# Patient Record
Sex: MALE | Race: WHITE | Employment: UNEMPLOYED | URBAN - METROPOLITAN AREA
[De-identification: names, ages, dates, MRNs, and addresses within clinical notes are randomized per-mention and may not be internally consistent; named-entity substitution may affect disease eponyms.]

---

## 2018-07-18 ENCOUNTER — HOSPITAL ENCOUNTER (EMERGENCY)
Age: 73
Discharge: HOME | End: 2018-07-18
Attending: EMERGENCY MEDICINE
Payer: COMMERCIAL

## 2018-07-18 VITALS
HEART RATE: 77 BPM | DIASTOLIC BLOOD PRESSURE: 57 MMHG | WEIGHT: 199 LBS | TEMPERATURE: 98.4 F | RESPIRATION RATE: 14 BRPM | SYSTOLIC BLOOD PRESSURE: 111 MMHG | OXYGEN SATURATION: 98 %

## 2018-07-18 DIAGNOSIS — Z76.89 ENCOUNTER FOR PSYCHIATRIC ASSESSMENT: Primary | ICD-10-CM

## 2018-07-18 PROCEDURE — 99283 EMERGENCY DEPT VISIT LOW MDM: CPT

## 2018-07-18 RX ORDER — LAMOTRIGINE 200 MG/1
200 TABLET ORAL DAILY
Status: ON HOLD | COMMUNITY
End: 2018-07-31

## 2018-07-18 RX ORDER — QUETIAPINE FUMARATE 300 MG/1
300 TABLET, FILM COATED ORAL EVERY EVENING
Status: ON HOLD | COMMUNITY
End: 2018-07-19 | Stop reason: ALTCHOICE

## 2018-07-18 RX ORDER — QUETIAPINE 200 MG/1
200 TABLET, FILM COATED, EXTENDED RELEASE ORAL 2 TIMES DAILY
Status: ON HOLD | COMMUNITY
End: 2018-07-19 | Stop reason: ALTCHOICE

## 2018-07-18 RX ORDER — TRAZODONE HYDROCHLORIDE 100 MG/1
100 TABLET ORAL NIGHTLY
Status: ON HOLD | COMMUNITY
End: 2018-07-19 | Stop reason: ALTCHOICE

## 2018-07-18 NOTE — ED NOTES
Provisional Diagnosis:  Bi-Polar Disorder     Psychosocial and Contextual Factors:    Pt has issues with relationships. Pt has issues with social environment. Pt states that he is suffering from \"Hypomania\". C-SSRS Summary: X    Patient: X  Family:  Agency: [facility]; [facility]     Present Suicidal Behavior:  Pt denies     Verbal:    Attempt:    Past Suicidal Behavior: Pt denies     Verbal:    Attempt:    Self-Injurious/Self-Mutilation:    Protective Factors:  Pt has housing. Pt has income. Pt has support. Pt has a Guardian: [place]. Risk Factors:  Pt has poor judgment, insight and coping skills. Clinical Summary:  Pt is a 72year old  male who presents to the ED via [facility] case workers. Pt reports that he is suffering from \"hypomania\". Pt is linked with [facility] and reports medication compliance. Pt reports great sleep and a good appetite. Pt states that he was just released from [facility] this morning. Pt states that he went to [facility] and decided that he was hungry and tired and wanted to come to the hospital. Pt then states that Alma Rosa Stewart made him come to the hospital to get checked out. Pt denies suicidal ideations. Pt denies homicidal ideations. Pt denies all halucinations. Pt reports that he lives in a group home in [place] [address] and his Jose Christiano is his daughter, Chari Mott. Level of Care Disposition:  Pt is not meeting criteria for an in-patient psychiatric admission. Pt to follow up at [facility].  Pt discharged

## 2018-07-19 ENCOUNTER — APPOINTMENT (OUTPATIENT)
Dept: CT IMAGING | Age: 73
DRG: 885 | End: 2018-07-19
Payer: COMMERCIAL

## 2018-07-19 ENCOUNTER — HOSPITAL ENCOUNTER (INPATIENT)
Age: 73
LOS: 12 days | Discharge: HOME | DRG: 885 | End: 2018-07-31
Attending: EMERGENCY MEDICINE | Admitting: PSYCHIATRY & NEUROLOGY
Payer: COMMERCIAL

## 2018-07-19 DIAGNOSIS — F31.9 BIPOLAR 1 DISORDER (HCC): Primary | ICD-10-CM

## 2018-07-19 LAB
ABSOLUTE EOS #: 0.1 K/UL (ref 0–0.4)
ABSOLUTE IMMATURE GRANULOCYTE: ABNORMAL K/UL (ref 0–0.3)
ABSOLUTE LYMPH #: 2.1 K/UL (ref 1–4.8)
ABSOLUTE MONO #: 0.6 K/UL (ref 0.1–1.3)
ALBUMIN SERPL-MCNC: 4 G/DL (ref 3.5–5.2)
ALBUMIN/GLOBULIN RATIO: ABNORMAL (ref 1–2.5)
ALP BLD-CCNC: 56 U/L (ref 40–129)
ALT SERPL-CCNC: 24 U/L (ref 5–41)
AMPHETAMINE SCREEN URINE: NEGATIVE
ANION GAP SERPL CALCULATED.3IONS-SCNC: 12 MMOL/L (ref 9–17)
AST SERPL-CCNC: 23 U/L
BARBITURATE SCREEN URINE: NEGATIVE
BASOPHILS # BLD: 1 % (ref 0–2)
BASOPHILS ABSOLUTE: 0 K/UL (ref 0–0.2)
BENZODIAZEPINE SCREEN, URINE: NEGATIVE
BILIRUB SERPL-MCNC: 0.23 MG/DL (ref 0.3–1.2)
BUN BLDV-MCNC: 16 MG/DL (ref 8–23)
BUN/CREAT BLD: ABNORMAL (ref 9–20)
BUPRENORPHINE URINE: NORMAL
CALCIUM SERPL-MCNC: 9.4 MG/DL (ref 8.6–10.4)
CANNABINOID SCREEN URINE: NEGATIVE
CHLORIDE BLD-SCNC: 101 MMOL/L (ref 98–107)
CO2: 25 MMOL/L (ref 20–31)
COCAINE METABOLITE, URINE: NEGATIVE
CREAT SERPL-MCNC: 0.92 MG/DL (ref 0.7–1.2)
DIFFERENTIAL TYPE: ABNORMAL
EOSINOPHILS RELATIVE PERCENT: 1 % (ref 0–4)
ETHANOL PERCENT: <0.01 %
ETHANOL: <10 MG/DL
GFR AFRICAN AMERICAN: >60 ML/MIN
GFR NON-AFRICAN AMERICAN: >60 ML/MIN
GFR SERPL CREATININE-BSD FRML MDRD: ABNORMAL ML/MIN/{1.73_M2}
GFR SERPL CREATININE-BSD FRML MDRD: ABNORMAL ML/MIN/{1.73_M2}
GLUCOSE BLD-MCNC: 131 MG/DL (ref 70–99)
HCT VFR BLD CALC: 44.4 % (ref 41–53)
HEMOGLOBIN: 14.7 G/DL (ref 13.5–17.5)
IMMATURE GRANULOCYTES: ABNORMAL %
LYMPHOCYTES # BLD: 28 % (ref 24–44)
MAGNESIUM: 2.1 MG/DL (ref 1.6–2.6)
MCH RBC QN AUTO: 32.4 PG (ref 26–34)
MCHC RBC AUTO-ENTMCNC: 33.2 G/DL (ref 31–37)
MCV RBC AUTO: 97.5 FL (ref 80–100)
MDMA URINE: NORMAL
METHADONE SCREEN, URINE: NEGATIVE
METHAMPHETAMINE, URINE: NORMAL
MONOCYTES # BLD: 9 % (ref 1–7)
NRBC AUTOMATED: ABNORMAL PER 100 WBC
OPIATES, URINE: NEGATIVE
OXYCODONE SCREEN URINE: NEGATIVE
PDW BLD-RTO: 13.9 % (ref 11.5–14.9)
PHENCYCLIDINE, URINE: NEGATIVE
PLATELET # BLD: 288 K/UL (ref 150–450)
PLATELET ESTIMATE: ABNORMAL
PMV BLD AUTO: 6.8 FL (ref 6–12)
POTASSIUM SERPL-SCNC: 4.7 MMOL/L (ref 3.7–5.3)
PROPOXYPHENE, URINE: NORMAL
RBC # BLD: 4.55 M/UL (ref 4.5–5.9)
RBC # BLD: ABNORMAL 10*6/UL
SEG NEUTROPHILS: 61 % (ref 36–66)
SEGMENTED NEUTROPHILS ABSOLUTE COUNT: 4.7 K/UL (ref 1.3–9.1)
SODIUM BLD-SCNC: 138 MMOL/L (ref 135–144)
TEST INFORMATION: NORMAL
TOTAL PROTEIN: 7.2 G/DL (ref 6.4–8.3)
TRICYCLIC ANTIDEPRESSANTS, UR: NORMAL
TSH SERPL DL<=0.05 MIU/L-ACNC: 3.05 MIU/L (ref 0.3–5)
WBC # BLD: 7.6 K/UL (ref 3.5–11)
WBC # BLD: ABNORMAL 10*3/UL

## 2018-07-19 PROCEDURE — 84439 ASSAY OF FREE THYROXINE: CPT

## 2018-07-19 PROCEDURE — 80307 DRUG TEST PRSMV CHEM ANLYZR: CPT

## 2018-07-19 PROCEDURE — 80053 COMPREHEN METABOLIC PANEL: CPT

## 2018-07-19 PROCEDURE — 84481 FREE ASSAY (FT-3): CPT

## 2018-07-19 PROCEDURE — 84443 ASSAY THYROID STIM HORMONE: CPT

## 2018-07-19 PROCEDURE — 99285 EMERGENCY DEPT VISIT HI MDM: CPT

## 2018-07-19 PROCEDURE — 6370000000 HC RX 637 (ALT 250 FOR IP): Performed by: PSYCHIATRY & NEUROLOGY

## 2018-07-19 PROCEDURE — 83735 ASSAY OF MAGNESIUM: CPT

## 2018-07-19 PROCEDURE — 85025 COMPLETE CBC W/AUTO DIFF WBC: CPT

## 2018-07-19 PROCEDURE — G0480 DRUG TEST DEF 1-7 CLASSES: HCPCS

## 2018-07-19 PROCEDURE — 70450 CT HEAD/BRAIN W/O DYE: CPT

## 2018-07-19 PROCEDURE — 80061 LIPID PANEL: CPT

## 2018-07-19 PROCEDURE — 1240000000 HC EMOTIONAL WELLNESS R&B

## 2018-07-19 PROCEDURE — 83036 HEMOGLOBIN GLYCOSYLATED A1C: CPT

## 2018-07-19 PROCEDURE — 36415 COLL VENOUS BLD VENIPUNCTURE: CPT

## 2018-07-19 RX ORDER — HYDROXYZINE HYDROCHLORIDE 25 MG/1
25 TABLET, FILM COATED ORAL 3 TIMES DAILY PRN
Status: DISCONTINUED | OUTPATIENT
Start: 2018-07-19 | End: 2018-07-24

## 2018-07-19 RX ORDER — PANTOPRAZOLE SODIUM 40 MG/1
40 TABLET, DELAYED RELEASE ORAL DAILY
Status: DISCONTINUED | OUTPATIENT
Start: 2018-07-20 | End: 2018-07-31 | Stop reason: HOSPADM

## 2018-07-19 RX ORDER — DIVALPROEX SODIUM 500 MG/1
500 TABLET, EXTENDED RELEASE ORAL DAILY
Status: ON HOLD | COMMUNITY
End: 2018-07-31 | Stop reason: HOSPADM

## 2018-07-19 RX ORDER — LAMOTRIGINE 100 MG/1
200 TABLET ORAL DAILY
Status: DISCONTINUED | OUTPATIENT
Start: 2018-07-20 | End: 2018-07-31 | Stop reason: HOSPADM

## 2018-07-19 RX ORDER — TRAZODONE HYDROCHLORIDE 100 MG/1
100 TABLET ORAL NIGHTLY PRN
Status: DISCONTINUED | OUTPATIENT
Start: 2018-07-19 | End: 2018-07-31 | Stop reason: HOSPADM

## 2018-07-19 RX ORDER — LOPERAMIDE HYDROCHLORIDE 2 MG/1
2 CAPSULE ORAL 2 TIMES DAILY PRN
Status: DISCONTINUED | OUTPATIENT
Start: 2018-07-19 | End: 2018-07-31 | Stop reason: HOSPADM

## 2018-07-19 RX ORDER — MAGNESIUM HYDROXIDE/ALUMINUM HYDROXICE/SIMETHICONE 120; 1200; 1200 MG/30ML; MG/30ML; MG/30ML
30 SUSPENSION ORAL 3 TIMES DAILY PRN
Status: DISCONTINUED | OUTPATIENT
Start: 2018-07-19 | End: 2018-07-31 | Stop reason: HOSPADM

## 2018-07-19 RX ORDER — DIVALPROEX SODIUM 500 MG/1
500 TABLET, EXTENDED RELEASE ORAL DAILY
Status: DISCONTINUED | OUTPATIENT
Start: 2018-07-19 | End: 2018-07-27

## 2018-07-19 RX ORDER — TRAZODONE HYDROCHLORIDE 100 MG/1
100 TABLET ORAL NIGHTLY PRN
Status: ON HOLD | COMMUNITY
End: 2018-07-31

## 2018-07-19 RX ORDER — ACETAMINOPHEN 500 MG
500 TABLET ORAL EVERY 4 HOURS PRN
Status: DISCONTINUED | OUTPATIENT
Start: 2018-07-19 | End: 2018-07-31 | Stop reason: HOSPADM

## 2018-07-19 RX ORDER — QUETIAPINE FUMARATE 300 MG/1
300 TABLET, FILM COATED ORAL NIGHTLY
Status: DISCONTINUED | OUTPATIENT
Start: 2018-07-19 | End: 2018-07-31 | Stop reason: HOSPADM

## 2018-07-19 RX ORDER — QUETIAPINE FUMARATE 300 MG/1
300 TABLET, FILM COATED ORAL NIGHTLY
Status: ON HOLD | COMMUNITY
End: 2018-07-31

## 2018-07-19 RX ORDER — OMEPRAZOLE 20 MG/1
20 CAPSULE, DELAYED RELEASE ORAL DAILY
Status: ON HOLD | COMMUNITY
End: 2018-07-31 | Stop reason: HOSPADM

## 2018-07-19 RX ADMIN — NICOTINE POLACRILEX 2 MG: 2 GUM, CHEWING BUCCAL at 22:31

## 2018-07-19 RX ADMIN — MAGNESIUM HYDROXIDE 10 ML: 400 SUSPENSION ORAL at 21:58

## 2018-07-19 RX ADMIN — TRAZODONE HYDROCHLORIDE 100 MG: 100 TABLET ORAL at 21:58

## 2018-07-19 RX ADMIN — QUETIAPINE FUMARATE 300 MG: 300 TABLET ORAL at 21:58

## 2018-07-19 RX ADMIN — HYDROXYZINE HYDROCHLORIDE 25 MG: 25 TABLET, FILM COATED ORAL at 21:58

## 2018-07-19 RX ADMIN — DIVALPROEX SODIUM 500 MG: 500 TABLET, EXTENDED RELEASE ORAL at 21:58

## 2018-07-19 ASSESSMENT — SLEEP AND FATIGUE QUESTIONNAIRES
AVERAGE NUMBER OF SLEEP HOURS: 5
DO YOU USE A SLEEP AID: YES
DIFFICULTY ARISING: NO
DIFFICULTY FALLING ASLEEP: NO
SLEEP PATTERN: EARLY AWAKENING
RESTFUL SLEEP: YES
DIFFICULTY STAYING ASLEEP: NO
DO YOU HAVE DIFFICULTY SLEEPING: NO

## 2018-07-19 ASSESSMENT — PAIN DESCRIPTION - ONSET: ONSET: ON-GOING

## 2018-07-19 ASSESSMENT — PAIN DESCRIPTION - PAIN TYPE: TYPE: CHRONIC PAIN

## 2018-07-19 ASSESSMENT — PAIN SCALES - GENERAL
PAINLEVEL_OUTOF10: 0
PAINLEVEL_OUTOF10: 2

## 2018-07-19 ASSESSMENT — PAIN DESCRIPTION - LOCATION: LOCATION: BACK

## 2018-07-19 ASSESSMENT — LIFESTYLE VARIABLES: HISTORY_ALCOHOL_USE: NO

## 2018-07-19 ASSESSMENT — PAIN DESCRIPTION - DESCRIPTORS: DESCRIPTORS: ACHING

## 2018-07-19 ASSESSMENT — PAIN DESCRIPTION - ORIENTATION: ORIENTATION: LOWER

## 2018-07-19 NOTE — ED NOTES

## 2018-07-19 NOTE — ED PROVIDER NOTES
(SEROQUEL XR) 200 MG EXTENDED RELEASE TABLET    Take 200 mg by mouth 2 times daily    QUETIAPINE (SEROQUEL) 300 MG TABLET    Take 300 mg by mouth every evening    TRAZODONE (DESYREL) 100 MG TABLET    Take 100 mg by mouth nightly     ALLERGIES     has No Known Allergies. FAMILY HISTORY     has no family status information on file. SOCIAL HISTORY      reports that he has been smoking Cigarettes. He has never used smokeless tobacco. He reports that he does not drink alcohol or use drugs. PHYSICAL EXAM     INITIAL VITALS: /65   Pulse 78   Temp 98.1 °F (36.7 °C)   Resp 16   Ht 5' 11\" (1.803 m)   Wt 198 lb (89.8 kg)   SpO2 98%   BMI 27.62 kg/m²    Physical Exam   Constitutional: He is oriented to person, place, and time. He appears well-developed and well-nourished. No distress. HENT:   Head: Normocephalic and atraumatic. Right Ear: External ear normal.   Left Ear: External ear normal.   Nose: Nose normal.   Mouth/Throat: Oropharynx is clear and moist.   Eyes: Conjunctivae and EOM are normal. Pupils are equal, round, and reactive to light. Right eye exhibits no discharge. Left eye exhibits no discharge. Neck: Normal range of motion. Neck supple. No tracheal deviation present. Cardiovascular: Normal rate, regular rhythm, normal heart sounds and intact distal pulses. Pulmonary/Chest: Effort normal and breath sounds normal. No stridor. No respiratory distress. He has no wheezes. He has no rales. He exhibits no tenderness. Abdominal: Soft. Bowel sounds are normal. There is no tenderness. There is no rebound and no guarding. Genitourinary:   Genitourinary Comments: Chronic mild rectal prolapse   Musculoskeletal: Normal range of motion. He exhibits no edema or tenderness. Neurological: He is alert and oriented to person, place, and time. No cranial nerve deficit. Coordination normal.   Skin: Skin is warm and dry. No rash noted. He is not diaphoretic. No erythema.    Psychiatric: His behavior is normal. Judgment and thought content normal.   Slightly anxious and hyperverbal, no si or hi, not psychotic, organized thinking       MEDICAL DECISION MAKING:   [facility]  Admitting psychiatrist requesting brain CT prior to admission to [facility] I have ordered this for him Dr. Jacinto Iniguez. He is medically clear for psychiatry admission. I reviewed the labs. Procedures    DIAGNOSTIC RESULTS     RADIOLOGY:All plain film, CT, MRI, and formal ultrasound images (except ED bedside ultrasound) are read by the radiologist, see reports below, unless otherwise noted in MDM or here. CT Head WO Contrast   Final Result   *No acute intracranial abnormality. *Mild chronic microvascular ischemic changes. *Questionable asymmetrical encephalomalacia involving the left cerebellar   hemisphere possibly representing prior infarct. LABS: All lab results were reviewed by myself, and all abnormals are listed below. Labs Reviewed   CBC WITH AUTO DIFFERENTIAL - Abnormal; Notable for the following:        Result Value    Monocytes 9 (*)     All other components within normal limits   COMPREHENSIVE METABOLIC PANEL - Abnormal; Notable for the following:     Glucose 131 (*)     Total Bilirubin 0.23 (*)     All other components within normal limits   ETHANOL   URINE DRUG SCREEN   MAGNESIUM   TSH WITH REFLEX     EMERGENCY DEPARTMENT COURSE:   Vitals:    Vitals:    07/19/18 1138   BP: 121/65   Pulse: 78   Resp: 16   Temp: 98.1 °F (36.7 °C)   SpO2: 98%   Weight: 198 lb (89.8 kg)   Height: 5' 11\" (1.803 m)         FINAL IMPRESSION      1. [facility]          DISPOSITION/PLAN   DISPOSITION  Admit to bhi    Griselda Albright, MD  Attending Emergency Physician  [employer] voice recognition software used in portions of this document. Griselda Albright, MD  07/19/18 7986    Reviewed CT brain no ICH, old CVA, patient still medically clear for psychiatry admission.        Griselda Albright, MD  07/19/18 4882

## 2018-07-19 NOTE — ED PROVIDER NOTES
[address] ED  eMERGENCY dEPARTMENT eNCOUnter    Pt Name: Priscilla Soto  MRN: [id number]  Maribelgfgrace 1945  Date of evaluation: 7/19/18  CHIEF COMPLAINT       Chief Complaint   Patient presents with    Psychiatric Evaluation     pt wants assessed for a hypomanic state     HISTORY OF PRESENT ILLNESS   HPI  71 yo M with pmh of bipolar presents to the ER from [address] [place] [address] after being released from shelter for psychiatric evaluation. Pt sates that he is here because he is hypomanic. Pt denies SI, HI, hallucinations, delusions and offers no somatic complaints. Pt's mood is elated and he is asking for food. Pt is elated in mood and very talkative. REVIEW OF SYSTEMS     Review of Systems   All other systems reviewed and are negative. PAST MEDICAL HISTORY     Past Medical History:   Diagnosis Date    Bipolar 1 disorder ([facility]      SURGICAL HISTORY     History reviewed. No pertinent surgical history. CURRENT MEDICATIONS       Discharge Medication List as of 7/18/2018  3:02 PM      CONTINUE these medications which have NOT CHANGED    Details   QUEtiapine (SEROQUEL XR) 200 MG extended release tablet Take 200 mg by mouth 2 times dailyHistorical Med      QUEtiapine (SEROQUEL) 300 MG tablet Take 300 mg by mouth every eveningHistorical Med      lamoTRIgine (LAMICTAL) 200 MG tablet Take 200 mg by mouth dailyHistorical Med      traZODone (DESYREL) 100 MG tablet Take 100 mg by mouth nightlyHistorical Med           ALLERGIES     has No Known Allergies. FAMILY HISTORY     has no family status information on file. SOCIAL HISTORY      reports that he has been smoking Cigarettes. He has never used smokeless tobacco. He reports that he does not drink alcohol or use drugs. PHYSICAL EXAM     INITIAL VITALS: BP (!) 111/57   Pulse 77   Temp 98.4 °F (36.9 °C) (Oral)   Resp 14   Wt 199 lb (90.3 kg)   SpO2 98%    Physical Exam   Constitutional: He is oriented to person, place, and time.  He appears well-developed and well-nourished. No distress. HENT:   Head: Normocephalic and atraumatic. Nose: Nose normal.   Mouth/Throat: Oropharynx is clear and moist.   Eyes: Conjunctivae and EOM are normal. Pupils are equal, round, and reactive to light. Neck: Normal range of motion. Neck supple. Cardiovascular: Normal rate, regular rhythm, normal heart sounds and intact distal pulses. Exam reveals no gallop and no friction rub. No murmur heard. Pulmonary/Chest: Effort normal and breath sounds normal. No stridor. No respiratory distress. He has no wheezes. He has no rales. He exhibits no tenderness. Abdominal: Soft. Bowel sounds are normal. He exhibits no distension. There is no tenderness. There is no rebound and no guarding. Musculoskeletal: Normal range of motion. He exhibits no edema, tenderness or deformity. Neurological: He is alert and oriented to person, place, and time. No cranial nerve deficit. Skin: Skin is warm and dry. No rash noted. He is not diaphoretic. No erythema. Psychiatric: His behavior is normal. Judgment and thought content normal.   Elated mood, colorful affect   Nursing note and vitals reviewed. MEDICAL DECISION MAKING:   [facility]    Pt displays insight and judgement. No indications for involuntary hold. Pt is appropriate for dc home at this time      Procedures    DIAGNOSTIC RESULTS   EKG: All EKG's are interpreted by the Emergency Department Physician who either signs or Co-signs this chart in the absence of a cardiologist.      RADIOLOGY:All plain film, CT, MRI, and formal ultrasound images (except ED bedside ultrasound) are read by the radiologist, see reports below, unless otherwise noted in MDM or here. No orders to display     LABS: All lab results were reviewed by myself, and all abnormals are listed below.   Labs Reviewed - No data to display  EMERGENCY DEPARTMENT COURSE:   Vitals:    Vitals:    07/18/18 1313   BP: (!) 111/57   Pulse: 77   Resp: 14   Temp: 98.4 °F (36.9 °C)   TempSrc: Oral   SpO2: 98%   Weight: 199 lb (90.3 kg)       The patient was given the following medications while in the emergency department:  No orders of the defined types were placed in this encounter. CONSULTS:  None    FINAL IMPRESSION      1. Encounter for psychiatric assessment          DISPOSITION/PLAN   DISPOSITION Decision To Discharge 07/18/2018 02:23:24 PM      PATIENT REFERRED TO:  Isaiah Gomez  [facility] [address]  [phone]  Schedule an appointment as soon as possible for a visit       DISCHARGE MEDICATIONS:  Discharge Medication List as of 7/18/2018  3:02 PM        Lolita Olson MD  Attending Emergency Physician  [employer] voice recognition software used in portions of this document.                   Lolita Olson MD  07/19/18 3901

## 2018-07-19 NOTE — ED NOTES
Provisional Diagnosis:   Bipolar    Psychosocial and Contextual Factors:   Patient has social issues. Patient lives in a group home. C-SSRS Summary:      Patient: X  Family:   Agency: X (EPIC)    Substance Abuse: Patient denies. Present Suicidal Behavior:  Patient denies. Verbal:      Attempt:     Past Suicidal Behavior: Patient denies. Verbal:     Attempt:       Self-Injurious/Self-Mutilation: Patient denies. Trauma Identified:  Patient denies. Protective Factors:    Patient has housing. Patient has insurance. Patient is linked with mental health agency. Risk Factors:    Patient has history of non compliance. Patient has relationship issues. Clinical Summary:    Patient is 72year old  male that comes into the ED today as a referral from Dr. Eloy Hu. Patient is currently having issues in his group home. Patient is manic, aggressive and impulsive per Dr. Eloy Hu. Patient is currently denying suicidal and homicidal ideations. Patient reports he \"packed up all my stuff and ready to go. \" Patient states \"I think I'm moving. \" Patient reports he currently lives in a group home. It is noted that patient is having conflicts with other residents in his group home as well as the group home staff. Patient is linked with [facility] but has a history of non compliance. Patient states Neeraj Enrique I'm here to play the piano today. \"  Patient has a guardian. It is noted it is difficult to redirect patient throughout assessment. Level of Care Disposition:    Dr. Eloy Hu consulted and patient to be admitted to the [facility] for safety and stabilization.

## 2018-07-20 ENCOUNTER — APPOINTMENT (OUTPATIENT)
Dept: MRI IMAGING | Age: 73
DRG: 885 | End: 2018-07-20
Payer: COMMERCIAL

## 2018-07-20 LAB
CHOLESTEROL/HDL RATIO: 3.9
CHOLESTEROL: 197 MG/DL
ESTIMATED AVERAGE GLUCOSE: 111 MG/DL
HBA1C MFR BLD: 5.5 % (ref 4–6)
HDLC SERPL-MCNC: 50 MG/DL
LDL CHOLESTEROL: 119 MG/DL (ref 0–130)
T3 FREE: 3.54 PG/ML (ref 2.02–4.43)
THYROXINE, FREE: 1.25 NG/DL (ref 0.93–1.7)
TRIGL SERPL-MCNC: 142 MG/DL
VLDLC SERPL CALC-MCNC: NORMAL MG/DL (ref 1–30)

## 2018-07-20 PROCEDURE — 6370000000 HC RX 637 (ALT 250 FOR IP): Performed by: PSYCHIATRY & NEUROLOGY

## 2018-07-20 PROCEDURE — 84481 FREE ASSAY (FT-3): CPT

## 2018-07-20 PROCEDURE — 80061 LIPID PANEL: CPT

## 2018-07-20 PROCEDURE — 1240000000 HC EMOTIONAL WELLNESS R&B

## 2018-07-20 PROCEDURE — 36415 COLL VENOUS BLD VENIPUNCTURE: CPT

## 2018-07-20 PROCEDURE — 84439 ASSAY OF FREE THYROXINE: CPT

## 2018-07-20 RX ORDER — LORAZEPAM 2 MG/ML
2 INJECTION INTRAMUSCULAR ONCE
Status: DISCONTINUED | OUTPATIENT
Start: 2018-07-20 | End: 2018-07-24

## 2018-07-20 RX ADMIN — TRAZODONE HYDROCHLORIDE 100 MG: 100 TABLET ORAL at 22:27

## 2018-07-20 RX ADMIN — ALUMINUM HYDROXIDE, MAGNESIUM HYDROXIDE, AND SIMETHICONE 30 ML: 200; 200; 20 SUSPENSION ORAL at 11:10

## 2018-07-20 RX ADMIN — HYDROXYZINE HYDROCHLORIDE 25 MG: 25 TABLET, FILM COATED ORAL at 03:51

## 2018-07-20 RX ADMIN — CARIPRAZINE 1.5 MG: 1.5 CAPSULE, GELATIN COATED ORAL at 08:59

## 2018-07-20 RX ADMIN — DIVALPROEX SODIUM 500 MG: 500 TABLET, EXTENDED RELEASE ORAL at 09:00

## 2018-07-20 RX ADMIN — ALUMINUM HYDROXIDE, MAGNESIUM HYDROXIDE, AND SIMETHICONE 30 ML: 200; 200; 20 SUSPENSION ORAL at 15:19

## 2018-07-20 RX ADMIN — NICOTINE POLACRILEX 2 MG: 2 GUM, CHEWING BUCCAL at 12:39

## 2018-07-20 RX ADMIN — QUETIAPINE FUMARATE 150 MG: 100 TABLET ORAL at 09:00

## 2018-07-20 RX ADMIN — NICOTINE POLACRILEX 2 MG: 2 GUM, CHEWING BUCCAL at 19:44

## 2018-07-20 RX ADMIN — QUETIAPINE FUMARATE 300 MG: 300 TABLET ORAL at 22:27

## 2018-07-20 RX ADMIN — LAMOTRIGINE 200 MG: 100 TABLET ORAL at 09:00

## 2018-07-20 RX ADMIN — PANTOPRAZOLE SODIUM 40 MG: 40 TABLET, DELAYED RELEASE ORAL at 08:59

## 2018-07-20 ASSESSMENT — LIFESTYLE VARIABLES: HISTORY_ALCOHOL_USE: NO

## 2018-07-20 ASSESSMENT — PAIN SCALES - GENERAL: PAINLEVEL_OUTOF10: 0

## 2018-07-20 NOTE — PLAN OF CARE
Problem: Altered Mood, Manic Behavior:  Goal: Able to verbalize decrease in frequency and intensity of racing thoughts  Able to verbalize decrease in frequency and intensity of racing thoughts   Outcome: Ongoing  Psychoeducation Group Note    Date: 7/20/2018  Start Time: 1430  End Time: 1515    Number Participants in Group:  10    Goal:  Patient will demonstrate increased interpersonal interaction. Topic:  Creative Expression / Stress and Relaxation / Positive Coping Skills    Discipline Responsible:   OT  AT  [facility]. X RT  Other       Participation Level:     None  Minimal    Active Listener x Interactive   x Monopolizing         Participation Quality:   Appropriate x Inappropriate          Attentive x       Intrusive   x       Sharing        Resistant          Supportive        Lethargic       Affective:    Congruent x Incongruent  Blunted  Flat    Constricted  Anxious  Elated  Angry    Labile  Depressed  Other  Bright       Cognitive:  x Alert x Oriented PPTP     Concentration  G  F x P   Attention Span  G  F x P   Short-Term Memory  G x F  P   Long-Term Memory  G  F  P   ProblemSolving/  Decision Making  G  F x P   Ability to Process  Information  G x F  P      Contributing Factors             Delusional             Hallucinating   x          Flight of Ideas             Other:       Modes of Intervention:   Education  Support x Exploration   x Clarifying x Problem Solving  Confrontation   x Socialization x Limit Setting x Reality Testing   x Activity  Movement  Media    Other:            Response to Learning:  x Able to verbalize current knowledge/experience    Able to verbalize/acknowledge new learning    Able to retain information    Capable of insight    Able to change behavior   x Progressing to goal    Other:        Comments:  Patient had poor concentration during group and needed constant redirection. Patient is currently manic with flight of ideas.  Patient walked away from group after he upset peers

## 2018-07-20 NOTE — PLAN OF CARE
Problem: Altered Mood, Manic Behavior:  Goal: Able to verbalize decrease in frequency and intensity of racing thoughts  Able to verbalize decrease in frequency and intensity of racing thoughts   Outcome: Ongoing  Patient is manic, has pressured speech, has flight of ideas, easily agitated, delusions and having thoughts of hurting others, no one specific. Writer encouraged patient to keep staff informed of any side effects he may have, talk to staff when he is feeling like he is loosing control and writer spoke to patient about the importance of staying on his medications when he feels fine and going to his community mental health appointments.

## 2018-07-20 NOTE — PLAN OF CARE
Problem: Altered Mood, Manic Behavior:  Goal: Able to verbalize decrease in frequency and intensity of racing thoughts  Able to verbalize decrease in frequency and intensity of racing thoughts   Outcome: Ongoing  Pt did not attend Therapeutic Recreation at 1330 d/t resting in room despite staff invitation to attend.

## 2018-07-20 NOTE — PLAN OF CARE
Problem: Altered Mood, Manic Behavior:  Goal: Able to verbalize decrease in frequency and intensity of racing thoughts  Able to verbalize decrease in frequency and intensity of racing thoughts   Outcome: Ongoing  Pt relates he knows he needs to take his medication to slow his thoughts. Does relate that he has been taking his meds but that \"they are not metabolizing correctly and that is why they do not work. \"  Goal: Ability to demonstrate self-control will improve  Ability to demonstrate self-control will improve   Outcome: Ongoing  Pt continues to display manic behavior AEB poor self control, inability to focus on one task for long periods of time, ideas of influence. Problem: Risk of Harm:  Goal: Ability to remain free from injury will improve  Ability to remain free from injury will improve    Outcome: Ongoing  No injury this shift.

## 2018-07-20 NOTE — PLAN OF CARE
Problem: Altered Mood, Manic Behavior:  Goal: Able to verbalize decrease in frequency and intensity of racing thoughts  Able to verbalize decrease in frequency and intensity of racing thoughts   Outcome: Ongoing  [facility]  Initial Interdisciplinary Treatment Plan NOTE    Original treatment plan Date & Time: 7/20/2018 0857    Admission Type:  Admission Type: Voluntary    Reason for admission:   Reason for Admission: from [facility] off medications, lives in group home. denies SI -pressured thoughts,states sleeping 5 hours a night.      Estimated Length of Stay:  5-7days  Estimated Discharge Date:  7/26/2018 to be determined by physician    PATIENT STRENGTHS:  Patient Strengths:Strengths: Positive Support  Patient Strengths and Limitations:Limitations: Perceives need for assistance with self-care, External locus of control  Addictive Behavior: Addictive Behavior  In the past 3 months, have you felt or has someone told you that you have a problem with:  : None  Do you have a history of Chemical Use?: No  Do you have a history of Alcohol Use?: No  Do you have a history of Street Drug Abuse?: No  Histroy of Prescripton Drug Abuse?: No  Medical Problems:  Past Medical History:   Diagnosis Date    Back pain     [facility]     [facility]     bowel    [facility]     GERD (gastroesophageal reflux disease)     Heart attack      Status EXAM:Status and Exam  Normal: No  Facial Expression: Brightened, Exaggerated  Affect: Congruent  Level of Consciousness: Alert  Mood:Normal: No  Mood: Anxious, Elated  Motor Activity:Normal: Yes  Interview Behavior: Cooperative  Preception: [place] to Person, Marsha Megan to Time, [place] to Place  Attention:Normal: No  Attention: Distractible  Thought Processes: Flt.of Ideas  Thought Content:Normal: No  Thought Content: Ideas of Influence  Hallucinations: None  Delusions: No  Memory:Normal: No  Memory: Poor Recent  Insight and Judgment: No  Insight and Judgment: Poor Judgment  Present Suicidal Ideation: No  Present Homicidal Ideation: No    EDUCATION:   Learner Progress Toward Treatment Goals:  Reviewed group plans and strategies for care    Method:  Group therapy, medication compliance, individualized assessments and care planning    Outcome:  Needs reinforcement    PATIENT GOALS:  Pt did not identify, to be discussed with patient within 72 hours.     PLAN/TREATMENT RECOMMENDATIONS:   Group therapy, medications compliance, goal setting, individualized assessments and care, continue to monitor pt on unit      SHORT-TERM GOALS:   Time frame for Short-Term Goals:  5-7 days    LONG-TERM GOALS:  Time frame for Long-Term Goals:  6 months    Members Present in Team Meeting:   See signature sheet    SEBLE Borja  Goal: Ability to demonstrate self-control will improve  Ability to demonstrate self-control will improve   Outcome: Ongoing

## 2018-07-20 NOTE — PLAN OF CARE
Problem: Altered Mood, Manic Behavior:  Goal: Able to verbalize decrease in frequency and intensity of racing thoughts  Able to verbalize decrease in frequency and intensity of racing thoughts   Outcome: Ongoing  PSYCHOTHERAP GROUP NOTE    Date: 7/20/18  Start Time: 10 AM  End Time: 1045AM    Number Participants in Group:  13    Goal:  Patient will demonstrate increased interpersonal interaction   Topic: Support     Discipline Responsible:   OT  AT x SW  Nsg.  RT MHP Other       Participation Level:     None  Minimal    Active Listener x Interactive   x Monopolizing         Participation Quality:   Appropriate x Inappropriate          Attentive        Intrusive   x       Sharing        Resistant          Supportive        Lethargic       Affective:   x Congruent  Incongruent  Blunted  Flat    Constricted  Anxious  Elated  Angry    Labile  Depressed  Other         Cognitive:  x Alert x Oriented PPTP     Concentration  G  F x P   Attention Span  G  F x P   Short-Term Memory  G  F x P   Long-Term Memory  G  F x P   ProblemSolving/  Decision Making  G  F x P   Ability to Process  Information  G  F x P      Contributing Factors             Delusional             Hallucinating   x          Flight of Ideas             Other:       Modes of Intervention:   Education x Support  Exploration    Clarifying  Problem Solving  Confrontation    Socialization  Limit Setting  Reality Testing    Activity  Movement  Media    Other:            Response to Learning:  x Able to verbalize current knowledge/experience    Able to verbalize/acknowledge new learning    Able to retain information   x Capable of insight    Able to change behavior    Progressing to goal    Other:        Comments:   Stated, \"I want to rape and kill people\"- angered multiple females in group.  Pt did eventually apologize and stated he likes to evoke reactions from other s

## 2018-07-21 ENCOUNTER — APPOINTMENT (OUTPATIENT)
Dept: MRI IMAGING | Age: 73
DRG: 885 | End: 2018-07-21
Payer: COMMERCIAL

## 2018-07-21 PROCEDURE — 6360000004 HC RX CONTRAST MEDICATION: Performed by: PSYCHIATRY & NEUROLOGY

## 2018-07-21 PROCEDURE — A9579 GAD-BASE MR CONTRAST NOS,1ML: HCPCS | Performed by: PSYCHIATRY & NEUROLOGY

## 2018-07-21 PROCEDURE — 70553 MRI BRAIN STEM W/O & W/DYE: CPT

## 2018-07-21 PROCEDURE — 6360000002 HC RX W HCPCS: Performed by: PSYCHIATRY & NEUROLOGY

## 2018-07-21 PROCEDURE — 1240000000 HC EMOTIONAL WELLNESS R&B

## 2018-07-21 PROCEDURE — 6370000000 HC RX 637 (ALT 250 FOR IP): Performed by: PSYCHIATRY & NEUROLOGY

## 2018-07-21 PROCEDURE — 2580000003 HC RX 258: Performed by: PSYCHIATRY & NEUROLOGY

## 2018-07-21 RX ORDER — SODIUM CHLORIDE 0.9 % (FLUSH) 0.9 %
10 SYRINGE (ML) INJECTION PRN
Status: DISCONTINUED | OUTPATIENT
Start: 2018-07-21 | End: 2018-07-24

## 2018-07-21 RX ORDER — LORAZEPAM 2 MG/ML
2 INJECTION INTRAMUSCULAR ONCE
Status: COMPLETED | OUTPATIENT
Start: 2018-07-21 | End: 2018-07-21

## 2018-07-21 RX ADMIN — QUETIAPINE FUMARATE 150 MG: 100 TABLET ORAL at 08:12

## 2018-07-21 RX ADMIN — HYDROXYZINE HYDROCHLORIDE 25 MG: 25 TABLET, FILM COATED ORAL at 21:43

## 2018-07-21 RX ADMIN — LORAZEPAM 2 MG: 2 INJECTION INTRAMUSCULAR; INTRAVENOUS at 12:33

## 2018-07-21 RX ADMIN — NICOTINE POLACRILEX 2 MG: 2 GUM, CHEWING BUCCAL at 18:12

## 2018-07-21 RX ADMIN — Medication 10 ML: at 13:38

## 2018-07-21 RX ADMIN — NICOTINE POLACRILEX 2 MG: 2 GUM, CHEWING BUCCAL at 14:55

## 2018-07-21 RX ADMIN — LAMOTRIGINE 200 MG: 100 TABLET ORAL at 08:12

## 2018-07-21 RX ADMIN — TRAZODONE HYDROCHLORIDE 100 MG: 100 TABLET ORAL at 21:43

## 2018-07-21 RX ADMIN — PANTOPRAZOLE SODIUM 40 MG: 40 TABLET, DELAYED RELEASE ORAL at 08:12

## 2018-07-21 RX ADMIN — GADOTERIDOL 18 ML: 279.3 INJECTION, SOLUTION INTRAVENOUS at 13:37

## 2018-07-21 RX ADMIN — DIVALPROEX SODIUM 500 MG: 500 TABLET, EXTENDED RELEASE ORAL at 08:12

## 2018-07-21 RX ADMIN — QUETIAPINE FUMARATE 300 MG: 300 TABLET ORAL at 21:43

## 2018-07-21 RX ADMIN — CARIPRAZINE 1.5 MG: 1.5 CAPSULE, GELATIN COATED ORAL at 08:12

## 2018-07-21 NOTE — PLAN OF CARE
Problem: Falls - Risk of:  Goal: Will remain free from falls  Will remain free from falls   Outcome: Ongoing  Pt remains free from falls, will continue to monitor pt.

## 2018-07-21 NOTE — H&P
Social History     Social History    Marital status:      Spouse name: N/A    Number of children: N/A    Years of education: N/A     Social History Main Topics    Smoking status: Current Some Day Smoker     Types: Cigarettes, Pipe    Smokeless tobacco: Never Used      Comment: unable to identify an amount    Alcohol use No    Drug use: No    Sexual activity: No     Other Topics Concern    None     Social History Narrative    None           REVIEW OF SYSTEMS      No Known Allergies    No current facility-administered medications on file prior to encounter. Current Outpatient Prescriptions on File Prior to Encounter   Medication Sig Dispense Refill    lamoTRIgine (LAMICTAL) 200 MG tablet Take 200 mg by mouth daily                        General health:  Fairly good. No fever or chills. Skin:  No itching, redness or rash. Head, eyes, ears, nose, throat:  No headache, epistaxis, rhinorrhea hearing loss or sore throat. Neck:  No pain, stiffness or masses. Cardiovascular/Respiratory system:  No chest pain, palpitation, shortness of breath, coughing or expectoration. Gastrointestinal tract: No abdominal pain, nausea, vomiting, diarrhea or constipation. Genitourinary:  No burning on micturition. No hesitancy, urgency, frequency or discoloration of urine. Locomotor:  No bone or joint pains. No swelling or deformities. Neuropsychiatric:  See HPI. GENERAL PHYSICAL EXAM:     Vitals: /81   Pulse 76   Temp 97.3 °F (36.3 °C) (Oral)   Resp 14   Ht 5' 11\" (1.803 m)   Wt 198 lb (89.8 kg)   SpO2 98%   BMI 27.62 kg/m²  Body mass index is 27.62 kg/m². Pt was examined with a nurse present in the room. GENERAL APPEARANCE:  Carolyn Mercado is 72 y.o.,  male, mildly obese, nourished, conscious, alert. Does not appear to be distress or pain at this time. SKIN:  Warm, dry, no cyanosis or jaundice.     HEAD: Normocephalic, atraumatic, no swelling or tenderness. EYES:  Pupils equal, reactive to light, Conjunctiva is clear, EOMs intact adolfo. eyelids WNL. EARS:  No discharge, no marked hearing loss. NOSE:  No rhinorrhea, epistaxis or septal deformity. THROAT:  Not congested. No ulceration bleeding or discharge. NECK:  No stiffness, trachea central.  No palpable masses or L.N.      CHEST:  Symmetrical and equal on expansion. HEART:  Regular rate and rhythm. S1 > S2, No audible murmurs or gallops. LUNGS:  Equal on expansion, normal breath sounds. No adventitious sounds. ABDOMEN:  Obese. Soft on palpation. No localized tenderness. No guarding or rigidity. No palpable organomegaly. LYMPHATICS:  No palpable Lymphadenopathy. LOCOMOTOR, BACK AND SPINE:  No tenderness or deformities. EXTREMITIES:  Symmetrical, no pedal edema. Adalbertos sign negative. No discoloration or ulcerations. NEUROLOGIC:  The patient is conscious, alert, oriented, Gait and balance WNL. No apparent focal sensory deficits. No motor deficits, muscle strength equal Adolfo. No facial droop, tongue protrudes centrally, no slurring of the speech. PROVISIONAL DIAGNOSES:      Principal Problem:    Bipolar 1 disorder ([facility] [address]  Resolved Problems:    * No resolved hospital problems.  Beverly SALDAÑA PA-C on 7/21/2018 at 2:08 PM

## 2018-07-21 NOTE — PLAN OF CARE
improve   Outcome: Ongoing      Problem: Tobacco Use:  Goal: Inpatient tobacco use cessation counseling participation  Inpatient tobacco use cessation counseling participation   Outcome: Ongoing

## 2018-07-21 NOTE — PLAN OF CARE
Pt declined to attend psychoeducational group at 1000 am despite encouragement. Pt offered 1:1 and refused.

## 2018-07-21 NOTE — PLAN OF CARE
Problem: Altered Mood, Manic Behavior:  Goal: Ability to demonstrate self-control will improve  Ability to demonstrate self-control will improve   Outcome: Ongoing  Pt did not attend Therapeutic Recreation at 1330 d/t resting in room despite staff invitation to attend.

## 2018-07-22 PROCEDURE — 6370000000 HC RX 637 (ALT 250 FOR IP): Performed by: PSYCHIATRY & NEUROLOGY

## 2018-07-22 PROCEDURE — 1240000000 HC EMOTIONAL WELLNESS R&B

## 2018-07-22 RX ADMIN — NICOTINE POLACRILEX 2 MG: 2 GUM, CHEWING BUCCAL at 15:46

## 2018-07-22 RX ADMIN — ACETAMINOPHEN 500 MG: 500 TABLET, FILM COATED ORAL at 08:37

## 2018-07-22 RX ADMIN — TRAZODONE HYDROCHLORIDE 100 MG: 100 TABLET ORAL at 21:34

## 2018-07-22 RX ADMIN — CARIPRAZINE 1.5 MG: 1.5 CAPSULE, GELATIN COATED ORAL at 08:38

## 2018-07-22 RX ADMIN — NICOTINE POLACRILEX 2 MG: 2 GUM, CHEWING BUCCAL at 10:01

## 2018-07-22 RX ADMIN — PANTOPRAZOLE SODIUM 40 MG: 40 TABLET, DELAYED RELEASE ORAL at 08:38

## 2018-07-22 RX ADMIN — QUETIAPINE FUMARATE 300 MG: 300 TABLET ORAL at 21:34

## 2018-07-22 RX ADMIN — HYDROXYZINE HYDROCHLORIDE 25 MG: 25 TABLET, FILM COATED ORAL at 21:34

## 2018-07-22 RX ADMIN — QUETIAPINE FUMARATE 150 MG: 100 TABLET ORAL at 08:38

## 2018-07-22 RX ADMIN — ALUMINUM HYDROXIDE, MAGNESIUM HYDROXIDE, AND SIMETHICONE 30 ML: 200; 200; 20 SUSPENSION ORAL at 19:45

## 2018-07-22 RX ADMIN — LAMOTRIGINE 200 MG: 100 TABLET ORAL at 08:38

## 2018-07-22 RX ADMIN — DIVALPROEX SODIUM 500 MG: 500 TABLET, EXTENDED RELEASE ORAL at 08:38

## 2018-07-22 ASSESSMENT — PAIN SCALES - GENERAL
PAINLEVEL_OUTOF10: 2
PAINLEVEL_OUTOF10: 2

## 2018-07-22 NOTE — PLAN OF CARE
Problem: Altered Mood, Manic Behavior:  Intervention: Supervise medication intake  Patient took all medications as prescribed. Goal: Able to verbalize decrease in frequency and intensity of racing thoughts  Able to verbalize decrease in frequency and intensity of racing thoughts   Outcome: Ongoing  Patient has pressured speech that is tangential and loosely associated with current conversation. Goal: Ability to demonstrate self-control will improve  Ability to demonstrate self-control will improve   Outcome: Ongoing  Patient has been in control of self this shift so far.

## 2018-07-22 NOTE — FLOWSHEET NOTE
Patient asked to see a ; patient wanted  to escort him to a piano in the main hospital;  explained she was unable to do this because it went against hospital policy; listening presence; listened to Chacha vega per patient's request;     07/22/18 Sarahi 54   Encounter Summary   Services provided to: Patient   Referral/Consult From: Rounding   Continue Visiting (7/22/18)   Complexity of Encounter Moderate   Length of Encounter 15 minutes   Spiritual Assessment Completed Yes   Routine   Type Initial   Spiritual/Catholic   Type Spiritual support   Assessment Approachable   Intervention Prayer;Sustaining presence/ Ministry of presence   Outcome Expressed gratitude;Engaged in conversation;Receptive

## 2018-07-22 NOTE — PLAN OF CARE
Problem: Risk of Harm:  Goal: Ability to remain free from injury will improve  Ability to remain free from injury will improve    Outcome: Ongoing  Patient has not had any falls this shift so far.

## 2018-07-22 NOTE — PLAN OF CARE
Problem: Altered Mood, Manic Behavior:  Goal: Ability to demonstrate self-control will improve  Ability to demonstrate self-control will improve   Outcome: Ongoing  PSYCHOEDUCATION GROUP NOTE    Date: 7/22/18  Start Time: 0845  End Time: 0900    Number Participants in Group:  10/21    Goal:  Patient will demonstrate increased interpersonal interaction   Topic: Community Meeting     Discipline Responsible:   OT  AT    Ns. x RT MHP Other       Participation Level:     None  Minimal   x Active Listener x Interactive    Monopolizing         Participation Quality:  x Appropriate  Inappropriate   x       Attentive        Intrusive   x       Sharing        Resistant          Supportive        Lethargic       Affective:   x Congruent  Incongruent  Blunted  Flat    Constricted  Anxious  Elated  Angry    Labile  Depressed  Other         Cognitive:  x Alert x Oriented PPTP     Concentration  G x F  P   Attention Span  G x F  P   Short-Term Memory  G x F  P   Long-Term Memory  G x F  P   ProblemSolving/  Decision Making  G x F  P   Ability to Process  Information  G x F  P      Contributing Factors             Delusional             Hallucinating   x          Flight of Ideas             Other:       Modes of Intervention:  x Education x Support x Exploration   x Clarifying x Problem Solving  Confrontation   x Socialization  Limit Setting x Reality Testing    Activity  Movement  Media    Other:            Response to Learning:   Able to verbalize current knowledge/experience    Able to verbalize/acknowledge new learning   x Able to retain information    Capable of insight    Able to change behavior   x Progressing to goal    Other:        Comments:

## 2018-07-22 NOTE — PLAN OF CARE
Problem: Falls - Risk of:  Goal: Will remain free from falls  Will remain free from falls   Outcome: Ongoing  No falls this shift. Problem: Altered Mood, Manic Behavior:  Goal: Able to verbalize decrease in frequency and intensity of racing thoughts  Able to verbalize decrease in frequency and intensity of racing thoughts   Outcome: Ongoing  Pt denies having racing thoughts states \" these meds are slowing me down like they are suppose to. \"  Goal: Ability to demonstrate self-control will improve  Ability to demonstrate self-control will improve   Outcome: Ongoing  Pt aloof of peers, less manic behaviors noted. Problem: Risk of Harm:  Goal: Ability to remain free from injury will improve  Ability to remain free from injury will improve    Outcome: Ongoing  No injury this shift.

## 2018-07-23 LAB
EKG ATRIAL RATE: 80 BPM
EKG P AXIS: 63 DEGREES
EKG P-R INTERVAL: 132 MS
EKG Q-T INTERVAL: 434 MS
EKG QRS DURATION: 130 MS
EKG QTC CALCULATION (BAZETT): 500 MS
EKG R AXIS: -51 DEGREES
EKG T AXIS: 60 DEGREES
EKG VENTRICULAR RATE: 80 BPM

## 2018-07-23 PROCEDURE — 1240000000 HC EMOTIONAL WELLNESS R&B

## 2018-07-23 PROCEDURE — 6370000000 HC RX 637 (ALT 250 FOR IP): Performed by: PSYCHIATRY & NEUROLOGY

## 2018-07-23 PROCEDURE — 93005 ELECTROCARDIOGRAM TRACING: CPT

## 2018-07-23 RX ADMIN — NICOTINE POLACRILEX 2 MG: 2 GUM, CHEWING BUCCAL at 09:42

## 2018-07-23 RX ADMIN — QUETIAPINE FUMARATE 150 MG: 100 TABLET ORAL at 08:03

## 2018-07-23 RX ADMIN — NICOTINE POLACRILEX 2 MG: 2 GUM, CHEWING BUCCAL at 23:03

## 2018-07-23 RX ADMIN — QUETIAPINE FUMARATE 300 MG: 300 TABLET ORAL at 22:19

## 2018-07-23 RX ADMIN — HYDROXYZINE HYDROCHLORIDE 25 MG: 25 TABLET, FILM COATED ORAL at 00:21

## 2018-07-23 RX ADMIN — PANTOPRAZOLE SODIUM 40 MG: 40 TABLET, DELAYED RELEASE ORAL at 08:03

## 2018-07-23 RX ADMIN — DIVALPROEX SODIUM 500 MG: 500 TABLET, EXTENDED RELEASE ORAL at 08:03

## 2018-07-23 RX ADMIN — NICOTINE POLACRILEX 2 MG: 2 GUM, CHEWING BUCCAL at 17:49

## 2018-07-23 RX ADMIN — NICOTINE POLACRILEX 2 MG: 2 GUM, CHEWING BUCCAL at 13:36

## 2018-07-23 RX ADMIN — LAMOTRIGINE 200 MG: 100 TABLET ORAL at 08:02

## 2018-07-23 RX ADMIN — CARIPRAZINE 1.5 MG: 1.5 CAPSULE, GELATIN COATED ORAL at 08:03

## 2018-07-23 RX ADMIN — TRAZODONE HYDROCHLORIDE 100 MG: 100 TABLET ORAL at 22:22

## 2018-07-23 NOTE — CARE COORDINATION
1:1    Pt approached writer in early morning and requested to meet. During 1:1 pt wanted to share poetry he has wrote for a friend, writer listened. Pt speech was slow and slured, pt stated it was d/t medications. Pt dropped coffee.  Pt psychomotor slower than admission; however thought process tangential and hyper verbal
balls\". Pt had a pocket full of sliced pickles he placed on writers desk and requested writer to smell. Pt friendly, evasive. Pt has legal guardian and lives in [address] [facility].  Pt denied concerns with MH

## 2018-07-23 NOTE — PLAN OF CARE
Problem: Altered Mood, Manic Behavior:  Goal: Able to verbalize decrease in frequency and intensity of racing thoughts  Able to verbalize decrease in frequency and intensity of racing thoughts   Outcome: Ongoing  Pt is up and about on unit social with staff and peers, pt is compliant with medications, attending to his ADL'S, encouraged patient to continue taking prescribed medications and go to all follow up appointments. Pt denies having any suicidal or homicidal thoughts, will continue to monitor pt.

## 2018-07-24 PROCEDURE — 99221 1ST HOSP IP/OBS SF/LOW 40: CPT | Performed by: INTERNAL MEDICINE

## 2018-07-24 PROCEDURE — 1240000000 HC EMOTIONAL WELLNESS R&B

## 2018-07-24 PROCEDURE — 6370000000 HC RX 637 (ALT 250 FOR IP): Performed by: PSYCHIATRY & NEUROLOGY

## 2018-07-24 RX ADMIN — NICOTINE POLACRILEX 2 MG: 2 GUM, CHEWING BUCCAL at 15:09

## 2018-07-24 RX ADMIN — ACETAMINOPHEN 500 MG: 500 TABLET, FILM COATED ORAL at 08:00

## 2018-07-24 RX ADMIN — NICOTINE POLACRILEX 2 MG: 2 GUM, CHEWING BUCCAL at 21:02

## 2018-07-24 RX ADMIN — TRAZODONE HYDROCHLORIDE 100 MG: 100 TABLET ORAL at 22:36

## 2018-07-24 RX ADMIN — QUETIAPINE FUMARATE 300 MG: 300 TABLET ORAL at 22:37

## 2018-07-24 RX ADMIN — QUETIAPINE FUMARATE 150 MG: 100 TABLET ORAL at 08:50

## 2018-07-24 RX ADMIN — DIVALPROEX SODIUM 500 MG: 500 TABLET, EXTENDED RELEASE ORAL at 08:51

## 2018-07-24 RX ADMIN — LAMOTRIGINE 200 MG: 100 TABLET ORAL at 08:51

## 2018-07-24 RX ADMIN — PANTOPRAZOLE SODIUM 40 MG: 40 TABLET, DELAYED RELEASE ORAL at 08:50

## 2018-07-24 RX ADMIN — CARIPRAZINE 1.5 MG: 1.5 CAPSULE, GELATIN COATED ORAL at 08:51

## 2018-07-24 RX ADMIN — NICOTINE POLACRILEX 2 MG: 2 GUM, CHEWING BUCCAL at 09:51

## 2018-07-24 ASSESSMENT — PAIN SCALES - GENERAL
PAINLEVEL_OUTOF10: 0
PAINLEVEL_OUTOF10: 3

## 2018-07-24 NOTE — PLAN OF CARE
Problem: Altered Mood, Manic Behavior:  Intervention: Assess ability to perform activities of daily living  Patient showered this morning. Goal: Able to verbalize decrease in frequency and intensity of racing thoughts  Able to verbalize decrease in frequency and intensity of racing thoughts   Outcome: Ongoing  Patient still exhibits signs of racing thoughts. Patient has pressured speech that is tangential and includes flight of ideas. Goal: Ability to demonstrate self-control will improve  Ability to demonstrate self-control will improve   Outcome: Ongoing  Patient has been behavior and medication compliant while on the unit this morning. Patient reports he feels ready to go home today.

## 2018-07-24 NOTE — PLAN OF CARE
Problem: Altered Mood, Manic Behavior:  Goal: Able to verbalize decrease in frequency and intensity of racing thoughts  Able to verbalize decrease in frequency and intensity of racing thoughts   Outcome: Ongoing  Pt did not participate in Therapeutic Cognitive Skills Group at 1330 despite staff encouragement.

## 2018-07-24 NOTE — CONSULTS
Department of General Surgery - Adult  Surgical Service  Consult Note      Reason for Consult:  Anal lesion  Requesting Physician:  Dr. Shireen Curiel:  Anal lesion    History Obtained From:  patient    HISTORY OF PRESENT ILLNESS:                The patient is a 72 y.o. male who presents with psychosis, admitted to [facility]. He is a poor historian. Pt states that he has a history of cancer, unable to elaborate on whether this was colon/rectal cancer vs anal cancer. He states that it was approximately 5-10 years ago and does not recall who any of the physicians treating him at that time were and that he did not have a PCP because he \" doesn't believe in them. \"  He states that he has had a lesion protruding from his rectum that is non-painful for the past few years. He has been referred to Colorectal surgery at [facility] however he failed to keep his appointment. He denies rectal bleeding. He complains of anal leakage for the past 5 years or so and thinks that it was because his biopsy was \"unnecessarily rough. \"  He states that he did have chemotherapy and radiation to his rectum, however I do not see obvious erythema or skin thickening in this area. He does not recall when his last colonoscopy was, however there are records of a colonoscopy in 6/2015 noting diverticulosis, hemorrhoids, reduced sphincter tone, and no evidence of an anastomosis indicating that he did not have a previous colon resection.     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    COPD (chronic obstructive pulmonary disease) (HCC)     GERD (gastroesophageal reflux disease)     Heart attack      Past Surgical History:        Procedure Laterality Date    ANUS SURGERY      bowel surgery r/t cancer approx 4 years ago     Current Medications:   Current Facility-Administered Medications: cariprazine hcl (VRAYLAR) capsule 1.5 mg, 1.5 mg, Oral, Daily  divalproex (DEPAKOTE ER) extended release tablet 500 mg, 500 mg, Oral, Daily  lamoTRIgine (LAMICTAL) tablet 200 mg, 200 mg, Oral, Daily  pantoprazole (PROTONIX) tablet 40 mg, 40 mg, Oral, Daily  QUEtiapine (SEROQUEL) tablet 300 mg, 300 mg, Oral, Nightly  QUEtiapine (SEROQUEL) tablet 150 mg, 150 mg, Oral, Daily  traZODone (DESYREL) tablet 100 mg, 100 mg, Oral, Nightly PRN  aluminum & magnesium hydroxide-simethicone (MAALOX) 200-200-20 MG/5ML suspension 30 mL, 30 mL, Oral, TID PRN  magnesium hydroxide (MILK OF MAGNESIA) 400 MG/5ML suspension 10 mL, 10 mL, Oral, BID PRN  acetaminophen (TYLENOL) tablet 500 mg, 500 mg, Oral, Q4H PRN  loperamide (IMODIUM) capsule 2 mg, 2 mg, Oral, BID PRN  nicotine polacrilex (NICORETTE) gum 2 mg, 2 mg, Oral, PRN  Allergies:  Patient has no known allergies. Social History:   TOBACCO:   reports that he has been smoking Cigarettes and Pipe. He has never used smokeless tobacco.  ETOH:   reports that he does not drink alcohol. DRUGS:   reports that he does not use drugs. Family History:   Family History   Problem Relation Age of Onset    Cancer Father         Lung    Stroke Father     Cancer Brother         Lymphoma    Mental Illness Other         Aunt       REVIEW OF SYSTEMS:    Review of systems not obtained due to patient factors - mental status    PHYSICAL EXAM:    VITALS:  /73   Pulse 74   Temp 97.7 °F (36.5 °C) (Oral)   Resp 14   Ht 5' 11\" (1.803 m)   Wt 198 lb (89.8 kg)   SpO2 98%   BMI 27.62 kg/m²   CONSTITUTIONAL:  awake, alert, cooperative, poor historian  ENT:  Normocephalic, without obvious abnormality, atraumatic, sinuses nontender on palpation, external ears without lesions, oral pharynx with moist mucus membranes, tonsils without erythema or exudates, gums normal and good dentition.   NECK:  supple, symmetrical, trachea midline  BACK:  symmetric and no curvature  LUNGS:  No increased work of breathing, good air exchange, clear to auscultation bilaterally, no crackles or wheezing  CARDIOVASCULAR:  normal apical pulses and regular rate and rhythm  ABDOMEN:  No scars, normal bowel sounds, soft, non-distended, non-tender, no masses palpated, no hepatosplenomegally  MUSCULOSKELETAL:  there is no redness, warmth, or swelling of the joints  NEUROLOGIC:  Awake, alert, oriented to name, place and time. Cranial nerves II-XII are grossly intact. SKIN:  no bruising or bleeding  Rectal: posterior anal fissure, no palpable mass visualized, poor sphincter tone    DATA:    CBC with Differential:    Lab Results   Component Value Date    WBC 7.6 07/19/2018    RBC 4.55 07/19/2018    HGB 14.7 07/19/2018    HCT 44.4 07/19/2018     07/19/2018    MCV 97.5 07/19/2018    MCH 32.4 07/19/2018    MCHC 33.2 07/19/2018    RDW 13.9 07/19/2018    LYMPHOPCT 28 07/19/2018    MONOPCT 9 07/19/2018    BASOPCT 1 07/19/2018    MONOSABS 0.60 07/19/2018    LYMPHSABS 2.10 07/19/2018    EOSABS 0.10 07/19/2018    BASOSABS 0.00 07/19/2018    DIFFTYPE NOT REPORTED 07/19/2018     BMP:    Lab Results   Component Value Date     07/19/2018    K 4.7 07/19/2018     07/19/2018    CO2 25 07/19/2018    BUN 16 07/19/2018    LABALBU 4.0 07/19/2018    CREATININE 0.92 07/19/2018    CALCIUM 9.4 07/19/2018    GFRAA >60 07/19/2018    LABGLOM >60 07/19/2018    GLUCOSE 131 07/19/2018     Radiology Review:  Ct Head Wo Contrast    Result Date: 7/19/2018  EXAMINATION: CT OF THE HEAD WITHOUT CONTRAST  7/19/2018 1:40 pm TECHNIQUE: CT of the head was performed without the administration of intravenous contrast. Dose modulation, iterative reconstruction, and/or weight based adjustment of the mA/kV was utilized to reduce the radiation dose to as low as reasonably achievable. COMPARISON: None. HISTORY: ORDERING SYSTEM PROVIDED HISTORY: ams per psychiatrist TECHNOLOGIST PROVIDED HISTORY: Has a \"code stroke\" or \"stroke alert\" been called? ->No Ordering Physician Provided Reason for Exam: FINDINGS: BRAIN/VENTRICLES: There is no acute intracranial hemorrhage, mass effect or midline shift. No abnormal extra-axial fluid collection. The gray-white differentiation is maintained without evidence of an acute infarct. There is no evidence of hydrocephalus. Bilateral basal ganglia lacunar infarcts. Mild chronic microvascular ischemic changes. Questionable asymmetrical encephalomalacia involving the left cerebellar hemisphere. ORBITS: The visualized portion of the orbits demonstrate no acute abnormality. SINUSES: The visualized paranasal sinuses and mastoid air cells demonstrate no acute abnormality. SOFT TISSUES/SKULL:  No acute abnormality of the visualized skull or soft tissues. *No acute intracranial abnormality. *Mild chronic microvascular ischemic changes. *Questionable asymmetrical encephalomalacia involving the left cerebellar hemisphere possibly representing prior infarct. Mri Brain W Wo Contrast    Result Date: 7/21/2018  EXAMINATION: MRI OF THE BRAIN WITHOUT AND WITH CONTRAST  7/21/2018 1:56 pm TECHNIQUE: Multiplanar multisequence MRI of the head/brain was performed without and with the administration of intravenous contrast. COMPARISON: CT brain 07/19/2018 HISTORY: ORDERING SYSTEM PROVIDED HISTORY: DEMENTIA TECHNOLOGIST PROVIDED HISTORY: Ordering Physician Provided Reason for Exam: DEMENTIA; MENTAL STATUS CHANGES Acuity: Unknown Additional signs and symptoms: MANIC EPISODES FINDINGS: INTRACRANIAL STRUCTURES/VENTRICLES:  There are no areas of restricted diffusion identified to suggest an acute infarct. There is no acute intracranial hemorrhage. No mass effect or midline shift is present. There is a remote left cerebellar infarct. There are multiple foci of abnormal increased T2/FLAIR signal intensity within the periventricular and deep white matter of both hemispheres. There is no abnormal enhancement. There is no sellar or suprasellar mass present.   There is no ventriculomegaly or abnormal extra-axial fluid collection present. The proximal portions of the [place] demonstrate normal flow voids. ORBITS: Limited evaluation of the orbits is unremarkable. SINUSES: The paranasal sinuses are clear. Trace fluid is present within the right mastoid air cells. BONES/SOFT TISSUES: Bone marrow signal intensity is normal.     1. No acute infarct or acute intracranial process identified. 2. Remote left cerebellar infarct. 3. Mild chronic small vessel ischemic changes.      IMPRESSION/RECOMMENDATIONS:      Patient Active Problem List   Diagnosis    Bipolar 1 disorder ([facility] [address]   history of colorectas vs anal cancer  Posterior anal fissure    More extensive chart review is required to obtain information on potential cancer history and treatment, no pathology, oncology, or surgical records are present in either the [facility] or [facility] system to indicate that he has had a definitive cancer diagnosis in the past  Recommend colonoscopy with biopsy, may be done outpatient if discharge is pending, pt may require referral to colorectal surgery pending actual cancer history    Electronically signed by Stephanie Padilla DO on 7/24/2018 at 6:13 PM

## 2018-07-24 NOTE — PLAN OF CARE
Problem: Altered Mood, Manic Behavior:  Goal: Ability to demonstrate self-control will improve  Ability to demonstrate self-control will improve   Outcome: Ongoing  PSYCHOEDUCATION GROUP NOTE    Date: 7/24/2018  Start Time: 1100  End Time: 2579    Number Participants in Group:  7/19    Goal:  Patient will demonstrate increased interpersonal interaction   Topic: Self-Expression/Therapeutic Arts    Discipline Responsible:   OT  AT  [facility]. x RT MHP Other       Participation Level:     None  Minimal   x Active Listener x Interactive    Monopolizing         Participation Quality:  x Appropriate  Inappropriate   x       Attentive        Intrusive   x       Sharing        Resistant          Supportive        Lethargic       Affective:   x Congruent  Incongruent  Blunted  Flat    Constricted  Anxious  Elated  Angry    Labile  Depressed  Other         Cognitive:  x Alert x Oriented PPTP     Concentration  G x F  P   Attention Span  G x F  P   Short-Term Memory  G x F  P   Long-Term Memory  G x F  P   ProblemSolving/  Decision Making  G x F  P   Ability to Process  Information  G x F  P      Contributing Factors             Delusional             Hallucinating             Flight of Ideas             Other:       Modes of Intervention:  x Education x Support x Exploration   x Clarifying x Problem Solving x Confrontation   x Socialization x Limit Setting x Reality Testing   x Activity  Movement x Media    Other:            Response to Learning:  x Able to verbalize current knowledge/experience   x Able to verbalize/acknowledge new learning    Able to retain information    Capable of insight    Able to change behavior   x Progressing to goal    Other:        Comments:

## 2018-07-24 NOTE — PLAN OF CARE
Problem: Falls - Risk of:  Goal: Will remain free from falls  Will remain free from falls   Outcome: Ongoing  Patient has not had a fall this shift so far. Goal: Absence of physical injury  Absence of physical injury   Outcome: Ongoing  Patient has not had any new physical injuries this shift so far.

## 2018-07-25 PROCEDURE — 6370000000 HC RX 637 (ALT 250 FOR IP): Performed by: PSYCHIATRY & NEUROLOGY

## 2018-07-25 PROCEDURE — 1240000000 HC EMOTIONAL WELLNESS R&B

## 2018-07-25 RX ADMIN — DIVALPROEX SODIUM 500 MG: 500 TABLET, EXTENDED RELEASE ORAL at 08:11

## 2018-07-25 RX ADMIN — PANTOPRAZOLE SODIUM 40 MG: 40 TABLET, DELAYED RELEASE ORAL at 08:11

## 2018-07-25 RX ADMIN — QUETIAPINE FUMARATE 300 MG: 300 TABLET ORAL at 22:19

## 2018-07-25 RX ADMIN — CARIPRAZINE 1.5 MG: 1.5 CAPSULE, GELATIN COATED ORAL at 08:11

## 2018-07-25 RX ADMIN — NICOTINE POLACRILEX 2 MG: 2 GUM, CHEWING BUCCAL at 12:23

## 2018-07-25 RX ADMIN — NICOTINE POLACRILEX 2 MG: 2 GUM, CHEWING BUCCAL at 22:24

## 2018-07-25 RX ADMIN — TRAZODONE HYDROCHLORIDE 100 MG: 100 TABLET ORAL at 22:19

## 2018-07-25 RX ADMIN — QUETIAPINE FUMARATE 150 MG: 100 TABLET ORAL at 08:11

## 2018-07-25 RX ADMIN — ALUMINUM HYDROXIDE, MAGNESIUM HYDROXIDE, AND SIMETHICONE 30 ML: 200; 200; 20 SUSPENSION ORAL at 22:45

## 2018-07-25 RX ADMIN — LAMOTRIGINE 200 MG: 100 TABLET ORAL at 08:11

## 2018-07-25 RX ADMIN — NICOTINE POLACRILEX 2 MG: 2 GUM, CHEWING BUCCAL at 06:49

## 2018-07-25 NOTE — CONSULTS
Provider, MD        Allergies:     Patient has no known allergies. Social History:     Tobacco:    reports that he has been smoking Cigarettes and Pipe. He has never used smokeless tobacco.  Alcohol:      reports that he does not drink alcohol. Drug Use:  reports that he does not use drugs. Family History:     Family History   Problem Relation Age of Onset    Cancer Father         Lung    Stroke Father     Cancer Brother         Lymphoma    Mental Illness Other         Aunt       Review of Systems:     Positive and Negative as described in HPI. CONSTITUTIONAL:  negative for fevers, chills, sweats, fatigue, weight loss  HEENT:  negative for vision, hearing changes, runny nose, throat pain  RESPIRATORY:  negative for shortness of breath, cough, congestion, wheezing. CARDIOVASCULAR:  negative for chest pain, palpitations. GASTROINTESTINAL:  negative for nausea, vomiting, diarrhea, constipation, change in bowel habits, abdominal pain   GENITOURINARY:  negative for difficulty of urination, burning with urination, frequency   INTEGUMENT:  negative for rash, skin lesions, easy bruising   HEMATOLOGIC/LYMPHATIC:  negative for swelling/edema   ALLERGIC/IMMUNOLOGIC:  negative for urticaria , itching  ENDOCRINE:  negative increase in drinking, increase in urination, hot or cold intolerance  MUSCULOSKELETAL:  negative joint pains, muscle aches, swelling of joints  NEUROLOGICAL:  negative for headaches, dizziness, lightheadedness, numbness, pain, tingling extremities      Physical Exam:     /75   Pulse 75   Temp 97.7 °F (36.5 °C) (Oral)   Resp 14   Ht 5' 11\" (1.803 m)   Wt 198 lb (89.8 kg)   SpO2 98%   BMI 27.62 kg/m²   Temp (24hrs), Av.7 °F (36.5 °C), Min:97.7 °F (36.5 °C), Max:97.7 °F (36.5 °C)    No results for input(s): POCGLU in the last 72 hours.   No intake or output data in the 24 hours ending 18 2550    General Appearance:  alert, well appearing, and in no acute distress  Mental status: oriented to person, place, and time with normal affect  Head:  normocephalic, atraumatic. Eye: no icterus, redness, pupils equal and reactive, extraocular eye movements intact, conjunctiva clear  Ear: normal external ear, no discharge, hearing intact  Nose:  no drainage noted  Mouth: mucous membranes moist  Neck: supple, no carotid bruits, thyroid not palpable  Lungs: Bilateral equal air entry, clear to ausculation, no wheezing, rales or rhonchi, normal effort  Cardiovascular: normal rate, regular rhythm, no murmur, gallop, rub. Abdomen: Soft, nontender, nondistended, normal bowel sounds, no hepatomegaly or splenomegaly  Neurologic: There are no new focal motor or sensory deficits, normal muscle tone and bulk, no abnormal sensation, normal speech, cranial nerves II through XII grossly intact  Skin: No gross lesions, rashes, bruising or bleeding on exposed skin area  Extremities:  peripheral pulses palpable, no pedal edema or calf pain with palpation      Investigations:      Laboratory Testing:  No results found for this or any previous visit (from the past 24 hour(s)). Imaging/Diagonstics:      Assessment :      Primary Problem  Bipolar 1 disorder [facility])    Active Hospital Problems    Diagnosis Date Noted    [facility] 07/19/2018       Plan:     1. ekg bifa block  2. No cp  3. asymtomaitc  4. Out pt pcp f/u   5. Pt complaints of rectal prolapse to nurse  6. Advised for surg consult  7. Will sing off pl nadia as needed    Consultations:   IP CONSULT TO HISTORY AND PHYSICAL  IP CONSULT TO INTERNAL MEDICINE  IP CONSULT TO [facility] Hawthorne MD  7/24/2018  11:33 PM    Copy sent to Dr. Hannah Gale primary care provider on file. Please note that this chart was generated using voice recognition Dragon dictation software. Although every effort was made to ensure the accuracy of this automated transcription, some errors in transcription may have occurred.

## 2018-07-25 NOTE — PLAN OF CARE
Problem: Altered Mood, Manic Behavior:  Goal: Ability to demonstrate self-control will improve  Ability to demonstrate self-control will improve   Outcome: Ongoing  Psychoeducation Group Note    Date: 7/25/18  Start Time: 1100  End Time: 9034    Number Participants in Group:  7    Goal:  Patient will demonstrate increased interpersonal interaction. Topic:  Leisure Skills Therapeutic Group     Discipline Responsible:   OT  AT  [facility]. X RT  Other       Participation Level:     None X Minimal    Active Listener  Interactive    Monopolizing         Participation Quality:  X Appropriate  Inappropriate          Attentive        Intrusive          Sharing        Resistant          Supportive        Lethargic       Affective:   X Congruent  Incongruent  Blunted  Flat    Constricted  Anxious  Elated  Angry    Labile  Depressed  Other  Bright       Cognitive:  X Alert X Oriented PPTP     Concentration  G X F  P   Attention Span  G X F  P   Short-Term Memory  G  F  P   Long-Term Memory  G  F  P   ProblemSolving/  Decision Making  G  F  P   Ability to Process  Information  G  F  P      Contributing Factors             Delusional             Hallucinating             Flight of Ideas             Other:       Modes of Intervention:  X Education X Support X Exploration   X Clarifying X Problem Solving  Confrontation   X Socialization X Limit Setting  Reality Testing   X Activity  Movement  Media    Other:            Response to Learning:  X Able to verbalize current knowledge/experience    Able to verbalize/acknowledge new learning    Able to retain information    Capable of insight    Able to change behavior   X Progressing to goal    Other:        Comments: Pt was present in group for 20 minutes but did not participate in activity.

## 2018-07-26 PROCEDURE — 1240000000 HC EMOTIONAL WELLNESS R&B

## 2018-07-26 PROCEDURE — 6370000000 HC RX 637 (ALT 250 FOR IP): Performed by: PSYCHIATRY & NEUROLOGY

## 2018-07-26 RX ADMIN — NICOTINE POLACRILEX 2 MG: 2 GUM, CHEWING BUCCAL at 12:59

## 2018-07-26 RX ADMIN — DIVALPROEX SODIUM 500 MG: 500 TABLET, EXTENDED RELEASE ORAL at 08:04

## 2018-07-26 RX ADMIN — TRAZODONE HYDROCHLORIDE 100 MG: 100 TABLET ORAL at 22:04

## 2018-07-26 RX ADMIN — QUETIAPINE FUMARATE 150 MG: 100 TABLET ORAL at 08:04

## 2018-07-26 RX ADMIN — LAMOTRIGINE 200 MG: 100 TABLET ORAL at 08:04

## 2018-07-26 RX ADMIN — NICOTINE POLACRILEX 2 MG: 2 GUM, CHEWING BUCCAL at 21:26

## 2018-07-26 RX ADMIN — QUETIAPINE FUMARATE 300 MG: 300 TABLET ORAL at 22:04

## 2018-07-26 RX ADMIN — PANTOPRAZOLE SODIUM 40 MG: 40 TABLET, DELAYED RELEASE ORAL at 08:03

## 2018-07-26 RX ADMIN — CARIPRAZINE 1.5 MG: 1.5 CAPSULE, GELATIN COATED ORAL at 08:04

## 2018-07-26 NOTE — PLAN OF CARE
Problem: Falls - Risk of:  Goal: Will remain free from falls  Will remain free from falls   Outcome: Ongoing  Pt remains free of falls and verbalizes understanding of individual fall risks. Pt wearing non skid footwear and encouraged to seek out staff for any assistance needed. Problem: Altered Mood, Manic Behavior:  Goal: Able to verbalize decrease in frequency and intensity of racing thoughts  Able to verbalize decrease in frequency and intensity of racing thoughts   Outcome: Ongoing  Pt less intrusive on unit and more social with peers. PT attends unit programming and voices readiness for discharge. PT states he is sleeping fine but only slept for 4 hour this evening. PT is taking ordered medications. PT maintained on 15 min safety checks and rounds at irregular intervals.

## 2018-07-26 NOTE — FLOWSHEET NOTE
*Patient participated in the [facility]       07/26/18 1532   Encounter Summary   Services provided to: Patient   Referral/Consult From: Rounding   Continue Visiting (7/26/18)   Complexity of Encounter Low   Length of Encounter 30 minutes   Spiritual Assessment Completed Yes   Spiritual/Latter day   Type Spiritual support   Assessment Calm   Intervention Active listening   Outcome Receptive

## 2018-07-26 NOTE — PLAN OF CARE
Problem: Altered Mood, Manic Behavior:  Goal: Ability to demonstrate self-control will improve  Ability to demonstrate self-control will improve   Outcome: Ongoing  PSYCHOEDUCATION GROUP NOTE    Date: 7/26/2018  Start Time: 0845  End Time: 0910    Number Participants in Group:  7/20    Goal:  Patient will demonstrate increased interpersonal interaction   Topic: Community Meeting/Goal Setting    Discipline Responsible:   OT  AT    Ns. x RT MHP Other       Participation Level:     None  Minimal   x Active Listener x Interactive    Monopolizing         Participation Quality:  x Appropriate  Inappropriate   x       Attentive        Intrusive   x       Sharing        Resistant          Supportive        Lethargic       Affective:    Congruent  Incongruent x Blunted  Flat    Constricted  Anxious  Elated  Angry    Labile  Depressed  Other         Cognitive:  x Alert x Oriented PPTP     Concentration  G  F x P   Attention Span  G  F x P   Short-Term Memory  G  F x P   Long-Term Memory  G  F x P   ProblemSolving/  Decision Making  G  F x P   Ability to Process  Information  G  F x P      Contributing Factors             Delusional             Hallucinating             Flight of Ideas             Other:       Modes of Intervention:  x Education x Support x Exploration   x Clarifying x Problem Solving x Confrontation   x Socialization x Limit Setting x Reality Testing    Activity  Movement  Media    Other:            Response to Learning:  x Able to verbalize current knowledge/experience    Able to verbalize/acknowledge new learning    Able to retain information    Capable of insight    Able to change behavior   x Progressing to goal    Other:        Comments:

## 2018-07-27 PROCEDURE — 1240000000 HC EMOTIONAL WELLNESS R&B

## 2018-07-27 PROCEDURE — 6370000000 HC RX 637 (ALT 250 FOR IP): Performed by: PSYCHIATRY & NEUROLOGY

## 2018-07-27 RX ORDER — DIVALPROEX SODIUM 500 MG/1
1000 TABLET, EXTENDED RELEASE ORAL DAILY
Status: DISCONTINUED | OUTPATIENT
Start: 2018-07-28 | End: 2018-07-31 | Stop reason: HOSPADM

## 2018-07-27 RX ADMIN — DIVALPROEX SODIUM 500 MG: 500 TABLET, EXTENDED RELEASE ORAL at 08:24

## 2018-07-27 RX ADMIN — QUETIAPINE FUMARATE 150 MG: 100 TABLET ORAL at 08:24

## 2018-07-27 RX ADMIN — TRAZODONE HYDROCHLORIDE 100 MG: 100 TABLET ORAL at 21:52

## 2018-07-27 RX ADMIN — NICOTINE POLACRILEX 2 MG: 2 GUM, CHEWING BUCCAL at 09:37

## 2018-07-27 RX ADMIN — LAMOTRIGINE 200 MG: 100 TABLET ORAL at 08:24

## 2018-07-27 RX ADMIN — CARIPRAZINE 1.5 MG: 1.5 CAPSULE, GELATIN COATED ORAL at 08:24

## 2018-07-27 RX ADMIN — PANTOPRAZOLE SODIUM 40 MG: 40 TABLET, DELAYED RELEASE ORAL at 08:24

## 2018-07-27 RX ADMIN — NICOTINE POLACRILEX 2 MG: 2 GUM, CHEWING BUCCAL at 15:39

## 2018-07-27 RX ADMIN — QUETIAPINE FUMARATE 300 MG: 300 TABLET ORAL at 21:52

## 2018-07-27 RX ADMIN — NICOTINE POLACRILEX 2 MG: 2 GUM, CHEWING BUCCAL at 21:52

## 2018-07-27 NOTE — PLAN OF CARE
Problem: Altered Mood, Manic Behavior:  Goal: Ability to demonstrate self-control will improve  Ability to demonstrate self-control will improve   Outcome: Ongoing  Pt did not participate in Stress Management Recovery Group at 1430 despite staff encouragement.

## 2018-07-27 NOTE — PLAN OF CARE
Problem: Altered Mood, Manic Behavior:  Goal: Able to verbalize decrease in frequency and intensity of racing thoughts  Able to verbalize decrease in frequency and intensity of racing thoughts   Outcome: Ongoing  Patient denies all but seem to have delusions of grandeur. Patient has exaggerated expressions and bizarre conversations while at the desk. Patient is controlled and calm with staff.

## 2018-07-27 NOTE — PLAN OF CARE
Problem: Altered Mood, Manic Behavior:  Goal: Ability to demonstrate self-control will improve  Ability to demonstrate self-control will improve   Outcome: Ongoing  Pt did not participate in Leisure Skills Therapeutic Group at 1100 despite staff encouragement.

## 2018-07-27 NOTE — PLAN OF CARE
Problem: Altered Mood, Manic Behavior:  Goal: Ability to demonstrate self-control will improve  Ability to demonstrate self-control will improve   Outcome: Ongoing  PSYCHOEDUCATION GROUP NOTE    Date: 7/27/2018  Start Time: 1330  End Time: 1415    Number Participants in Group:  6/16    Goal:  Patient will demonstrate increased interpersonal interaction   Topic: Self-Awareness/Emotions    Discipline Responsible:   OT  AT    Ns. x RT MHP Other       Participation Level:     None  Minimal   x Active Listener x Interactive    Monopolizing         Participation Quality:  x Appropriate  Inappropriate   x       Attentive        Intrusive   x       Sharing        Resistant          Supportive        Lethargic       Affective:   x Congruent  Incongruent  Blunted  Flat    Constricted  Anxious  Elated  Angry    Labile  Depressed  Other         Cognitive:  x Alert x Oriented PPTP     Concentration  G x F  P   Attention Span  G x F  P   Short-Term Memory  G x F  P   Long-Term Memory  G x F  P   ProblemSolving/  Decision Making  G x F  P   Ability to Process  Information  G x F  P      Contributing Factors             Delusional             Hallucinating             Flight of Ideas             Other:       Modes of Intervention:  x Education x Support x Exploration   x Clarifying x Problem Solving x Confrontation   x Socialization x Limit Setting x Reality Testing   x Activity x Movement x Media    Other:            Response to Learning:  x Able to verbalize current knowledge/experience   x Able to verbalize/acknowledge new learning    Able to retain information    Capable of insight    Able to change behavior   x Progressing to goal    Other:        Comments:

## 2018-07-28 PROCEDURE — 6370000000 HC RX 637 (ALT 250 FOR IP): Performed by: PSYCHIATRY & NEUROLOGY

## 2018-07-28 PROCEDURE — 1240000000 HC EMOTIONAL WELLNESS R&B

## 2018-07-28 RX ADMIN — ALUMINUM HYDROXIDE, MAGNESIUM HYDROXIDE, AND SIMETHICONE 30 ML: 200; 200; 20 SUSPENSION ORAL at 01:30

## 2018-07-28 RX ADMIN — NICOTINE POLACRILEX 2 MG: 2 GUM, CHEWING BUCCAL at 09:54

## 2018-07-28 RX ADMIN — PANTOPRAZOLE SODIUM 40 MG: 40 TABLET, DELAYED RELEASE ORAL at 08:32

## 2018-07-28 RX ADMIN — TRAZODONE HYDROCHLORIDE 100 MG: 100 TABLET ORAL at 21:36

## 2018-07-28 RX ADMIN — ACETAMINOPHEN 500 MG: 500 TABLET, FILM COATED ORAL at 12:56

## 2018-07-28 RX ADMIN — LAMOTRIGINE 200 MG: 100 TABLET ORAL at 08:31

## 2018-07-28 RX ADMIN — NICOTINE POLACRILEX 2 MG: 2 GUM, CHEWING BUCCAL at 18:57

## 2018-07-28 RX ADMIN — QUETIAPINE FUMARATE 300 MG: 300 TABLET ORAL at 21:36

## 2018-07-28 RX ADMIN — CARIPRAZINE 1.5 MG: 1.5 CAPSULE, GELATIN COATED ORAL at 08:32

## 2018-07-28 RX ADMIN — QUETIAPINE FUMARATE 150 MG: 100 TABLET ORAL at 08:32

## 2018-07-28 RX ADMIN — DIVALPROEX SODIUM 1000 MG: 500 TABLET, EXTENDED RELEASE ORAL at 10:50

## 2018-07-28 ASSESSMENT — PAIN SCALES - GENERAL
PAINLEVEL_OUTOF10: 0
PAINLEVEL_OUTOF10: 3

## 2018-07-28 NOTE — PLAN OF CARE
Problem: Altered Mood, Manic Behavior:  Goal: Ability to demonstrate self-control will improve  Ability to demonstrate self-control will improve   Outcome: Ongoing  Psychoeducation Group Note    Date: 7/28/2018  Start Time: 0900  End Time: 0920    Number Participants in Group:  9    Goal:  Patient will demonstrate increased interpersonal interaction. Topic:  Goal Setting / Community Meeting    Discipline Responsible:   OT  AT  [facility]. X RT  Other       Participation Level:     None  Minimal   x Active Listener x Interactive    Monopolizing         Participation Quality:  x Appropriate  Inappropriate   x       Attentive        Intrusive   x       Sharing        Resistant          Supportive        Lethargic       Affective:   x Congruent  Incongruent  Blunted  Flat    Constricted  Anxious  Elated  Angry    Labile  Depressed  Other  Bright       Cognitive:  x Alert x Oriented PPTP     Concentration  G x F  P   Attention Span  G x F  P   Short-Term Memory  G x F  P   Long-Term Memory  G  F  P   ProblemSolving/  Decision Making  G x F  P   Ability to Process  Information  G x F  P      Contributing Factors             Delusional             Hallucinating             Flight of Ideas             Other:       Modes of Intervention:  x Education x Support x Exploration   x Clarifying x Problem Solving x Confrontation   x Socialization x Limit Setting x Reality Testing   x Activity  Movement  Media    Other:            Response to Learning:  x Able to verbalize current knowledge/experience   x Able to verbalize/acknowledge new learning   x Able to retain information   x Capable of insight    Able to change behavior   x Progressing to goal    Other:        Goal for today:  Talk with doctor about medications and discharge.

## 2018-07-28 NOTE — PLAN OF CARE
Problem: Altered Mood, Manic Behavior:  Goal: Able to verbalize decrease in frequency and intensity of racing thoughts  Able to verbalize decrease in frequency and intensity of racing thoughts   Outcome: Ongoing  PSYCHOEDUCATION GROUP NOTE    Date: 7/28/18  Start Time: 1000  End Time: 1045    Number Participants in Group:  11    Goal:  Patient will demonstrate increased interpersonal interaction   Topic: Decision Making    Discipline Responsible:   OT  AT x SW  Nsg.  RT MHP Other       Participation Level:     None  Minimal   x Active Listener x Interactive    Monopolizing         Participation Quality:   Appropriate  Inappropriate          Attentive        Intrusive   x       Sharing        Resistant          Supportive        Lethargic       Affective:   x Congruent  Incongruent  Blunted  Flat    Constricted  Anxious  Elated  Angry    Labile  Depressed  Other         Cognitive:  x Alert x Oriented PPTP     Concentration x G  F  P   Attention Span x G  F  P   Short-Term Memory  G  F  P   Long-Term Memory  G  F  P   ProblemSolving/  Decision Making x G  F  P   Ability to Process  Information  G x F  P      Contributing Factors             Delusional             Hallucinating   x          Flight of Ideas             Other:       Modes of Intervention:  x Education x Support x Exploration    Clarifying x Problem Solving  Confrontation   xx Socialization  Limit Setting  Reality Testing    Activity  Movement  Media    Other:            Response to Learning:  x Able to verbalize current knowledge/experience    Able to verbalize/acknowledge new learning    Able to retain information    Capable of insight    Able to change behavior   x Progressing to goal    Other:        Comments:

## 2018-07-28 NOTE — PLAN OF CARE
Problem: Falls - Risk of:  Goal: Will remain free from falls  Will remain free from falls   Outcome: Ongoing  Pt remains free of falls and verbalizes understanding of individual fall risks. Pt wearing non skid footwear and encouraged to seek out staff for any assistance needed.

## 2018-07-28 NOTE — PLAN OF CARE
Problem: Risk of Harm:  Goal: Ability to remain free from injury will improve  Ability to remain free from injury will improve    Outcome: Ongoing  Pt denies thoughts of self harm and is agreeable to seeking out should thoughts of self harm arise. Safe environment maintained. Q15 minute checks for safety cont per unit policy. Will cont to monitor for safety and provides support and reassurance as needed.

## 2018-07-28 NOTE — PLAN OF CARE
Problem: Altered Mood, Manic Behavior:  Goal: Able to verbalize decrease in frequency and intensity of racing thoughts  Able to verbalize decrease in frequency and intensity of racing thoughts   Outcome: Ongoing  Pt denies auditory/visual hallucinations at this time. Pt out in milieu at irregular intervals. Pt is medication compliant and in behavioral control. Pt is disheveled and encouraged to tend to ADL's. Pt agrees to feeling safe on the unit and seeking out staff for any needs. Pt. Remains on q15 min checks and frequent spontaneous checks throughout shift. Pt. Safety maintained.

## 2018-07-28 NOTE — PLAN OF CARE
Problem: Altered Mood, Manic Behavior:  Goal: Able to verbalize decrease in frequency and intensity of racing thoughts  Able to verbalize decrease in frequency and intensity of racing thoughts   Outcome: Ongoing  [facility]  Week Interdisciplinary Treatment Plan Note     Review Date & Time: 7/28/2018 0949    Admission Type:   Admission Type: Voluntary    Reason for admission:  Reason for Admission: from [facility] off medications, lives in group home. denies SI -pressured thoughts,states sleeping 5 hours a night. Estimated Length of Stay:  8-14 days  Estimated Discharge Date Update:  8/2/2018 to be determined by physician    PATIENT STRENGTHS:  Patient Strengths:Strengths: Positive Support, Connection to output provider  Patient Strengths and Limitations:Limitations: External locus of control  Addictive Behavior:Addictive Behavior  In the past 3 months, have you felt or has someone told you that you have a problem with:  : None  Do you have a history of Chemical Use?: No  Do you have a history of Alcohol Use?: No  Do you have a history of Street Drug Abuse?: No  Histroy of Prescripton Drug Abuse?: No  Medical Problems:   Past Medical History:   Diagnosis Date    Back pain     [facility]     [facility]     bowel    [facility]     GERD (gastroesophageal reflux disease)     Heart attack        Risk:  Fall RiskTotal: 67  Shankar Scale Shankar Scale Score: 22  BVC Total: 0    Change in scores:  No. Changes to plan of Care:  No    Status EXAM:   Status and Exam  Normal: No  Facial Expression: Exaggerated  Affect: Appropriate  Level of Consciousness: Alert  Mood:Normal: No  Mood: Anxious  Motor Activity:Normal: Yes  Motor Activity: Increased  Interview Behavior: Cooperative  Preception: [place] to Person, [place] to Time, [place] to Place, [place] to Situation  Attention:Normal: No  Attention: Distractible  Thought Processes: Loose Assoc.   Thought Content:Normal: No  Thought Content: Preoccupations  Hallucinations: None  Delusions: No  Delusions: Grandeur  Memory:Normal: No  Memory: Confabulation  Insight and Judgment: No  Insight and Judgment: Poor Insight, Poor Judgment  Present Suicidal Ideation: No  Present Homicidal Ideation: No    Daily Assessment Last Entry:   Daily Sleep (WDL): Exceptions to WDL         Patient Currently in Pain: Denies  Daily Nutrition (WDL): Within Defined Limits    Patient Monitoring:  Frequency of Checks: 4 times per hour, close    Psychiatric Symptoms:   Depression Symptoms  Depression Symptoms: Impaired concentration, Sleep disturbance  Anxiety Symptoms  Anxiety Symptoms: Generalized  Courtney Symptoms  Courtney Symptoms: Increased energy, Less need to sleep     Psychosis Symptoms  Delusion Type: No problems reported or observed. Suicide Risk CSSR-S:  1) Within the past month, have you wished you were dead or wished you could go to sleep and not wake up? : NO  2) Within the past month, have you actually had any thoughts of killing yourself? : NO  6)  Have you ever done anything, started to do anything, or prepared to do anything to end your life?: NO  Change in result:  No  Change in plan of care:  No    EDUCATION:   Learner Progress Toward Treatment Goals:   Reviewed results and recommendations of this team, reviewed group plan and strategies, reviewed signs, symptoms and risk of self harm and violent behavior, reviewed goals and plan of care. Method:  individual education, small group, verbal education. Outcome:    Pt verbalized understanding, but needs reinforcement to obtain goals. PATIENT GOALS:  Short term:  Discharge planning. Adjust to medications. Long term: Follow up with [facility]. Finish book. PLAN/TREATMENT RECOMMENDATIONS UPDATE:   Continue with group therapies, education of coping skills   Continue to monitor patient on unit. Medications provided/medication compliance by patient.   Continue for plans to

## 2018-07-28 NOTE — PLAN OF CARE
Problem: Altered Mood, Manic Behavior:  Goal: Able to verbalize decrease in frequency and intensity of racing thoughts  Able to verbalize decrease in frequency and intensity of racing thoughts   Outcome: Ongoing  Psychoeducation Group Note    Date:  7/28/2018  Start Time: 1330  End Time: 1415    Number Participants in Group:  10    Goal:  Patient will demonstrate increased interpersonal interaction. Topic:   Social Skills / Leisure Skills and Awareness / Problem Solving    Discipline Responsible:   OT  AT  [facility]. X RT  Other       Participation Level:     None  Minimal   x Active Listener x Interactive    Monopolizing         Participation Quality:  x Appropriate  Inappropriate   x       Attentive        Intrusive   x       Sharing        Resistant          Supportive        Lethargic       Affective:    Congruent x Incongruent  Blunted  Flat    Constricted  Anxious  Elated  Angry    Labile  Depressed  Other  Bright       Cognitive:  x Alert x Oriented PPTP     Concentration  G x F  P   Attention Span  G x F  P   Short-Term Memory  G x F  P   Long-Term Memory  G  F  P   ProblemSolving/  Decision Making  G x F  P   Ability to Process  Information  G x F  P      Contributing Factors             Delusional             Hallucinating             Flight of Ideas             Other:       Modes of Intervention:   Education  Support x Exploration   x Clarifying x Problem Solving x Confrontation   x Socialization x Limit Setting x Reality Testing   x Activity  Movement  Media    Other:            Response to Learning:  x Able to verbalize current knowledge/experience   x Able to verbalize/acknowledge new learning   x Able to retain information   x Capable of insight    Able to change behavior   x Progressing to goal    Other:        Comments:   Patient left group 10 minutes early stating it was time for him to leave- did not return to the room.  Patient made bizarre statement of if someone didn't pick his card from task at hand that he was going to hold his breath until he passed out.

## 2018-07-29 ENCOUNTER — ANESTHESIA EVENT (OUTPATIENT)
Dept: OPERATING ROOM | Age: 73
DRG: 885 | End: 2018-07-29
Payer: COMMERCIAL

## 2018-07-29 PROCEDURE — 6370000000 HC RX 637 (ALT 250 FOR IP): Performed by: PSYCHIATRY & NEUROLOGY

## 2018-07-29 PROCEDURE — 6370000000 HC RX 637 (ALT 250 FOR IP): Performed by: SURGERY

## 2018-07-29 PROCEDURE — 1240000000 HC EMOTIONAL WELLNESS R&B

## 2018-07-29 RX ADMIN — DIVALPROEX SODIUM 1000 MG: 500 TABLET, EXTENDED RELEASE ORAL at 07:54

## 2018-07-29 RX ADMIN — CARIPRAZINE 1.5 MG: 1.5 CAPSULE, GELATIN COATED ORAL at 07:58

## 2018-07-29 RX ADMIN — NICOTINE POLACRILEX 2 MG: 2 GUM, CHEWING BUCCAL at 16:06

## 2018-07-29 RX ADMIN — PANTOPRAZOLE SODIUM 40 MG: 40 TABLET, DELAYED RELEASE ORAL at 07:53

## 2018-07-29 RX ADMIN — QUETIAPINE FUMARATE 150 MG: 100 TABLET ORAL at 07:54

## 2018-07-29 RX ADMIN — TRAZODONE HYDROCHLORIDE 100 MG: 100 TABLET ORAL at 21:46

## 2018-07-29 RX ADMIN — NICOTINE POLACRILEX 2 MG: 2 GUM, CHEWING BUCCAL at 18:22

## 2018-07-29 RX ADMIN — LAMOTRIGINE 200 MG: 100 TABLET ORAL at 07:53

## 2018-07-29 RX ADMIN — POLYETHYLENE GLYCOL-3350 AND ELECTROLYTES 4000 ML: 236; 6.74; 5.86; 2.97; 22.74 POWDER, FOR SOLUTION ORAL at 10:45

## 2018-07-29 RX ADMIN — QUETIAPINE FUMARATE 300 MG: 300 TABLET ORAL at 21:46

## 2018-07-29 ASSESSMENT — ENCOUNTER SYMPTOMS
STRIDOR: 0
SHORTNESS OF BREATH: 0

## 2018-07-29 ASSESSMENT — LIFESTYLE VARIABLES: SMOKING_STATUS: 0

## 2018-07-29 ASSESSMENT — COPD QUESTIONNAIRES: CAT_SEVERITY: NO INTERVAL CHANGE

## 2018-07-29 NOTE — ANESTHESIA PRE PROCEDURE
Department of Anesthesiology  Preprocedure Note       Name:  Ajit Blackburn   Age:  72 y.o.  :  1945                                          MRN:  [id number]         Date:  2018      Surgeon: Irina Perdomo):  Anushka Sow DO    Procedure: Procedure(s):  COLONOSCOPY WITH ANAL BIOPSY    Medications prior to admission:   Prior to Admission medications    Medication Sig Start Date End Date Taking?  Authorizing Provider   traZODone (DESYREL) 100 MG tablet Take 100 mg by mouth nightly as needed for Sleep   Yes Historical Provider, MD   omeprazole (PRILOSEC) 20 MG delayed release capsule Take 20 mg by mouth daily   Yes Historical Provider, MD   QUETIAPINE FUMARATE PO Take 150 mg by mouth daily   Yes Historical Provider, MD   QUEtiapine (SEROQUEL) 300 MG tablet Take 300 mg by mouth nightly   Yes Historical Provider, MD   cariprazine hcl (VRAYLAR) 1.5 MG capsule Take 1.5 mg by mouth daily   Yes Historical Provider, MD   lamoTRIgine (LAMICTAL) 200 MG tablet Take 200 mg by mouth daily   Yes Historical Provider, MD   divalproex (DEPAKOTE ER) 500 MG extended release tablet Take 500 mg by mouth daily    Historical Provider, MD       Current medications:    Current Facility-Administered Medications   Medication Dose Route Frequency Provider Last Rate Last Dose    divalproex (DEPAKOTE ER) extended release tablet 1,000 mg  1,000 mg Oral Daily Jayson SUN MD   1,000 mg at 18 0754    cariprazine hcl (VRAYLAR) capsule 1.5 mg  1.5 mg Oral Daily Jayson SUN MD   1.5 mg at 18 0758    lamoTRIgine (LAMICTAL) tablet 200 mg  200 mg Oral Daily Jayson SUN MD   200 mg at 18 0753    pantoprazole (PROTONIX) tablet 40 mg  40 mg Oral Daily Hugh SUN MD   40 mg at 18 0753    QUEtiapine (SEROQUEL) tablet 300 mg  300 mg Oral Nightly Jayson SUN MD   300 mg at 18 2136    QUEtiapine (SEROQUEL) tablet 150 mg  150 mg Oral Daily Jayson SUN MD   150 mg at 07/18/18 (!) 111/57       NPO Status:                                                                                 BMI:   Wt Readings from Last 3 Encounters:   07/19/18 198 lb (89.8 kg)   07/18/18 199 lb (90.3 kg)     Body mass index is 27.62 kg/m². CBC:   Lab Results   Component Value Date    WBC 7.6 07/19/2018    RBC 4.55 07/19/2018    HGB 14.7 07/19/2018    HCT 44.4 07/19/2018    MCV 97.5 07/19/2018    RDW 13.9 07/19/2018     07/19/2018       CMP:   Lab Results   Component Value Date     07/19/2018    K 4.7 07/19/2018     07/19/2018    CO2 25 07/19/2018    BUN 16 07/19/2018    CREATININE 0.92 07/19/2018    GFRAA >60 07/19/2018    LABGLOM >60 07/19/2018    GLUCOSE 131 07/19/2018    PROT 7.2 07/19/2018    CALCIUM 9.4 07/19/2018    BILITOT 0.23 07/19/2018    ALKPHOS 56 07/19/2018    AST 23 07/19/2018    ALT 24 07/19/2018       POC Tests: No results for input(s): POCGLU, POCNA, POCK, POCCL, POCBUN, POCHEMO, POCHCT in the last 72 hours. Coags: No results found for: PROTIME, INR, APTT    HCG (If Applicable): No results found for: PREGTESTUR, PREGSERUM, HCG, HCGQUANT     ABGs: No results found for: PHART, PO2ART, NMF3SJV, DKM2ILM, BEART, D9SUHHUH     Type & Screen (If Applicable):  No results found for: [facility]    Anesthesia Evaluation  Patient summary reviewed no history of anesthetic complications:   Airway: Mallampati: II  TM distance: >3 FB   Neck ROM: full  Mouth opening: > = 3 FB Dental:          Pulmonary:normal exam  breath sounds clear to auscultation  (+) COPD: no interval change,      (-) pneumonia, asthma, shortness of breath, recent URI, sleep apnea, rhonchi, wheezes, rales, stridor and not a current smoker          Patient smoked on day of surgery.                  Cardiovascular:  Exercise tolerance: good (>4 METS),   (+) past MI: no interval change,     (-) pacemaker, hypertension, valvular problems/murmurs, CAD, CABG/stent, dysrhythmias,  angina,  CHF, orthopnea, PND, CASTILLO, murmur, weak pulses,  friction rub, systolic click, carotid bruit,  JVD and peripheral edema    ECG reviewed  Rhythm: regular             Beta Blocker:  Not on Beta Blocker         Neuro/Psych:   (+) psychiatric history: stable with treatmentdepression/anxiety    (-) seizures, neuromuscular disease, TIA, CVA and headaches           GI/Hepatic/Renal:   (+) GERD: no interval change,      (-) hiatal hernia, PUD, hepatitis, liver disease, no renal disease and no morbid obesity       Endo/Other: Negative Endo/Other ROS   (+) no malignancy/cancer. (-) diabetes mellitus, hypothyroidism, hyperthyroidism, blood dyscrasia, arthritis, no electrolyte abnormalities, no malignancy/cancer               Abdominal:           Vascular: negative vascular ROS. - PVD, DVT and PE. Anesthesia Plan      MAC and general     ASA 3       Induction: intravenous. MIPS: Postoperative opioids intended and Prophylactic antiemetics administered. Anesthetic plan and risks discussed with patient.                       Tyrone Del Valle MD   7/29/2018

## 2018-07-30 ENCOUNTER — ANESTHESIA (OUTPATIENT)
Dept: OPERATING ROOM | Age: 73
DRG: 885 | End: 2018-07-30
Payer: COMMERCIAL

## 2018-07-30 VITALS — SYSTOLIC BLOOD PRESSURE: 123 MMHG | DIASTOLIC BLOOD PRESSURE: 63 MMHG | OXYGEN SATURATION: 100 %

## 2018-07-30 PROCEDURE — 6360000002 HC RX W HCPCS: Performed by: ANESTHESIOLOGY

## 2018-07-30 PROCEDURE — 0DBH8ZZ EXCISION OF CECUM, VIA NATURAL OR ARTIFICIAL OPENING ENDOSCOPIC: ICD-10-PCS | Performed by: SURGERY

## 2018-07-30 PROCEDURE — 6370000000 HC RX 637 (ALT 250 FOR IP): Performed by: PSYCHIATRY & NEUROLOGY

## 2018-07-30 PROCEDURE — 2580000003 HC RX 258: Performed by: ANESTHESIOLOGY

## 2018-07-30 PROCEDURE — 3609010400 HC COLONOSCOPY POLYPECTOMY HOT BIOPSY: Performed by: SURGERY

## 2018-07-30 PROCEDURE — C1773 RET DEV, INSERTABLE: HCPCS | Performed by: SURGERY

## 2018-07-30 PROCEDURE — 88305 TISSUE EXAM BY PATHOLOGIST: CPT

## 2018-07-30 PROCEDURE — 7100000000 HC PACU RECOVERY - FIRST 15 MIN: Performed by: SURGERY

## 2018-07-30 PROCEDURE — 2709999900 HC NON-CHARGEABLE SUPPLY: Performed by: SURGERY

## 2018-07-30 PROCEDURE — 7100000001 HC PACU RECOVERY - ADDTL 15 MIN: Performed by: SURGERY

## 2018-07-30 PROCEDURE — 3700000001 HC ADD 15 MINUTES (ANESTHESIA): Performed by: SURGERY

## 2018-07-30 PROCEDURE — 2580000003 HC RX 258: Performed by: SURGERY

## 2018-07-30 PROCEDURE — 2720000010 HC SURG SUPPLY STERILE: Performed by: SURGERY

## 2018-07-30 PROCEDURE — 3700000000 HC ANESTHESIA ATTENDED CARE: Performed by: SURGERY

## 2018-07-30 PROCEDURE — 1240000000 HC EMOTIONAL WELLNESS R&B

## 2018-07-30 RX ORDER — ONDANSETRON 2 MG/ML
4 INJECTION INTRAMUSCULAR; INTRAVENOUS
Status: DISCONTINUED | OUTPATIENT
Start: 2018-07-30 | End: 2018-07-30 | Stop reason: HOSPADM

## 2018-07-30 RX ORDER — DIPHENHYDRAMINE HYDROCHLORIDE 50 MG/ML
12.5 INJECTION INTRAMUSCULAR; INTRAVENOUS
Status: DISCONTINUED | OUTPATIENT
Start: 2018-07-30 | End: 2018-07-30 | Stop reason: HOSPADM

## 2018-07-30 RX ORDER — SODIUM CHLORIDE, SODIUM LACTATE, POTASSIUM CHLORIDE, CALCIUM CHLORIDE 600; 310; 30; 20 MG/100ML; MG/100ML; MG/100ML; MG/100ML
INJECTION, SOLUTION INTRAVENOUS CONTINUOUS
Status: DISCONTINUED | OUTPATIENT
Start: 2018-07-30 | End: 2018-07-30

## 2018-07-30 RX ORDER — HYDRALAZINE HYDROCHLORIDE 20 MG/ML
5 INJECTION INTRAMUSCULAR; INTRAVENOUS EVERY 10 MIN PRN
Status: DISCONTINUED | OUTPATIENT
Start: 2018-07-30 | End: 2018-07-30 | Stop reason: HOSPADM

## 2018-07-30 RX ORDER — FENTANYL CITRATE 50 UG/ML
25 INJECTION, SOLUTION INTRAMUSCULAR; INTRAVENOUS EVERY 5 MIN PRN
Status: DISCONTINUED | OUTPATIENT
Start: 2018-07-30 | End: 2018-07-30 | Stop reason: HOSPADM

## 2018-07-30 RX ORDER — FENTANYL CITRATE 50 UG/ML
50 INJECTION, SOLUTION INTRAMUSCULAR; INTRAVENOUS EVERY 5 MIN PRN
Status: DISCONTINUED | OUTPATIENT
Start: 2018-07-30 | End: 2018-07-30 | Stop reason: HOSPADM

## 2018-07-30 RX ORDER — LABETALOL HYDROCHLORIDE 5 MG/ML
5 INJECTION, SOLUTION INTRAVENOUS EVERY 10 MIN PRN
Status: DISCONTINUED | OUTPATIENT
Start: 2018-07-30 | End: 2018-07-30 | Stop reason: HOSPADM

## 2018-07-30 RX ORDER — MEPERIDINE HYDROCHLORIDE 50 MG/ML
12.5 INJECTION INTRAMUSCULAR; INTRAVENOUS; SUBCUTANEOUS EVERY 5 MIN PRN
Status: DISCONTINUED | OUTPATIENT
Start: 2018-07-30 | End: 2018-07-30 | Stop reason: HOSPADM

## 2018-07-30 RX ORDER — HYDROCODONE BITARTRATE AND ACETAMINOPHEN 5; 325 MG/1; MG/1
1 TABLET ORAL PRN
Status: DISCONTINUED | OUTPATIENT
Start: 2018-07-30 | End: 2018-07-30 | Stop reason: HOSPADM

## 2018-07-30 RX ORDER — MORPHINE SULFATE 2 MG/ML
2 INJECTION, SOLUTION INTRAMUSCULAR; INTRAVENOUS EVERY 5 MIN PRN
Status: DISCONTINUED | OUTPATIENT
Start: 2018-07-30 | End: 2018-07-30 | Stop reason: HOSPADM

## 2018-07-30 RX ORDER — METOCLOPRAMIDE HYDROCHLORIDE 5 MG/ML
10 INJECTION INTRAMUSCULAR; INTRAVENOUS
Status: DISCONTINUED | OUTPATIENT
Start: 2018-07-30 | End: 2018-07-30 | Stop reason: HOSPADM

## 2018-07-30 RX ORDER — SODIUM CHLORIDE, SODIUM LACTATE, POTASSIUM CHLORIDE, CALCIUM CHLORIDE 600; 310; 30; 20 MG/100ML; MG/100ML; MG/100ML; MG/100ML
INJECTION, SOLUTION INTRAVENOUS CONTINUOUS PRN
Status: DISCONTINUED | OUTPATIENT
Start: 2018-07-30 | End: 2018-07-30 | Stop reason: SDUPTHER

## 2018-07-30 RX ORDER — HYDROCODONE BITARTRATE AND ACETAMINOPHEN 5; 325 MG/1; MG/1
2 TABLET ORAL PRN
Status: DISCONTINUED | OUTPATIENT
Start: 2018-07-30 | End: 2018-07-30 | Stop reason: HOSPADM

## 2018-07-30 RX ORDER — PROPOFOL 10 MG/ML
INJECTION, EMULSION INTRAVENOUS PRN
Status: DISCONTINUED | OUTPATIENT
Start: 2018-07-30 | End: 2018-07-30 | Stop reason: SDUPTHER

## 2018-07-30 RX ADMIN — PROPOFOL 50 MG: 10 INJECTION, EMULSION INTRAVENOUS at 07:35

## 2018-07-30 RX ADMIN — TRAZODONE HYDROCHLORIDE 100 MG: 100 TABLET ORAL at 21:44

## 2018-07-30 RX ADMIN — NICOTINE POLACRILEX 2 MG: 2 GUM, CHEWING BUCCAL at 19:07

## 2018-07-30 RX ADMIN — SODIUM CHLORIDE, POTASSIUM CHLORIDE, SODIUM LACTATE AND CALCIUM CHLORIDE: 600; 310; 30; 20 INJECTION, SOLUTION INTRAVENOUS at 07:21

## 2018-07-30 RX ADMIN — SODIUM CHLORIDE, POTASSIUM CHLORIDE, SODIUM LACTATE AND CALCIUM CHLORIDE: 600; 310; 30; 20 INJECTION, SOLUTION INTRAVENOUS at 06:50

## 2018-07-30 RX ADMIN — QUETIAPINE FUMARATE 150 MG: 100 TABLET ORAL at 09:42

## 2018-07-30 RX ADMIN — PROPOFOL 50 MG: 10 INJECTION, EMULSION INTRAVENOUS at 07:45

## 2018-07-30 RX ADMIN — PROPOFOL 25 MG: 10 INJECTION, EMULSION INTRAVENOUS at 07:40

## 2018-07-30 RX ADMIN — PROPOFOL 25 MG: 10 INJECTION, EMULSION INTRAVENOUS at 07:55

## 2018-07-30 RX ADMIN — PROPOFOL 100 MG: 10 INJECTION, EMULSION INTRAVENOUS at 07:20

## 2018-07-30 RX ADMIN — PANTOPRAZOLE SODIUM 40 MG: 40 TABLET, DELAYED RELEASE ORAL at 09:42

## 2018-07-30 RX ADMIN — PROPOFOL 50 MG: 10 INJECTION, EMULSION INTRAVENOUS at 07:25

## 2018-07-30 RX ADMIN — PROPOFOL 25 MG: 10 INJECTION, EMULSION INTRAVENOUS at 08:00

## 2018-07-30 RX ADMIN — PROPOFOL 50 MG: 10 INJECTION, EMULSION INTRAVENOUS at 07:50

## 2018-07-30 RX ADMIN — CARIPRAZINE 1.5 MG: 1.5 CAPSULE, GELATIN COATED ORAL at 09:42

## 2018-07-30 RX ADMIN — QUETIAPINE FUMARATE 300 MG: 300 TABLET ORAL at 21:45

## 2018-07-30 RX ADMIN — DIVALPROEX SODIUM 1000 MG: 500 TABLET, EXTENDED RELEASE ORAL at 09:42

## 2018-07-30 RX ADMIN — ACETAMINOPHEN 500 MG: 500 TABLET, FILM COATED ORAL at 13:26

## 2018-07-30 RX ADMIN — NICOTINE POLACRILEX 2 MG: 2 GUM, CHEWING BUCCAL at 13:26

## 2018-07-30 RX ADMIN — ALUMINUM HYDROXIDE, MAGNESIUM HYDROXIDE, AND SIMETHICONE 30 ML: 200; 200; 20 SUSPENSION ORAL at 18:56

## 2018-07-30 RX ADMIN — PROPOFOL 50 MG: 10 INJECTION, EMULSION INTRAVENOUS at 07:30

## 2018-07-30 RX ADMIN — NICOTINE POLACRILEX 2 MG: 2 GUM, CHEWING BUCCAL at 21:45

## 2018-07-30 RX ADMIN — LAMOTRIGINE 200 MG: 100 TABLET ORAL at 09:42

## 2018-07-30 ASSESSMENT — PULMONARY FUNCTION TESTS
PIF_VALUE: 1
PIF_VALUE: 0
PIF_VALUE: 1

## 2018-07-30 ASSESSMENT — PAIN SCALES - GENERAL
PAINLEVEL_OUTOF10: 0
PAINLEVEL_OUTOF10: 3

## 2018-07-30 ASSESSMENT — PAIN - FUNCTIONAL ASSESSMENT: PAIN_FUNCTIONAL_ASSESSMENT: 0-10

## 2018-07-30 ASSESSMENT — PAIN DESCRIPTION - FREQUENCY: FREQUENCY: INTERMITTENT

## 2018-07-30 ASSESSMENT — PAIN DESCRIPTION - LOCATION
LOCATION: BACK
LOCATION: BACK

## 2018-07-30 ASSESSMENT — PAIN DESCRIPTION - PAIN TYPE
TYPE: CHRONIC PAIN
TYPE: CHRONIC PAIN

## 2018-07-30 ASSESSMENT — PAIN DESCRIPTION - DESCRIPTORS
DESCRIPTORS: ACHING
DESCRIPTORS: ACHING

## 2018-07-30 ASSESSMENT — PAIN DESCRIPTION - ORIENTATION
ORIENTATION: LOWER
ORIENTATION: LOWER

## 2018-07-30 ASSESSMENT — PAIN DESCRIPTION - ONSET: ONSET: ON-GOING

## 2018-07-30 NOTE — ANESTHESIA POSTPROCEDURE EVALUATION
POST- ANESTHESIA EVALUATION       Pt Name: Monique Lynn  MRN: [id number]  YOB: 1945  Date of evaluation: 7/30/2018  Time:  8:58 AM      BP (!) 167/81   Pulse 77   Temp 97.2 °F (36.2 °C) (Infrared)   Resp 20   Ht 5' 11\" (1.803 m)   Wt 198 lb (89.8 kg)   SpO2 98%   BMI 27.62 kg/m²      Consciousness Level  Awake  Cardiopulmonary Status  Stable  Pain Adequately Treated YES  Nausea / Vomiting  NO  Adequate Hydration  YES  Anesthesia Related Complications NONE      Electronically signed by Gaye Lloyd MD on 7/30/2018 at 8:58 AM       Department of Anesthesiology  Postprocedure Note    Patient: Monique Lynn  MRN: [id number]  YOB: 1945  Date of evaluation: 7/30/2018  Time:  8:58 AM     Procedure Summary     Date:  07/30/18 Room / Location:  [address] [place] [address] [address] / [address] [place] [address] [address]    Anesthesia Start:  0721 Anesthesia Stop:  0813    Procedure:  COLONOSCOPY POLYPECTOMY HOT BIOPSY (N/A ) Diagnosis:  (abdominal pain)    Surgeon:  Lauryn Jorgensen DO Responsible Provider:  Gaye Lloyd MD    Anesthesia Type:  MAC, general ASA Status:  3          Anesthesia Type: MAC, general    Rasheeda Phase I: Rasheeda Score: 10    Rasheeda Phase II:      Last vitals: Reviewed and per EMR flowsheets.        Anesthesia Post Evaluation

## 2018-07-30 NOTE — PLAN OF CARE
Problem: Altered Mood, Manic Behavior:  Goal: Able to verbalize decrease in frequency and intensity of racing thoughts  Able to verbalize decrease in frequency and intensity of racing thoughts   Outcome: Ongoing  Psychoeducation Group Note    Date: 7/30/18  Start Time: 0845  End Time: 5151    Number Participants in Group:  16    Goal:  Patient will demonstrate increased interpersonal interaction. Topic:  Goal Setting and [employer]    Discipline Responsible:   OT  AT  [facility]. X RT  Other       Participation Level:     None  Minimal   X Active Listener X Interactive    Monopolizing         Participation Quality:  X Appropriate  Inappropriate   X       Attentive        Intrusive   X       Sharing        Resistant          Supportive        Lethargic       Affective:   X Congruent  Incongruent  Blunted  Flat    Constricted  Anxious  Elated  Angry    Labile  Depressed  Other  Bright       Cognitive:  X Alert X Oriented PPTP     Concentration X G  F  P   Attention Span X G  F  P   Short-Term Memory X G  F  P   Long-Term Memory X G  F  P   ProblemSolving/  Decision Making X G  F  P   Ability to Process  Information X G  F  P      Contributing Factors             Delusional             Hallucinating             Flight of Ideas             Other:       Modes of Intervention:  X Education  Support X Exploration   X Clarifying X Problem Solving  Confrontation    Socialization X Limit Setting  Reality Testing    Activity  Movement  Media    Other:            Response to Learning:  X Able to verbalize current knowledge/experience   X Able to verbalize/acknowledge new learning   X Able to retain information   X Capable of insight    Able to change behavior   X Progressing to goal    Other:        Comments: Pt developed daily goal of discharge planning.

## 2018-07-30 NOTE — PLAN OF CARE
Problem: Altered Mood, Manic Behavior:  Goal: Able to verbalize decrease in frequency and intensity of racing thoughts  Able to verbalize decrease in frequency and intensity of racing thoughts   Outcome: Ongoing  PSYCHOTHERAPYGROUP NOTE    Date: 7/30/18  Start Time: 10 am  End Time: 1045am    Number Participants in Group:  8    Goal:  Patient will demonstrate increased interpersonal interaction   Topic: Support     Discipline Responsible:   OT  AT x SW  Nsg.  RT MHP Other       Participation Level:     None  Minimal    Active Listener x Interactive    Monopolizing         Participation Quality:  x Appropriate  Inappropriate   x       Attentive        Intrusive   x       Sharing        Resistant          Supportive        Lethargic       Affective:   x Congruent  Incongruent  Blunted  Flat    Constricted  Anxious  Elated  Angry    Labile  Depressed  Other         Cognitive:  x Alert x Oriented PPTP     Concentration  G x F  P   Attention Span  G x F  P   Short-Term Memory  G x F  P   Long-Term Memory  G x F  P   ProblemSolving/  Decision Making  G x F  P   Ability to Process  Information  G x F  P      Contributing Factors             Delusional             Hallucinating             Flight of Ideas             Other:       Modes of Intervention:   Education x Support  Exploration    Clarifying  Problem Solving  Confrontation    Socialization  Limit Setting  Reality Testing    Activity  Movement  Media    Other:            Response to Learning:  x Able to verbalize current knowledge/experience    Able to verbalize/acknowledge new learning    Able to retain information    Capable of insight    Able to change behavior    Progressing to goal    Other:        Comments:

## 2018-07-30 NOTE — BH NOTE
He has grandiose delusions. He had delusions of persecution and delusions of reference. He has thought withdrawal and thought insertion. He has homicidal thoughts    He has homicidal thoughts and plans to beat up everybody including the police and the staff. He has no insight. His judgment is impaired. He is oriented to time place and person. His memory to recent and remote and immediate events are within normal limits    Plan: To continue current management.     Autumn Grannis 72 y.o. male      Vitals:    07/24/18 0819   BP: 123/73   Pulse: 74   Resp: 14   Temp: 97.7 °F (36.5 °C)   SpO2:        Review of systems  Constitutional:  negative for chills, fevers, sweats  Respiratory:  negative for cough, dyspnea on exertion, hemoptysis, shortness of breath, wheezing  Cardiovascular:  negative for chest pain, chest pressure/discomfort, lower extremity edema, palpitations  Gastrointestinal:  negative for abdominal pain, constipation, diarrhea, nausea, vomiting  Neurological:  negative for dizziness, headache
I have reviewed AMARI Diggs's charting.
LPN documentation reviewed by RN
PRN note  Pt is anxious AEB poor concentration and restlessness. Staff attempted to find and relieve the distress by talking to patient, and offering PRN medication. Pt is currently accepted PRN medications, and 1:1 talk time.
Patient did attend 2:30 PM open recreational group by playing cards.
Patient has delusions of persecution and delusions of reference. He is experiencing auditory hallucinations. He is actively responding to internal stimuli. He feels that he has nothing in his life. He feels that there is no one in his life. He is unhappy that his life has no purpose. He has no insight and his judgement is impaired. He is anxious and agitated for no reason. He is refusing to take his medication. Plan: to continue current management. Jovanni Coley 72 y.o. male    I have reviewed all the lab results. There are some abnormalities that are not critical to the patient's health, b schedule a follow up visit with me at his convenience. Her judgement is impaired. She has adequate attention and concentration.   Plan: continue current management  Vitals:    07/30/18 0830   BP: (!) 167/81   Pulse: 77   Resp: 20   Temp:    SpO2: 98%       Review of systems  Constitutional:  negative for chills, fevers, sweats  Respiratory:  negative for cough, dyspnea on exertion, hemoptysis, shortness of breath, wheezing  Cardiovascular:  negative for chest pain, chest pressure/discomfort, lower extremity edema, palpitations  Gastrointestinal:  negative for abdominal pain, constipation, diarrhea, nausea, vomiting  Neurological:  negative for dizziness, headache
Patient has labile mood and affect. Patient has mildly pressured speech. Patient has delusions of persecution. He has been hearing voices telling him different things. He feels that people are against him and that they are trying to harm him and hurt him. He feels that his life is a waste and he should kill himself and die. He said that he tried to cut himself to commit suicide. He has no insight and him judgement is impaired. Plan: to continue current management.     Jamie Benjamin 72 y.o. male      Vitals:    07/27/18 0728   BP: (!) 133/57   Pulse: 66   Resp: 14   Temp: 97.5 °F (36.4 °C)   SpO2:        Review of systems  Constitutional:  negative for chills, fevers, sweats  Respiratory:  negative for cough, dyspnea on exertion, hemoptysis, shortness of breath, wheezing  Cardiovascular:  negative for chest pain, chest pressure/discomfort, lower extremity edema, palpitations  Gastrointestinal:  negative for abdominal pain, constipation, diarrhea, nausea, vomiting  Neurological:  negative for dizziness, headache
Patient is having increased psychomotor activity. Patient has labile mood and affect. Patient has adequate eye contact. Patient has adequate rapport. Patient has been experiencing auditory hallucinations giving him running commentary. Some of the voices are also telling them to hurt himself and other people. He has delusions of persecution and delusions of reference. He feels that people are trying to harm him and are talking about him. He has no insight. His judgement is impaired. He has poor impulse control. Patient has suicidal and homicidal thoughts and plans. He wants to hurt himself by walking into traffic. Patient is oriented to time, place and person. Patient is of average intellegence. Patient's memory to recent, remote and immediate events are within normal limits. Plan: to continue current management.   Ajit Blackburn 72 y.o. male      Vitals:    07/25/18 0731   BP: 127/76   Pulse: 79   Resp: 14   Temp: 98 °F (36.7 °C)   SpO2:        Review of systems  Constitutional:  negative for chills, fevers, sweats  Respiratory:  negative for cough, dyspnea on exertion, hemoptysis, shortness of breath, wheezing  Cardiovascular:  negative for chest pain, chest pressure/discomfort, lower extremity edema, palpitations  Gastrointestinal:  negative for abdominal pain, constipation, diarrhea, nausea, vomiting  Neurological:  negative for dizziness, headache
Patient is pacing up and down. He has labile mood and affect. He complains of racing thoughts and agitation. He has lose associations and thought blocking. He feels that people are watching him. He believes that people are trying to harm him and kill him by shooting at him. He complains of mood swings. He has dysphoric mood and affect. He has been hearing voices telling him to kill himself and die and beat up other people. He feels that he should overdose on his medication an die. He is unable to focus and concentrate. He is oriented to time place and person. His memory to immediate, recent and remote events are within noramal limits. He has no insight and his judgement is impaired. Plan: to continue current management.   Renate Russo 72 y.o. male      Vitals:    07/26/18 0741   BP: 131/60   Pulse: 69   Resp: 14   Temp: 97.3 °F (36.3 °C)   SpO2:        Review of systems  Constitutional:  negative for chills, fevers, sweats  Respiratory:  negative for cough, dyspnea on exertion, hemoptysis, shortness of breath, wheezing  Cardiovascular:  negative for chest pain, chest pressure/discomfort, lower extremity edema, palpitations  Gastrointestinal:  negative for abdominal pain, constipation, diarrhea, nausea, vomiting  Neurological:  negative for dizziness, headache
Patient participated in 4 PM safety group.
Patient was admitted through the ER with complaints of increasing agitation and aggressive behavior Detailed note dictated    Mental Status Examination:    Appearance: grooming:alert, severe distress, uncooperative  Motor Activity:psychomotor activity: accelerated  Speech:rate/volume:pressured and profane  Attitude: cooperative with interview  Affect: labile  Mood: euphoric  Thought Processes: pressured speech and flight of ideas  Thought Content: He has delusions of persecution, delusions of grandeur and reference. Suicidal Ideation: patient denies  Homicidal Ideation: patient has homicidal thoughts and plans to beat up people and hurt them  Perceptions: Hearing voices and responds to them  Insight/Judgment: impaired  Cognition: Alert, oriented to person, place, time, and situation; immediate recall 3/3 and delayed recall 3/3; concentration mildly impaired; it is evident during interview that patient's fund of knowledge is average; no language deficit noted  Axis I Bipolar Disorder type I Courtney  Axis II None  Axis III H? O hypertension.   Axis IV Severe  Axis V 30  Tx plan: Safe therapeutic milieu
[facility] through the [facility] with complaints of increasing depression and suicidal ideation. Detailed note dictated    Mental Status Examination:    Appearance: grooming:alert, cooperative, severe distress  Motor Activity:psychomotor activity: accelerated  Speech:rate/volume:normal pitch and normal volume  Attitude: cooperative with interview  Affect: labile  Mood: dysphoric  Thought Processes: linear goal directed  Thought Content: He has delusions of persecution and delusions of reference. Suicidal Ideation: Denies  Homicidal Ideation: patient has homicidal thoughts and he is threatening his peers at the group home/  Perceptions: Hearing voices  Insight/Judgment: impaired  Cognition: Alert, oriented to person, place, time, and situation; immediate recall 3/3 and delayed recall 3/3; concentration mildly impaired; it is evident during interview that patient's fund of knowledge is average; no language deficit noted  Axis I Bipolar disorder Type 1 Courtney  Axis II None  Axis III HTN  Axis IV Severe  Axis V 30  Tx plan: Safe therapeutic milieu  Medications as listed   No current facility-administered medications for this encounter.       [unfilled]    H&P  Labs  Encourage to attend groups  Supportive therapy  ELOS: 5-7 days      Electronically signed by Anusha Le MD on 7/19/2018 at 6:31 PM
Psychoeducation Group Note    Date: 07/26/18  Start Time: 1600  End Time: 1645    Number Participants in Group:  6/18    Goal:  Patient will demonstrate increased interpersonal interaction   Topic: Wellness Group- Attitude    Discipline Responsible:   OT  AT   X Nsg.  RT  Other       Participation Level:     None  Minimal   X Active Listener  Interactive    Monopolizing         Participation Quality:  X Appropriate  Inappropriate          Attentive        Intrusive          Sharing        Resistant          Supportive        Lethargic       Affective:   X Congruent  Incongruent  Blunted  Flat    Constricted  Anxious  Elated  Angry    Labile  Depressed  Other         Cognitive:  X Alert  Oriented PPTP     Concentration  G X F  P   Attention Span  G X F  P   Short-Term Memory  G X F  P   Long-Term Memory  G X F  P   ProblemSolving/  Decision Making  G X F  P   Ability to Process  Information  G X F  P      Contributing Factors             Delusional             Hallucinating             Flight of Ideas             Other:       Modes of Intervention:  X Education  Support  Exploration    Clarifying  Problem Solving  Confrontation   X Socialization  Limit Setting  Reality Testing    Activity  Movement  Media    Other:            Response to Learning:  X Able to verbalize current knowledge/experience    Able to verbalize/acknowledge new learning    Able to retain information    Capable of insight    Able to change behavior   X Progressing to goal    Other:        Comments: Discussed having a positive attitude compared to a negative one.
Pt did not attend 4pm group on unit. Despite encouragement.
Pt. Did not attend wellness group at 1100 despite invitation and encouragement to attend.
Pt. Returned from MRI with staff member, no issues to report
Pt. Taken off unit for MRI with staff member.
RN reviewed LPN work.
This RN has reviewed and agrees with all the documentation entered by Brice Zavala LPN.
Writer called patients guardian Reid Barajas (daughter) [phone]  regarding admission paperwork, Roque Holloway states she has no access to a fax machine and lives in [place]. Writer informed her that  will be contacting her, to get papers signed. Roque Holloway also stated pt was to be in court today at 9 AM the court requires that Social work will need to fax the court at  case # [phone] stating he was admitted  to hospital. Information was given to .
Writer spoke to Amanda Whitten, patient's guardian, concerning patient's results of CT scan and MRI. Amanda Whitten was asking if she could receive a copy of the tests and have them explained to her. Writer informed Amanda Whitten that Shadia Cadet would have to talk to charge nurse concerning the protocol for such a circumstance. Writer informed Amanda Whitten that Shadia Cadet believes she would have to obtain a copy from medical records and then call Dr. Guido Hands office to make an appointment time to speak with doctor concerning results. Writer informed Amanda Whitten that writer would clarify information with charge nurse and call Amanda Whitten back with answer.  Merly's number is [phone]
Writer spoke to Dr. Rachel Flores who stated she would come see patient either this afternoon or sometime tomorrow. Dr. Rachel Flores informed [address] that there is not a lot the surgeons can do while he is inpatient on [facility]- he would most likely be told to follow up when he is discharged. Writer informed Dr. Rachel Flores that patient seems like he simply wants to be reassured by a doctor that his rectum has not prolapsed. Dr. Rachel Flores stated she would call writer to let writer know what time she was going to see patient.
Writer viewed patient's rectum, along with a male staff member, Luisa Walden, [place]. Patient appeared to have a hemorrhoid near rectum. A consult was put into colon and rectal surgery per Dr. Raciel Ellis.
`[facility]  Admission Note     Admission Type:   Admission Type: Voluntary    Reason for admission:  Reason for Admission: from [facility] off medications, lives in group home. denies SI -pressured thoughts,states sleeping 5 hours a night. PATIENT STRENGTHS:  Strengths: Positive Support    Patient Strengths and Limitations:  Limitations: Perceives need for assistance with self-care, External locus of control    Addictive Behavior:   Addictive Behavior  In the past 3 months, have you felt or has someone told you that you have a problem with:  : None  Do you have a history of Chemical Use?: No  Do you have a history of Alcohol Use?: No  Do you have a history of Street Drug Abuse?: No  Histroy of Prescripton Drug Abuse?: No    Medical Problems:   Past Medical History:   Diagnosis Date    Back pain     Bipolar 1 disorder (HCC)     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    [facility] obstructive [facility]     GERD (gastroesophageal reflux disease)     Heart attack        Status EXAM:  Status and Exam  Normal: No  Facial Expression: Brightened, Avoids Gaze  Affect: Blunt  Level of Consciousness: Alert  Mood:Normal: Yes  Mood: Anxious, Sad  Motor Activity:Normal: Yes  Interview Behavior: Cooperative  Preception: [place] to Person, [place] to Time, [place] to Place  Attention:Normal: No  Attention: Distractible  Thought Processes: Flt.of Ideas  Thought Content:Normal: No  Thought Content: Preoccupations  Hallucinations: None  Delusions: Yes  Memory:Normal: No  Memory: Poor Recent  Insight and Judgment: No  Insight and Judgment: Poor Judgment, Unrealistic  Present Suicidal Ideation: No  Present Homicidal Ideation: No    Tobacco Screening:  Practical Counseling, on admission, cal X, if applicable and completed (first 3 are required if patient doesn't refuse):             (x )  Recognizing danger situations (included triggers and roadblocks)                    ( x)  Coping skills (new ways to manage
He is of average intelligence. He is oriented to  time, place, and person. He has poor attention and concentration. His  insight and judgement are impaired. His abstraction is concrete. DIAGNOSES:  1. Bipolar disorder, current episode type 1, janine. 2.  Hypertension. TREATMENT AND PLAN:  1. Admit him. 2.  Stabilize him. 3.  Adjust his medications. ESTIMATED LENGTH OF STAY:  10 days.         Yogn Sotelo    D: 07/19/2018 18:11:56       T: 07/19/2018 21:35:27     [email or web address]  Job#: [id number]     Doc#: [id number]    CC:
indicated    Anticipated Discharge Date: None    Patient's Response to Treatment: positive    Electronically signed by Oliva Oscar MD on 7/28/2018 at 9:19 AM

## 2018-07-31 VITALS
OXYGEN SATURATION: 98 % | DIASTOLIC BLOOD PRESSURE: 66 MMHG | HEART RATE: 73 BPM | BODY MASS INDEX: 27.72 KG/M2 | HEIGHT: 71 IN | WEIGHT: 198 LBS | TEMPERATURE: 97.5 F | RESPIRATION RATE: 14 BRPM | SYSTOLIC BLOOD PRESSURE: 135 MMHG

## 2018-07-31 PROBLEM — F31.9 BIPOLAR 1 DISORDER (HCC): Status: RESOLVED | Noted: 2018-07-19 | Resolved: 2018-07-31

## 2018-07-31 LAB — SURGICAL PATHOLOGY REPORT: NORMAL

## 2018-07-31 PROCEDURE — 6370000000 HC RX 637 (ALT 250 FOR IP): Performed by: PSYCHIATRY & NEUROLOGY

## 2018-07-31 PROCEDURE — 5130000000 HC BRIDGE APPOINTMENT

## 2018-07-31 RX ORDER — TRAZODONE HYDROCHLORIDE 100 MG/1
100 TABLET ORAL NIGHTLY PRN
Qty: 30 TABLET | Refills: 0 | Status: SHIPPED | OUTPATIENT
Start: 2018-07-31

## 2018-07-31 RX ORDER — LAMOTRIGINE 200 MG/1
200 TABLET ORAL DAILY
Qty: 30 TABLET | Refills: 0 | Status: SHIPPED | OUTPATIENT
Start: 2018-07-31

## 2018-07-31 RX ORDER — TRAZODONE HYDROCHLORIDE 100 MG/1
100 TABLET ORAL NIGHTLY PRN
Qty: 30 TABLET | Refills: 0 | Status: SHIPPED | OUTPATIENT
Start: 2018-07-31 | End: 2018-07-31

## 2018-07-31 RX ORDER — QUETIAPINE 150 MG/1
150 TABLET, FILM COATED, EXTENDED RELEASE ORAL DAILY
Qty: 30 TABLET | Refills: 0 | Status: SHIPPED | OUTPATIENT
Start: 2018-07-31 | End: 2018-07-31

## 2018-07-31 RX ORDER — DIVALPROEX SODIUM 500 MG/1
1000 TABLET, EXTENDED RELEASE ORAL DAILY
Qty: 60 TABLET | Refills: 0 | Status: SHIPPED | OUTPATIENT
Start: 2018-07-31

## 2018-07-31 RX ORDER — QUETIAPINE 150 MG/1
150 TABLET, FILM COATED, EXTENDED RELEASE ORAL DAILY
Qty: 30 TABLET | Refills: 0 | Status: SHIPPED | OUTPATIENT
Start: 2018-07-31

## 2018-07-31 RX ORDER — DIVALPROEX SODIUM 500 MG/1
1000 TABLET, EXTENDED RELEASE ORAL DAILY
Qty: 60 TABLET | Refills: 0 | Status: SHIPPED | OUTPATIENT
Start: 2018-07-31 | End: 2018-07-31

## 2018-07-31 RX ORDER — PANTOPRAZOLE SODIUM 40 MG/1
40 TABLET, DELAYED RELEASE ORAL DAILY
Qty: 30 TABLET | Refills: 0 | Status: SHIPPED | OUTPATIENT
Start: 2018-07-31

## 2018-07-31 RX ORDER — QUETIAPINE FUMARATE 300 MG/1
300 TABLET, FILM COATED ORAL NIGHTLY
Qty: 30 TABLET | Refills: 0 | Status: SHIPPED | OUTPATIENT
Start: 2018-07-31

## 2018-07-31 RX ORDER — LAMOTRIGINE 200 MG/1
200 TABLET ORAL DAILY
Qty: 30 TABLET | Refills: 0 | Status: SHIPPED | OUTPATIENT
Start: 2018-07-31 | End: 2018-07-31

## 2018-07-31 RX ORDER — PANTOPRAZOLE SODIUM 40 MG/1
40 TABLET, DELAYED RELEASE ORAL DAILY
Qty: 30 TABLET | Refills: 0 | Status: SHIPPED | OUTPATIENT
Start: 2018-07-31 | End: 2018-07-31

## 2018-07-31 RX ORDER — QUETIAPINE FUMARATE 300 MG/1
300 TABLET, FILM COATED ORAL NIGHTLY
Qty: 30 TABLET | Refills: 0 | Status: SHIPPED | OUTPATIENT
Start: 2018-07-31 | End: 2018-07-31

## 2018-07-31 RX ADMIN — LAMOTRIGINE 200 MG: 100 TABLET ORAL at 08:51

## 2018-07-31 RX ADMIN — DIVALPROEX SODIUM 1000 MG: 500 TABLET, EXTENDED RELEASE ORAL at 08:51

## 2018-07-31 RX ADMIN — CARIPRAZINE 1.5 MG: 1.5 CAPSULE, GELATIN COATED ORAL at 08:51

## 2018-07-31 RX ADMIN — NICOTINE POLACRILEX 2 MG: 2 GUM, CHEWING BUCCAL at 07:19

## 2018-07-31 RX ADMIN — NICOTINE POLACRILEX 2 MG: 2 GUM, CHEWING BUCCAL at 10:38

## 2018-07-31 RX ADMIN — QUETIAPINE FUMARATE 150 MG: 100 TABLET ORAL at 08:51

## 2018-07-31 RX ADMIN — PANTOPRAZOLE SODIUM 40 MG: 40 TABLET, DELAYED RELEASE ORAL at 08:51

## 2018-07-31 ASSESSMENT — PAIN SCALES - GENERAL: PAINLEVEL_OUTOF10: 0
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
## 2018-07-31 NOTE — PLAN OF CARE
Problem: Altered Mood, Manic Behavior:  Goal: Able to verbalize decrease in frequency and intensity of racing thoughts  Able to verbalize decrease in frequency and intensity of racing thoughts   Outcome: Ongoing  Psychoeducation Group Note    Date: 7/31/2018  Start Time: 0845  End Time: 0905    Number Participants in Group:  12    Goal:  Patient will demonstrate increased interpersonal interaction   Topic: Community meeting/ goals group    Discipline Responsible:   OT  AT  [facility]. x RT  Other       Participation Level:     None  Minimal   x Active Listener x Interactive    Monopolizing         Participation Quality:  x Appropriate  Inappropriate   x       Attentive        Intrusive   x       Sharing        Resistant          Supportive        Lethargic       Affective:   x Congruent  Incongruent  Blunted  Flat    Constricted  Anxious  Elated  Angry    Labile  Depressed  Other         Cognitive:  x Alert x Oriented PPTP     Concentration  G x F  P   Attention Span  G x F  P   Short-Term Memory  G x F  P   Long-Term Memory  G x F  P   ProblemSolving/  Decision Making  G x F  P   Ability to Process  Information  G x F  P      Contributing Factors             Delusional             Hallucinating             Flight of Ideas             Other:       Modes of Intervention:  x Education x Support x Exploration   x Clarifying x Problem Solving  Confrontation   x Socialization  Limit Setting x Reality Testing   x Activity  Movement  Media    Other:            Response to Learning:  x Able to verbalize current knowledge/experience   x Able to verbalize/acknowledge new learning   x Able to retain information    Capable of insight    Able to change behavior   x Progressing to goal    Other:        Comments:

## 2018-07-31 NOTE — PLAN OF CARE
Problem: Falls - Risk of:  Goal: Will remain free from falls  Will remain free from falls   Outcome: Ongoing  Pt remains free of falls and verbalizes understanding of individual fall risks. Pt wearing non skid footwear and encouraged to seek out staff for any assistance needed. Problem: Altered Mood, Manic Behavior:  Goal: Able to verbalize decrease in frequency and intensity of racing thoughts  Able to verbalize decrease in frequency and intensity of racing thoughts   Outcome: Ongoing  Patient has been calm, controlled, med complaint. Accepting of 1:1 talk time with staff. 1:1 with pt x ten minutes. Pt encouraged to attend unit programming and interact with peers and staff. Pt also encouraged to tend to hygiene and ADLs. Pt encouraged to discuss feelings with staff and feedback and reassurance provided. Patient has been out in the dayroom and social with peers. Patient currently denies anxiety and depression. Goal: Ability to demonstrate self-control will improve  Ability to demonstrate self-control will improve   Outcome: Ongoing      Problem: Risk of Harm:  Goal: Ability to remain free from injury will improve  Ability to remain free from injury will improve    Outcome: Ongoing  Patient denies suicidal and homicidal thoughts. Patient denies auditory and visual hallucinations. Patient is taking meds and eating well. No self harm noted this shift. Patient agrees to seek staff out if negative thoughts arise. Will continue to monitor Q15 minute and intermittently.

## 2018-07-31 NOTE — PLAN OF CARE
Problem: Altered Mood, Manic Behavior:  Goal: Able to verbalize decrease in frequency and intensity of racing thoughts  Able to verbalize decrease in frequency and intensity of racing thoughts   Outcome: Ongoing  Pt did not participate in Leisure Skills Therapeutic Group at 1000. Pt was planning discharge.

## 2023-01-13 ENCOUNTER — APPOINTMENT (OUTPATIENT)
Dept: GENERAL RADIOLOGY | Age: 78
End: 2023-01-13
Payer: MEDICARE

## 2023-01-13 ENCOUNTER — HOSPITAL ENCOUNTER (INPATIENT)
Age: 78
LOS: 14 days | Discharge: NURSING, REHAB OR LONG-TERM CARE | End: 2023-01-27
Attending: EMERGENCY MEDICINE | Admitting: INTERNAL MEDICINE
Payer: MEDICARE

## 2023-01-13 DIAGNOSIS — U07.1 COVID-19: Primary | ICD-10-CM

## 2023-01-13 DIAGNOSIS — R77.8 ELEVATED TROPONIN: ICD-10-CM

## 2023-01-13 PROBLEM — R79.89 ELEVATED TROPONIN: Status: ACTIVE | Noted: 2023-01-13

## 2023-01-13 PROBLEM — J96.01 ACUTE RESPIRATORY FAILURE WITH HYPOXIA (HCC): Status: ACTIVE | Noted: 2023-01-13

## 2023-01-13 PROBLEM — J44.1 COPD EXACERBATION (HCC): Status: ACTIVE | Noted: 2023-01-13

## 2023-01-13 PROBLEM — Z72.0 TOBACCO ABUSE: Status: ACTIVE | Noted: 2023-01-13

## 2023-01-13 LAB
ABSOLUTE EOS #: 0.06 K/UL (ref 0–0.44)
ABSOLUTE IMMATURE GRANULOCYTE: 0.03 K/UL (ref 0–0.3)
ABSOLUTE LYMPH #: 1.04 K/UL (ref 1.1–3.7)
ABSOLUTE MONO #: 1 K/UL (ref 0.1–1.2)
ALBUMIN SERPL-MCNC: 3.5 G/DL (ref 3.5–5.2)
ALP BLD-CCNC: 56 U/L (ref 40–129)
ALT SERPL-CCNC: 18 U/L (ref 5–41)
ANION GAP SERPL CALCULATED.3IONS-SCNC: 13 MMOL/L (ref 9–17)
AST SERPL-CCNC: 29 U/L
BASOPHILS # BLD: 1 % (ref 0–2)
BASOPHILS ABSOLUTE: 0.04 K/UL (ref 0–0.2)
BILIRUB SERPL-MCNC: 0.2 MG/DL (ref 0.3–1.2)
BILIRUBIN DIRECT: <0.1 MG/DL
BILIRUBIN URINE: NEGATIVE
BILIRUBIN, INDIRECT: ABNORMAL MG/DL (ref 0–1)
BUN BLDV-MCNC: 21 MG/DL (ref 8–23)
BUN/CREAT BLD: 20 (ref 9–20)
C-REACTIVE PROTEIN: 5.4 MG/L (ref 0–5)
CALCIUM SERPL-MCNC: 9.2 MG/DL (ref 8.6–10.4)
CHLORIDE BLD-SCNC: 99 MMOL/L (ref 98–107)
CO2: 26 MMOL/L (ref 20–31)
COLOR: YELLOW
CREAT SERPL-MCNC: 1.07 MG/DL (ref 0.7–1.2)
D-DIMER QUANTITATIVE: 1.63 MG/L FEU (ref 0–0.59)
EKG ATRIAL RATE: 95 BPM
EKG P AXIS: 55 DEGREES
EKG P-R INTERVAL: 128 MS
EKG Q-T INTERVAL: 386 MS
EKG QRS DURATION: 124 MS
EKG QTC CALCULATION (BAZETT): 485 MS
EKG R AXIS: -20 DEGREES
EKG T AXIS: 39 DEGREES
EKG VENTRICULAR RATE: 95 BPM
EOSINOPHILS RELATIVE PERCENT: 1 % (ref 1–4)
EPITHELIAL CELLS UA: NORMAL /HPF (ref 0–5)
FERRITIN: 114 NG/ML (ref 30–400)
FLU A ANTIGEN: NEGATIVE
FLU B ANTIGEN: NEGATIVE
GFR SERPL CREATININE-BSD FRML MDRD: >60 ML/MIN/1.73M2
GLUCOSE BLD-MCNC: 109 MG/DL (ref 70–99)
GLUCOSE URINE: NEGATIVE
HCT VFR BLD CALC: 48.1 % (ref 40.7–50.3)
HEMOGLOBIN: 15.2 G/DL (ref 13–17)
IMMATURE GRANULOCYTES: 1 %
KETONES, URINE: NEGATIVE
LACTATE DEHYDROGENASE: 174 U/L (ref 135–225)
LEUKOCYTE ESTERASE, URINE: NEGATIVE
LYMPHOCYTES # BLD: 18 % (ref 24–43)
MCH RBC QN AUTO: 31 PG (ref 25.2–33.5)
MCHC RBC AUTO-ENTMCNC: 31.6 G/DL (ref 28.4–34.8)
MCV RBC AUTO: 98.2 FL (ref 82.6–102.9)
MONOCYTES # BLD: 17 % (ref 3–12)
MYOGLOBIN: 177 NG/ML (ref 28–72)
MYOGLOBIN: 462 NG/ML (ref 28–72)
MYOGLOBIN: 520 NG/ML (ref 28–72)
NITRITE, URINE: NEGATIVE
NRBC AUTOMATED: 0 PER 100 WBC
PDW BLD-RTO: 14.2 % (ref 11.8–14.4)
PH UA: 7.5 (ref 5–8)
PLATELET # BLD: 182 K/UL (ref 138–453)
PMV BLD AUTO: 8.9 FL (ref 8.1–13.5)
POTASSIUM SERPL-SCNC: 4.4 MMOL/L (ref 3.7–5.3)
PROCALCITONIN: 0.07 NG/ML
PROTEIN UA: ABNORMAL
RBC # BLD: 4.9 M/UL (ref 4.21–5.77)
RBC UA: NORMAL /HPF (ref 0–2)
SARS-COV-2, RAPID: DETECTED
SEG NEUTROPHILS: 62 % (ref 36–65)
SEGMENTED NEUTROPHILS ABSOLUTE COUNT: 3.62 K/UL (ref 1.5–8.1)
SODIUM BLD-SCNC: 138 MMOL/L (ref 135–144)
SPECIFIC GRAVITY UA: 1.01 (ref 1–1.03)
SPECIMEN DESCRIPTION: ABNORMAL
TOTAL PROTEIN: 6.9 G/DL (ref 6.4–8.3)
TROPONIN, HIGH SENSITIVITY: 31 NG/L (ref 0–22)
TROPONIN, HIGH SENSITIVITY: 34 NG/L (ref 0–22)
TROPONIN, HIGH SENSITIVITY: 40 NG/L (ref 0–22)
TROPONIN, HIGH SENSITIVITY: 43 NG/L (ref 0–22)
TROPONIN, HIGH SENSITIVITY: 47 NG/L (ref 0–22)
TROPONIN, HIGH SENSITIVITY: 51 NG/L (ref 0–22)
TURBIDITY: CLEAR
URINE HGB: NEGATIVE
UROBILINOGEN, URINE: NORMAL
VITAMIN D 25-HYDROXY: 22.2 NG/ML
WBC # BLD: 5.8 K/UL (ref 3.5–11.3)
WBC UA: NORMAL /HPF (ref 0–5)

## 2023-01-13 PROCEDURE — 6370000000 HC RX 637 (ALT 250 FOR IP): Performed by: NURSE PRACTITIONER

## 2023-01-13 PROCEDURE — 6370000000 HC RX 637 (ALT 250 FOR IP): Performed by: STUDENT IN AN ORGANIZED HEALTH CARE EDUCATION/TRAINING PROGRAM

## 2023-01-13 PROCEDURE — 86140 C-REACTIVE PROTEIN: CPT

## 2023-01-13 PROCEDURE — 36415 COLL VENOUS BLD VENIPUNCTURE: CPT

## 2023-01-13 PROCEDURE — 6360000002 HC RX W HCPCS: Performed by: NURSE PRACTITIONER

## 2023-01-13 PROCEDURE — 85379 FIBRIN DEGRADATION QUANT: CPT

## 2023-01-13 PROCEDURE — 2060000000 HC ICU INTERMEDIATE R&B

## 2023-01-13 PROCEDURE — 83874 ASSAY OF MYOGLOBIN: CPT

## 2023-01-13 PROCEDURE — 94761 N-INVAS EAR/PLS OXIMETRY MLT: CPT

## 2023-01-13 PROCEDURE — 94640 AIRWAY INHALATION TREATMENT: CPT

## 2023-01-13 PROCEDURE — 99285 EMERGENCY DEPT VISIT HI MDM: CPT

## 2023-01-13 PROCEDURE — 87635 SARS-COV-2 COVID-19 AMP PRB: CPT

## 2023-01-13 PROCEDURE — 84145 PROCALCITONIN (PCT): CPT

## 2023-01-13 PROCEDURE — 93010 ELECTROCARDIOGRAM REPORT: CPT | Performed by: INTERNAL MEDICINE

## 2023-01-13 PROCEDURE — 84484 ASSAY OF TROPONIN QUANT: CPT

## 2023-01-13 PROCEDURE — 80048 BASIC METABOLIC PNL TOTAL CA: CPT

## 2023-01-13 PROCEDURE — 99222 1ST HOSP IP/OBS MODERATE 55: CPT | Performed by: STUDENT IN AN ORGANIZED HEALTH CARE EDUCATION/TRAINING PROGRAM

## 2023-01-13 PROCEDURE — 93306 TTE W/DOPPLER COMPLETE: CPT

## 2023-01-13 PROCEDURE — 93005 ELECTROCARDIOGRAM TRACING: CPT | Performed by: STUDENT IN AN ORGANIZED HEALTH CARE EDUCATION/TRAINING PROGRAM

## 2023-01-13 PROCEDURE — 80076 HEPATIC FUNCTION PANEL: CPT

## 2023-01-13 PROCEDURE — 81001 URINALYSIS AUTO W/SCOPE: CPT

## 2023-01-13 PROCEDURE — XW0DXF5 INTRODUCTION OF OTHER NEW TECHNOLOGY THERAPEUTIC SUBSTANCE INTO MOUTH AND PHARYNX, EXTERNAL APPROACH, NEW TECHNOLOGY GROUP 5: ICD-10-PCS | Performed by: STUDENT IN AN ORGANIZED HEALTH CARE EDUCATION/TRAINING PROGRAM

## 2023-01-13 PROCEDURE — 83615 LACTATE (LD) (LDH) ENZYME: CPT

## 2023-01-13 PROCEDURE — 2700000000 HC OXYGEN THERAPY PER DAY

## 2023-01-13 PROCEDURE — 85025 COMPLETE CBC W/AUTO DIFF WBC: CPT

## 2023-01-13 PROCEDURE — 82728 ASSAY OF FERRITIN: CPT

## 2023-01-13 PROCEDURE — 2580000003 HC RX 258: Performed by: NURSE PRACTITIONER

## 2023-01-13 PROCEDURE — 82306 VITAMIN D 25 HYDROXY: CPT

## 2023-01-13 PROCEDURE — 87804 INFLUENZA ASSAY W/OPTIC: CPT

## 2023-01-13 PROCEDURE — 71045 X-RAY EXAM CHEST 1 VIEW: CPT

## 2023-01-13 RX ORDER — PANTOPRAZOLE SODIUM 40 MG/1
40 TABLET, DELAYED RELEASE ORAL
Status: DISCONTINUED | OUTPATIENT
Start: 2023-01-14 | End: 2023-01-16

## 2023-01-13 RX ORDER — ASPIRIN 81 MG/1
81 TABLET, CHEWABLE ORAL DAILY
Status: DISCONTINUED | OUTPATIENT
Start: 2023-01-13 | End: 2023-01-27 | Stop reason: HOSPADM

## 2023-01-13 RX ORDER — SODIUM CHLORIDE 0.9 % (FLUSH) 0.9 %
5-40 SYRINGE (ML) INJECTION PRN
Status: DISCONTINUED | OUTPATIENT
Start: 2023-01-13 | End: 2023-01-27 | Stop reason: HOSPADM

## 2023-01-13 RX ORDER — POLYETHYLENE GLYCOL 3350 17 G/17G
17 POWDER, FOR SOLUTION ORAL DAILY PRN
Status: DISCONTINUED | OUTPATIENT
Start: 2023-01-13 | End: 2023-01-27 | Stop reason: HOSPADM

## 2023-01-13 RX ORDER — LAMOTRIGINE 100 MG/1
100 TABLET ORAL DAILY
COMMUNITY

## 2023-01-13 RX ORDER — NICOTINE 21 MG/24HR
1 PATCH, TRANSDERMAL 24 HOURS TRANSDERMAL DAILY
Status: DISCONTINUED | OUTPATIENT
Start: 2023-01-13 | End: 2023-01-27 | Stop reason: HOSPADM

## 2023-01-13 RX ORDER — DIVALPROEX SODIUM 500 MG/1
1000 TABLET, EXTENDED RELEASE ORAL DAILY
Status: DISCONTINUED | OUTPATIENT
Start: 2023-01-13 | End: 2023-01-27 | Stop reason: HOSPADM

## 2023-01-13 RX ORDER — ONDANSETRON 4 MG/1
4 TABLET, ORALLY DISINTEGRATING ORAL EVERY 8 HOURS PRN
Status: DISCONTINUED | OUTPATIENT
Start: 2023-01-13 | End: 2023-01-27 | Stop reason: HOSPADM

## 2023-01-13 RX ORDER — ACETAMINOPHEN 650 MG/1
650 SUPPOSITORY RECTAL EVERY 6 HOURS PRN
Status: DISCONTINUED | OUTPATIENT
Start: 2023-01-13 | End: 2023-01-27 | Stop reason: HOSPADM

## 2023-01-13 RX ORDER — AZITHROMYCIN 250 MG/1
500 TABLET, FILM COATED ORAL ONCE
Status: COMPLETED | OUTPATIENT
Start: 2023-01-13 | End: 2023-01-13

## 2023-01-13 RX ORDER — BUDESONIDE AND FORMOTEROL FUMARATE DIHYDRATE 80; 4.5 UG/1; UG/1
2 AEROSOL RESPIRATORY (INHALATION) 2 TIMES DAILY
Status: DISCONTINUED | OUTPATIENT
Start: 2023-01-13 | End: 2023-01-18

## 2023-01-13 RX ORDER — QUETIAPINE 150 MG/1
150 TABLET, FILM COATED, EXTENDED RELEASE ORAL NIGHTLY
Status: DISCONTINUED | OUTPATIENT
Start: 2023-01-13 | End: 2023-01-27 | Stop reason: HOSPADM

## 2023-01-13 RX ORDER — QUETIAPINE FUMARATE 400 MG/1
400 TABLET, FILM COATED ORAL NIGHTLY
COMMUNITY

## 2023-01-13 RX ORDER — SODIUM CHLORIDE 9 MG/ML
25 INJECTION, SOLUTION INTRAVENOUS PRN
Status: DISCONTINUED | OUTPATIENT
Start: 2023-01-13 | End: 2023-01-27 | Stop reason: HOSPADM

## 2023-01-13 RX ORDER — ENOXAPARIN SODIUM 100 MG/ML
30 INJECTION SUBCUTANEOUS 2 TIMES DAILY
Status: DISCONTINUED | OUTPATIENT
Start: 2023-01-13 | End: 2023-01-14

## 2023-01-13 RX ORDER — QUETIAPINE FUMARATE 200 MG/1
200 TABLET, FILM COATED ORAL 2 TIMES DAILY
Status: DISCONTINUED | OUTPATIENT
Start: 2023-01-13 | End: 2023-01-27 | Stop reason: HOSPADM

## 2023-01-13 RX ORDER — ACETAMINOPHEN 325 MG/1
650 TABLET ORAL EVERY 6 HOURS PRN
Status: DISCONTINUED | OUTPATIENT
Start: 2023-01-13 | End: 2023-01-27 | Stop reason: HOSPADM

## 2023-01-13 RX ORDER — ONDANSETRON 2 MG/ML
4 INJECTION INTRAMUSCULAR; INTRAVENOUS EVERY 6 HOURS PRN
Status: DISCONTINUED | OUTPATIENT
Start: 2023-01-13 | End: 2023-01-27 | Stop reason: HOSPADM

## 2023-01-13 RX ORDER — LAMOTRIGINE 100 MG/1
100 TABLET ORAL DAILY
Status: DISCONTINUED | OUTPATIENT
Start: 2023-01-13 | End: 2023-01-27 | Stop reason: HOSPADM

## 2023-01-13 RX ORDER — AZITHROMYCIN 250 MG/1
250 TABLET, FILM COATED ORAL DAILY
Status: DISCONTINUED | OUTPATIENT
Start: 2023-01-14 | End: 2023-01-15

## 2023-01-13 RX ORDER — MAGNESIUM HYDROXIDE/ALUMINUM HYDROXICE/SIMETHICONE 120; 1200; 1200 MG/30ML; MG/30ML; MG/30ML
30 SUSPENSION ORAL EVERY 6 HOURS PRN
Status: DISCONTINUED | OUTPATIENT
Start: 2023-01-13 | End: 2023-01-27 | Stop reason: HOSPADM

## 2023-01-13 RX ORDER — GUAIFENESIN/DEXTROMETHORPHAN 100-10MG/5
5 SYRUP ORAL EVERY 4 HOURS PRN
Status: DISCONTINUED | OUTPATIENT
Start: 2023-01-13 | End: 2023-01-21

## 2023-01-13 RX ORDER — PREDNISONE 20 MG/1
40 TABLET ORAL DAILY
Status: DISCONTINUED | OUTPATIENT
Start: 2023-01-13 | End: 2023-01-15

## 2023-01-13 RX ORDER — IPRATROPIUM BROMIDE AND ALBUTEROL SULFATE 2.5; .5 MG/3ML; MG/3ML
1 SOLUTION RESPIRATORY (INHALATION)
Status: DISCONTINUED | OUTPATIENT
Start: 2023-01-13 | End: 2023-01-25

## 2023-01-13 RX ORDER — SODIUM CHLORIDE 0.9 % (FLUSH) 0.9 %
5-40 SYRINGE (ML) INJECTION EVERY 12 HOURS SCHEDULED
Status: DISCONTINUED | OUTPATIENT
Start: 2023-01-13 | End: 2023-01-27 | Stop reason: HOSPADM

## 2023-01-13 RX ADMIN — DIVALPROEX SODIUM 1000 MG: 500 TABLET, EXTENDED RELEASE ORAL at 09:16

## 2023-01-13 RX ADMIN — QUETIAPINE FUMARATE 150 MG: 150 TABLET, EXTENDED RELEASE ORAL at 21:57

## 2023-01-13 RX ADMIN — ENOXAPARIN SODIUM 30 MG: 100 INJECTION SUBCUTANEOUS at 21:58

## 2023-01-13 RX ADMIN — PREDNISONE 40 MG: 20 TABLET ORAL at 10:37

## 2023-01-13 RX ADMIN — ENOXAPARIN SODIUM 30 MG: 100 INJECTION SUBCUTANEOUS at 09:16

## 2023-01-13 RX ADMIN — DEXAMETHASONE 6 MG: 2 TABLET ORAL at 09:16

## 2023-01-13 RX ADMIN — QUETIAPINE FUMARATE 200 MG: 200 TABLET ORAL at 21:57

## 2023-01-13 RX ADMIN — SODIUM CHLORIDE, PRESERVATIVE FREE 10 ML: 5 INJECTION INTRAVENOUS at 09:16

## 2023-01-13 RX ADMIN — ASPIRIN 81 MG: 81 TABLET, CHEWABLE ORAL at 09:16

## 2023-01-13 RX ADMIN — SODIUM CHLORIDE, PRESERVATIVE FREE 10 ML: 5 INJECTION INTRAVENOUS at 23:02

## 2023-01-13 RX ADMIN — IPRATROPIUM BROMIDE AND ALBUTEROL SULFATE 1 AMPULE: 2.5; .5 SOLUTION RESPIRATORY (INHALATION) at 18:16

## 2023-01-13 RX ADMIN — IPRATROPIUM BROMIDE AND ALBUTEROL SULFATE 1 AMPULE: 2.5; .5 SOLUTION RESPIRATORY (INHALATION) at 09:45

## 2023-01-13 RX ADMIN — MAGNESIUM HYDROXIDE/ALUMINUM HYDROXICE/SIMETHICONE 30 ML: 120; 1200; 1200 SUSPENSION ORAL at 23:00

## 2023-01-13 RX ADMIN — LAMOTRIGINE 100 MG: 100 TABLET ORAL at 16:15

## 2023-01-13 RX ADMIN — AZITHROMYCIN MONOHYDRATE 500 MG: 250 TABLET ORAL at 10:37

## 2023-01-13 RX ADMIN — ACETAMINOPHEN 650 MG: 325 TABLET ORAL at 11:53

## 2023-01-13 RX ADMIN — IPRATROPIUM BROMIDE AND ALBUTEROL SULFATE 1 AMPULE: 2.5; .5 SOLUTION RESPIRATORY (INHALATION) at 14:21

## 2023-01-13 RX ADMIN — BUDESONIDE AND FORMOTEROL FUMARATE DIHYDRATE 2 PUFF: 80; 4.5 AEROSOL RESPIRATORY (INHALATION) at 14:21

## 2023-01-13 ASSESSMENT — ENCOUNTER SYMPTOMS
CONSTIPATION: 0
VOMITING: 0
DIARRHEA: 0
EYE REDNESS: 0
SHORTNESS OF BREATH: 1
BACK PAIN: 0
COUGH: 0
FACIAL SWELLING: 0
EYE DISCHARGE: 0
ABDOMINAL PAIN: 0
SHORTNESS OF BREATH: 0
COUGH: 1
ABDOMINAL DISTENTION: 0
EYE ITCHING: 0
COLOR CHANGE: 0
NAUSEA: 0

## 2023-01-13 ASSESSMENT — PAIN SCALES - GENERAL: PAINLEVEL_OUTOF10: 8

## 2023-01-13 ASSESSMENT — PAIN DESCRIPTION - LOCATION
LOCATION: HEAD
LOCATION: HEAD

## 2023-01-13 ASSESSMENT — PAIN DESCRIPTION - DESCRIPTORS: DESCRIPTORS: ACHING

## 2023-01-13 NOTE — H&P
[facility]  Office: [address], DO, Trisha Lanza, DO, Sha Corbin, DO, Alena Ember Richards, DO, Mahesh Bruce MD, Myesha Houser MD, Oumar Harrison MD, Whit Zepeda MD,  Julia Fabian MD, Earl Ace MD, Derick Kerr, DO, Kourtney Peck MD,  Bret Simons MD, Lieutenant Carrington MD, Estela Anderson, DO, David Sharpe MD, Destiney Jeong MD, Franklyn Rodriguez DO, Eugene Yañez MD, Jorge Bradford MD, Thierry Shine MD, Giovanna Dennis MD, Donell Lawson, DO, Chet Hernandez MD, Chel Block MD, Lance Barrow, CNP,  Ken Negrete, CNP, Radha Eckert, CNP, Juan De Leon, CNP,  Michelle Barone, [facility], Zeb Barros, CNP, Florence Rojas, CNP, Tom Blue, CNP, eGorgette King, CNP, Caroline Riley, CNP, Rose Bhatt PA-C, Bryant Castro, CNS, Jamie Cui, CNP, Aj Plunkett, [facility]    HISTORY AND PHYSICAL EXAMINATION            Date:   1/13/2023  Patient name:  Patrick Celeste  Date of admission:  1/13/2023  2:14 AM  MRN:   [id number]  Account:  [de-identified]  YOB: 1945  PCP:    No primary care provider on file. Room:   [address] [place] [address]  Code Status:    Full Code    Chief Complaint:     Chief Complaint   Patient presents with    Fatigue     Pt was found on his knees outside of assisted living. Pt was outside smoking and slid out of a chair. History Obtained From:     patient, electronic medical record    History of Present Illness:     Patrick Celeste is a 78 y.o. Non- / non  male who presents with Fatigue (Pt was found on his knees outside of assisted living. Pt was outside smoking and slid out of a chair. )   and is admitted to the hospital for the management of Hardeep.    78 yrs old with past medical history of COPD lives in assisted living facility presented to hospital with weakness, shortness of breath.  Patient states that he had a usual morning routine, drank his coffee and was trying to get out of wheel chair noted that he was very weak and fatigued. he also had cough started yesterday with white phlegm, no chest pain, no fever, no chills. Patient also started feeling short of breath. No diarrhea. No sick contacts that patient knows of. Patient smokes 2 packs every day and he has copd diagnosis in chart but not on any treatment, no pft seen in the chart. In the ER patient was noted to be hypoxic, 88%, requiring 2 lt oxygen. He was afebrile. Electrolytes normal. Patient was seen to have elevated troponin of 34 which trended up to 51 and 47. Patient does not report any chest pain. D-dimer 1.63, COVID positive, x-ray does not show any acute abnormality. Past Medical History:     Past Medical History:   Diagnosis Date    Back pain     Bipolar 1 disorder ([facility]     Cancer ([address]     bowel    COPD (chronic obstructive pulmonary disease) (HCC)     GERD (gastroesophageal reflux disease)     Heart attack [facility])         Past Surgical History:     Past Surgical History:   Procedure Laterality Date    ANUS SURGERY      bowel surgery r/t cancer approx 4 years ago    COLONOSCOPY N/A 7/30/2018    COLONOSCOPY POLYPECTOMY HOT BIOPSY performed by Manda Butler DO at [address]      [facility]        Medications Prior to Admission:     Prior to Admission medications    Medication Sig Start Date End Date Taking? Authorizing Provider   nirmatrelvir/ritonavir (PAXLOVID, 300/100,) 20 x 150 MG & 10 x 100MG TBPK Take 3 tablets (two 150 mg nirmatrelvir and one 100 mg ritonavir tablets) by mouth every 12 hours for 5 days.  1/13/23 1/18/23 Yes Linda Flores MD   divalproex (DEPAKOTE ER) 500 MG extended release tablet Take 2 tablets by mouth daily 7/31/18   Yusuf Clarke MD   QUEtiapine (SEROQUEL) 300 MG tablet Take 1 tablet by mouth nightly  Patient taking differently: Take 400 mg by mouth nightly 7/31/18   Yusuf Clarke MD   QUEtiapine (SEROQUEL XR) 150 MG TB24 extended release tablet Take 1 tablet by mouth daily  Patient taking differently: Take 150 mg by mouth at bedtime 18   Anthony Treviño MD        Allergies:     Patient has no known allergies. Social History:     Tobacco:    reports that he has been smoking cigarettes and pipe. He has never used smokeless tobacco.  Alcohol:      reports no history of alcohol use. Drug Use:  reports no history of drug use. Family History:     Family History   Problem Relation Age of Onset    Cancer Father         Lung    Stroke Father     Cancer Brother         Lymphoma    Mental Illness Other         Aunt       Review of Systems:     Positive and Negative as described in HPI. Review of Systems   Constitutional:  Positive for activity change, chills, diaphoresis and fatigue. Negative for fever. HENT:  Negative for congestion. Eyes:  Negative for discharge and itching. Respiratory:  Positive for cough and shortness of breath. Cardiovascular:  Negative for chest pain, palpitations and leg swelling. Gastrointestinal:  Negative for abdominal distention, abdominal pain, diarrhea and nausea. Endocrine: Negative for cold intolerance and heat intolerance. Genitourinary:  Negative for difficulty urinating and dysuria. Musculoskeletal:  Positive for arthralgias. Negative for back pain and gait problem. Skin:  Negative for color change and pallor. Allergic/Immunologic: Negative for environmental allergies and food allergies. Neurological:  Negative for dizziness and facial asymmetry. Psychiatric/Behavioral:  Negative for agitation and behavioral problems. Physical Exam:   BP (!) 155/76   Pulse 80   Temp 98.4 °F (36.9 °C) (Oral)   Resp 24   Ht 5' 10\" (1.778 m)   Wt 207 lb (93.9 kg)   SpO2 92%   BMI 29.70 kg/m²   Temp (24hrs), Av.4 °F (36.9 °C), Min:98.4 °F (36.9 °C), Max:98.4 °F (36.9 °C)    No results for input(s): POCGLU in the last 72 hours.   No intake or output data in the 24 hours ending 01/13/23 1114    Physical Exam  Constitutional:       General: He is not in acute distress. Appearance: Normal appearance. He is not ill-appearing or diaphoretic. HENT:      Head: Normocephalic. Right Ear: External ear normal.      Left Ear: External ear normal.      Nose: Nose normal.      Mouth/Throat:      Mouth: Mucous membranes are dry. Eyes:      Pupils: Pupils are equal, round, and reactive to light. Cardiovascular:      Rate and Rhythm: Normal rate and regular rhythm. Pulmonary:      Effort: Pulmonary effort is normal. No respiratory distress. Comments: Decr breath sounds bilaterally  No crackles  Abdominal:      General: Abdomen is flat. Bowel sounds are normal. There is no distension. Tenderness: There is no abdominal tenderness. Musculoskeletal:         General: No deformity. Normal range of motion. Right lower leg: No edema. Left lower leg: No edema. Skin:     General: Skin is warm. Coloration: Skin is not jaundiced. Findings: No bruising. Neurological:      General: No focal deficit present. Mental Status: He is alert and oriented to person, place, and time.    Psychiatric:         Mood and Affect: Mood normal.       Investigations:      Laboratory Testing:  Recent Results (from the past 24 hour(s))   CBC with Auto Differential    Collection Time: 01/13/23  2:25 AM   Result Value Ref Range    WBC 5.8 3.5 - 11.3 k/uL    RBC 4.90 4.21 - 5.77 m/uL    Hemoglobin 15.2 13.0 - 17.0 g/dL    Hematocrit 48.1 40.7 - 50.3 %    MCV 98.2 82.6 - 102.9 fL    MCH 31.0 25.2 - 33.5 pg    MCHC 31.6 28.4 - 34.8 g/dL    RDW 14.2 11.8 - 14.4 %    Platelets 884 591 - 641 k/uL    MPV 8.9 8.1 - 13.5 fL    NRBC Automated 0.0 0.0 per 100 WBC    Seg Neutrophils 62 36 - 65 %    Lymphocytes 18 (L) 24 - 43 %    Monocytes 17 (H) 3 - 12 %    Eosinophils % 1 1 - 4 %    Basophils 1 0 - 2 %    Immature Granulocytes 1 (H) 0 %    Segs Absolute 3.62 1.50 - 8.10 k/uL Absolute Lymph # 1.04 (L) 1.10 - 3.70 k/uL    Absolute Mono # 1.00 0.10 - 1.20 k/uL    Absolute Eos # 0.06 0.00 - 0.44 k/uL    Basophils Absolute 0.04 0.00 - 0.20 k/uL    Absolute Immature Granulocyte 0.03 0.00 - 0.30 k/uL   Basic Metabolic Panel    Collection Time: 01/13/23  2:25 AM   Result Value Ref Range    Glucose 109 (H) 70 - 99 mg/dL    BUN 21 8 - 23 mg/dL    Creatinine 1.07 0.70 - 1.20 mg/dL    Est, Glom Filt Rate >60 >60 mL/min/1.73m2    Bun/Cre Ratio 20 9 - 20    Calcium 9.2 8.6 - 10.4 mg/dL    Sodium 138 135 - 144 mmol/L    Potassium 4.4 3.7 - 5.3 mmol/L    Chloride 99 98 - 107 mmol/L    CO2 26 20 - 31 mmol/L    Anion Gap 13 9 - 17 mmol/L   TROP/MYOGLOBIN    Collection Time: 01/13/23  2:25 AM   Result Value Ref Range    Troponin, High Sensitivity 34 (H) 0 - 22 ng/L    Myoglobin 177 (H) 28 - 72 ng/mL   COVID-19, Rapid    Collection Time: 01/13/23  2:26 AM    Specimen: Nasopharyngeal Swab   Result Value Ref Range    Specimen Description . NASOPHARYNGEAL SWAB     SARS-CoV-2, Rapid DETECTED (A) Not Detected   Flu A/B Ag Detection    Collection Time: 01/13/23  2:26 AM    Specimen: Nasopharyngeal   Result Value Ref Range    Flu A Antigen NEGATIVE NEGATIVE    Flu B Antigen NEGATIVE NEGATIVE   EKG 12 Lead    Collection Time: 01/13/23  2:26 AM   Result Value Ref Range    Ventricular Rate 95 BPM    Atrial Rate 95 BPM    P-R Interval 128 ms    QRS Duration 124 ms    Q-T Interval 386 ms    QTc Calculation (Bazett) 485 ms    P Axis 55 degrees    R Axis -20 degrees    T Axis 39 degrees   Urinalysis    Collection Time: 01/13/23  3:03 AM   Result Value Ref Range    Color, UA Yellow Yellow    Turbidity UA Clear Clear    Glucose, Ur NEGATIVE NEGATIVE    Bilirubin Urine NEGATIVE NEGATIVE    Ketones, Urine NEGATIVE NEGATIVE    Specific Gravity, UA 1.015 1.005 - 1.030    Urine Hgb NEGATIVE NEGATIVE    pH, UA 7.5 5.0 - 8.0    Protein, UA 1+ (A) NEGATIVE    Urobilinogen, Urine Normal Normal    Nitrite, Urine NEGATIVE NEGATIVE Leukocyte Esterase, Urine NEGATIVE NEGATIVE   Microscopic Urinalysis    Collection Time: 01/13/23  3:03 AM   Result Value Ref Range    WBC, UA 0 TO 2 0 - 5 /HPF    RBC, UA 0 TO 2 0 - 2 /HPF    Epithelial Cells UA None 0 - 5 /HPF   TROP/MYOGLOBIN    Collection Time: 01/13/23  3:43 AM   Result Value Ref Range    Troponin, High Sensitivity 43 (H) 0 - 22 ng/L    Myoglobin 462 (H) 28 - 72 ng/mL   TROP/MYOGLOBIN    Collection Time: 01/13/23  5:30 AM   Result Value Ref Range    Troponin, High Sensitivity 51 (H) 0 - 22 ng/L    Myoglobin 520 (H) 28 - 72 ng/mL   C-Reactive Protein    Collection Time: 01/13/23  8:24 AM   Result Value Ref Range    CRP 5.4 (H) 0.0 - 5.0 mg/L   Lactate Dehydrogenase    Collection Time: 01/13/23  8:24 AM   Result Value Ref Range     135 - 225 U/L   Ferritin    Collection Time: 01/13/23  8:24 AM   Result Value Ref Range    Ferritin 114 30 - 400 ng/mL   D-Dimer, Quantitative    Collection Time: 01/13/23  8:24 AM   Result Value Ref Range    D-Dimer, Quant 1.63 (H) 0.00 - 0.59 mg/L FEU   Troponin    Collection Time: 01/13/23  8:24 AM   Result Value Ref Range    Troponin, High Sensitivity 47 (H) 0 - 22 ng/L   Hepatic Function Panel    Collection Time: 01/13/23  8:24 AM   Result Value Ref Range    Albumin 3.5 3.5 - 5.2 g/dL    Alkaline Phosphatase 56 40 - 129 U/L    ALT 18 5 - 41 U/L    AST 29 <40 U/L    Total Bilirubin 0.2 (L) 0.3 - 1.2 mg/dL    Bilirubin, Direct <0.1 <0.3 mg/dL    Bilirubin, Indirect Can not be calculated 0.0 - 1.0 mg/dL    Total Protein 6.9 6.4 - 8.3 g/dL       Imaging/Diagnostics:  XR CHEST PORTABLE    Result Date: 1/13/2023  Mild to moderate COPD but no acute cardiopulmonary abnormality evident. Possible soft tissue mass at the lateral left lower chest wall. Correlate clinically.        Assessment :      Hospital Problems             Last Modified POA    * (Principal) COVID 1/13/2023 Yes    Acute respiratory failure with hypoxia ([facility] 1/13/2023 Yes    COPD exacerbation ([facility] 1/13/2023 Yes    Elevated troponin 1/13/2023 Yes    Tobacco abuse 1/13/2023 Yes       Plan:     Patient status inpatient in the  Progressive Unit/Step down    COPD exacerbation with COVID-CRP of 5.4, D-dimer 1.63, will start paxlovid, given risk factors of age, smoking, COPD, start Z-Segun, breathing treatments with DuoNeb and Symbicort. We will continue to follow inflammatory markers to watch trend. Prednisone 40 mg for 5 doses. Patient will need PFTs outpatient to establish diagnosis. Acute respiratory failure likely secondary to COPD exacerbation-patient was desaturating to 88% on arrival, now requiring 2 L oxygen, wean as tolerated. Elevated troponin-no ST elevation, no chest pain, will have cardiology evaluation as troponins trended up, will obtain echocardiogram to rule out wall motion abnormality. Add aspirin. Tobacco abuse-nicotine patch ordered, patient motivated to quit smoking    Pt/ot eval  Lovenox for dvt prop  Resume home seroquel    Consultations:   IP CONSULT TO HOSPITALIST  IP CONSULT TO CARDIOLOGY  IP CONSULT TO PHARMACY     Patient is admitted as inpatient status because of co-morbidities listed above, severity of signs and symptoms as outlined, requirement for current medical therapies and most importantly because of direct risk to patient if care not provided in a hospital setting. Expected length of stay > 48 hours. Bonnie Kirby MD  1/13/2023  11:14 AM    Copy sent to Dr. Dunn primary care provider on file.

## 2023-01-13 NOTE — ED NOTES
[address] [place] called, [facility] provided update on admission status.       Darius Perez RN  01/13/23 8552

## 2023-01-13 NOTE — ED NOTES
ED to inpatient nurses report     Chief Complaint   Patient presents with    Fatigue     Pt was found on his knees outside of assisted living. Pt was outside smoking and slid out of a chair. Present to ED from [facility] where he resides. Was outside smoking a cigarette in the smoke hut at [facility] when he got increasingly weak and slid out of chair and could not get back up. Patient was found on his knees, did not hit head. SPO2 was in the upper 80s when EMS arrived. LOC: alert and orientated to name, place, date  Vital signs   Vitals:    01/13/23 0227 01/13/23 0230 01/13/23 0258 01/13/23 0400   BP:  117/65 134/62 (!) 147/63   Pulse:       Resp:       Temp:       TempSrc:       SpO2: 94% 94% 93% 94%   Weight:       Height:          Oxygen Baseline room air. Current needs required 2L O2      LDAs:   Peripheral IV 01/13/23 Left Forearm (Active)     Mobility: Requires assistance * 1-- standby assistance  Fall Risk:    Pending ED orders: n/a  Present condition: stable  Code Status: FULL CODE  Consults: IP CONSULT TO HOSPITALIST  [x]  Hospitalist  Completed  [x] yes [] no Who: Intermed     []  Medicine  Completed  [] yes [] No Who:   []  Cardiology  Completed  [] yes [] No Who:   []  GI   Completed  [] yes [] No Who:   []  Neurology  Completed  [] yes [] No Who:   []  Nephrology Completed  [] yes [] No Who:    []  Vascular  Completed  [] yes [] No Who:   []  Ortho  Completed  [] yes [] No Who:     []  Surgery  Completed  [] yes [] No Who:    []  Urology  Completed  [] yes [] No Who:    []  CT Surgery Completed  [] yes [] No Who:   []  Podiatry  Completed  [] yes [] No Who:    []  Other    Completed  [] yes [] No Who:  Interventions: COVID POSITIVE  Important Events: Troponins and myoglobins elevated.         Electronically signed by Shandra Vázquez RN on 1/13/2023 at 6:30 AM     Shandra Vázquez RN  01/13/23 9919

## 2023-01-13 NOTE — PROGRESS NOTES
RN spoke with patient's, Nathanielrenetta Liberty, with an update on patient's status. Briseyda Zhou will be faxing her legal guardianship paperwork to unit.

## 2023-01-13 NOTE — PROGRESS NOTES
Patient smoked 2 packs/day and is requesting nicotine substitute. RN notified Dr. Gaby Delgado who placed orders for nicotine patch.

## 2023-01-13 NOTE — PLAN OF CARE
Problem: Discharge Planning  Goal: Discharge to home or other facility with appropriate resources  Outcome: Progressing     Problem: Safety - Adult  Goal: Free from fall injury  Outcome: Progressing     Problem: Respiratory - Adult  Goal: Achieves optimal ventilation and oxygenation  1/13/2023 1828 by Zhane Jaimes RN  Outcome: Progressing  1/13/2023 0947 by Maria Alejandra Teague RCP  Outcome: Progressing     Problem: Pain  Goal: Verbalizes/displays adequate comfort level or baseline comfort level  Outcome: Progressing

## 2023-01-13 NOTE — CONSULTS
Section of Cardiology   Consult Note      Reason for Consult: Elevated troponin  Requesting Physician: Brittany Johnston MD    CHIEF COMPLAINT: Status post fall    History Obtained From:  patient, electronic medical record, patient's nurse    HISTORY OF PRESENT ILLNESS:      The patient is a 78 y.o. male with significant past medical history of COPD who presents with lower extremities weakness and he slid to the floor. It was witnessed by the staff at the nursing home. The patient does not report shortness of breath or fever or chills however routine testing for COVID came back positive and currently the patient in isolation. The patient tells me he does not see a cardiologist and never was told to have heart disease? Francoise Wilson He denies any palpitation. No headache or blurred vision. Denies any history of chest pain. The patient smokes 2 packs/day. Part of his routine test in the emergency room troponin was checked and came back elevated and peaked at 51. Previous diagnostic testing for coronary artery disease includes: echocardiogram.   Previous history of cardiac disease includes: hypertension. Coronary artery disease risk factors include: advanced age (older than 54 for men, 72 for women), hypertension, male gender, sedentary lifestyle, and smoking/ tobacco exposure. Past Medical History:    Past Medical History:   Diagnosis Date    Back pain     Bipolar 1 disorder ([facility] [address]     Cancer ([facility] [address]     bowel    COPD (chronic obstructive pulmonary disease) (HCC)     GERD (gastroesophageal reflux disease)     Heart attack [facility])      Past Surgical History:    Past Surgical History:   Procedure Laterality Date    ANUS SURGERY      bowel surgery r/t cancer approx 4 years ago    COLONOSCOPY N/A 7/30/2018    COLONOSCOPY POLYPECTOMY HOT BIOPSY performed by Joel Sow DO at [address] [place] [address] Medications:  Prior to Admission medications    Medication Sig Start Date End Date Taking? Authorizing Provider   nirmatrelvir/ritonavir (PAXLOVID, 300/100,) 20 x 150 MG & 10 x 100MG TBPK Take 3 tablets (two 150 mg nirmatrelvir and one 100 mg ritonavir tablets) by mouth every 12 hours for 5 days.  1/13/23 1/18/23 Yes Roberto Calhoun MD   divalproex (DEPAKOTE ER) 500 MG extended release tablet Take 2 tablets by mouth daily 7/31/18   Usha Crowley MD   QUEtiapine (SEROQUEL) 300 MG tablet Take 1 tablet by mouth nightly  Patient taking differently: Take 400 mg by mouth nightly 7/31/18   Usha Crowley MD   QUEtiapine (SEROQUEL XR) 150 MG TB24 extended release tablet Take 1 tablet by mouth daily  Patient taking differently: Take 150 mg by mouth at bedtime 7/31/18   Usha Crowley MD     Current Medications:    Current Facility-Administered Medications   Medication Dose Route Frequency Provider Last Rate Last Admin    divalproex (DEPAKOTE ER) extended release tablet 1,000 mg  1,000 mg Oral Daily Valma Balls, APRN - CNP   1,000 mg at 01/13/23 0916    sodium chloride flush 0.9 % injection 5-40 mL  5-40 mL IntraVENous 2 times per day Valma Balls, APRN - CNP   10 mL at 01/13/23 0916    sodium chloride flush 0.9 % injection 5-40 mL  5-40 mL IntraVENous PRN Valma Balls, APRN - CNP        0.9 % sodium chloride infusion  25 mL IntraVENous PRN Valma Balls, APRN - CNP        enoxaparin Sodium (LOVENOX) injection 30 mg  30 mg SubCUTAneous BID Valma Balls, APRN - CNP   30 mg at 01/13/23 0916    ondansetron (ZOFRAN-ODT) disintegrating tablet 4 mg  4 mg Oral Q8H PRN Valma Balls, APRN - CNP        Or    ondansetron [facility]) injection 4 mg  4 mg IntraVENous Q6H PRN Valma Balls, APRN - CNP        polyethylene glycol (GLYCOLAX) packet 17 g  17 g Oral Daily PRN Valma Balls, APRN - CNP        acetaminophen (TYLENOL) tablet 650 mg  650 mg Oral Q6H PRN Valma Balls, APRN - CNP   650 mg at 01/13/23 1153    Or    acetaminophen (TYLENOL) suppository 650 mg  650 mg Rectal Q6H PRN ALEXSANDRA Sheehan - CNP guaiFENesin-dextromethorphan (ROBITUSSIN DM) 100-10 MG/5ML syrup 5 mL  5 mL Oral Q4H PRN ALEXSANDRA Le - CNP        QUEtiapine (SEROQUEL XR) 150 MG extended release tablet 150 mg  150 mg Oral Nightly Amber Vyas MD        ipratropium-albuterol (DUONEB) nebulizer solution 1 ampule  1 ampule Inhalation Q4H WA Amber Vyas MD   1 ampule at 01/13/23 1421    aspirin chewable tablet 81 mg  81 mg Oral Daily Amber Vyas MD   81 mg at 01/13/23 0916    nicotine (NICODERM CQ) 14 MG/24HR 1 patch  1 patch TransDERmal Daily Amber Vyas MD   1 patch at 01/13/23 0921    [START ON 1/14/2023] azithromycin (ZITHROMAX) tablet 250 mg  250 mg Oral Daily Amber Vyas MD        predniSONE (DELTASONE) tablet 40 mg  40 mg Oral Daily Amber Vyas MD   40 mg at 01/13/23 1037    budesonide-formoterol (SYMBICORT) 80-4.5 MCG/ACT inhaler 2 puff  2 puff Inhalation BID Amber Vyas MD   2 puff at 01/13/23 1421     Allergies:  Patient has no known allergies.     Social History:    Social History     Socioeconomic History    Marital status:      Spouse name: Not on file    Number of children: Not on file    Years of education: Not on file    Highest education level: Not on file   Occupational History    Not on file   Tobacco Use    Smoking status: Some Days     Types: Cigarettes, Pipe    Smokeless tobacco: Never    Tobacco comments:     unable to identify an amount   Vaping Use    Vaping Use: Some days   Substance and Sexual Activity    Alcohol use: No    Drug use: No    Sexual activity: Never   Other Topics Concern    Not on file   Social History Narrative    Not on file     Social Determinants of Health     Financial Resource Strain: Not on file   Food Insecurity: Not on file   Transportation Needs: Not on file   Physical Activity: Not on file   Stress: Not on file   Social Connections: Not on file   Intimate Partner Violence: Not on file   Housing Stability: Not on file     Family History:   Family History   Problem Relation Age of Onset    Cancer Father         Lung    Stroke Father     Cancer Brother         Lymphoma    Mental Illness Other         Aunt       REVIEW OF SYSTEMS   Patient denies recent falls. No bleeding from any orifice. Denies any headache or blurred vision. No recent weight loss. No nausea or vomiting. No current abdominal pain. PHYSICAL EXAM:    Vitals:    VITALS:  /66   Pulse 81   Temp 100.2 °F (37.9 °C)   Resp 24   Ht 5' 10\" (1.778 m)   Wt 207 lb (93.9 kg)   SpO2 92%   BMI 29.70 kg/m²   24HR INTAKE/OUTPUT:    Intake/Output Summary (Last 24 hours) at 1/13/2023 1435  Last data filed at 1/13/2023 1222  Gross per 24 hour   Intake --   Output 200 ml   Net -200 ml     Physical exam was deferred due to COVID-19 isolation status.     DATA:   ECG: Normal sinus rhythm, right bundle branch block  ECHO: Date: Today  Results are pending  Stress Test:  Not performed to date  Angiography:  Not performed to date    Cardiology Labs:  Recent Labs     01/13/23  0225 01/13/23  0343 01/13/23  0530   MYOGLOBIN 177* 462* 520*     Warfarin PT/INR:No results found for: PROTIME, INR, WARFARIN  CBC:  Lab Results   Component Value Date/Time    WBC 5.8 01/13/2023 02:25 AM    RBC 4.90 01/13/2023 02:25 AM    HGB 15.2 01/13/2023 02:25 AM    HCT 48.1 01/13/2023 02:25 AM    MCV 98.2 01/13/2023 02:25 AM    MCH 31.0 01/13/2023 02:25 AM    MCHC 31.6 01/13/2023 02:25 AM    RDW 14.2 01/13/2023 02:25 AM     01/13/2023 02:25 AM    MPV 8.9 01/13/2023 02:25 AM     CMP:  Lab Results   Component Value Date/Time     01/13/2023 02:25 AM    K 4.4 01/13/2023 02:25 AM    CL 99 01/13/2023 02:25 AM    CO2 26 01/13/2023 02:25 AM    BUN 21 01/13/2023 02:25 AM    CREATININE 1.07 01/13/2023 02:25 AM    GFRAA >60 07/19/2018 12:03 PM    LABGLOM >60 01/13/2023 02:25 AM    GLUCOSE 109 01/13/2023 02:25 AM    CALCIUM 9.2 01/13/2023 02:25 AM     Magnesium:    Lab Results   Component Value Date/Time    MG 2.1 07/19/2018 12:03 PM     PTT:  No results found for: APTT, PTT  TSH:    Lab Results   Component Value Date/Time    TSH 3.05 07/19/2018 12:03 PM     BMP:  Lab Results   Component Value Date/Time     01/13/2023 02:25 AM    K 4.4 01/13/2023 02:25 AM    CL 99 01/13/2023 02:25 AM    CO2 26 01/13/2023 02:25 AM    BUN 21 01/13/2023 02:25 AM    LABALBU 3.5 01/13/2023 08:24 AM    CREATININE 1.07 01/13/2023 02:25 AM    CALCIUM 9.2 01/13/2023 02:25 AM    GFRAA >60 07/19/2018 12:03 PM    LABGLOM >60 01/13/2023 02:25 AM    GLUCOSE 109 01/13/2023 02:25 AM     LIVER PROFILE:  Recent Labs     01/13/23  0824   AST 29   ALT 18   LABALBU 3.5   ALKPHOS 64   BILITOT 0.2*   BILIDIR <0.1   IBILI Can not be calculated   PROT 6.9     FLP:    Lab Results   Component Value Date/Time    CHOL 197 07/20/2018 06:59 AM    TRIG 142 07/20/2018 06:59 AM    HDL 50 07/20/2018 06:59 AM    LDLCHOLESTEROL 119 07/20/2018 06:59 AM         IMPRESSION  #1 minimal elevation of troponin from unknown significance and no clinical angina  #2 right bundle branch block  #3 COVID-19 pneumonia  #4 COPD    Patient Active Problem List   Diagnosis    COVID    Acute respiratory failure with hypoxia (HCC)    COPD exacerbation (HCC)    Elevated troponin    Tobacco abuse           RECOMMENDATIONS:     Continue current medications  Management plan was discussed with patient     No need to do further blood test on troponin. We will review the echocardiogram if he has normal LV systolic function then no further cardiac work-up but if his LV systolic function is diminished then further cardiac work-up would be reasonable. In the interim recommend to place him on aspirin 81 mg daily. Discussed with the patient and his nurse  Thank you for the consultation      Electronically signed by Tom Washburn MD on 1/13/2023 at 2:35 PM     CC: No primary care provider on file.

## 2023-01-13 NOTE — PROGRESS NOTES
Transitions of Care Pharmacy Service   Medication Review    The patient's list of current home medications has been reviewed. Records from [facility] are from November 2022. Also utilized [facility] for more up to date dispensing information    Source(s) of information: Surescripts/ records from [facility] on information provided by the above source(s), I have updated the patient's home med list as described below. Please review the ACTION REQUESTED section of this note below for any discrepancies on current hospital orders. I changed or updated the following medications on the patient's home medication list:  Removed Vrylar 1.5mg  Protonix 40mg  Trazodone 100mg     Added none     Adjusted   Lamotrigine from 200mg to 100mg PO daily  Seroquel from 300mg to 400mg PO nightly (also receiving the XL 150mg daily)   Other Notes none         PROVIDER ACTION REQUESTED  Medications that need to be addressed by a physician/nurse practitioner:    Medication Action Requested   Lamotrigine 100mg  Seroquel 400mg   Please review and order as appropriate         Please feel free to call me with any questions about this encounter. Thank you.     Sofia Neal [facility]   Transitions of Care Pharmacy Service  Phone:  [phone]  Fax: [phone]      Electronically signed by Sofia Neal [facility] on 1/13/2023 at 3:41 PM           Medications Prior to Admission: QUEtiapine (SEROQUEL) 400 MG tablet, Take 400 mg by mouth at bedtime  lamoTRIgine (LAMICTAL) 100 MG tablet, Take 100 mg by mouth daily  divalproex (DEPAKOTE ER) 500 MG extended release tablet, Take 2 tablets by mouth daily (Patient taking differently: Take 1,000 mg by mouth nightly)  QUEtiapine (SEROQUEL XR) 150 MG TB24 extended release tablet, Take 1 tablet by mouth daily (Patient taking differently: Take 150 mg by mouth at bedtime)

## 2023-01-13 NOTE — ED PROVIDER NOTES
[address] [facility]  EMERGENCY DEPARTMENT ENCOUNTER      Pt Name: Mariela Blanco  MRN: [id number]  [place] 1945  Date of evaluation: 1/13/2023  Provider: Tawanna Brock MD    CHIEF COMPLAINT       Chief Complaint   Patient presents with    Fatigue     Pt was found on his knees outside of assisted living. Pt was outside smoking and slid out of a chair. HISTORY OF PRESENT ILLNESS  (Location/Symptom, Timing/Onset, Context/Setting, Quality, Duration, Modifying Factors, Severity.)   Mariela Blanco is a 78 y.o. male who presents to the emergency department for weakness. He states it started tonight. He went to go to the smoking unge area but could not get out of his wheelchair, he was too weak. He is not complaining of chest pain or shortness of breath but he was noted to be 88% on room air upon arrival.  He does not use inhalers but continues to smoke. He did not fall and he does not have a headache or abdominal pain. No vomiting or diarrhea. Nursing Notes were reviewed. ALLERGIES     Patient has no known allergies.     CURRENT MEDICATIONS       Previous Medications    DIVALPROEX (DEPAKOTE ER) 500 MG EXTENDED RELEASE TABLET    Take 2 tablets by mouth daily    QUETIAPINE (SEROQUEL XR) 150 MG TB24 EXTENDED RELEASE TABLET    Take 1 tablet by mouth daily    QUETIAPINE (SEROQUEL) 300 MG TABLET    Take 1 tablet by mouth nightly       PAST MEDICAL HISTORY         Diagnosis Date    Back pain     Bipolar 1 disorder (HCC)     Cancer ([address]     bowel    COPD (chronic obstructive pulmonary disease) (HCC)     GERD (gastroesophageal reflux disease)     Heart attack ([address]        SURGICAL HISTORY           Procedure Laterality Date    ANUS SURGERY      bowel surgery r/t cancer approx 4 years ago    COLONOSCOPY N/A 7/30/2018    COLONOSCOPY POLYPECTOMY HOT BIOPSY performed by Sheldon Abel DO at [address] [place] [address]         FAMILY HISTORY           Problem Relation Age of Onset Cancer Father         Lung    Stroke Father     Cancer Brother         Lymphoma    Mental Illness Other         Aunt     Family Status   Relation Name Status    Father  (Not Specified)    Brother  (Not Specified)    Other  (Not Specified)        SOCIAL HISTORY      reports that he has been smoking cigarettes and pipe. He has never used smokeless tobacco. He reports that he does not drink alcohol and does not use drugs. REVIEW OF SYSTEMS    (2-9 systems for level 4, 10 or more for level 5)     Review of Systems   Constitutional:  Negative for chills, fatigue and fever. HENT:  Negative for congestion, ear discharge and facial swelling. Eyes:  Negative for discharge and redness. Respiratory:  Negative for cough and shortness of breath. Cardiovascular:  Negative for chest pain. Gastrointestinal:  Negative for abdominal pain, constipation, diarrhea and vomiting. Genitourinary:  Negative for dysuria and hematuria. Musculoskeletal:  Negative for arthralgias. Skin:  Negative for color change and rash. Neurological:  Positive for weakness. Negative for syncope, numbness and headaches. Hematological:  Negative for adenopathy. Psychiatric/Behavioral:  Negative for confusion. The patient is not nervous/anxious. Except as noted above the remainder of the review of systems was reviewed and negative. PHYSICAL EXAM    (up to 7 for level 4, 8 or more for level 5)     Vitals:    01/13/23 0220 01/13/23 0227 01/13/23 0230 01/13/23 0258   BP: (!) 144/72  117/65 134/62   Pulse:       Resp:       Temp:       TempSrc:       SpO2:  94% 94% 93%   Weight:       Height:           Physical Exam  Vitals reviewed. Constitutional:       General: He is not in acute distress. Appearance: He is well-developed. He is not diaphoretic. HENT:      Head: Normocephalic and atraumatic. Eyes:      General: No scleral icterus. Right eye: No discharge. Left eye: No discharge.    Cardiovascular: Rate and Rhythm: Normal rate and regular rhythm. Pulmonary:      Effort: No respiratory distress. Breath sounds: No stridor. Rhonchi present. No rales. Comments: He seems to have some upper airway secretions. Abdominal:      General: There is no distension. Palpations: Abdomen is soft. Tenderness: There is no abdominal tenderness. Musculoskeletal:         General: Normal range of motion. Cervical back: Neck supple. Lymphadenopathy:      Cervical: No cervical adenopathy. Skin:     General: Skin is warm and dry. Findings: No erythema or rash. Neurological:      Mental Status: He is alert and oriented to person, place, and time. Psychiatric:         Behavior: Behavior normal.           DIAGNOSTIC RESULTS     EKG: All EKG's are interpreted by the Emergency Department Physician who either signs or Co-signs this chart in the absence of a cardiologist.    EKG on my interpretation shows sinus rhythm ST segment changes    RADIOLOGY:   Non-plain film images such as CT, Ultrasound and MRI are read by the radiologist. Plain radiographic images are visualized and preliminarily interpreted by the emergency physician with the below findings:    Interpretation per the Radiologist below, if available at the time of this note:    XR CHEST PORTABLE    Result Date: 1/13/2023  EXAMINATION: [address] 1/13/2023 2:30 am COMPARISON: Two-view chest dated 12/17/2008. HISTORY: ORDERING SYSTEM PROVIDED HISTORY: weak TECHNOLOGIST PROVIDED HISTORY: weak Reason for Exam: weakness FINDINGS: Normal heart size and pulmonary vasculature. The lungs are hyperexpanded and clear. No pneumothorax or pleural effusion. Surrounding osseous and soft tissue structures are suggestive of a lateral left chest wall soft tissue mass. Mild to moderate COPD but no acute cardiopulmonary abnormality evident. Possible soft tissue mass at the lateral left lower chest wall. Correlate clinically. LABS:  Labs Reviewed   COVID-19, RAPID - Abnormal; Notable for the following components:       Result Value    SARS-CoV-2, Rapid DETECTED (*)     All other components within normal limits   CBC WITH AUTO DIFFERENTIAL - Abnormal; Notable for the following components:    Lymphocytes 18 (*)     Monocytes 17 (*)     Immature Granulocytes 1 (*)     Absolute Lymph # 1.04 (*)     All other components within normal limits   BASIC METABOLIC PANEL - Abnormal; Notable for the following components:    Glucose 109 (*)     All other components within normal limits   URINALYSIS - Abnormal; Notable for the following components:    Protein, UA 1+ (*)     All other components within normal limits   TROP/MYOGLOBIN - Abnormal; Notable for the following components:    Troponin, High Sensitivity 34 (*)     Myoglobin 177 (*)     All other components within normal limits   TROP/MYOGLOBIN - Abnormal; Notable for the following components:    Troponin, High Sensitivity 43 (*)     Myoglobin 462 (*)     All other components within normal limits   TROP/MYOGLOBIN - Abnormal; Notable for the following components:    Troponin, High Sensitivity 51 (*)     Myoglobin 520 (*)     All other components within normal limits   RAPID INFLUENZA A/B ANTIGENS   MICROSCOPIC URINALYSIS       All other labs were within normal range or not returned as of this dictation. EMERGENCY DEPARTMENT COURSE and DIFFERENTIAL DIAGNOSIS/MDM:   Vitals:    Vitals:    01/13/23 0220 01/13/23 0227 01/13/23 0230 01/13/23 0258   BP: (!) 144/72  117/65 134/62   Pulse:       Resp:       Temp:       TempSrc:       SpO2:  94% 94% 93%   Weight:       Height:           No orders of the defined types were placed in this encounter. HEART SCORE: Not applicable, he does not have chest pain    Medical Decision Making: The patient tested positive for COVID-19. He does not appear to have pneumonia. Initial troponin was 34, then 43, then 51. He still does not have chest pain.   He is being admitted. O2 sat on room air was 88%. Findings are discussed thoroughly with the patient. Evaluation and treatment course in the ED, and plan of care upon discharge was discussed in length with the patient. Patient had no further questions prior to being discharged and was instructed to return to the ED for new or worsening symptoms. DDx include pneumonia, COVID, influenza, dehydration, anemia      I will order labs including troponin and COVID, complete CXR and monitor closely. Test considered, but not ordered: None    The patient was involved in his/her plan of care. The testing that was ordered was discussed with the patient. Any medications that may have been ordered were discussed with the patient. I have reviewed the patient's previous medical records using the electronic health record that we have available. Labs and imaging were reviewed. I have discusssed recommendation for admission with the patient and/or family. We have discussed benefits of admission and the risks of discharge home. The patient agrees with the plan of care and admission. The patient will be admitted for further evaluation and treatment. I have consulted the admitting service. The patient will be admitted to them, he/she agrees to accept the patient for further evaluation and treatment. CONSULTS:  IP CONSULT TO HOSPITALIST    PROCEDURES:  None    FINAL IMPRESSION      1. COVID-19    2. Elevated troponin          DISPOSITION/PLAN   DISPOSITION Decision To Admit 01/13/2023 06:07:52 AM      PATIENT REFERRED TO:   No follow-up provider specified. DISCHARGE MEDICATIONS:     New Prescriptions    No medications on file       The care is provided during an unprecedented national emergency due to the novel coronavirus, COVID-19.     (Please note that portions of this note were completed with a voice recognition program.  Efforts were made to edit the dictations but occasionally words are mis-transcribed.)    Anshul Driscoll MD  Attending Emergency Physician            Anshul Driscoll MD  01/13/23 [id number]

## 2023-01-13 NOTE — ACP (ADVANCE CARE PLANNING)
Advance Care Planning     Advance Care Planning Activator (Inpatient)  Conversation Note      Date of ACP Conversation: 1/13/2023     Conversation Conducted with:  Healthcare Decision Maker: Court-Appointed Legal Guardian (name) dtr Donavon Hutson     ACP Activator: Lali Jauregui RN        Health Care Decision Maker: self/dtr     Current Designated Health Care Decision Maker: self Chandrika Antoine     Click here to complete Healthcare Decision Makers including section of the Healthcare Decision Maker Relationship (ie \"Primary\")  Today we documented Decision Maker(s). The patient will provide ACP documents. Care Preferences    Ventilation: \"If you were in your present state of health and suddenly became very ill and were unable to breathe on your own, what would your preference be about the use of a ventilator (breathing machine) if it were available to you? \"      Would the patient desire the use of ventilator (breathing machine)?: yes    \"If your health worsens and it becomes clear that your chance of recovery is unlikely, what would your preference be about the use of a ventilator (breathing machine) if it were available to you? \"     Would the patient desire the use of ventilator (breathing machine)? : per dtr       Resuscitation  \"CPR works best to restart the heart when there is a sudden event, like a heart attack, in someone who is otherwise healthy. Unfortunately, CPR does not typically restart the heart for people who have serious health conditions or who are very sick. \"    \"In the event your heart stopped as a result of an underlying serious health condition, would you want attempts to be made to restart your heart (answer \"yes\" for attempt to resuscitate) or would you prefer a natural death (answer \"no\" for do not attempt to resuscitate)? \" Per dtr        [] Yes   [] No   Educated Patient / [employer] regarding differences between Advance Directives and portable DNR orders.     Length of ACP Conversation in minutes: 10    Conversation Outcomes:  [x] ACP discussion completed  [] Existing advance directive reviewed with patient; no changes to patient's previously recorded wishes  [] New Advance Directive completed  [] Portable Do Not Rescitate prepared for Provider review and signature  [] POLST/POST/MOLST/MOST prepared for Provider review and signature      Follow-up plan:    [] Schedule follow-up conversation to continue planning  [x] Referred individual to Provider for additional questions/concerns   [] Advised patient/agent/surrogate to review completed ACP document and update if needed with changes in condition, patient preferences or care setting    [] This note routed to one or more involved healthcare providers

## 2023-01-13 NOTE — CARE COORDINATION
01/13/23 1554   Service Assessment   Patient Orientation   (spoke with dtr Leigh Hays she lives in [facility])   [facility]   History Provided By Child/Family   Primary Caregiver Other (Comment)  (Joshua RAINES)   [facility] Staff   PCP Verified by CM Yes  (at [facility])   Last Visit to PCP Within last 3 months   Prior Functional Level Assistance with the following:;Feeding;Cooking;Mobility; Shopping;Housework   Current Functional Level Assistance with the following:;Feeding;Cooking;Mobility; Shopping;Housework   Can patient return to prior living arrangement Unknown at present   Ability to make needs known: Fair   Family able to assist with home care needs:   (no dtr lives out of state)   Financial Resources Medicare;Medicaid  ([facility])   [address] [facility] History   Lives With Home care staff   Type of Home Assisted living   Home Layout One level   Home Access Level entry   [address] Needs assistance  (able to walk short distances)   Transfer Assistance Independent   Discharge Planning   Type of [facility] Other (Comment)   Current Services Prior To Admission [facility]   Current DME Prior to [facility]   DME Ordered? No   Type of [address]   One/Two Story Residence One story   History of falls? 1   Services At/After Discharge   Transition of Care Consult (CM Consult) Assisted Living;SNF;Home Health   Services At/After Discharge   (TBD)   Confirm Follow Up Transport   (will need transport)   Condition of Participation: Discharge Planning   The Patient and/or Patient Representative was provided with a Choice of Provider?  Patient Representative   The Patient and/Or Patient Representative agree with the Discharge Plan? Yes   Freedom of Choice list was provided with basic dialogue that supports the patient's individualized plan of care/goals, treatment preferences, and shares the quality data associated with the providers? Yes       Spoke with pt dtr and legal guardian over the phone-Joshua is supposed to fax her paperwork. Pt can make most of his own decisions. He has been at [facility] since August 2022. He is able to walk short distances. He does not wear 02 at [facility]. Will need to watch for PT/OT recs may need snf-if needs please call and update dtr with choices. He will need transport back to [address] [place] [address] and or snf. Discussed with LSW.

## 2023-01-13 NOTE — CARE COORDINATION
DC Planning    Called LM with dtr Nadia Ron for assessment. Pt is from [facility] appears has been there since August 2022.

## 2023-01-14 ENCOUNTER — APPOINTMENT (OUTPATIENT)
Dept: CT IMAGING | Age: 78
End: 2023-01-14
Payer: MEDICARE

## 2023-01-14 LAB
ABSOLUTE EOS #: <0.03 K/UL (ref 0–0.44)
ABSOLUTE IMMATURE GRANULOCYTE: 0.02 K/UL (ref 0–0.3)
ABSOLUTE LYMPH #: 1.88 K/UL (ref 1.1–3.7)
ABSOLUTE MONO #: 1.05 K/UL (ref 0.1–1.2)
ALBUMIN SERPL-MCNC: 3.4 G/DL (ref 3.5–5.2)
ALP BLD-CCNC: 51 U/L (ref 40–129)
ALT SERPL-CCNC: 16 U/L (ref 5–41)
ANION GAP SERPL CALCULATED.3IONS-SCNC: 9 MMOL/L (ref 9–17)
AST SERPL-CCNC: 27 U/L
BASOPHILS # BLD: 0 % (ref 0–2)
BASOPHILS ABSOLUTE: <0.03 K/UL (ref 0–0.2)
BILIRUB SERPL-MCNC: 0.2 MG/DL (ref 0.3–1.2)
BUN BLDV-MCNC: 20 MG/DL (ref 8–23)
BUN/CREAT BLD: 23 (ref 9–20)
C-REACTIVE PROTEIN: 6.7 MG/L (ref 0–5)
CALCIUM SERPL-MCNC: 8.9 MG/DL (ref 8.6–10.4)
CHLORIDE BLD-SCNC: 106 MMOL/L (ref 98–107)
CO2: 29 MMOL/L (ref 20–31)
CREAT SERPL-MCNC: 0.87 MG/DL (ref 0.7–1.2)
D-DIMER QUANTITATIVE: 1.2 MG/L FEU (ref 0–0.59)
EOSINOPHILS RELATIVE PERCENT: 0 % (ref 1–4)
GFR SERPL CREATININE-BSD FRML MDRD: >60 ML/MIN/1.73M2
GLUCOSE BLD-MCNC: 97 MG/DL (ref 70–99)
HCT VFR BLD CALC: 45.4 % (ref 40.7–50.3)
HEMOGLOBIN: 14.3 G/DL (ref 13–17)
IMMATURE GRANULOCYTES: 0 %
LYMPHOCYTES # BLD: 26 % (ref 24–43)
MCH RBC QN AUTO: 30.5 PG (ref 25.2–33.5)
MCHC RBC AUTO-ENTMCNC: 31.5 G/DL (ref 28.4–34.8)
MCV RBC AUTO: 96.8 FL (ref 82.6–102.9)
MONOCYTES # BLD: 15 % (ref 3–12)
NRBC AUTOMATED: 0 PER 100 WBC
PDW BLD-RTO: 14 % (ref 11.8–14.4)
PLATELET # BLD: 180 K/UL (ref 138–453)
PMV BLD AUTO: 9.2 FL (ref 8.1–13.5)
POTASSIUM SERPL-SCNC: 4.3 MMOL/L (ref 3.7–5.3)
RBC # BLD: 4.69 M/UL (ref 4.21–5.77)
SEG NEUTROPHILS: 59 % (ref 36–65)
SEGMENTED NEUTROPHILS ABSOLUTE COUNT: 4.19 K/UL (ref 1.5–8.1)
SODIUM BLD-SCNC: 144 MMOL/L (ref 135–144)
TOTAL PROTEIN: 6.6 G/DL (ref 6.4–8.3)
WBC # BLD: 7.2 K/UL (ref 3.5–11.3)

## 2023-01-14 PROCEDURE — 86140 C-REACTIVE PROTEIN: CPT

## 2023-01-14 PROCEDURE — 94640 AIRWAY INHALATION TREATMENT: CPT

## 2023-01-14 PROCEDURE — 6370000000 HC RX 637 (ALT 250 FOR IP): Performed by: STUDENT IN AN ORGANIZED HEALTH CARE EDUCATION/TRAINING PROGRAM

## 2023-01-14 PROCEDURE — 71260 CT THORAX DX C+: CPT | Performed by: FAMILY MEDICINE

## 2023-01-14 PROCEDURE — 85379 FIBRIN DEGRADATION QUANT: CPT

## 2023-01-14 PROCEDURE — 6360000004 HC RX CONTRAST MEDICATION: Performed by: FAMILY MEDICINE

## 2023-01-14 PROCEDURE — 99232 SBSQ HOSP IP/OBS MODERATE 35: CPT | Performed by: FAMILY MEDICINE

## 2023-01-14 PROCEDURE — 2060000000 HC ICU INTERMEDIATE R&B

## 2023-01-14 PROCEDURE — 6370000000 HC RX 637 (ALT 250 FOR IP): Performed by: NURSE PRACTITIONER

## 2023-01-14 PROCEDURE — 6360000002 HC RX W HCPCS: Performed by: FAMILY MEDICINE

## 2023-01-14 PROCEDURE — 36415 COLL VENOUS BLD VENIPUNCTURE: CPT

## 2023-01-14 PROCEDURE — 6370000000 HC RX 637 (ALT 250 FOR IP): Performed by: FAMILY MEDICINE

## 2023-01-14 PROCEDURE — 2580000003 HC RX 258: Performed by: NURSE PRACTITIONER

## 2023-01-14 PROCEDURE — 94761 N-INVAS EAR/PLS OXIMETRY MLT: CPT

## 2023-01-14 PROCEDURE — 6360000002 HC RX W HCPCS: Performed by: NURSE PRACTITIONER

## 2023-01-14 PROCEDURE — 80053 COMPREHEN METABOLIC PANEL: CPT

## 2023-01-14 PROCEDURE — 2700000000 HC OXYGEN THERAPY PER DAY

## 2023-01-14 PROCEDURE — 85025 COMPLETE CBC W/AUTO DIFF WBC: CPT

## 2023-01-14 PROCEDURE — 2580000003 HC RX 258: Performed by: FAMILY MEDICINE

## 2023-01-14 RX ORDER — 0.9 % SODIUM CHLORIDE 0.9 %
80 INTRAVENOUS SOLUTION INTRAVENOUS ONCE
Status: COMPLETED | OUTPATIENT
Start: 2023-01-14 | End: 2023-01-14

## 2023-01-14 RX ORDER — SODIUM CHLORIDE 0.9 % (FLUSH) 0.9 %
10 SYRINGE (ML) INJECTION ONCE
Status: COMPLETED | OUTPATIENT
Start: 2023-01-14 | End: 2023-01-14

## 2023-01-14 RX ORDER — METOPROLOL SUCCINATE 25 MG/1
25 TABLET, EXTENDED RELEASE ORAL DAILY
Status: DISCONTINUED | OUTPATIENT
Start: 2023-01-14 | End: 2023-01-27 | Stop reason: HOSPADM

## 2023-01-14 RX ORDER — ENOXAPARIN SODIUM 100 MG/ML
60 INJECTION SUBCUTANEOUS ONCE
Status: COMPLETED | OUTPATIENT
Start: 2023-01-14 | End: 2023-01-14

## 2023-01-14 RX ORDER — ERGOCALCIFEROL 1.25 MG/1
50000 CAPSULE ORAL
Status: DISCONTINUED | OUTPATIENT
Start: 2023-01-14 | End: 2023-01-27 | Stop reason: HOSPADM

## 2023-01-14 RX ORDER — ENOXAPARIN SODIUM 100 MG/ML
1 INJECTION SUBCUTANEOUS 2 TIMES DAILY
Status: DISCONTINUED | OUTPATIENT
Start: 2023-01-14 | End: 2023-01-17

## 2023-01-14 RX ADMIN — QUETIAPINE FUMARATE 200 MG: 200 TABLET ORAL at 21:29

## 2023-01-14 RX ADMIN — MAGNESIUM HYDROXIDE/ALUMINUM HYDROXICE/SIMETHICONE 30 ML: 120; 1200; 1200 SUSPENSION ORAL at 06:37

## 2023-01-14 RX ADMIN — AZITHROMYCIN MONOHYDRATE 250 MG: 250 TABLET ORAL at 08:03

## 2023-01-14 RX ADMIN — ENOXAPARIN SODIUM 60 MG: 100 INJECTION SUBCUTANEOUS at 13:00

## 2023-01-14 RX ADMIN — PREDNISONE 40 MG: 20 TABLET ORAL at 08:02

## 2023-01-14 RX ADMIN — BUDESONIDE AND FORMOTEROL FUMARATE DIHYDRATE 2 PUFF: 80; 4.5 AEROSOL RESPIRATORY (INHALATION) at 20:52

## 2023-01-14 RX ADMIN — SODIUM CHLORIDE, PRESERVATIVE FREE 10 ML: 5 INJECTION INTRAVENOUS at 08:03

## 2023-01-14 RX ADMIN — QUETIAPINE FUMARATE 200 MG: 200 TABLET ORAL at 08:02

## 2023-01-14 RX ADMIN — SODIUM CHLORIDE, PRESERVATIVE FREE 10 ML: 5 INJECTION INTRAVENOUS at 12:20

## 2023-01-14 RX ADMIN — IPRATROPIUM BROMIDE AND ALBUTEROL SULFATE 1 AMPULE: 2.5; .5 SOLUTION RESPIRATORY (INHALATION) at 20:52

## 2023-01-14 RX ADMIN — CEFTRIAXONE SODIUM 1000 MG: 1 INJECTION, POWDER, FOR SOLUTION INTRAMUSCULAR; INTRAVENOUS at 13:01

## 2023-01-14 RX ADMIN — IOPAMIDOL 75 ML: 755 INJECTION, SOLUTION INTRAVENOUS at 12:20

## 2023-01-14 RX ADMIN — ASPIRIN 81 MG: 81 TABLET, CHEWABLE ORAL at 08:03

## 2023-01-14 RX ADMIN — GUAIFENESIN AND DEXTROMETHORPHAN 5 ML: 100; 10 SYRUP ORAL at 21:41

## 2023-01-14 RX ADMIN — ENOXAPARIN SODIUM 30 MG: 100 INJECTION SUBCUTANEOUS at 08:04

## 2023-01-14 RX ADMIN — LAMOTRIGINE 100 MG: 100 TABLET ORAL at 08:03

## 2023-01-14 RX ADMIN — BUDESONIDE AND FORMOTEROL FUMARATE DIHYDRATE 2 PUFF: 80; 4.5 AEROSOL RESPIRATORY (INHALATION) at 07:39

## 2023-01-14 RX ADMIN — ERGOCALCIFEROL 50000 UNITS: 1.25 CAPSULE ORAL at 21:29

## 2023-01-14 RX ADMIN — PANTOPRAZOLE SODIUM 40 MG: 40 TABLET, DELAYED RELEASE ORAL at 05:51

## 2023-01-14 RX ADMIN — IPRATROPIUM BROMIDE AND ALBUTEROL SULFATE 1 AMPULE: 2.5; .5 SOLUTION RESPIRATORY (INHALATION) at 15:39

## 2023-01-14 RX ADMIN — IPRATROPIUM BROMIDE AND ALBUTEROL SULFATE 1 AMPULE: 2.5; .5 SOLUTION RESPIRATORY (INHALATION) at 07:39

## 2023-01-14 RX ADMIN — ENOXAPARIN SODIUM 90 MG: 100 INJECTION SUBCUTANEOUS at 21:29

## 2023-01-14 RX ADMIN — DIVALPROEX SODIUM 1000 MG: 500 TABLET, EXTENDED RELEASE ORAL at 08:02

## 2023-01-14 RX ADMIN — IPRATROPIUM BROMIDE AND ALBUTEROL SULFATE 1 AMPULE: 2.5; .5 SOLUTION RESPIRATORY (INHALATION) at 10:57

## 2023-01-14 RX ADMIN — QUETIAPINE FUMARATE 150 MG: 150 TABLET, EXTENDED RELEASE ORAL at 21:29

## 2023-01-14 RX ADMIN — SODIUM CHLORIDE 80 ML: 0.9 INJECTION, SOLUTION INTRAVENOUS at 12:20

## 2023-01-14 RX ADMIN — SODIUM CHLORIDE, PRESERVATIVE FREE 10 ML: 5 INJECTION INTRAVENOUS at 21:29

## 2023-01-14 ASSESSMENT — ENCOUNTER SYMPTOMS
CHOKING: 0
SHORTNESS OF BREATH: 1
VOICE CHANGE: 0
NAUSEA: 0
VOMITING: 0
DIARRHEA: 0
BACK PAIN: 0
CONSTIPATION: 0
SINUS PRESSURE: 0
CHEST TIGHTNESS: 0
RHINORRHEA: 0
ABDOMINAL PAIN: 0
COUGH: 1
WHEEZING: 0

## 2023-01-14 ASSESSMENT — PAIN SCALES - GENERAL: PAINLEVEL_OUTOF10: 6

## 2023-01-14 NOTE — PROGRESS NOTES
Patient had relatively uneventful evening. SaO2 = 92-94% on 3L/NC, which decreases to mid-80s when patient removes nasal cannula. SaO2 rises to 92% on 3 1/2 L/NC. Received first dose of Paxloid last night. Will monitor.

## 2023-01-14 NOTE — RT PROTOCOL NOTE
RT Inhaler-Nebulizer Bronchodilator Protocol Note    There is a bronchodilator order in the chart from a provider indicating to follow the RT Bronchodilator Protocol and there is an Initiate RT Inhaler-Nebulizer Bronchodilator Protocol order as well (see protocol at bottom of note). CXR Findings:  XR CHEST PORTABLE    Result Date: 1/13/2023  Mild to moderate COPD but no acute cardiopulmonary abnormality evident. Possible soft tissue mass at the lateral left lower chest wall. Correlate clinically. The findings from the last RT Protocol Assessment were as follows:   History Pulmonary Disease: Chronic pulmonary disease  Respiratory Pattern: Dyspnea on exertion or RR 21-25 bpm  Breath Sounds: Severe inspiratory and expiratory wheezing or severely diminished  Cough: Strong, spontaneous, non-productive  Indication for Bronchodilator Therapy: Decreased or absent breath sounds  Bronchodilator Assessment Score: 12    Aerosolized bronchodilator medication orders have been revised according to the RT Inhaler-Nebulizer Bronchodilator Protocol below. Respiratory Therapist to perform RT Therapy Protocol Assessment initially then follow the protocol. Repeat RT Therapy Protocol Assessment PRN for score 0-3 or on second treatment, BID, and PRN for scores above 3. No Indications - adjust the frequency to every 6 hours PRN wheezing or bronchospasm, if no treatments needed after 48 hours then discontinue using Per Protocol order mode. If indication present, adjust the RT bronchodilator orders based on the Bronchodilator Assessment Score as indicated below. Use Inhaler orders unless patient has one or more of the following: on home nebulizer, not able to hold breath for 10 seconds, is not alert and oriented, cannot activate and use MDI correctly, or respiratory rate 25 breaths per minute or more, then use the equivalent nebulizer order(s) with same Frequency and PRN reasons based on the score.   If a patient is on this medication at home then do not decrease Frequency below that used at home. 0-3 - enter or revise RT bronchodilator order(s) to equivalent RT Bronchodilator order with Frequency of every 4 hours PRN for wheezing or increased work of breathing using Per Protocol order mode. 4-6 - enter or revise RT Bronchodilator order(s) to two equivalent RT bronchodilator orders with one order with BID Frequency and one order with Frequency of every 4 hours PRN wheezing or increased work of breathing using Per Protocol order mode. 7-10 - enter or revise RT Bronchodilator order(s) to two equivalent RT bronchodilator orders with one order with TID Frequency and one order with Frequency of every 4 hours PRN wheezing or increased work of breathing using Per Protocol order mode. 11-13 - enter or revise RT Bronchodilator order(s) to one equivalent RT bronchodilator order with QID Frequency and an Albuterol order with Frequency of every 4 hours PRN wheezing or increased work of breathing using Per Protocol order mode. Greater than 13 - enter or revise RT Bronchodilator order(s) to one equivalent RT bronchodilator order with every 4 hours Frequency and an Albuterol order with Frequency of every 2 hours PRN wheezing or increased work of breathing using Per Protocol order mode. RT to enter RT Home Evaluation for COPD & MDI Assessment order using Per Protocol order mode.     Electronically signed by Fransisco Funez RCP on 1/14/2023 at 7:43 AM

## 2023-01-14 NOTE — PLAN OF CARE
Problem: Respiratory - Adult  Goal: Achieves optimal ventilation and oxygenation  1/14/2023 0742 by Cristopher Hong RCP  Outcome: Progressing  1/14/2023 0022 by Mauricio Hart RN  Outcome: Progressing  Note: Assess breath sounds every shift and as needed. Assess oxygenation level & respiration rate. Encourage coughing & deep breathing. Encourage use of incentive spirometer. Assess cough & sputum. Administer oxygen as needed.    1/13/2023 1828 by Molly Blizzard, RN  Outcome: Progressing

## 2023-01-14 NOTE — PROGRESS NOTES
Writer entered patient's room and oxygen sat's around 87%-88% on 3L/NC. Writer bumped up to 3.5L/NC and patient's oxygen still running 88%-89%. Writer then increased to 4L/NC and oxygen 90%-91%. Writer stayed with patient awhile after to monitor. Patient denies any breathing difficulties or any other symptoms. Dr. Rodrigo Nolan rounded and updated. He assessed patient and reviewed imaging/labs/meds. See order for CT chest, IV abx's and okay to use Bipap if needed as well. He also discussed code status with patient and patient agree's he would want everything done if needed. Writer also updated respiratory therapist Lula Llamas as well.

## 2023-01-14 NOTE — PLAN OF CARE
Problem: Respiratory - Adult  Goal: Achieves optimal ventilation and oxygenation  1/14/2023 1818 by Gilda Blanco RCP  Outcome: Progressing

## 2023-01-14 NOTE — PROGRESS NOTES
Section of Cardiology  Progress note    Reason for Consult: Elevated troponin    Requesting Physician: Jorge Hernadez MD    Subjective:  I did not see the patient due to isolation due to COVID-19 infection no change of clinical status    His nurse reported that patient is doing well. Denies chest pain or shortness of breath. He is currently on oxygen at 3.5 L/min and saturating above 90%. Also no dizziness or palpitations or legs edema    Past Medical History:    Past Medical History:   Diagnosis Date    Back pain     Bipolar 1 disorder ([facility]     [facility]     bowel    COPD (chronic obstructive pulmonary disease) (HCC)     GERD (gastroesophageal reflux disease)     Heart attack [facility])      Past Surgical History:    Past Surgical History:   Procedure Laterality Date    ANUS SURGERY      bowel surgery r/t cancer approx 4 years ago    COLONOSCOPY N/A 7/30/2018    COLONOSCOPY POLYPECTOMY HOT BIOPSY performed by Josephine Irizarry DO at [address] [place] [address] Medications:  Prior to Admission medications    Medication Sig Start Date End Date Taking? Authorizing Provider   nirmatrelvir/ritonavir (PAXLOVID, 300/100,) 20 x 150 MG & 10 x 100MG TBPK Take 3 tablets (two 150 mg nirmatrelvir and one 100 mg ritonavir tablets) by mouth every 12 hours for 5 days.  1/13/23 1/18/23 Yes Caroline Parish MD   QUEtiapine (SEROQUEL) 400 MG tablet Take 400 mg by mouth at bedtime   Yes Historical Provider, MD   lamoTRIgine (LAMICTAL) 100 MG tablet Take 100 mg by mouth daily   Yes Historical Provider, MD   divalproex (DEPAKOTE ER) 500 MG extended release tablet Take 2 tablets by mouth daily  Patient taking differently: Take 1,000 mg by mouth nightly 7/31/18   Ely Kowalski MD   QUEtiapine (SEROQUEL XR) 150 MG TB24 extended release tablet Take 1 tablet by mouth daily  Patient taking differently: Take 150 mg by mouth at bedtime 7/31/18   Ely Kowalski MD     Current Medications:    Current Facility-Administered Medications   Medication Dose Route Frequency Provider Last Rate Last Admin    metoprolol succinate (TOPROL XL) extended release tablet 25 mg  25 mg Oral Daily Gely Pitts MD        divalproex (DEPAKOTE ER) extended release tablet 1,000 mg  1,000 mg Oral Daily Doylene Eaves, APRN - CNP   1,000 mg at 01/14/23 0802    sodium chloride flush 0.9 % injection 5-40 mL  5-40 mL IntraVENous 2 times per day Doylene Eaves, APRN - CNP   10 mL at 01/14/23 0803    sodium chloride flush 0.9 % injection 5-40 mL  5-40 mL IntraVENous PRN Doylene Eaves, APRN - CNP        0.9 % sodium chloride infusion  25 mL IntraVENous PRN Doylene Eaves, APRN - CNP        enoxaparin Sodium (LOVENOX) injection 30 mg  30 mg SubCUTAneous BID Doylene Eaves, APRN - CNP   30 mg at 01/14/23 0804    ondansetron (ZOFRAN-ODT) disintegrating tablet 4 mg  4 mg Oral Q8H PRN Doylene Eaves, APRN - CNP        Or    ondansetron [facility]) injection 4 mg  4 mg IntraVENous Q6H PRN Doylene Eaves, APRN - CNP        polyethylene glycol (GLYCOLAX) packet 17 g  17 g Oral Daily PRN Doylene Eaves, APRN - CNP        acetaminophen (TYLENOL) tablet 650 mg  650 mg Oral Q6H PRN Doylene Eaves, APRN - CNP   650 mg at 01/13/23 1153    Or    acetaminophen (TYLENOL) suppository 650 mg  650 mg Rectal Q6H PRN Doylene Eaves, APRN - CNP        guaiFENesin-dextromethorphan (ROBITUSSIN DM) 100-10 MG/5ML syrup 5 mL  5 mL Oral Q4H PRN Doylene Eaves, APRN - CNP        QUEtiapine (SEROQUEL XR) 150 MG extended release tablet 150 mg  150 mg Oral Nightly Kriss Sands MD   150 mg at 01/13/23 7597    ipratropium-albuterol (DUONEB) nebulizer solution 1 ampule  1 ampule Inhalation Q4H WA Kriss Sands MD   1 ampule at 01/14/23 0739    aspirin chewable tablet 81 mg  81 mg Oral Daily Kriss Sands MD   81 mg at 01/14/23 0803    nicotine (NICODERM CQ) 14 MG/24HR 1 patch  1 patch TransDERmal Daily Kriss Sands MD   1 patch at 01/14/23 0804    azithromycin (ZITHROMAX) tablet 250 mg  250 mg Oral Daily Vielka Pierce MD   250 mg at 01/14/23 0803    predniSONE (DELTASONE) tablet 40 mg  40 mg Oral Daily Vielka Pierce MD   40 mg at 01/14/23 0802    budesonide-formoterol (SYMBICORT) 80-4.5 MCG/ACT inhaler 2 puff  2 puff Inhalation BID Vielka Pierce MD   2 puff at 01/14/23 0739    lamoTRIgine (LAMICTAL) tablet 100 mg  100 mg Oral Daily Vielka Pierce MD   100 mg at 01/14/23 0803    QUEtiapine (SEROQUEL) tablet 200 mg  200 mg Oral BID Vielka Pierce MD   200 mg at 01/14/23 0802    nirmatrelvir/ritonavir (PAXLOVID) 3 tablet   (Patient Supplied)  3 tablet Oral Q12H Vielka Pierce MD   3 tablet at 01/13/23 2300    pantoprazole (PROTONIX) tablet 40 mg  40 mg Oral QAM AC ALEXSANDRA Vazquez - CNP   40 mg at 01/14/23 0551    aluminum & magnesium hydroxide-simethicone (MAALOX) 200-200-20 MG/5ML suspension 30 mL  30 mL Oral Q6H PRN Purnima Chua APRN - CNP   30 mL at 01/14/23 9001     Allergies:  Patient has no known allergies.     Social History:    Social History     Socioeconomic History    Marital status:      Spouse name: Not on file    Number of children: Not on file    Years of education: Not on file    Highest education level: Not on file   Occupational History    Not on file   Tobacco Use    Smoking status: Some Days     Types: Cigarettes, Pipe    Smokeless tobacco: Never    Tobacco comments:     unable to identify an amount   Vaping Use    Vaping Use: Some days   Substance and Sexual Activity    Alcohol use: No    Drug use: No    Sexual activity: Never   Other Topics Concern    Not on file   Social History Narrative    Not on file     Social Determinants of Health     Financial Resource Strain: Not on file   Food Insecurity: Not on file   Transportation Needs: Not on file   Physical Activity: Not on file   Stress: Not on file   Social Connections: Not on file   Intimate Partner Violence: Not on file   Housing Stability: Not on file     Family History:   Family History   Problem Relation Age of Onset    Cancer Father         Lung    Stroke Father     Cancer Brother         Lymphoma    Mental Illness Other         Aunt       REVIEW OF SYSTEMS   Reviewed yesterday by Dr. Sue Burrell:    Vitals:    [place]:  /72   Pulse 72   Temp 97.7 °F (36.5 °C) (Oral)   Resp 18   Ht 5' 10\" (1.778 m)   Wt 207 lb (93.9 kg)   SpO2 90%   BMI 29.70 kg/m²   24HR INTAKE/OUTPUT:    Intake/Output Summary (Last 24 hours) at 1/14/2023 1035  Last data filed at 1/13/2023 1636  Gross per 24 hour   Intake --   Output 500 ml   Net -500 ml     Physical exam was deferred due to COVID-19 isolation status. DATA:   ECG: Normal sinus rhythm, right bundle branch block    ECHO: 1/13/2023  Summary  Mild left ventricular hypertrophy  Global left ventricular systolic function is normal  Estimated ejection fraction is 65 % . Left atrial size is at the upper limits of normal.  Aortic leaflet calcification with mild stenosis. Peak instantaneous gradient 27 mmHg and mean gradient 14 mmHg. No aortic insufficiency. No pericardial effusion seen. Normal aortic root dimension. Signature  ----------------------------------------------------------------------------   Electronically signed by Susi Devries on 01/13/2023   01:21 PM  ----------------------------------------------------------------------------     ----------------------------------------------------------------------------   Electronically signed by Yazji,Ghiath(Interpreting physician) on   01/13/2023 09:42 PM      Stress Test:  Not performed to date  Angiography:  Not performed to date    Chest x-ray 1/13/2023    Mild to moderate COPD but no acute cardiopulmonary abnormality evident. Possible soft tissue mass at the lateral left lower chest wall. Correlate   clinically.      Cardiology Labs:  Recent Labs     01/13/23  0225 01/13/23  0343 01/13/23  0530   MYOGLOBIN 177* 462* 520*     Warfarin PT/INR:No results found for: PROTIME, INR, WARFARIN  CBC:  Lab Results   Component Value Date/Time    WBC 7.2 01/14/2023 05:31 AM    RBC 4.69 01/14/2023 05:31 AM    HGB 14.3 01/14/2023 05:31 AM    HCT 45.4 01/14/2023 05:31 AM    MCV 96.8 01/14/2023 05:31 AM    MCH 30.5 01/14/2023 05:31 AM    MCHC 31.5 01/14/2023 05:31 AM    RDW 14.0 01/14/2023 05:31 AM     01/14/2023 05:31 AM    MPV 9.2 01/14/2023 05:31 AM     CMP:  Lab Results   Component Value Date/Time     01/14/2023 05:31 AM    K 4.3 01/14/2023 05:31 AM     01/14/2023 05:31 AM    CO2 29 01/14/2023 05:31 AM    BUN 20 01/14/2023 05:31 AM    CREATININE 0.87 01/14/2023 05:31 AM    GFRAA >60 07/19/2018 12:03 PM    LABGLOM >60 01/14/2023 05:31 AM    GLUCOSE 97 01/14/2023 05:31 AM    CALCIUM 8.9 01/14/2023 05:31 AM     Magnesium:    Lab Results   Component Value Date/Time    MG 2.1 07/19/2018 12:03 PM     PTT:  No results found for: APTT, PTT  TSH:    Lab Results   Component Value Date/Time    TSH 3.05 07/19/2018 12:03 PM     BMP:  Lab Results   Component Value Date/Time     01/14/2023 05:31 AM    K 4.3 01/14/2023 05:31 AM     01/14/2023 05:31 AM    CO2 29 01/14/2023 05:31 AM    BUN 20 01/14/2023 05:31 AM    LABALBU 3.4 01/14/2023 05:31 AM    CREATININE 0.87 01/14/2023 05:31 AM    CALCIUM 8.9 01/14/2023 05:31 AM    GFRAA >60 07/19/2018 12:03 PM    LABGLOM >60 01/14/2023 05:31 AM    GLUCOSE 97 01/14/2023 05:31 AM     LIVER PROFILE:  Recent Labs     01/13/23 0824 01/14/23  0531   AST 29 27   ALT 18 16   LABALBU 3.5 3.4*   ALKPHOS 56 51   BILITOT 0.2* 0.2*   BILIDIR <0.1  --    IBILI Can not be calculated  --    PROT 6.9 6.6     FLP:    Lab Results   Component Value Date/Time    CHOL 197 07/20/2018 06:59 AM    TRIG 142 07/20/2018 06:59 AM    HDL 50 07/20/2018 06:59 AM    LDLCHOLESTEROL 119 07/20/2018 06:59 AM         IMPRESSION  #1 minimal elevation of troponin from unknown significance, peaked at 51, no clinical angina, no acute EKG changes, no segmental wall motion abnormalities on echo  #2 right bundle branch block  #3 COVID-19 pneumonia  #4 COPD  #5 Remote smoker    Patient Active Problem List   Diagnosis    COVID    Acute respiratory failure with hypoxia (HCC)    COPD exacerbation (HCC)    Elevated troponin    Tobacco abuse           RECOMMENDATIONS:     Continue current medications including aspirin  Add small dose of beta-blocker  Check lipids and start a statin if LDL cholesterol is above 70  No further cardiac work-up is needed  Discussed with the patient 's nurse  Stable cardiac wise. Therefore cardiology will sign off. When discharged please arrange to follow-up with Dr. Mel Ordonez in about 2 to 3 weeks      Electronically signed by Sonia Gandhi MD on 1/14/2023 at 10:35 AM     CC: No primary care provider on file.

## 2023-01-14 NOTE — PROGRESS NOTES
[facility]  Office: [address], DO, Meli Kat, DO, Colby Mail, DO, Federico Has Blood, DO, Veto Johnson MD, Jalil Salinas MD, He Connors MD, Angela Chaney MD,  Eryn Lanza MD, Olivia Schmitt MD, James Martinez, DO, Ayaz Blackmon MD,  Abad Abreu MD, Mike Friedman MD, Jordan Martínez, DO, Mo Del Valle MD, Cynthia Schrader MD, Mazin Memory, DO, Nicolas Reis MD, Aditi Rayo MD, Chanel Cano MD, Geovani Baum MD, Praveen Rojas, DO, Jesse Pendleton MD, Filipe Young MD, Syeda Rubi, CNP,  Lashon Bustamante, CNP, Amor Handley, CNP, Georgiana Booty, CNP,  Coral Reyna, DNP, Hugo Polanco, CNP, Yin Beat, CNP, Don Class, CNP, Bartolome Sahu, CNP, Lory Ramos, CNP, Delisa Saucedo PA-C, Tiffany Delgadillo, CNS, Lindy Tyson, CNP, Cisco Obrien, CNP       [facility] - INPATIENT      Daily Progress Note     Admit Date: 1/13/2023  Bed/Room No.  1015/1015-01  Admitting Physician : He Connors MD  Code Status :Full Code  Hospital Day:  LOS: 1 day   Chief Complaint:     Chief Complaint   Patient presents with    Fatigue     Pt was found on his knees outside of assisted living. Pt was outside smoking and slid out of a chair. Principal Problem:    COVID  Active Problems:    Acute respiratory failure with hypoxia (HCC)    COPD exacerbation (HCC)    Elevated troponin    Tobacco abuse  Resolved Problems:    * No resolved hospital problems. *    Subjective : Interval History/Significant events :  01/14/23    Patient continues to have breathing difficulty and remains on supplemental oxygen. He has dry cough. He is feeling fatigued and tired. He denies any chest pain, nausea , vomiting   Vitals - Stable afebrile, TMAX 99 F . On 4 LPM O2 via nasal canula   Labs - normal white count     Nursing notes , Consults notes reviewed. Overnight events and updates discussed with Nursing staff .    Background History:         Dada Mary is 78 y.o. male  Who was admitted to the hospital on 1/13/2023 for treatment of COVID. Patient presented to emergency room from assisted living with generalized fatigue. Patient went outside his room at his facility to smoke and was found on his knees. He was unable to get up. Patient was complaining of cough, breathing difficulty. He smokes 2 packs/day and has underlying history of COPD. He also history of colon cancer. Evaluation emergency room showed hypoxia 88%. Patient was placed on 2 L of oxygen. D-dimer was elevated 1.63. Patient checked positive for COVID-19. Lab evaluation showed normal kidney function, normal WBC. Chest x-ray was negative for pneumonia however showed possible soft tissue mass at lateral left wall chest  PMH:  Past Medical History:   Diagnosis Date    Back pain     Bipolar 1 disorder ([facility])     Cancer ([facility] [address]     bowel    COPD (chronic obstructive pulmonary disease) ([facility])     GERD (gastroesophageal reflux disease)     Heart attack ([facility])       Allergies: No Known Allergies   Medications :  divalproex, 1,000 mg, Oral, Daily  sodium chloride flush, 5-40 mL, IntraVENous, 2 times per day  enoxaparin, 30 mg, SubCUTAneous, BID  QUEtiapine, 150 mg, Oral, Nightly  ipratropium-albuterol, 1 ampule, Inhalation, Q4H WA  aspirin, 81 mg, Oral, Daily  nicotine, 1 patch, TransDERmal, Daily  azithromycin, 250 mg, Oral, Daily  predniSONE, 40 mg, Oral, Daily  budesonide-formoterol, 2 puff, Inhalation, BID  lamoTRIgine, 100 mg, Oral, Daily  QUEtiapine, 200 mg, Oral, BID  nirmatrelvir/ritonavir, 3 tablet, Oral, Q12H  pantoprazole, 40 mg, Oral, QAM AC        Review of Systems   Review of Systems   Constitutional:  Negative for activity change, appetite change, fatigue, fever and unexpected weight change. HENT:  Negative for congestion, nosebleeds, rhinorrhea, sinus pressure, sneezing and voice change. Respiratory:  Positive for cough and shortness of breath.  Negative for choking, chest tightness and wheezing. Cardiovascular:  Negative for chest pain, palpitations and leg swelling. Gastrointestinal:  Negative for abdominal pain, constipation, diarrhea, nausea and vomiting. Genitourinary:  Negative for difficulty urinating, dysuria, frequency, penile discharge and testicular pain. Musculoskeletal:  Negative for back pain. Skin:  Negative for rash. Neurological:  Negative for dizziness, weakness, light-headedness and headaches. Hematological:  Does not bruise/bleed easily. Psychiatric/Behavioral:  Negative for agitation, behavioral problems, confusion, self-injury, sleep disturbance and suicidal ideas. Objective :      Current Vitals : Temp: 97.7 °F (36.5 °C),  Heart Rate: 72, Resp: 18, BP: 126/72, SpO2: 90 %  Last 24 Hrs Vitals   Patient Vitals for the past 24 hrs:   BP Temp Temp src Pulse Resp SpO2   01/14/23 0741 -- -- -- 72 -- 90 %   01/14/23 0738 126/72 97.7 °F (36.5 °C) Oral 70 18 94 %   01/14/23 0506 (!) 122/55 97.7 °F (36.5 °C) Oral 67 18 92 %   01/13/23 2352 (!) 115/53 97.7 °F (36.5 °C) Axillary 74 16 94 %   01/13/23 1934 (!) 122/56 97.5 °F (36.4 °C) Oral 80 20 90 %   01/13/23 1817 -- -- -- -- -- 93 %   01/13/23 1529 130/60 97.9 °F (36.6 °C) Oral 78 18 93 %   01/13/23 1424 -- -- -- -- -- 92 %   01/13/23 1143 138/66 100.2 °F (37.9 °C) -- 81 24 92 %   01/13/23 0944 -- -- -- -- -- 92 %     Intake / output   01/12 1901 - 01/14 0700  In: -   Out: 500 [Urine:500]  Physical Exam:  Physical Exam  Vitals and nursing note reviewed. Constitutional:       General: He is not in acute distress. Appearance: He is ill-appearing. He is not diaphoretic. Interventions: Nasal cannula in place. HENT:      Head: Normocephalic and atraumatic. Nose:      Right Sinus: No maxillary sinus tenderness or frontal sinus tenderness. Left Sinus: No maxillary sinus tenderness or frontal sinus tenderness. Mouth/Throat:      Pharynx: No oropharyngeal exudate.    Eyes:      General: No scleral icterus. Conjunctiva/sclera: Conjunctivae normal.      Pupils: Pupils are equal, round, and reactive to light. Neck:      Thyroid: No thyromegaly. Vascular: No JVD. Cardiovascular:      Rate and Rhythm: Normal rate and regular rhythm. Pulses:           Dorsalis pedis pulses are 2+ on the right side and 2+ on the left side. Heart sounds: Normal heart sounds. No murmur heard. Pulmonary:      Effort: Pulmonary effort is normal.      Breath sounds: Normal breath sounds. No wheezing or rales. Abdominal:      Palpations: Abdomen is soft. There is no mass. Tenderness: There is no abdominal tenderness. Musculoskeletal:      Cervical back: Full passive range of motion without pain and neck supple. Lymphadenopathy:      Head:      Right side of head: No submandibular adenopathy. Left side of head: No submandibular adenopathy. Cervical: No cervical adenopathy. Skin:     General: Skin is warm. Neurological:      Mental Status: He is alert and oriented to person, place, and time. Motor: No tremor. Psychiatric:         Behavior: Behavior is cooperative.          Laboratory findings:    Recent Labs     01/13/23  0225 01/14/23  0531   WBC 5.8 7.2   HGB 15.2 14.3   HCT 48.1 45.4    180     Recent Labs     01/13/23  0225 01/14/23  0531    144   K 4.4 4.3   CL 99 106   CO2 26 29   GLUCOSE 109* 97   BUN 21 20   CREATININE 1.07 0.87   CALCIUM 9.2 8.9     Recent Labs     01/13/23  0824 01/14/23  0531   PROT 6.9 6.6   LABALBU 3.5 3.4*   AST 29 27   ALT 18 16     --    ALKPHOS 56 51   BILITOT 0.2* 0.2*   BILIDIR <0.1  --           Specific Gravity, UA   Date Value Ref Range Status   01/13/2023 1.015 1.005 - 1.030 Final     Protein, UA   Date Value Ref Range Status   01/13/2023 1+ (A) NEGATIVE Final     RBC, UA   Date Value Ref Range Status   01/13/2023 0 TO 2 0 - 2 /HPF Final     Nitrite, Urine   Date Value Ref Range Status   01/13/2023 NEGATIVE NEGATIVE Final     WBC, UA   Date Value Ref Range Status   01/13/2023 0 TO 2 0 - 5 /HPF Final     Leukocyte Esterase, Urine   Date Value Ref Range Status   01/13/2023 NEGATIVE NEGATIVE Final       Imaging / Clinical Data :-   XR CHEST PORTABLE    Result Date: 1/13/2023  Mild to moderate COPD but no acute cardiopulmonary abnormality evident. Possible soft tissue mass at the lateral left lower chest wall. Correlate clinically. Clinical Course : unchanged  Assessment and Plan  :        Acute respiratory failure with hypoxia -O2 supplement, steroids  Acute COPD exacerbation -as above. Rocephin, azithromycin. Prednisone  COVID-19 infection -droplet plus isolation. On Paxlovid   Demand ischemia from acute respiratory failure. Check echocardiogram.  RBBB -   Elevated D-dimer-check CT chest for pulmonary embolism. Left lower lung nodule -check CT. Vitamin D deficiency-replace      Continue to monitor vitals , Intake / output ,  Cell count , HGB , Kidney function, oxygenation  as indicated . Plan and updates discussed with patient ,  answers  explained to satisfaction.    Plan discussed with Staff  RN     (Please note that portions of this note were completed with a voice recognition program. Efforts were made to edit the dictations but occasionally words are mis-transcribed.)      Oumar Harrison MD  1/14/2023

## 2023-01-14 NOTE — PLAN OF CARE
Problem: Safety - Adult  Goal: Free from fall injury  1/14/2023 0814 by Brenna Lowe RN  Outcome: Progressing  Pt fall risk, fall band present, falling star, safety alarm activated and in use as needed. Hourly rounding performed. Pt encouraged to use call light. See Anaid Chou fall risk assessment.

## 2023-01-15 LAB
C-REACTIVE PROTEIN: 3.3 MG/L (ref 0–5)
CHOLESTEROL, FASTING: 187 MG/DL
CHOLESTEROL/HDL RATIO: 4.3
D-DIMER QUANTITATIVE: 0.94 MG/L FEU (ref 0–0.59)
HDLC SERPL-MCNC: 44 MG/DL
LDL CHOLESTEROL: 119 MG/DL (ref 0–130)
PROCALCITONIN: 0.06 NG/ML
TRIGLYCERIDE, FASTING: 121 MG/DL

## 2023-01-15 PROCEDURE — 97166 OT EVAL MOD COMPLEX 45 MIN: CPT

## 2023-01-15 PROCEDURE — 36415 COLL VENOUS BLD VENIPUNCTURE: CPT

## 2023-01-15 PROCEDURE — 84145 PROCALCITONIN (PCT): CPT

## 2023-01-15 PROCEDURE — 2580000003 HC RX 258: Performed by: FAMILY MEDICINE

## 2023-01-15 PROCEDURE — 85379 FIBRIN DEGRADATION QUANT: CPT

## 2023-01-15 PROCEDURE — 6370000000 HC RX 637 (ALT 250 FOR IP): Performed by: NURSE PRACTITIONER

## 2023-01-15 PROCEDURE — 2700000000 HC OXYGEN THERAPY PER DAY

## 2023-01-15 PROCEDURE — 6370000000 HC RX 637 (ALT 250 FOR IP): Performed by: STUDENT IN AN ORGANIZED HEALTH CARE EDUCATION/TRAINING PROGRAM

## 2023-01-15 PROCEDURE — 97530 THERAPEUTIC ACTIVITIES: CPT

## 2023-01-15 PROCEDURE — 80061 LIPID PANEL: CPT

## 2023-01-15 PROCEDURE — 6360000002 HC RX W HCPCS: Performed by: FAMILY MEDICINE

## 2023-01-15 PROCEDURE — 2580000003 HC RX 258: Performed by: NURSE PRACTITIONER

## 2023-01-15 PROCEDURE — 94761 N-INVAS EAR/PLS OXIMETRY MLT: CPT

## 2023-01-15 PROCEDURE — 2060000000 HC ICU INTERMEDIATE R&B

## 2023-01-15 PROCEDURE — 99232 SBSQ HOSP IP/OBS MODERATE 35: CPT | Performed by: FAMILY MEDICINE

## 2023-01-15 PROCEDURE — 86140 C-REACTIVE PROTEIN: CPT

## 2023-01-15 PROCEDURE — 94640 AIRWAY INHALATION TREATMENT: CPT

## 2023-01-15 PROCEDURE — 97535 SELF CARE MNGMENT TRAINING: CPT

## 2023-01-15 PROCEDURE — 97163 PT EVAL HIGH COMPLEX 45 MIN: CPT

## 2023-01-15 PROCEDURE — 6370000000 HC RX 637 (ALT 250 FOR IP): Performed by: INTERNAL MEDICINE

## 2023-01-15 RX ORDER — METHYLPREDNISOLONE SODIUM SUCCINATE 40 MG/ML
40 INJECTION, POWDER, LYOPHILIZED, FOR SOLUTION INTRAMUSCULAR; INTRAVENOUS EVERY 6 HOURS
Status: COMPLETED | OUTPATIENT
Start: 2023-01-15 | End: 2023-01-18

## 2023-01-15 RX ADMIN — PANTOPRAZOLE SODIUM 40 MG: 40 TABLET, DELAYED RELEASE ORAL at 05:38

## 2023-01-15 RX ADMIN — METHYLPREDNISOLONE SODIUM SUCCINATE 40 MG: 40 INJECTION, POWDER, FOR SOLUTION INTRAMUSCULAR; INTRAVENOUS at 19:54

## 2023-01-15 RX ADMIN — ENOXAPARIN SODIUM 90 MG: 100 INJECTION SUBCUTANEOUS at 08:35

## 2023-01-15 RX ADMIN — QUETIAPINE FUMARATE 200 MG: 200 TABLET ORAL at 08:35

## 2023-01-15 RX ADMIN — MAGNESIUM HYDROXIDE/ALUMINUM HYDROXICE/SIMETHICONE 30 ML: 120; 1200; 1200 SUSPENSION ORAL at 03:42

## 2023-01-15 RX ADMIN — BUDESONIDE AND FORMOTEROL FUMARATE DIHYDRATE 2 PUFF: 80; 4.5 AEROSOL RESPIRATORY (INHALATION) at 21:10

## 2023-01-15 RX ADMIN — ASPIRIN 81 MG: 81 TABLET, CHEWABLE ORAL at 08:35

## 2023-01-15 RX ADMIN — SODIUM CHLORIDE, PRESERVATIVE FREE 10 ML: 5 INJECTION INTRAVENOUS at 19:55

## 2023-01-15 RX ADMIN — IPRATROPIUM BROMIDE AND ALBUTEROL SULFATE 1 AMPULE: 2.5; .5 SOLUTION RESPIRATORY (INHALATION) at 11:18

## 2023-01-15 RX ADMIN — METOPROLOL SUCCINATE 25 MG: 25 TABLET, EXTENDED RELEASE ORAL at 08:35

## 2023-01-15 RX ADMIN — IPRATROPIUM BROMIDE AND ALBUTEROL SULFATE 1 AMPULE: 2.5; .5 SOLUTION RESPIRATORY (INHALATION) at 21:08

## 2023-01-15 RX ADMIN — DIVALPROEX SODIUM 1000 MG: 500 TABLET, EXTENDED RELEASE ORAL at 08:35

## 2023-01-15 RX ADMIN — LAMOTRIGINE 100 MG: 100 TABLET ORAL at 08:35

## 2023-01-15 RX ADMIN — IPRATROPIUM BROMIDE AND ALBUTEROL SULFATE 1 AMPULE: 2.5; .5 SOLUTION RESPIRATORY (INHALATION) at 08:03

## 2023-01-15 RX ADMIN — AZITHROMYCIN MONOHYDRATE 250 MG: 250 TABLET ORAL at 08:35

## 2023-01-15 RX ADMIN — QUETIAPINE FUMARATE 200 MG: 200 TABLET ORAL at 20:11

## 2023-01-15 RX ADMIN — SODIUM CHLORIDE, PRESERVATIVE FREE 10 ML: 5 INJECTION INTRAVENOUS at 08:35

## 2023-01-15 RX ADMIN — BUDESONIDE AND FORMOTEROL FUMARATE DIHYDRATE 2 PUFF: 80; 4.5 AEROSOL RESPIRATORY (INHALATION) at 08:03

## 2023-01-15 RX ADMIN — METHYLPREDNISOLONE SODIUM SUCCINATE 40 MG: 40 INJECTION, POWDER, FOR SOLUTION INTRAMUSCULAR; INTRAVENOUS at 14:29

## 2023-01-15 RX ADMIN — CEFTRIAXONE SODIUM 1000 MG: 1 INJECTION, POWDER, FOR SOLUTION INTRAMUSCULAR; INTRAVENOUS at 12:09

## 2023-01-15 RX ADMIN — PREDNISONE 40 MG: 20 TABLET ORAL at 08:35

## 2023-01-15 RX ADMIN — ENOXAPARIN SODIUM 90 MG: 100 INJECTION SUBCUTANEOUS at 19:54

## 2023-01-15 RX ADMIN — QUETIAPINE FUMARATE 150 MG: 150 TABLET, EXTENDED RELEASE ORAL at 19:54

## 2023-01-15 RX ADMIN — IPRATROPIUM BROMIDE AND ALBUTEROL SULFATE 1 AMPULE: 2.5; .5 SOLUTION RESPIRATORY (INHALATION) at 15:53

## 2023-01-15 ASSESSMENT — ENCOUNTER SYMPTOMS
NAUSEA: 0
SINUS PRESSURE: 0
CHEST TIGHTNESS: 0
RHINORRHEA: 0
SHORTNESS OF BREATH: 1
BACK PAIN: 0
VOICE CHANGE: 0
ABDOMINAL PAIN: 0
COUGH: 0
CHOKING: 0
DIARRHEA: 0
WHEEZING: 0
CONSTIPATION: 0
VOMITING: 0

## 2023-01-15 ASSESSMENT — PAIN SCALES - GENERAL: PAINLEVEL_OUTOF10: 0

## 2023-01-15 NOTE — PROGRESS NOTES
[facility]  Office: [address], DO, Giovanny Tonya, DO, Fili Arely, DO, Rhona Skinnersatya Richards, DO, Deepti Simon MD, Reyes Yoon MD, Keyona Espinosa MD, Darci Farrell MD,  Juan Veliz MD, Haroldo Ugarte MD, Savana Benitez, DO, Ricarda Doss MD,  Silverio Grier MD, Vaishnavi Williamson MD, Jesus Goncalves, DO, Phan Frank MD, Horacio Espinoza MD, Winston Krause, DO, Tammy Pollard MD, Francisco Brar MD, Alis Queen MD, Luis Manuel Burrell MD, Kalyani Nolan, DO, Jessica Stein MD, Tito Ruiz MD, Krystle Blackmon, CNP,  Mati Mathew, CNP, Star Dinh, CNP, Mary Reyes, CNP,  Alex Bermudez, [facility], Danyel Joya, CNP, Caryl Johnston, CNP, Carmen Portillo, CNP, Jean Villavicencio, CNP, Loli Bustillos, CNP, Mahesh Holbrook, PA-C, Avi Gold, CNS, Soheila Ace, CNP, José Madrigal, CNP       [address]      Daily Progress Note     Admit Date: 1/13/2023  Bed/Room No.  1015/1015-01  Admitting Physician : Keyona Espinosa MD  Code Status :Full Code  Hospital Day:  LOS: 2 days   Chief Complaint:     Chief Complaint   Patient presents with    Fatigue     Pt was found on his knees outside of assisted living. Pt was outside smoking and slid out of a chair. Principal Problem:    COVID  Active Problems:    Acute respiratory failure with hypoxia (HCC)    COPD exacerbation (HCC)    Elevated troponin    Tobacco abuse  Resolved Problems:    * No resolved hospital problems. *    Subjective : Interval History/Significant events :  01/15/23    Patient remains on supplemental oxygen 5 LPM, he has fatigue . Denies any nausea or vomiting . He denies chest pain , palpitations. Vitals - Stable afebrile, TMAX 99 F . On 4 LPM O2 via nasal canula   Labs - normal white count , normal lipid panel . Nursing notes , Consults notes reviewed. Overnight events and updates discussed with Nursing staff .    Background History:         Odessa Duran is 78 y.o. male  Who was admitted to the hospital on 1/13/2023 for treatment of COVID. Patient presented to emergency room from assisted living with generalized fatigue. Patient went outside his room at his facility to smoke and was found on his knees. He was unable to get up. Patient was complaining of cough, breathing difficulty. He smokes 2 packs/day and has underlying history of COPD. He also history of colon cancer. Evaluation emergency room showed hypoxia 88%. Patient was placed on 2 L of oxygen. D-dimer was elevated 1.63. Patient checked positive for COVID-19. Lab evaluation showed normal kidney function, normal WBC. Chest x-ray was negative for pneumonia however showed possible soft tissue mass at lateral left wall chest  PMH:  Past Medical History:   Diagnosis Date    Back pain     Bipolar 1 disorder (HCC)     Cancer ([facility] [address]     bowel    COPD (chronic obstructive pulmonary disease) ([facility])     GERD (gastroesophageal reflux disease)     Heart attack ([facility])       Allergies: No Known Allergies   Medications :  metoprolol succinate, 25 mg, Oral, Daily  enoxaparin, 1 mg/kg, SubCUTAneous, BID  cefTRIAXone (ROCEPHIN) IV, 1,000 mg, IntraVENous, Q24H  vitamin D, 50,000 Units, Oral, Once per day on Sat  divalproex, 1,000 mg, Oral, Daily  sodium chloride flush, 5-40 mL, IntraVENous, 2 times per day  QUEtiapine, 150 mg, Oral, Nightly  ipratropium-albuterol, 1 ampule, Inhalation, Q4H WA  aspirin, 81 mg, Oral, Daily  nicotine, 1 patch, TransDERmal, Daily  azithromycin, 250 mg, Oral, Daily  predniSONE, 40 mg, Oral, Daily  budesonide-formoterol, 2 puff, Inhalation, BID  lamoTRIgine, 100 mg, Oral, Daily  QUEtiapine, 200 mg, Oral, BID  nirmatrelvir/ritonavir, 3 tablet, Oral, Q12H  pantoprazole, 40 mg, Oral, QAM AC      Review of Systems   Review of Systems   Constitutional:  Positive for activity change, appetite change and fatigue. Negative for diaphoresis, fever and unexpected weight change.    HENT:  Negative for congestion, nosebleeds, rhinorrhea, sinus pressure, sneezing and voice change. Respiratory:  Positive for shortness of breath. Negative for cough, choking, chest tightness and wheezing. Cardiovascular:  Negative for chest pain, palpitations and leg swelling. Gastrointestinal:  Negative for abdominal pain, constipation, diarrhea, nausea and vomiting. Genitourinary:  Negative for difficulty urinating, dysuria, frequency, penile discharge and testicular pain. Musculoskeletal:  Negative for back pain. Skin:  Negative for rash. Neurological:  Negative for dizziness, weakness, light-headedness and headaches. Hematological:  Does not bruise/bleed easily. Psychiatric/Behavioral:  Negative for agitation, behavioral problems, confusion, self-injury, sleep disturbance and suicidal ideas. Objective :      Current Vitals : Temp: 98.4 °F (36.9 °C),  Heart Rate: 66, Resp: 20, BP: 126/63, SpO2: 94 %  Last 24 Hrs Vitals   Patient Vitals for the past 24 hrs:   BP Temp Temp src Pulse Resp SpO2 Weight   01/15/23 0756 126/63 98.4 °F (36.9 °C) Oral 66 20 94 % --   01/15/23 0608 136/66 97.5 °F (36.4 °C) Oral 57 18 95 % --   01/15/23 0036 -- -- -- -- -- -- 206 lb (93.4 kg)   01/15/23 0033 -- -- -- -- -- 92 % --   01/15/23 0028 (!) 140/61 97.3 °F (36.3 °C) Oral 66 20 92 % --   01/14/23 2052 -- -- -- -- -- 95 % --   01/14/23 2044 (!) 151/77 97.5 °F (36.4 °C) Oral 72 24 92 % --   01/14/23 1539 -- -- -- -- -- 91 % --   01/14/23 1519 (!) 125/51 99 °F (37.2 °C) Oral 76 18 96 % --   01/14/23 1145 -- -- -- -- -- 90 % --   01/14/23 1130 -- -- -- -- -- (!) 88 % --   01/14/23 1116 (!) 95/47 98.6 °F (37 °C) Oral 83 18 90 % --       Intake / output   No intake/output data recorded. Physical Exam:  Physical Exam  Vitals and nursing note reviewed. Constitutional:       General: He is not in acute distress. Appearance: He is ill-appearing. He is not diaphoretic. Interventions: Nasal cannula in place.    HENT:      Head: Normocephalic and atraumatic. Nose:      Right Sinus: No maxillary sinus tenderness or frontal sinus tenderness. Left Sinus: No maxillary sinus tenderness or frontal sinus tenderness. Mouth/Throat:      Pharynx: No oropharyngeal exudate. Eyes:      General: No scleral icterus. Conjunctiva/sclera: Conjunctivae normal.      Pupils: Pupils are equal, round, and reactive to light. Neck:      Thyroid: No thyromegaly. Vascular: No JVD. Cardiovascular:      Rate and Rhythm: Normal rate and regular rhythm. Pulses:           Dorsalis pedis pulses are 2+ on the right side and 2+ on the left side. Heart sounds: Normal heart sounds. No murmur heard. Pulmonary:      Effort: Pulmonary effort is normal. Prolonged expiration present. Breath sounds: Normal breath sounds. No wheezing or rales. Abdominal:      Palpations: Abdomen is soft. There is no mass. Tenderness: There is no abdominal tenderness. Musculoskeletal:      Cervical back: Full passive range of motion without pain and neck supple. Lymphadenopathy:      Head:      Right side of head: No submandibular adenopathy. Left side of head: No submandibular adenopathy. Cervical: No cervical adenopathy. Skin:     General: Skin is warm. Neurological:      Mental Status: He is alert and oriented to person, place, and time. Motor: No tremor. Psychiatric:         Behavior: Behavior is cooperative.          Laboratory findings:    Recent Labs     01/13/23 0225 01/14/23  0531   WBC 5.8 7.2   HGB 15.2 14.3   HCT 48.1 45.4    180       Recent Labs     01/13/23 0225 01/14/23  0531    144   K 4.4 4.3   CL 99 106   CO2 26 29   GLUCOSE 109* 97   BUN 21 20   CREATININE 1.07 0.87   CALCIUM 9.2 8.9       Recent Labs     01/13/23  0824 01/14/23  0531   PROT 6.9 6.6   LABALBU 3.5 3.4*   AST 29 27   ALT 18 16     --    ALKPHOS 56 51   BILITOT 0.2* 0.2*   BILIDIR <0.1  --             Specific Gravity, UA   Date Value Ref Range Status   01/13/2023 1.015 1.005 - 1.030 Final     Protein, UA   Date Value Ref Range Status   01/13/2023 1+ (A) NEGATIVE Final     RBC, UA   Date Value Ref Range Status   01/13/2023 0 TO 2 0 - 2 /HPF Final     Nitrite, Urine   Date Value Ref Range Status   01/13/2023 NEGATIVE NEGATIVE Final     WBC, UA   Date Value Ref Range Status   01/13/2023 0 TO 2 0 - 5 /HPF Final     Leukocyte Esterase, Urine   Date Value Ref Range Status   01/13/2023 NEGATIVE NEGATIVE Final       Imaging / Clinical Data :-   XR CHEST PORTABLE    Result Date: 1/13/2023  Mild to moderate COPD but no acute cardiopulmonary abnormality evident. Possible soft tissue mass at the lateral left lower chest wall. Correlate clinically. Clinical Course : unchanged  Assessment and Plan  :        Acute respiratory failure with hypoxia -O2 supplement, steroids  Acute COPD exacerbation -as above. Stop antibiotics,  Rocephin, azithromycin. wean oxygen as needed. Encourage incentive spirometry   Continue steroids - Prednisone. Continue Symbicort and Duoneb . COVID-19 infection - droplet plus isolation. On Paxlovid   Emphysema -   Demand ischemia from acute respiratory failure. Preserved EF on  echocardiogram without WMA. RBBB -   Elevated D-dimer- CT chest negative  for pulmonary embolism. Left lower lung nodule - 9 mm , repeat CT in 3 months. Vitamin D deficiency-replace  Current smoker . - recommend abstinence     PT OT     Continue to monitor vitals , Intake / output ,  Cell count , HGB , Kidney function, oxygenation  as indicated . Plan and updates discussed with patient ,  answers  explained to satisfaction.    Plan discussed with Staff  RN     (Please note that portions of this note were completed with a voice recognition program. Efforts were made to edit the dictations but occasionally words are mis-transcribed.)      Kennis Osler, MD  1/15/2023

## 2023-01-15 NOTE — PLAN OF CARE
Problem: Respiratory - Adult  Goal: Achieves optimal ventilation and oxygenation  1/15/2023 0806 by Azeb Leigh RCP  Outcome: Progressing  Flowsheets (Taken 1/14/2023 2142 by Patrick Shirley)  Achieves optimal ventilation and oxygenation:   Assess for changes in respiratory status   Assess for changes in mentation and behavior   Position to facilitate oxygenation and minimize respiratory effort   Oxygen supplementation based on oxygen saturation or arterial blood gases  1/14/2023 1818 by Audrey Delgado RCP  Outcome: Progressing

## 2023-01-15 NOTE — PLAN OF CARE
Problem: Respiratory - Adult  Goal: Achieves optimal ventilation and oxygenation  1/15/2023 0850 by Leah Grajeda RN  Outcome: Progressing  1/15/2023 0806 by Primitivo Thompson RCP  Outcome: Progressing  Flowsheets (Taken 1/14/2023 2142 by Phyllis Guerrero)  Achieves optimal ventilation and oxygenation:   Assess for changes in respiratory status   Assess for changes in mentation and behavior   Position to facilitate oxygenation and minimize respiratory effort   Oxygen supplementation based on oxygen saturation or arterial blood gases   Patient admitted for covid. IV abx'x, steroids, paxlovid, breathing tx's on board. Respiratory tx's. Cont pulse ox. Lung sounds wheezes. Patient oxygen 91% on 4L/NC.

## 2023-01-15 NOTE — PROGRESS NOTES
Occupational Therapy  Facility/Department: [facility] PROGRESSIVE CARE  Occupational Therapy Initial Assessment    Name: Shaheen House  : 1945  MRN: [id number]  Date of Service: 1/15/2023    RN reports patient is medically stable for therapy treatment this date. Chart reviewed prior to treatment and patient is agreeable for therapy. All lines intact and patient positioned comfortably at end of treatment. All patient needs addressed prior to ending therapy session. Pt currently functioning below baseline. Recommend daily inpatient skilled therapy at time of discharge to maximize long term outcomes and prevent re-admission. Please refer to AM-PAC score for current level of function. Discharge Recommendations:  Patient would benefit from continued therapy after discharge  OT Equipment Recommendations  Equipment Needed:  (CTA)       Per H&P: Shaheen House is a 78 y.o. Non- / non  male who presents with Fatigue (Pt was found on his knees outside of assisted living. Pt was outside smoking and slid out of a chair. )   and is admitted to the hospital for the management of [place].     78 yrs old with past medical history of COPD lives in assisted living facility presented to hospital with weakness, shortness of breath. Patient states that he had a usual morning routine, drank his coffee and was trying to get out of wheel chair noted that he was very weak and fatigued. he also had cough started yesterday with white phlegm, no chest pain, no fever, no chills. Patient also started feeling short of breath. No diarrhea. No sick contacts that patient knows of. Patient smokes 2 packs every day and he has copd diagnosis in chart but not on any treatment, no pft seen in the chart. In the ER patient was noted to be hypoxic, 88%, requiring 2 lt oxygen. He was afebrile. Electrolytes normal. Patient was seen to have elevated troponin of 34 which trended up to 51 and 47.   Patient does not report any chest pain. D-dimer 1.63, COVID positive, x-ray does not show any acute abnormality. Patient Diagnosis(es): The primary encounter diagnosis was COVID-19. A diagnosis of Elevated troponin was also pertinent to this visit. Past Medical History:  has a past medical history of Back pain, Bipolar 1 disorder ([facility] COPD (chronic obstructive pulmonary disease) ([facility] GERD (gastroesophageal reflux disease), and Heart attack ([facility] Past Surgical History:  has a past surgical history that includes Anus surgery; hernia repair; and Colonoscopy (N/A, 7/30/2018). Assessment   Performance deficits / Impairments: Decreased functional mobility ; Decreased safe awareness;Decreased balance;Decreased cognition;Decreased ADL status; Decreased endurance;Decreased strength;Decreased fine motor control  Assessment: Skilled OT services are indicated at this time to maximize this pt's safety and IND with self care tasks, prevent further decline in function, and to facilitate safe return to [facility] as able. Prognosis: Fair;Good  Decision Making: Medium Complexity  REQUIRES OT FOLLOW-UP: Yes  Activity Tolerance  Activity Tolerance: Patient limited by fatigue;Treatment limited secondary to medical complications (free text)  Activity Tolerance Comments: Activity limited by pt's decreased aerobic capacity and SpO2 stats ~89-91% throughout session.         Plan   Occupational Therapy Plan  Times Per Week: 4-5x/week 1x/day as tolerated  Current Treatment Recommendations: Strengthening, Balance training, Functional mobility training, Endurance training, Safety education & training, Cognitive reorientation, Patient/Caregiver education & training, Self-Care / ADL, Cognitive/Perceptual training, Equipment evaluation, education, & procurement     Restrictions  Restrictions/Precautions  Restrictions/Precautions: Up as Tolerated, General Precautions, Isolation  Required Braces or Orthoses?: No  Position Activity Restriction  Other position/activity restrictions: Up as tolerated, telemetry, 5L O2 via NC, R wrist IV, Droplet+ precautions d/t COVID+    Subjective   General  Chart Reviewed: Yes  Patient assessed for rehabilitation services?: Yes  Family / Caregiver Present: No  Subjective  Subjective: AMARI Rodgers reports pt medically appropriate for participation in therapy, states pt's SpO2 is better once seated upright vs supine. Pt supine upon entry to room, cooperative w/ therapy evaluation. SpO2 resting at 88% upon entry, w/ pt requesting to sit at EOB. Once at EOB, pt's Spo2 reading between 90-91%.   Denies pain  Social/Functional History  Social/Functional History  Lives With: Alone  Type of Home: Assisted living [facility])  Home Layout: One level  Home Access: Level entry  Bathroom Shower/Tub: Walk-in shower  Bathroom Toilet: Standard (w/ HRs)  Bathroom Equipment: Shower chair, Built-in shower seat, Grab bars in [address]:  (Pt reports he does not own any DME)  Has the patient had two or more falls in the past year or any fall with injury in the past year?: No (Pt denies any recent falls)  Receives Help From: Personal care attendant  ADL Assistance: Independent  Homemaking Assistance: Needs assistance (Facility provides meals and housekeeping)  Ambulation Assistance: Independent  Transfer Assistance: Independent  Active : No  Mode of Transportation: Cab  Occupation: Retired  Type of Occupation: Licensed clinical therapist  Leisure & Hobbies: Sketch, paint, play the [employer]       Objective           Observation/Palpation  Posture: Fair (Seated at [address])  Observation: Pt's SpO2 ~88% upon entry to room and in supine; upon sitting at EOB SpO2 increased to ~90/91% - cues provided throughout session for pt to engage in pursed lip breathing in order to maintain oxygen saturation w/ Fair return demo; Pt fatigued easily while sitting at EOB; on 5L O2 via NC  Safety Devices  Type of Devices: Gait belt;Left in chair;Chair alarm in place;Call light within reach;Nurse notified; Patient at risk for falls  Restraints  Restraints Initially in Place: No    Bed Mobility Training  Bed Mobility Training: Yes  Overall Level of Assistance: Stand-by assistance  Interventions: Safety awareness training;Verbal cues (Mod cues for pacing self, upright posture, pursed lip breathing tech, line awareness and placing feet on floor to form at stable WENDIE at EOB, all to increase overall safety.)  Rolling: Stand-by assistance  Supine to Sit: Stand-by assistance  Sit to Supine:  (Not observed, pt retired to bedside chair at session end)  Balance  Sitting: Intact (SBA - CGA while seated at EOB; pt tolerated ~10 mins)  Standing: With support (Yesika w/ no AD)  Transfer Training  Transfer Training: Yes  Overall Level of Assistance: Minimum assistance  Interventions: Safety awareness training;Verbal cues (Mod cues for upright posture, slow/controlled sit<>stand, squaring to transfer surface, and pursed lip breathing tech all to increase overall safety and reduce the risk of falls.)  Sit to Stand: Minimum assistance  Stand to Sit: Minimum assistance  Bed to Chair: Minimum assistance  Functional Mobility  Overall Level of Assistance: Minimum assistance (Yesika to complete mobility EOB>chair w/ no AD. Pt required cues for increased safety throughout, impulsive at times for mobility. Tolerance limited this date d/t low SpO2 stats.)  Interventions: Safety awareness training;Verbal cues  Assistive Device: Gait belt     AROM: Within functional limits  Strength: Generally decreased, functional (BUE ~4/5)  Coordination: Generally decreased, functional  Tone: Normal  Sensation: Intact (Pt denies any numbness/tingling)    ADL  Feeding: Setup  Grooming: Stand by assistance;Setup (Seated position)  UE Bathing: Minimal assistance;Setup  LE Bathing: Moderate assistance;Minimal assistance;Setup  UE Dressing: Minimal assistance  UE Dressing Skilled Clinical Factors:  To adjust gown throughout session for increased modesty  LE Dressing: Maximum assistance  LE Dressing Skilled Clinical Factors: To don socks while seated at EOB  Toileting: Moderate assistance  Additional Comments: Pt's participation in ADLs limited d/t generalized weakness, fatigue, decreased aerobic capacity, and decreased safety awareness. Pt's SpO2 in high 80s/low 90s throughout session (RN and RT aware) and pt demo'd Fair- return of pursed lip breathing tech. Pt's displayed Poor safety awarenss during session, attempting to sit down in chair before squaring to surface and attempting to stand multiple times before staff could prepare room and lines. Vision  Vision: Impaired  Vision Exceptions: Wears glasses for reading  Hearing  Hearing: Within functional limits    Cognition  Overall Cognitive Status: Exceptions  Arousal/Alertness: Appropriate responses to stimuli  Following Commands: Follows one step commands with repetition; Follows one step commands with increased time  Attention Span: Attends with cues to redirect  Memory: Decreased short term memory;Decreased recall of precautions;Decreased recall of recent events  Safety Judgement: Decreased awareness of need for assistance;Decreased awareness of need for safety  Problem Solving: Decreased awareness of errors;Assistance required to identify errors made;Assistance required to generate solutions;Assistance required to correct errors made;Assistance required to implement solutions  Insights: Decreased awareness of deficits  Initiation: Does not require cues  Sequencing: Requires cues for some  Orientation  Overall Orientation Status: Impaired  Orientation Level: Disoriented to place;Oriented to person;Oriented to time;Disoriented to situation                  Education Given To: Patient  Education Provided: Role of Therapy;Transfer Training;Plan of Care;Energy Conservation;Precautions; Fall Prevention Strategies;Orientation; ADL Adaptive Strategies  Education Method: Verbal  Barriers to Learning: Cognition  Education Outcome: Verbalized understanding;Continued education needed             AM-PAC Score        AM-PAC Inpatient Daily Activity Raw Score: 16 (01/15/23 1555)  AM-PAC Inpatient ADL T-Scale Score : 35.96 (01/15/23 1555)  ADL Inpatient CMS 0-100% Score: 53.32 (01/15/23 1555)  ADL Inpatient CMS G-Code Modifier : CK (01/15/23 1555)      Goals  Short Term Goals  Time Frame for Short Term Goals: By discharge, pt to demo:  Short Term Goal 1: ADL transfers and functional mobility tasks with Good safety and SBA with use of AD as needed. Short Term Goal 2: UB ADLs to SBA and LB ADLs to MinAx1 with Good safety and use of AE/compensatory strategies/AD as needed. Short Term Goal 3: increased BUE strength by 1/2 grade to promote strength/ROM required for safety and IND with self care tasks. Short Term Goal 4: increased standing tolerance to > 8 mins w/ Good safety and use of AD as needed in order to promote standing balance and reduce the risk of falls. Short Term Goal 5: toileting tasks to SBA with Good safety and use of AD/grab bars/BSC as needed. Long Term Goals  Long Term Goal 1: Pt to be IND with fall prevention strategies, EC/WS tech, covid-19 education, COPD & smoking cessation education, discharge/equipment recommendations, and a BUE HEP with use of handouts as needed. Patient Goals   Patient goals : To go home!        Therapy Time   Individual Concurrent Group Co-treatment   Time In 8800         Time Out 1219 (10 mins added for chart review & RN coordination)         Minutes 38         Tx Time: 23 minutes       Devon Tripathi OT

## 2023-01-15 NOTE — RT PROTOCOL NOTE
RT Nebulizer Bronchodilator Protocol Note    There is a bronchodilator order in the chart from a provider indicating to follow the RT Bronchodilator Protocol and there is an Initiate RT Bronchodilator Protocol order as well (see protocol at bottom of note). CXR Findings:  XR CHEST PORTABLE    Result Date: 1/13/2023  Mild to moderate COPD but no acute cardiopulmonary abnormality evident. Possible soft tissue mass at the lateral left lower chest wall. Correlate clinically. The findings from the last RT Protocol Assessment were as follows:  Smoking: Chronic pulmonary disease  Respiratory Pattern: Mild dyspnea at rest, irregular pattern, or RR 21-25 bpm  Breath Sounds: Inspiratory and expiratory or bilateral wheezing and/or rhonchi  Cough: Strong, spontaneous, non-productive  Indication for Bronchodilator Therapy: Decreased or absent breath sounds  Bronchodilator Assessment Score: 12    Aerosolized bronchodilator medication orders have been revised according to the RT Nebulizer Bronchodilator Protocol below. Respiratory Therapist to perform RT Therapy Protocol Assessment initially then follow the protocol. Repeat RT Therapy Protocol Assessment PRN for score 0-3 or on second treatment, BID, and PRN for scores above 3. No Indications - adjust the frequency to every 6 hours PRN wheezing or bronchospasm, if no treatments needed after 48 hours then discontinue using Per Protocol order mode. If indication present, adjust the RT bronchodilator orders based on the Bronchodilator Assessment Score as indicated below. If a patient is on this medication at home then do not decrease Frequency below that used at home. 0-3 - enter or revise RT bronchodilator order(s) to equivalent RT Bronchodilator order with Frequency of every 4 hours PRN for wheezing or increased work of breathing using Per Protocol order mode.        4-6 - enter or revise RT Bronchodilator order(s) to two equivalent RT bronchodilator orders with one order with BID Frequency and one order with Frequency of every 4 hours PRN wheezing or increased work of breathing using Per Protocol order mode. 7-10 - enter or revise RT Bronchodilator order(s) to two equivalent RT bronchodilator orders with one order with TID Frequency and one order with Frequency of every 4 hours PRN wheezing or increased work of breathing using Per Protocol order mode. 11-13 - enter or revise RT Bronchodilator order(s) to one equivalent RT bronchodilator order with QID Frequency and an Albuterol order with Frequency of every 4 hours PRN wheezing or increased work of breathing using Per Protocol order mode. Greater than 13 - enter or revise RT Bronchodilator order(s) to one equivalent RT bronchodilator order with every 4 hours Frequency and an Albuterol order with Frequency of every 2 hours PRN wheezing or increased work of breathing using Per Protocol order mode. RT to enter RT Home Evaluation for COPD & MDI Assessment order using Per Protocol order mode.     Electronically signed by Cinda Santos RCP on 1/14/2023 at 9:00 PM

## 2023-01-15 NOTE — RT PROTOCOL NOTE
RT Inhaler-Nebulizer Bronchodilator Protocol Note    There is a bronchodilator order in the chart from a provider indicating to follow the RT Bronchodilator Protocol and there is an Initiate RT Inhaler-Nebulizer Bronchodilator Protocol order as well (see protocol at bottom of note). CXR Findings:  No results found. The findings from the last RT Protocol Assessment were as follows:   History Pulmonary Disease: Chronic pulmonary disease  Respiratory Pattern: Mild dyspnea at rest, irregular pattern, or RR 21-25 bpm  Breath Sounds: Inspiratory and expiratory or bilateral wheezing and/or rhonchi  Cough: Strong, spontaneous, non-productive  Indication for Bronchodilator Therapy: Decreased or absent breath sounds, Wheezing associated with pulm disorder  Bronchodilator Assessment Score: 12    Aerosolized bronchodilator medication orders have been revised according to the RT Inhaler-Nebulizer Bronchodilator Protocol below. Respiratory Therapist to perform RT Therapy Protocol Assessment initially then follow the protocol. Repeat RT Therapy Protocol Assessment PRN for score 0-3 or on second treatment, BID, and PRN for scores above 3. No Indications - adjust the frequency to every 6 hours PRN wheezing or bronchospasm, if no treatments needed after 48 hours then discontinue using Per Protocol order mode. If indication present, adjust the RT bronchodilator orders based on the Bronchodilator Assessment Score as indicated below. Use Inhaler orders unless patient has one or more of the following: on home nebulizer, not able to hold breath for 10 seconds, is not alert and oriented, cannot activate and use MDI correctly, or respiratory rate 25 breaths per minute or more, then use the equivalent nebulizer order(s) with same Frequency and PRN reasons based on the score. If a patient is on this medication at home then do not decrease Frequency below that used at home.     0-3 - enter or revise RT bronchodilator order(s) to equivalent RT Bronchodilator order with Frequency of every 4 hours PRN for wheezing or increased work of breathing using Per Protocol order mode. 4-6 - enter or revise RT Bronchodilator order(s) to two equivalent RT bronchodilator orders with one order with BID Frequency and one order with Frequency of every 4 hours PRN wheezing or increased work of breathing using Per Protocol order mode. 7-10 - enter or revise RT Bronchodilator order(s) to two equivalent RT bronchodilator orders with one order with TID Frequency and one order with Frequency of every 4 hours PRN wheezing or increased work of breathing using Per Protocol order mode. 11-13 - enter or revise RT Bronchodilator order(s) to one equivalent RT bronchodilator order with QID Frequency and an Albuterol order with Frequency of every 4 hours PRN wheezing or increased work of breathing using Per Protocol order mode. Greater than 13 - enter or revise RT Bronchodilator order(s) to one equivalent RT bronchodilator order with every 4 hours Frequency and an Albuterol order with Frequency of every 2 hours PRN wheezing or increased work of breathing using Per Protocol order mode. RT to enter RT Home Evaluation for COPD & MDI Assessment order using Per Protocol order mode.     Electronically signed by Joselyn Halsted, RCP on 1/15/2023 at 8:07 AM

## 2023-01-15 NOTE — PLAN OF CARE
Problem: Respiratory - Adult  Goal: Achieves optimal ventilation and oxygenation  1/15/2023 1813 by Arnie Ambrocio RCP  Outcome: Progressing

## 2023-01-15 NOTE — PROGRESS NOTES
Physical Therapy  Facility/Department: [facility] PROGRESSIVE CARE  Physical Therapy Initial Assessment    Name: Elizabeth Brandt  : 1945  MRN: [id number]  Date of Service: 1/15/2023    Discharge Recommendations:  Patient would benefit from continued therapy after discharge          Patient Diagnosis(es): The primary encounter diagnosis was COVID-19. A diagnosis of Elevated troponin was also pertinent to this visit. Past Medical History:  has a past medical history of Back pain, Bipolar 1 disorder ([facility] [address] Cancer ([facility] GERD (gastroesophageal reflux disease), and Heart attack ([facility] Past Surgical History:  has a past surgical history that includes Anus surgery; hernia repair; and Colonoscopy (N/A, 2018). Assessment   Body Structures, Functions, Activity Limitations Requiring Skilled Therapeutic Intervention: Decreased endurance  Assessment: Pt limited by SOB and poor activity tolerance  Therapy Prognosis: Good  Decision Making: High Complexity  Requires PT Follow-Up: Yes  Activity Tolerance  Activity Tolerance: Patient limited by endurance; Patient limited by fatigue     Plan   Physcial Therapy Plan  General Plan: 5-7 times per week  Current Treatment Recommendations: Endurance training, Gait training, Transfer training  Safety Devices  Type of Devices: Gait belt, Left in chair, Chair alarm in place, Call light within reach, Nurse notified  Restraints  Restraints Initially in Place: No     Restrictions  Restrictions/Precautions  Restrictions/Precautions: General Precautions, Contact Precautions, Isolation  Required Braces or Orthoses?: No     Subjective   Pain: Denies pain  General  Chart Reviewed: Yes  Patient assessed for rehabilitation services?: Yes  Response To Previous Treatment: Not applicable  Family / Caregiver Present: No  Follows Commands: Within Functional Limits  General Comment  Comments: OK for PT per Selma Broderick RN, pts SpO2 is better sitting than supine per Chai Bee RN         Social/Functional History  Social/Functional History  Lives With: Alone  Type of Home: Assisted living [facility])  Home Layout: One level  Home Access: Level entry  Bathroom Shower/Tub: Walk-in shower  Bathroom Toilet: Standard (w/ HRs)  Bathroom Equipment: Shower chair, Built-in shower seat, Grab bars in [address]:  (Pt reports he does not own any DME)  Has the patient had two or more falls in the past year or any fall with injury in the past year?: No (Pt denies any recent falls)  Receives Help From: Personal care attendant  ADL Assistance: Independent  Homemaking Assistance: Needs assistance (Facility provides meals and housekeeping)  Ambulation Assistance: Independent  Transfer Assistance: Independent  Active : No  Mode of Transportation: Cab  Occupation: Retired  Type of Occupation: Licensed clinical therapist  Leisure & Hobbies: Sketch, paint, play the [employer]  Vision/Hearing       Cognition   Orientation  Overall Orientation Status: Within Normal Limits  Orientation Level: Oriented X4  Cognition  Overall Cognitive Status: WNL     Objective   Heart Rate: 65  Heart Rate Source: Monitor  BP: 122/74  BP Location: Right upper arm  Patient Position: Supine  MAP (Calculated): 90  Resp: 22  SpO2: 93 %  O2 Device: Nasal cannula     Observation/Palpation  Posture: Fair  Gross Assessment  AROM: Within functional limits  Strength:  Within functional limits  Coordination: Within functional limits  Tone: Normal  Sensation: Intact        Strength RLE  Strength RLE: WFL  Comment: 5/5 GABRIELA LE's  Strength LLE  Strength LLE: WFL  Strength RUE  Comment: See OT eval for B UE MMT        Balance  Sitting: Intact  Standing: Intact  Bed mobility  Rolling to Right: Modified independent  Sit to Supine: Modified independent  Scooting: Modified independent  Transfers  Sit to Stand: Contact guard assistance  Stand to Sit: Contact guard assistance  Stand Pivot Transfers: Contact guard assistance  Ambulation  Surface: Level tile  Device: No Device  Other Apparatus: O2 (5L)  Assistance: Contact guard assistance  Distance: 3 steps to chair  Comments: SpO2 88% supine/ 88% sitting then 91-92% s/p 30 seconds sitting     Balance  Posture: Good  Sitting - Static: Good  Sitting - Dynamic: Good  Standing - Static: Good  Standing - Dynamic: Good           OutComes Score                                                  AM-PAC Score  AM-PAC Inpatient Mobility Raw Score : 17 (01/15/23 1435)  AM-PAC Inpatient T-Scale Score : 42.13 (01/15/23 1435)  Mobility Inpatient CMS 0-100% Score: 50.57 (01/15/23 1435)  Mobility Inpatient CMS G-Code Modifier : CK (01/15/23 1435)          Tinneti Score       Goals  Short Term Goals  Time Frame for Short Term Goals: 12 treatments  Short Term Goal 1: Independent transfers  Short Term Goal 2:  Independent ambulation 100' x 1 w/ SpO2 >92%  Short Term Goal 3: Tolerate 30 min ther act  Patient Goals   Patient Goals : Breathe better       Education  Patient Education  Education Given To: Patient  Education Provided: Role of Therapy;Plan of Care;Energy Conservation  Education Provided Comments: Energy conservation handout  Education Method: Verbal;Printed Information/Hand-outs  Barriers to Learning: None  Education Outcome: Verbalized understanding      Therapy Time   Individual Concurrent Group Co-treatment   Time In [phone] (Treatment time 30 minutes)         Time Out 8624         Minutes [address] [place] [address], [place]

## 2023-01-16 ENCOUNTER — APPOINTMENT (OUTPATIENT)
Dept: GENERAL RADIOLOGY | Age: 78
End: 2023-01-16
Payer: MEDICARE

## 2023-01-16 PROCEDURE — 99232 SBSQ HOSP IP/OBS MODERATE 35: CPT | Performed by: NURSE PRACTITIONER

## 2023-01-16 PROCEDURE — 6370000000 HC RX 637 (ALT 250 FOR IP): Performed by: NURSE PRACTITIONER

## 2023-01-16 PROCEDURE — 71045 X-RAY EXAM CHEST 1 VIEW: CPT

## 2023-01-16 PROCEDURE — 6370000000 HC RX 637 (ALT 250 FOR IP): Performed by: INTERNAL MEDICINE

## 2023-01-16 PROCEDURE — 94761 N-INVAS EAR/PLS OXIMETRY MLT: CPT

## 2023-01-16 PROCEDURE — 6370000000 HC RX 637 (ALT 250 FOR IP): Performed by: STUDENT IN AN ORGANIZED HEALTH CARE EDUCATION/TRAINING PROGRAM

## 2023-01-16 PROCEDURE — 2060000000 HC ICU INTERMEDIATE R&B

## 2023-01-16 PROCEDURE — 6360000002 HC RX W HCPCS: Performed by: FAMILY MEDICINE

## 2023-01-16 PROCEDURE — 2700000000 HC OXYGEN THERAPY PER DAY

## 2023-01-16 PROCEDURE — 94640 AIRWAY INHALATION TREATMENT: CPT

## 2023-01-16 PROCEDURE — 2580000003 HC RX 258: Performed by: NURSE PRACTITIONER

## 2023-01-16 RX ADMIN — QUETIAPINE FUMARATE 200 MG: 200 TABLET ORAL at 20:15

## 2023-01-16 RX ADMIN — BUDESONIDE AND FORMOTEROL FUMARATE DIHYDRATE 2 PUFF: 80; 4.5 AEROSOL RESPIRATORY (INHALATION) at 05:56

## 2023-01-16 RX ADMIN — LAMOTRIGINE 100 MG: 100 TABLET ORAL at 09:20

## 2023-01-16 RX ADMIN — METHYLPREDNISOLONE SODIUM SUCCINATE 40 MG: 40 INJECTION, POWDER, FOR SOLUTION INTRAMUSCULAR; INTRAVENOUS at 20:15

## 2023-01-16 RX ADMIN — SODIUM CHLORIDE, PRESERVATIVE FREE 10 ML: 5 INJECTION INTRAVENOUS at 09:23

## 2023-01-16 RX ADMIN — METHYLPREDNISOLONE SODIUM SUCCINATE 40 MG: 40 INJECTION, POWDER, FOR SOLUTION INTRAMUSCULAR; INTRAVENOUS at 01:54

## 2023-01-16 RX ADMIN — ASPIRIN 81 MG: 81 TABLET, CHEWABLE ORAL at 09:20

## 2023-01-16 RX ADMIN — METHYLPREDNISOLONE SODIUM SUCCINATE 40 MG: 40 INJECTION, POWDER, FOR SOLUTION INTRAMUSCULAR; INTRAVENOUS at 15:13

## 2023-01-16 RX ADMIN — METHYLPREDNISOLONE SODIUM SUCCINATE 40 MG: 40 INJECTION, POWDER, FOR SOLUTION INTRAMUSCULAR; INTRAVENOUS at 09:43

## 2023-01-16 RX ADMIN — IPRATROPIUM BROMIDE AND ALBUTEROL SULFATE 1 AMPULE: 2.5; .5 SOLUTION RESPIRATORY (INHALATION) at 10:42

## 2023-01-16 RX ADMIN — DIVALPROEX SODIUM 1000 MG: 500 TABLET, EXTENDED RELEASE ORAL at 09:21

## 2023-01-16 RX ADMIN — IPRATROPIUM BROMIDE AND ALBUTEROL SULFATE 1 AMPULE: 2.5; .5 SOLUTION RESPIRATORY (INHALATION) at 05:56

## 2023-01-16 RX ADMIN — QUETIAPINE FUMARATE 200 MG: 200 TABLET ORAL at 09:21

## 2023-01-16 RX ADMIN — METOPROLOL SUCCINATE 25 MG: 25 TABLET, EXTENDED RELEASE ORAL at 09:20

## 2023-01-16 RX ADMIN — PANTOPRAZOLE SODIUM 40 MG: 40 TABLET, DELAYED RELEASE ORAL at 06:06

## 2023-01-16 RX ADMIN — QUETIAPINE FUMARATE 150 MG: 150 TABLET, EXTENDED RELEASE ORAL at 20:15

## 2023-01-16 RX ADMIN — IPRATROPIUM BROMIDE AND ALBUTEROL SULFATE 1 AMPULE: 2.5; .5 SOLUTION RESPIRATORY (INHALATION) at 18:03

## 2023-01-16 RX ADMIN — ENOXAPARIN SODIUM 90 MG: 100 INJECTION SUBCUTANEOUS at 09:21

## 2023-01-16 RX ADMIN — IPRATROPIUM BROMIDE AND ALBUTEROL SULFATE 1 AMPULE: 2.5; .5 SOLUTION RESPIRATORY (INHALATION) at 14:19

## 2023-01-16 RX ADMIN — GUAIFENESIN AND DEXTROMETHORPHAN 5 ML: 100; 10 SYRUP ORAL at 20:15

## 2023-01-16 RX ADMIN — BUDESONIDE AND FORMOTEROL FUMARATE DIHYDRATE 2 PUFF: 80; 4.5 AEROSOL RESPIRATORY (INHALATION) at 18:04

## 2023-01-16 RX ADMIN — SODIUM CHLORIDE, PRESERVATIVE FREE 10 ML: 5 INJECTION INTRAVENOUS at 20:23

## 2023-01-16 RX ADMIN — ENOXAPARIN SODIUM 90 MG: 100 INJECTION SUBCUTANEOUS at 20:15

## 2023-01-16 ASSESSMENT — ENCOUNTER SYMPTOMS
COUGH: 1
PHOTOPHOBIA: 0
SINUS PRESSURE: 0
ABDOMINAL DISTENTION: 0
NAUSEA: 0
SHORTNESS OF BREATH: 1
SINUS PAIN: 0
ABDOMINAL PAIN: 0
COLOR CHANGE: 0

## 2023-01-16 NOTE — PLAN OF CARE
Problem: Respiratory - Adult  Goal: Achieves optimal ventilation and oxygenation  1/16/2023 1046 by Arianne Johnston RCP  Outcome: Progressing

## 2023-01-16 NOTE — PROGRESS NOTES
Dear Provider,    Your patient's proton pump inhibitor (PPI) therapy was evaluated and was subsequently discontinued per [facility] approved policy. Because of the risk/benefit profile of these medications for our patients, a policy was developed to minimize the use of PPI medications in patients who do not appear to be high risk for clinically significant ulceration or bleeding. Was ordered from home med list and after Med Rec review was found to have not been on this med at home. Many clinicians feel that the indications for Stress Ulcer Prophylaxis are as follows:  Coagulopathy (defined as platelet count <69,120 per m3, INR>1.5, or PTT>2 times the control value)  Mechanical ventilation for >48 hours  History of GI ulceration or bleeding within the past year  Traumatic brain injury, traumatic spinal cord injury, or burn injury  Two or more of the following minor criteria:  sepsis, ICU stay>1 week, occult GI bleeding for >6 days, or glucocorticoid therapy (more than 250mg hydrocortisone or the equivalent     Proton pump inhibitor (PPI) usage has been shown to be associated with nosocomial Clostridium difficile infection (CDI). The duration of PPI therapy is significantly associated with CDI. The probability for CDI appears higher when PPI use exceeded two (2) days in patients without a prior hospital admission and 1 day in patients with a prior admission. Additionally there may be an increased risk of pneumonia with the PPIs, although the data is far less consistent. If a patient is on PPI therapy and does not meet the above criteria (and is not excluded from review as defined by the policy),  PPI orders are discontinued per protocol. If this therapy was discontinued inappropriately and you feel that the order is necessary, we apologize for the inconvenience. Thank you.   Roselyn Venegas, [facility], RPluis fernando/PharmD  1/16/2023 2:49 PM

## 2023-01-16 NOTE — RT PROTOCOL NOTE
RT Nebulizer Bronchodilator Protocol Note    There is a bronchodilator order in the chart from a provider indicating to follow the RT Bronchodilator Protocol and there is an Initiate RT Bronchodilator Protocol order as well (see protocol at bottom of note). CXR Findings:  No results found. The findings from the last RT Protocol Assessment were as follows:  Smoking: Chronic pulmonary disease  Respiratory Pattern: Mild dyspnea at rest, irregular pattern, or RR 21-25 bpm  Breath Sounds: Inspiratory and expiratory or bilateral wheezing and/or rhonchi  Cough: Strong, spontaneous, non-productive  Indication for Bronchodilator Therapy: Decreased or absent breath sounds, Wheezing associated with pulm disorder  Bronchodilator Assessment Score: 12    Aerosolized bronchodilator medication orders have been revised according to the RT Nebulizer Bronchodilator Protocol below. Respiratory Therapist to perform RT Therapy Protocol Assessment initially then follow the protocol. Repeat RT Therapy Protocol Assessment PRN for score 0-3 or on second treatment, BID, and PRN for scores above 3. No Indications - adjust the frequency to every 6 hours PRN wheezing or bronchospasm, if no treatments needed after 48 hours then discontinue using Per Protocol order mode. If indication present, adjust the RT bronchodilator orders based on the Bronchodilator Assessment Score as indicated below. If a patient is on this medication at home then do not decrease Frequency below that used at home. 0-3 - enter or revise RT bronchodilator order(s) to equivalent RT Bronchodilator order with Frequency of every 4 hours PRN for wheezing or increased work of breathing using Per Protocol order mode.        4-6 - enter or revise RT Bronchodilator order(s) to two equivalent RT bronchodilator orders with one order with BID Frequency and one order with Frequency of every 4 hours PRN wheezing or increased work of breathing using Per Protocol order mode. 7-10 - enter or revise RT Bronchodilator order(s) to two equivalent RT bronchodilator orders with one order with TID Frequency and one order with Frequency of every 4 hours PRN wheezing or increased work of breathing using Per Protocol order mode. 11-13 - enter or revise RT Bronchodilator order(s) to one equivalent RT bronchodilator order with QID Frequency and an Albuterol order with Frequency of every 4 hours PRN wheezing or increased work of breathing using Per Protocol order mode. Greater than 13 - enter or revise RT Bronchodilator order(s) to one equivalent RT bronchodilator order with every 4 hours Frequency and an Albuterol order with Frequency of every 2 hours PRN wheezing or increased work of breathing using Per Protocol order mode. RT to enter RT Home Evaluation for COPD & MDI Assessment order using Per Protocol order mode.     Electronically signed by Yasemin Cavanaugh RCP on 1/16/2023 at 5:58 AM

## 2023-01-16 NOTE — PLAN OF CARE
Problem: Respiratory - Adult  Goal: Achieves optimal ventilation and oxygenation  1/16/2023 1806 by Tanner Frances RCP  Outcome: Progressing

## 2023-01-16 NOTE — PROGRESS NOTES
Patient resting comfortably in bed. He has required 4L nasal cannula today and sats in the low 90s. Solumedrol and paxlovid given today.  VSS, call light within reach, safety maintained

## 2023-01-16 NOTE — PLAN OF CARE
Problem: Discharge Planning  Goal: Discharge to home or other facility with appropriate resources  Outcome: Progressing     Problem: Skin/Tissue Integrity  Goal: Absence of new skin breakdown  Description: 1. Monitor for areas of redness and/or skin breakdown  2. Assess vascular access sites hourly  3. Every 4-6 hours minimum:  Change oxygen saturation probe site  4. Every 4-6 hours:  If on nasal continuous positive airway pressure, respiratory therapy assess nares and determine need for appliance change or resting period.   1/16/2023 1305 by Rubia Parker RN  Outcome: Progressing     Problem: ABCDS Injury Assessment  Goal: Absence of physical injury  Outcome: Progressing     Problem: Safety - Adult  Goal: Free from fall injury  1/16/2023 1305 by Rubia Parker RN  Outcome: Progressing     Problem: Respiratory - Adult  Goal: Achieves optimal ventilation and oxygenation  1/16/2023 1305 by Rubia Parker RN  Outcome: Progressing     Problem: Infection - Adult  Goal: Absence of infection at discharge  1/16/2023 1305 by Rubia Parker RN  Outcome: Progressing     Problem: Pain  Goal: Verbalizes/displays adequate comfort level or baseline comfort level  1/16/2023 1305 by Rubia Parker RN  Outcome: Progressing

## 2023-01-16 NOTE — RT PROTOCOL NOTE
RT Nebulizer Bronchodilator Protocol Note    There is a bronchodilator order in the chart from a provider indicating to follow the RT Bronchodilator Protocol and there is an Initiate RT Bronchodilator Protocol order as well (see protocol at bottom of note). CXR Findings:  No results found. The findings from the last RT Protocol Assessment were as follows:  Smoking: Chronic pulmonary disease  Respiratory Pattern: Mild dyspnea at rest, irregular pattern, or RR 21-25 bpm  Breath Sounds: Inspiratory and expiratory or bilateral wheezing and/or rhonchi  Cough: Strong, spontaneous, non-productive  Indication for Bronchodilator Therapy: Decreased or absent breath sounds, Wheezing associated with pulm disorder  Bronchodilator Assessment Score: 12    Aerosolized bronchodilator medication orders have been revised according to the RT Nebulizer Bronchodilator Protocol below. Respiratory Therapist to perform RT Therapy Protocol Assessment initially then follow the protocol. Repeat RT Therapy Protocol Assessment PRN for score 0-3 or on second treatment, BID, and PRN for scores above 3. No Indications - adjust the frequency to every 6 hours PRN wheezing or bronchospasm, if no treatments needed after 48 hours then discontinue using Per Protocol order mode. If indication present, adjust the RT bronchodilator orders based on the Bronchodilator Assessment Score as indicated below. If a patient is on this medication at home then do not decrease Frequency below that used at home. 0-3 - enter or revise RT bronchodilator order(s) to equivalent RT Bronchodilator order with Frequency of every 4 hours PRN for wheezing or increased work of breathing using Per Protocol order mode.        4-6 - enter or revise RT Bronchodilator order(s) to two equivalent RT bronchodilator orders with one order with BID Frequency and one order with Frequency of every 4 hours PRN wheezing or increased work of breathing using Per Protocol order mode. 7-10 - enter or revise RT Bronchodilator order(s) to two equivalent RT bronchodilator orders with one order with TID Frequency and one order with Frequency of every 4 hours PRN wheezing or increased work of breathing using Per Protocol order mode. 11-13 - enter or revise RT Bronchodilator order(s) to one equivalent RT bronchodilator order with QID Frequency and an Albuterol order with Frequency of every 4 hours PRN wheezing or increased work of breathing using Per Protocol order mode. Greater than 13 - enter or revise RT Bronchodilator order(s) to one equivalent RT bronchodilator order with every 4 hours Frequency and an Albuterol order with Frequency of every 2 hours PRN wheezing or increased work of breathing using Per Protocol order mode. RT to enter RT Home Evaluation for COPD & MDI Assessment order using Per Protocol order mode.     Electronically signed by Liz Stewart RCP on 1/15/2023 at 9:12 PM

## 2023-01-16 NOTE — PROGRESS NOTES
[facility]  Office: [address], DO, Alex Funez, DO, Samiadriano Mayfield, DO, Sabrina Evakaren Richards, DO, Aubrey Lucas MD, Arlene Stweart MD, Katja Hinton MD, Reji Antunez MD,  Malcolm Rogers MD, Bertha Richard MD, Robin Wilson, DO, Randy Melvin MD,  Adi De Jesus MD, Jackson Feng MD, Floyd Olivares DO, Alberta Bradley MD, Suly Vicente MD, Cora Salas DO, Lawrence Bliss MD, Fela Christy MD, Sandy Barnes MD, John De MD, Gerber Tinajero DO, Wendy Hinton MD, Riky Rascon MD, Avtar Lawrence, CNP,  Sofia Lovett, CNP, Isadora Clement, CNP, Juwan Chase, CNP,  Lady Rodriguez, [facility], Ruchi Pedro, CNP, Emy Thakur, CNP, Roger Valle, CNP, Victor Hugo Madsen, CNP, Alexia Muñoz, CNP, Danae Henley PA-C, Valerie Power, CNS, Teresita Mack, CNP, Kaity Landon, CNP         [facility]    Progress Note    1/16/2023    11:22 AM    Name:   Jaylin Humphrey  MRN:     [id number]     Acct:      [de-identified]   Room:   [address] [place] [address] Day:  3  Admit Date:  1/13/2023  2:14 AM    PCP:   No primary care provider on file. Code Status:  Full Code    Subjective:     C/C:   Chief Complaint   Patient presents with    Fatigue     Pt was found on his knees outside of assisted living. Pt was outside smoking and slid out of a chair. Interval History Status: improved. Condition is improved. Patient reports far less fatigue and shortness of breath today. Patient's oxygen demand did increase overnight however he reports clinical improvement. Patient will remain on breathing treatments, steroids, antibiotics. Patient does have a productive cough and rhonchorous lung sounds in the right lower lobe. We will repeat chest x-ray today to evaluate for bacterial pneumonia.     Brief History:     1/13 - 78 yrs old with past medical history of COPD lives in assisted living facility presented to hospital with weakness, shortness of breath. Patient states that he had a usual morning routine, drank his coffee and was trying to get out of wheel chair noted that he was very weak and fatigued. he also had cough started yesterday with white phlegm, no chest pain, no fever, no chills. Patient also started feeling short of breath. No diarrhea. No sick contacts that patient knows of. Patient smokes 2 packs every day and he has copd diagnosis in chart but not on any treatment, no pft seen in the chart. In the ER patient was noted to be hypoxic, 88%, requiring 2 lt oxygen. He was afebrile. Electrolytes normal. Patient was seen to have elevated troponin of 34 which trended up to 51 and 47. Patient does not report any chest pain. D-dimer 1.63, COVID positive, x-ray does not show any acute abnormality. 1/14-1/16 -interval improvement each day. Patient remains on breathing treatments, antibiotics, steroids. Continues to require submental oxygen. Review of Systems:     Review of Systems   Constitutional:  Positive for activity change. Negative for fatigue and fever. HENT:  Negative for sinus pressure and sinus pain. Eyes:  Negative for photophobia and visual disturbance. Respiratory:  Positive for cough and shortness of breath. Cardiovascular:  Negative for chest pain, palpitations and leg swelling. Gastrointestinal:  Negative for abdominal distention, abdominal pain and nausea. Endocrine: Negative for polyphagia and polyuria. Genitourinary:  Negative for difficulty urinating, frequency and urgency. Musculoskeletal:  Negative for arthralgias and myalgias. Skin:  Negative for color change and rash. Neurological:  Negative for syncope and weakness. Hematological:  Negative for adenopathy. Does not bruise/bleed easily. Psychiatric/Behavioral:  Negative for agitation and confusion. Medications:      Allergies:  No Known Allergies    Current Meds:   Scheduled Meds:    methylPREDNISolone  40 mg IntraVENous Q6H    metoprolol succinate  25 mg Oral Daily    enoxaparin  1 mg/kg SubCUTAneous BID    vitamin D  50,000 Units Oral Once per day on Sat    divalproex  1,000 mg Oral Daily    sodium chloride flush  5-40 mL IntraVENous 2 times per day    QUEtiapine  150 mg Oral Nightly    ipratropium-albuterol  1 ampule Inhalation Q4H WA    aspirin  81 mg Oral Daily    nicotine  1 patch TransDERmal Daily    budesonide-formoterol  2 puff Inhalation BID    lamoTRIgine  100 mg Oral Daily    QUEtiapine  200 mg Oral BID    nirmatrelvir/ritonavir  3 tablet Oral Q12H    pantoprazole  40 mg Oral QAM AC     Continuous Infusions:    sodium chloride       PRN Meds: sodium chloride flush, sodium chloride, ondansetron **OR** ondansetron, polyethylene glycol, acetaminophen **OR** acetaminophen, guaiFENesin-dextromethorphan, aluminum & magnesium hydroxide-simethicone    Data:     Past Medical History:   has a past medical history of Back pain, Bipolar 1 disorder ([facility] Cancer ([facility] COPD (chronic obstructive pulmonary disease) ([facility] GERD (gastroesophageal reflux disease), and Heart attack ([facility] Social History:   reports that he has been smoking cigarettes and pipe. He has never used smokeless tobacco. He reports that he does not drink alcohol and does not use drugs. Family History:   Family History   Problem Relation Age of Onset    Cancer Father         Lung    Stroke Father     Cancer Brother         Lymphoma    Mental Illness Other         Aunt       Vitals:  /65   Pulse 58   Temp 98.2 °F (36.8 °C) (Oral)   Resp 18   Ht 5' 10\" (1.778 m)   Wt 206 lb (93.4 kg)   SpO2 93%   BMI 29.56 kg/m²   Temp (24hrs), Av.7 °F (36.5 °C), Min:97.3 °F (36.3 °C), Max:98.4 °F (36.9 °C)    No results for input(s): POCGLU in the last 72 hours. I/O (24Hr):     Intake/Output Summary (Last 24 hours) at 2023 1122  Last data filed at 2023 0600  Gross per 24 hour   Intake 240 ml   Output 1250 ml   Net -1010 ml Labs:  Hematology:  Recent Labs     01/14/23 0531 01/15/23  0521   WBC 7.2  --    RBC 4.69  --    HGB 14.3  --    HCT 45.4  --    MCV 96.8  --    MCH 30.5  --    MCHC 31.5  --    RDW 14.0  --      --    MPV 9.2  --    CRP 6.7* 3.3   DDIMER 1.20* 0.94*     Chemistry:  Recent Labs     01/13/23  1134 01/13/23  1536 01/14/23  0531   NA  --   --  144   K  --   --  4.3   CL  --   --  106   CO2  --   --  29   GLUCOSE  --   --  97   BUN  --   --  20   CREATININE  --   --  0.87   ANIONGAP  --   --  9   LABGLOM  --   --  >60   CALCIUM  --   --  8.9   TROPHS 40* 31*  --      Recent Labs     01/14/23 0531 01/15/23  0521   PROT 6.6  --    LABALBU 3.4*  --    AST 27  --    ALT 16  --    ALKPHOS 51  --    BILITOT 0.2*  --    HDL  --  44   LDLCHOLESTEROL  --  119   CHOLHDLRATIO  --  4.3     ABG:No results found for: POCPH, PHART, PH, POCPCO2, IQM3FWQ, PCO2, POCPO2, PO2ART, PO2, POCHCO3, QKW9YQO, HCO3, NBEA, PBEA, BEART, BE, THGBART, THB, KMM2JHK, VLRG9HCL, R4XEWFET, O2SAT, FIO2  No results found for: SPECIAL  No results found for: CULTURE    Radiology:  XR CHEST PORTABLE    Result Date: 1/13/2023  Mild to moderate COPD but no acute cardiopulmonary abnormality evident. Possible soft tissue mass at the lateral left lower chest wall. Correlate clinically. CT CHEST PULMONARY EMBOLISM W CONTRAST    Result Date: 1/14/2023  1. Negative for pulmonary embolus. 2. Emphysema with mild right basilar segmental atelectasis versus pneumonia. 3. Indeterminate 9 mm lingular nodule. RECOMMENDATIONS: 9 mm suspicious left solid pulmonary nodule. Consider a non-contrast Chest CT at 3 months, a PET/CT, or tissue sampling. These guidelines do not apply to immunocompromised patients and patients with cancer. Follow up in patients with significant comorbidities as clinically warranted. For lung cancer screening, adhere to Lung-RADS guidelines. Reference: Radiology. 2017; 284(1):228-43.        Physical Examination:        Physical Exam  Constitutional:       Appearance: Normal appearance. He is obese. HENT:      Head: Normocephalic and atraumatic. Nose: Nose normal.      Mouth/Throat:      Mouth: Mucous membranes are dry. Eyes:      Extraocular Movements: Extraocular movements intact. Pupils: Pupils are equal, round, and reactive to light. Cardiovascular:      Rate and Rhythm: Normal rate and regular rhythm. Pulses: Normal pulses. Heart sounds: Normal heart sounds. Pulmonary:      Effort: No tachypnea or bradypnea. Breath sounds: Decreased air movement present. Examination of the right-middle field reveals rhonchi. Examination of the left-middle field reveals decreased breath sounds. Examination of the right-lower field reveals decreased breath sounds and rhonchi. Examination of the left-lower field reveals decreased breath sounds. Decreased breath sounds and rhonchi present. Abdominal:      General: Abdomen is flat. Bowel sounds are normal.      Palpations: Abdomen is soft. Musculoskeletal:         General: No swelling, tenderness or deformity. Normal range of motion. Cervical back: Normal range of motion and neck supple. No rigidity or tenderness. Neurological:      General: No focal deficit present. Mental Status: He is alert and oriented to person, place, and time.    Psychiatric:         Mood and Affect: Mood normal.         Behavior: Behavior normal.       Assessment:        Hospital Problems             Last Modified POA    * (Principal) COVID 1/13/2023 Yes    Acute respiratory failure with hypoxia (HCC) 1/13/2023 Yes    COPD exacerbation ([address] 1/13/2023 Yes    Elevated troponin 1/13/2023 Yes    Tobacco abuse 1/13/2023 Yes       Plan:        Acute respiratory failure with hypoxia secondary to COVID-19 as well as COPD exacerbation with current tobacco abuse  Continue IV steroids, plan to transition to oral taper regimen at discharge  Continue submental oxygen, incentive spirometer, cough deep breathe, pulmonary hygiene  Scheduled and as needed breathing treatments  Continue antibiotics to prevent opportunistic bacterial infection  Repeat chest x-ray today  Nicotine patch and education on the need for tobacco abstinence.       ALEXSANDRA Patel NP  1/16/2023  11:22 AM

## 2023-01-16 NOTE — CARE COORDINATION
Discharge planning    Patient chart reviewed. Patient lives at [address] [place] [address]. He is able to walk short distences. Daughter assisting with decisions . Therapy is recommending snf but continue to assess. He is on 4 L 02 which he does not wear at the [place]. Admitted with acute respiratory failure. COPD. COVID. Will discuss with ss to follow up with daughter and patient regarding need for potential snf. Would be precert. If back to [place]. Will need home 02 evaluation, home care and transportation back to [facility].

## 2023-01-16 NOTE — PLAN OF CARE
Nitza Mckeon is resting well this shift with no s/s or c/o pain. He is maintaining saturations in high 80s/low 90s on 5L via nc. Education provided on positioning for optimal oxygenation, but patient likes to sleep on his side and will not elevate HOB. His saturations are better when he is alert and sitting up in bed or in chair. He exhibits SOB with activity and saturations generally drop to low/mid 80s. He has been standing at bedside to void this evening instead of walking to the bathroom to conserve energy. He is afebrile on ABT therapy for COPD exacerbation with no s/s of adverse effects r/t therapy. He is generally calm and cooperative with care. He has call light in reach and bed alarm active; safety maintained. \    Problem: Safety - Adult  Goal: Free from fall injury  1/16/2023 0506 by Janie CASTILLO (Taken 1/16/2023 0506)  Free From Fall Injury: Instruct family/caregiver on patient safety  Outcome: Progressing     Problem: Respiratory - Adult  Goal: Achieves optimal ventilation and oxygenation  1/16/2023 0505 by Millisandroa Alert  Outcome: Progressing  Flowsheets (Taken 1/15/2023 2003)  Achieves optimal ventilation and oxygenation:   Assess for changes in respiratory status   Assess for changes in mentation and behavior   Position to facilitate oxygenation and minimize respiratory effort   Oxygen supplementation based on oxygen saturation or arterial blood gases   Respiratory therapy support as indicated     Problem: Infection - Adult  Goal: Absence of infection at discharge  Outcome: Progressing  Flowsheets (Taken 1/15/2023 2003)  Absence of infection at discharge:   Assess and monitor for signs and symptoms of infection   Monitor lab/diagnostic results   Monitor all insertion sites i.e., indwelling lines, tubes and drains     Problem: Pain  Goal: Verbalizes/displays adequate comfort level or baseline comfort level  1/16/2023 0507 by Milinda Alert  Flowsheets (Taken 1/16/2023 0507)  Verbalizes/displays adequate comfort level or baseline comfort level:   Encourage patient to monitor pain and request assistance   Assess pain using appropriate pain scale  Outcome: Progressing

## 2023-01-17 LAB
ABSOLUTE EOS #: <0.03 K/UL (ref 0–0.44)
ABSOLUTE IMMATURE GRANULOCYTE: 0.05 K/UL (ref 0–0.3)
ABSOLUTE LYMPH #: 1.41 K/UL (ref 1.1–3.7)
ABSOLUTE MONO #: 0.46 K/UL (ref 0.1–1.2)
ANION GAP SERPL CALCULATED.3IONS-SCNC: 10 MMOL/L (ref 9–17)
BASOPHILS # BLD: 0 % (ref 0–2)
BASOPHILS ABSOLUTE: <0.03 K/UL (ref 0–0.2)
BUN BLDV-MCNC: 24 MG/DL (ref 8–23)
BUN/CREAT BLD: 29 (ref 9–20)
CALCIUM SERPL-MCNC: 9.2 MG/DL (ref 8.6–10.4)
CHLORIDE BLD-SCNC: 103 MMOL/L (ref 98–107)
CO2: 29 MMOL/L (ref 20–31)
CREAT SERPL-MCNC: 0.82 MG/DL (ref 0.7–1.2)
EOSINOPHILS RELATIVE PERCENT: 0 % (ref 1–4)
GFR SERPL CREATININE-BSD FRML MDRD: >60 ML/MIN/1.73M2
GLUCOSE BLD-MCNC: 122 MG/DL (ref 70–99)
HCT VFR BLD CALC: 50.6 % (ref 40.7–50.3)
HEMOGLOBIN: 16 G/DL (ref 13–17)
IMMATURE GRANULOCYTES: 0 %
LYMPHOCYTES # BLD: 11 % (ref 24–43)
MCH RBC QN AUTO: 30.8 PG (ref 25.2–33.5)
MCHC RBC AUTO-ENTMCNC: 31.6 G/DL (ref 28.4–34.8)
MCV RBC AUTO: 97.3 FL (ref 82.6–102.9)
MONOCYTES # BLD: 3 % (ref 3–12)
NRBC AUTOMATED: 0 PER 100 WBC
PDW BLD-RTO: 14.2 % (ref 11.8–14.4)
PLATELET # BLD: 203 K/UL (ref 138–453)
PMV BLD AUTO: 10.3 FL (ref 8.1–13.5)
POTASSIUM SERPL-SCNC: 4.5 MMOL/L (ref 3.7–5.3)
RBC # BLD: 5.2 M/UL (ref 4.21–5.77)
SEG NEUTROPHILS: 86 % (ref 36–65)
SEGMENTED NEUTROPHILS ABSOLUTE COUNT: 11.51 K/UL (ref 1.5–8.1)
SODIUM BLD-SCNC: 142 MMOL/L (ref 135–144)
WBC # BLD: 13.4 K/UL (ref 3.5–11.3)

## 2023-01-17 PROCEDURE — 6360000002 HC RX W HCPCS: Performed by: FAMILY MEDICINE

## 2023-01-17 PROCEDURE — 6370000000 HC RX 637 (ALT 250 FOR IP): Performed by: STUDENT IN AN ORGANIZED HEALTH CARE EDUCATION/TRAINING PROGRAM

## 2023-01-17 PROCEDURE — 97535 SELF CARE MNGMENT TRAINING: CPT

## 2023-01-17 PROCEDURE — 6370000000 HC RX 637 (ALT 250 FOR IP): Performed by: NURSE PRACTITIONER

## 2023-01-17 PROCEDURE — 2700000000 HC OXYGEN THERAPY PER DAY

## 2023-01-17 PROCEDURE — 94640 AIRWAY INHALATION TREATMENT: CPT

## 2023-01-17 PROCEDURE — 94761 N-INVAS EAR/PLS OXIMETRY MLT: CPT

## 2023-01-17 PROCEDURE — 2060000000 HC ICU INTERMEDIATE R&B

## 2023-01-17 PROCEDURE — 2580000003 HC RX 258: Performed by: NURSE PRACTITIONER

## 2023-01-17 PROCEDURE — 97530 THERAPEUTIC ACTIVITIES: CPT

## 2023-01-17 PROCEDURE — 85025 COMPLETE CBC W/AUTO DIFF WBC: CPT

## 2023-01-17 PROCEDURE — 36415 COLL VENOUS BLD VENIPUNCTURE: CPT

## 2023-01-17 PROCEDURE — 94669 MECHANICAL CHEST WALL OSCILL: CPT

## 2023-01-17 PROCEDURE — 6370000000 HC RX 637 (ALT 250 FOR IP): Performed by: INTERNAL MEDICINE

## 2023-01-17 PROCEDURE — 80048 BASIC METABOLIC PNL TOTAL CA: CPT

## 2023-01-17 PROCEDURE — 99233 SBSQ HOSP IP/OBS HIGH 50: CPT | Performed by: NURSE PRACTITIONER

## 2023-01-17 RX ORDER — ENOXAPARIN SODIUM 100 MG/ML
30 INJECTION SUBCUTANEOUS DAILY
Status: DISCONTINUED | OUTPATIENT
Start: 2023-01-18 | End: 2023-01-19

## 2023-01-17 RX ADMIN — MAGNESIUM HYDROXIDE/ALUMINUM HYDROXICE/SIMETHICONE 30 ML: 120; 1200; 1200 SUSPENSION ORAL at 20:54

## 2023-01-17 RX ADMIN — BUDESONIDE AND FORMOTEROL FUMARATE DIHYDRATE 2 PUFF: 80; 4.5 AEROSOL RESPIRATORY (INHALATION) at 20:33

## 2023-01-17 RX ADMIN — ACETAMINOPHEN 650 MG: 325 TABLET ORAL at 14:01

## 2023-01-17 RX ADMIN — LAMOTRIGINE 100 MG: 100 TABLET ORAL at 08:40

## 2023-01-17 RX ADMIN — SODIUM CHLORIDE, PRESERVATIVE FREE 10 ML: 5 INJECTION INTRAVENOUS at 08:42

## 2023-01-17 RX ADMIN — ASPIRIN 81 MG: 81 TABLET, CHEWABLE ORAL at 08:40

## 2023-01-17 RX ADMIN — IPRATROPIUM BROMIDE AND ALBUTEROL SULFATE 1 AMPULE: 2.5; .5 SOLUTION RESPIRATORY (INHALATION) at 10:31

## 2023-01-17 RX ADMIN — DIVALPROEX SODIUM 1000 MG: 500 TABLET, EXTENDED RELEASE ORAL at 08:40

## 2023-01-17 RX ADMIN — METHYLPREDNISOLONE SODIUM SUCCINATE 40 MG: 40 INJECTION, POWDER, FOR SOLUTION INTRAMUSCULAR; INTRAVENOUS at 20:54

## 2023-01-17 RX ADMIN — METHYLPREDNISOLONE SODIUM SUCCINATE 40 MG: 40 INJECTION, POWDER, FOR SOLUTION INTRAMUSCULAR; INTRAVENOUS at 14:01

## 2023-01-17 RX ADMIN — METHYLPREDNISOLONE SODIUM SUCCINATE 40 MG: 40 INJECTION, POWDER, FOR SOLUTION INTRAMUSCULAR; INTRAVENOUS at 02:09

## 2023-01-17 RX ADMIN — IPRATROPIUM BROMIDE AND ALBUTEROL SULFATE 1 AMPULE: 2.5; .5 SOLUTION RESPIRATORY (INHALATION) at 05:59

## 2023-01-17 RX ADMIN — IPRATROPIUM BROMIDE AND ALBUTEROL SULFATE 1 AMPULE: 2.5; .5 SOLUTION RESPIRATORY (INHALATION) at 14:28

## 2023-01-17 RX ADMIN — ENOXAPARIN SODIUM 90 MG: 100 INJECTION SUBCUTANEOUS at 08:40

## 2023-01-17 RX ADMIN — QUETIAPINE FUMARATE 150 MG: 150 TABLET, EXTENDED RELEASE ORAL at 20:54

## 2023-01-17 RX ADMIN — SODIUM CHLORIDE, PRESERVATIVE FREE 10 ML: 5 INJECTION INTRAVENOUS at 21:03

## 2023-01-17 RX ADMIN — IPRATROPIUM BROMIDE AND ALBUTEROL SULFATE 1 AMPULE: 2.5; .5 SOLUTION RESPIRATORY (INHALATION) at 20:33

## 2023-01-17 RX ADMIN — METHYLPREDNISOLONE SODIUM SUCCINATE 40 MG: 40 INJECTION, POWDER, FOR SOLUTION INTRAMUSCULAR; INTRAVENOUS at 08:40

## 2023-01-17 RX ADMIN — BUDESONIDE AND FORMOTEROL FUMARATE DIHYDRATE 2 PUFF: 80; 4.5 AEROSOL RESPIRATORY (INHALATION) at 05:59

## 2023-01-17 RX ADMIN — QUETIAPINE FUMARATE 200 MG: 200 TABLET ORAL at 08:40

## 2023-01-17 RX ADMIN — GUAIFENESIN AND DEXTROMETHORPHAN 5 ML: 100; 10 SYRUP ORAL at 20:54

## 2023-01-17 RX ADMIN — QUETIAPINE FUMARATE 200 MG: 200 TABLET ORAL at 20:54

## 2023-01-17 RX ADMIN — METOPROLOL SUCCINATE 25 MG: 25 TABLET, EXTENDED RELEASE ORAL at 08:38

## 2023-01-17 ASSESSMENT — PAIN DESCRIPTION - DESCRIPTORS: DESCRIPTORS: SORE

## 2023-01-17 ASSESSMENT — PAIN DESCRIPTION - LOCATION: LOCATION: NECK

## 2023-01-17 ASSESSMENT — PAIN SCALES - GENERAL
PAINLEVEL_OUTOF10: 0
PAINLEVEL_OUTOF10: 7

## 2023-01-17 NOTE — PROGRESS NOTES
[facility]  Office: [address], DO, Eliana Monson, DO, Sheila Dry, DO, Cora Rankinu Blood, DO, Oanh Mota MD, Mainor Bess MD, Jesse Mancera MD, Pete Mauricio MD,  Jw Honeycutt MD, Polina Mott MD, Cheri Thomas, DO, Yoan De Leon MD,  Marline Jj MD, Holger Rob MD, Josh Webster DO, Channing Mendosa MD, Ward Roper MD, Fortino Henao DO, Frandy Brown MD, Debbie Wallace MD, Indra Penn MD, Antonio Brunner MD, Claudell Orion, DO, Falguni Patterson MD, Deronda Kawasaki, MD, Nieves Pardo, CNP,  Dash Crum, CNP, Ashley Florez, CNP, Reji Owens, CNP,  Shayla Marin, [facility], Ky Mason, CNP, Berlinda Boast, CNP, Sharon Aguilar, CNP, Ida Ford, CNP, Lisa Bettencourt, CNP, Melissa Ho PA-C, Mario Alberto Flores, CNS, Catalino Schroeder, CNP, Ebony Garcia, [facility]    Progress Note    1/17/2023    11:11 AM    Name:   Zion Hagen  MRN:     [id number]     Acct:      [de-identified]   Room:   [address] [place] [address] Day:  4  Admit Date:  1/13/2023  2:14 AM    PCP:   No primary care provider on file. Code Status:  Full Code    Subjective:     C/C:   Chief Complaint   Patient presents with    Fatigue     Pt was found on his knees outside of assisted living. Pt was outside smoking and slid out of a chair. Interval History Status: improved. Patient reports he feels his shortness of breath is significantly better compared to yesterday. He is sitting up in chair. SPO2 93% on 4 L. Vitals otherwise stable. Brief History:     Mr. Lainey Lehman is a 78 yr old with a history of COPD lives in assisted living facility presented to hospital with weakness, shortness of breath. Patient states that he had a usual morning routine, drank his coffee and was trying to get out of wheel chair noted that he was very weak and fatigued.  he also had cough started yesterday with white phlegm, no chest pain, no fever, no chills. Patient also started feeling short of breath. No diarrhea. No sick contacts that patient knows of. Patient smokes 2 packs every day and he has copd diagnosis in chart but not on any treatment, no pft seen in the chart. In the ER patient was noted to be hypoxic, 88%, requiring 2 lt oxygen. He was afebrile. Electrolytes normal. Patient was seen to have elevated troponin of 34 which trended up to 51 and 47. Patient does not report any chest pain. D-dimer 1.63, COVID positive, x-ray does not show any acute abnormality. He has been started on Paxlovid  Review of Systems:     Constitutional:  negative for chills, fevers, sweats  Respiratory: Positive for cough, dyspnea on exertion, shortness of breath (all improving) negative for wheezing  Cardiovascular:  negative for chest pain, chest pressure/discomfort, lower extremity edema, palpitations  Gastrointestinal:  negative for abdominal pain, constipation, diarrhea, nausea, vomiting  Neurological:  negative for dizziness, headache    Medications:      Allergies:  No Known Allergies    Current Meds:   Scheduled Meds:    methylPREDNISolone  40 mg IntraVENous Q6H    metoprolol succinate  25 mg Oral Daily    enoxaparin  1 mg/kg SubCUTAneous BID    vitamin D  50,000 Units Oral Once per day on Sat    divalproex  1,000 mg Oral Daily    sodium chloride flush  5-40 mL IntraVENous 2 times per day    QUEtiapine  150 mg Oral Nightly    ipratropium-albuterol  1 ampule Inhalation Q4H WA    aspirin  81 mg Oral Daily    nicotine  1 patch TransDERmal Daily    budesonide-formoterol  2 puff Inhalation BID    lamoTRIgine  100 mg Oral Daily    QUEtiapine  200 mg Oral BID    nirmatrelvir/ritonavir  3 tablet Oral Q12H     Continuous Infusions:    sodium chloride       PRN Meds: sodium chloride flush, sodium chloride, ondansetron **OR** ondansetron, polyethylene glycol, acetaminophen **OR** acetaminophen, guaiFENesin-dextromethorphan, aluminum & magnesium hydroxide-simethicone    Data:     Past Medical History:   has a past medical history of Back pain, Bipolar 1 disorder ([address] [facility] Cancer ([facility] COPD (chronic obstructive pulmonary disease) ([facility] GERD (gastroesophageal reflux disease), and Heart attack ([facility] Social History:   reports that he has been smoking cigarettes and pipe. He has never used smokeless tobacco. He reports that he does not drink alcohol and does not use drugs. Family History:   Family History   Problem Relation Age of Onset    Cancer Father         Lung    Stroke Father     Cancer Brother         Lymphoma    Mental Illness Other         Aunt       Vitals:  BP (!) 153/80   Pulse 68   Temp 98.4 °F (36.9 °C) (Oral)   Resp 18   Ht 5' 10\" (1.778 m)   Wt 206 lb (93.4 kg)   SpO2 93%   BMI 29.56 kg/m²   Temp (24hrs), Av.7 °F (36.5 °C), Min:97.3 °F (36.3 °C), Max:98.4 °F (36.9 °C)    No results for input(s): POCGLU in the last 72 hours. I/O (24Hr):     Intake/Output Summary (Last 24 hours) at 2023 1111  Last data filed at 2023 0531  Gross per 24 hour   Intake 120 ml   Output 1250 ml   Net -1130 ml       Labs:  Hematology:  Recent Labs     01/15/23  0523  0512   WBC  --  13.4*   RBC  --  5.20   HGB  --  16.0   HCT  --  50.6*   MCV  --  97.3   MCH  --  30.8   MCHC  --  31.6   RDW  --  14.2   PLT  --  203   MPV  --  10.3   CRP 3.3  --    DDIMER 0.94*  --      Chemistry:  Recent Labs     23  0512      K 4.5      CO2 29   GLUCOSE 122*   BUN 24*   CREATININE 0.82   ANIONGAP 10   LABGLOM >60   CALCIUM 9.2     Recent Labs     01/15/23  0521   HDL 44   LDLCHOLESTEROL 119   CHOLHDLRATIO 4.3     ABG:No results found for: POCPH, PHART, PH, POCPCO2, ERG2QWL, PCO2, POCPO2, PO2ART, PO2, POCHCO3, OMF2CDI, HCO3, NBEA, PBEA, BEART, BE, THGBART, THB, GFC6FLV, EQMC8BTB, B4YVNTLJ, O2SAT, FIO2  No results found for: SPECIAL  No results found for: CULTURE    Radiology:  XR CHEST PORTABLE    Result Date: 2023  Recently described right basilar opacity with CT appears unchanged. No new findings identified in the interval.     XR CHEST PORTABLE    Result Date: 1/13/2023  Mild to moderate COPD but no acute cardiopulmonary abnormality evident. Possible soft tissue mass at the lateral left lower chest wall. Correlate clinically. CT CHEST PULMONARY EMBOLISM W CONTRAST    Result Date: 1/14/2023  1. Negative for pulmonary embolus. 2. Emphysema with mild right basilar segmental atelectasis versus pneumonia. 3. Indeterminate 9 mm lingular nodule. RECOMMENDATIONS: 9 mm suspicious left solid pulmonary nodule. Consider a non-contrast Chest CT at 3 months, a PET/CT, or tissue sampling. These guidelines do not apply to immunocompromised patients and patients with cancer. Follow up in patients with significant comorbidities as clinically warranted. For lung cancer screening, adhere to Lung-RADS guidelines. Reference: Radiology. 2017; 284(1):228-43. Physical Examination:        General appearance:  alert, cooperative and no distress  Mental Status:  oriented to person, place and time and normal affect  Lungs: Rhonchi auscultated to bilateral bases, positive cough with deep inspiration, creased air exchange.   Normal effort at rest but does get dyspneic with exertion  Heart:  regular rate and rhythm, no murmur  Abdomen:  soft, nontender, nondistended, normal bowel sounds, no masses, hepatomegaly, splenomegaly  Extremities:  no edema, redness, tenderness in the calves  Skin:  no gross lesions, rashes, induration  Positive for generalized weakness  Assessment:        Hospital Problems             Last Modified POA    * (Principal) COVID 1/13/2023 Yes    Acute respiratory failure with hypoxia ([address] 1/13/2023 Yes    COPD exacerbation ([address] 1/13/2023 Yes    Elevated troponin 1/13/2023 Yes    Tobacco abuse 1/13/2023 Yes       Plan:        Continue course of Paxlovid through 1/18/2023  Continue aerosols and bronchodilators  Monitor labs, replace electrolytes as needed  Continue IV steroids will start to taper dose tomorrow  Switch Lovenox to prophylactic dose  Continue supplemental oxygen, wean as able  Incentive spirometer every hour while awake  Add Acapella  Monitor and control blood pressure  Continue PT/OT  Discharge planning back to assisted living versus SNF,  to discuss with family  Plan discussed with patient and staff    ALEXSANDRA Flores NP  1/17/2023  11:11 AM

## 2023-01-17 NOTE — CARE COORDINATION
Social work: Titus Annabella is checking to see if they are in network with [facility]. Referral also sent to [facility] and [facility], as both accept COVID patients. Ideally patient would like to go back to [facility] at discharge, but d/t oxygen needs he is willing to consider snf. If oxygen can be weaned down, patient plans to go back home. SW updated Merly/dtr on above. Once accepted at snf will need precert.

## 2023-01-17 NOTE — PLAN OF CARE
Patient remains on 4L nasal cannula today. Education provided on incentive spirometry, patient has been motivated to use several times an hour. Shower given today, patient tolerated on 4L nasal cannula. VSS, call light within reach, safety maintained. Problem: Discharge Planning  Goal: Discharge to home or other facility with appropriate resources  Outcome: Progressing     Problem: Skin/Tissue Integrity  Goal: Absence of new skin breakdown  Description: 1. Monitor for areas of redness and/or skin breakdown  2. Assess vascular access sites hourly  3. Every 4-6 hours minimum:  Change oxygen saturation probe site  4. Every 4-6 hours:  If on nasal continuous positive airway pressure, respiratory therapy assess nares and determine need for appliance change or resting period.   Outcome: Progressing     Problem: ABCDS Injury Assessment  Goal: Absence of physical injury  Outcome: Progressing     Problem: Safety - Adult  Goal: Free from fall injury  1/17/2023 1727 by Delfino Barr RN  Outcome: Progressing     Problem: Respiratory - Adult  Goal: Achieves optimal ventilation and oxygenation  1/17/2023 1727 by Delfino Barr RN  Outcome: Progressing     Problem: Infection - Adult  Goal: Absence of infection at discharge  1/17/2023 1727 by Delfino Barr RN  Outcome: Progressing     Problem: Pain  Goal: Verbalizes/displays adequate comfort level or baseline comfort level  1/17/2023 1727 by Delfino Barr RN  Outcome: Progressing

## 2023-01-17 NOTE — CARE COORDINATION
Discharge planning    Discussed with SS plan of care. Patient hesitant about going to snf. Went to patient room to discuss plans. As writer was in there patient was finishing up a treatment with RT> sats were still only 90-91 on 4 LNC. Did discuss home 02 and may need to titrate which would be difficult if out and about in [place]. He is agreeable to having precert started at [facility] and if improves and more stable will set up with home care/home 02. He is agreeable with both plans. Notified ss.

## 2023-01-17 NOTE — PLAN OF CARE
Nash Feliz is resting well this shift with no s/s or c/o pain. He is maintaining saturations in high 80s/low 90s on 4L via nc. He continues to decline to elevate HOB. He exhibits SOB with any activity. He has been standing at bedside to void with minimal assist. He is afebrile on antiviral therapy for COVID and isolation precautions observed. He is generally calm and cooperative with care. He has call light in reach and bed alarm active; safety maintained.     Problem: Safety - Adult  Goal: Free from fall injury  1/17/2023 0533 by Yonis Hauser (Taken 1/17/2023 0533)  Free From Fall Injury: Instruct family/caregiver on patient safety  Outcome: Progressing     Problem: Respiratory - Adult  Goal: Achieves optimal ventilation and oxygenation  1/17/2023 0532 by Renny Bolton  Outcome: Progressing  Flowsheets (Taken 1/16/2023 2030)  Achieves optimal ventilation and oxygenation: (declining HOB elevation)   Assess for changes in respiratory status   Position to facilitate oxygenation and minimize respiratory effort   Assess for changes in mentation and behavior   Oxygen supplementation based on oxygen saturation or arterial blood gases   Respiratory therapy support as indicated     Problem: Infection - Adult  Goal: Absence of infection at discharge  Outcome: Progressing  Flowsheets (Taken 1/16/2023 2030)  Absence of infection at discharge:   Assess and monitor for signs and symptoms of infection   Monitor lab/diagnostic results   Monitor all insertion sites i.e., indwelling lines, tubes and drains     Problem: Pain  Goal: Verbalizes/displays adequate comfort level or baseline comfort level  Outcome: Progressing  Flowsheets (Taken 1/16/2023 1927)  Verbalizes/displays adequate comfort level or baseline comfort level:   Encourage patient to monitor pain and request assistance   Assess pain using appropriate pain scale

## 2023-01-17 NOTE — PROGRESS NOTES
Physical Therapy  DATE: 2023    NAME: Niraj Kwon  MRN: [id number]   : 1945    Patient not seen this date for Physical Therapy due to:      [] Cancel by RN or physician due to:    [] Hemodialysis    [] Critical Lab Value Level     [] Blood transfusion in progress    [] Acute or unstable cardiovascular status   _MAP < 55 or more than >115  _HR < 40 or > 130    [] Acute or unstable pulmonary status   -FiO2 > 60%   _RR < 5 or >40    _O2 sats < 85%    [] Strict Bedrest    [] Off Unit for surgery or procedure    [] Off Unit for testing       [] Pending imaging to R/O fracture    [] Refusal by Patient      [x] Other OT working with pt [phone] second attempt pt receiving respiratory RX       [] PT being discontinued at this time. Patient independent. No further needs. [] PT being discontinued at this time as the patient has been transferred to hospice care. No further needs.       EVELINE GRANT, PTA

## 2023-01-17 NOTE — PROGRESS NOTES
Patient called staff to room to state he has bipolar disorder and thinks he is 'cycling up'. He takes Depakote and Seroquel routinely, but believes his unfamiliar environment and medications we are giving him are disrupting his balance. Notified NP and will alert oncoming staff of patient concerns.

## 2023-01-17 NOTE — CARE COORDINATION
Social work: Spoke to [employer] on the phone to discuss plans. She would be on board with short-term rehab if insurance would cover and [address] [place] [address] would hold the bed for patient. Referrals to [facility] and [facility]. Will need precert. Leigh Hays also asked writer to confirm plans with patient- SW will discuss. ZHANNA russell.

## 2023-01-17 NOTE — PLAN OF CARE
Problem: Respiratory - Adult  Goal: Achieves optimal ventilation and oxygenation  1/17/2023 1028 by Ino Prince RCP  Outcome: Progressing

## 2023-01-17 NOTE — PROGRESS NOTES
Occupational Therapy  Facility/Department: [facility] PROGRESSIVE CARE  Daily Treatment Note  NAME: Renan Badillo  : 1945  MRN: [id number]    Date of Service: 2023    AMARI Dempsey reports patient is medically stable for therapy treatment this date. Chart reviewed prior to treatment and patient is agreeable for therapy. All lines intact and patient positioned comfortably at end of treatment. All patient needs addressed prior to ending therapy session. Pt currently functioning below baseline. Recommend daily inpatient skilled therapy at time of discharge to maximize long term outcomes and prevent re-admission. Please refer to AM-PAC score for current level of function. Discharge Recommendations:  Patient would benefit from continued therapy after discharge  OT Equipment Recommendations  Equipment Needed:  (CTA)      Patient Diagnosis(es): The primary encounter diagnosis was COVID-19. A diagnosis of Elevated troponin was also pertinent to this visit. Assessment    Assessment: Pt had Fair tolerance for activity this session and is progressing towards goals, limited by decreased aerobic capacity, Poor endurance, and drops in SpO2 w/ minimal activity. Pt completed LB ADLs w/ MaxA and functional mobility about hospital room w/ CGA  - Yesika and use of RW. Continued skilled OT services are indicated at this time to maximize this pt's safety and IND with self care tasks and to facilitate safe return to OF as able. Activity Tolerance: Patient limited by endurance; Patient limited by fatigue  Discharge Recommendations: Patient would benefit from continued therapy after discharge  Equipment Needed:  (CTA)      Plan   Occupational Therapy Plan  Times Per Week: 4-5x/week 1x/day as tolerated  Current Treatment Recommendations: Strengthening;Balance training;Functional mobility training; Endurance training; Safety education & training;Cognitive reorientation;Patient/Caregiver education & training;Self-Care / ADL;Cognitive/Perceptual training;Equipment evaluation, education, & procurement     Restrictions  Restrictions/Precautions  Restrictions/Precautions: Up as Tolerated, General Precautions, Isolation  Required Braces or Orthoses?: No  Position Activity Restriction  Other position/activity restrictions: Up as tolerated, telemetry, 5L O2 via NC, R wrist IV, Droplet+ precautions d/t COVID+    Subjective   Subjective  Subjective: Pt seated in bedside chair upon entry to room, agreeable to therapy w/ no c/o pain. \"When is my breakfast tray coming? \" Pt prepped for mobilization in room, then breakfast tray arrived and pt declining all further participation in therapy. Educated pt on importance of engaging in therapy and OOB mobility, with pt continuing to decline at this time. Agreeable to writer returning at a later time this morning. Writer returned at [address] [place] [address], w/ pt supine in bed and motivated to engage in therapy session. Pt continues w/ no c/o pain throughout session. Cognition  Overall Cognitive Status: Exceptions  Arousal/Alertness: Appropriate responses to stimuli  Following Commands: Follows one step commands with repetition; Follows one step commands with increased time  Attention Span: Attends with cues to redirect  Memory: Decreased short term memory;Decreased recall of precautions;Decreased recall of recent events  Safety Judgement: Decreased awareness of need for assistance;Decreased awareness of need for safety  Problem Solving: Decreased awareness of errors;Assistance required to identify errors made;Assistance required to generate solutions;Assistance required to correct errors made;Assistance required to implement solutions  Insights: Decreased awareness of deficits  Initiation: Does not require cues  Sequencing: Requires cues for some  Cognition Comment: Pt demo'd decreased safety awareness throughout session, would attempt mobility w/o awareness for lines.  Pt required education throughout to pace self, engage in pursed lip breathing, and for RW safety. Objective       Bed Mobility Training  Bed Mobility Training: Yes  Overall Level of Assistance: Stand-by assistance (Pt transferred self to supine as writer was entering room w/ no awareness for lines. Educated provided to pt on importance of pacing self, waiting for staff assist for increased safety, and to engage in pursed lip breathing tech.)  Interventions: Safety awareness training;Verbal cues  Rolling: Stand-by assistance  Supine to Sit: Stand-by assistance  Sit to Supine:  (Pt retired to bedside chair at session end.)  Scooting: Stand-by assistance  Balance  Sitting: Intact (SBA while seated at EOB in prep for transfers and to engage in LB dressing tasks.)  Standing: With support (CGA - Yesika w/ RW)  Transfer Training  Transfer Training: Yes  Overall Level of Assistance: Contact-guard assistance  Interventions: Safety awareness training;Verbal cues; Visual cues (MAX cues for RW safety/mgnt, hand placement on stable surfaces vs pulling up on RW, pursed lip breathing tech, awareness for lines, upright posture, and reaching behind during controlled descent to transfer surface all to increase overall safety.)  Sit to Stand: Contact-guard assistance  Stand to Sit: Contact-guard assistance  Bed to Chair: Contact-guard assistance  Functional Mobility  Overall Level of Assistance: Minimum assistance (Pt completed functional mobility about hospital room w/ Yesika and RW x2 trials. With mobility, pt's SpO2 would decreased to high 80s; able to recover w/ seated rest breaks and cues for pursed lip breathing tech.  Pt demo'd Poor safety awareness w/ RW.)  Interventions: Safety awareness training;Verbal cues (Max cues for line awareness, pursed lip breathing tech, maintaining WENDIE within RW, squaring AD to transfer surface, and pacing self all to increase overall safety/reduce fall risk.)  Assistive Device: Gait belt;Walker, rolling     ADL  Feeding: Setup  Feeding Skilled Clinical Factors: Pt required set up assist to open packaging for items on breakfast tray during morning session. Once set up, pt displayed no significant difficulty w/ self feeding tasks. UE Dressing: Minimal assistance  UE Dressing Skilled Clinical Factors: To adjust gown throughout session for increased modesty  LE Dressing: Maximum assistance  LE Dressing Skilled Clinical Factors: MaxA to don socks while seated at EOB; MaxA to don clean brief over feet and manage up over hips  Additional Comments: With minimal activity, pt would become SOB and SpO2 would drop from low/mid 90s to high 80s. Pt able to recover w/ rest breaks and cues to utilize pursed lip breathing tech, w/ Fair return demo. Education provided throughout session on RW safety/mgnt, as pt displayed Poor safety awareness w/ device throughout session. Pt able to verbalize understanding of education on RW safety, but displayed difficulty w/ implementing education. Pt would benefit from continued training on use of RW for increased safety/stability w/ functional mobility tasks. Safety Devices  Type of Devices: Gait belt;Left in chair;Chair alarm in place;Call light within reach;Nurse notified; Patient at risk for falls     Patient Education  Education Given To: Patient  Education Provided: Role of Therapy;Transfer Training;Plan of Care;Energy Conservation;Precautions; Fall Prevention Strategies; ADL Adaptive Strategies; Equipment (pursed lip breathing tech, RW safety/mgnt, incentive spirometer use)  Education Method: Verbal;Demonstration  Barriers to Learning: Cognition  Education Outcome: Verbalized understanding;Continued education needed    AM-PAC: 16    Goals  Short Term Goals  Time Frame for Short Term Goals: By discharge, pt to demo:  Short Term Goal 1: ADL transfers and functional mobility tasks with Good safety and SBA with use of AD as needed.   Short Term Goal 2: UB ADLs to SBA and LB ADLs to MinAx1 with Good safety and use of AE/compensatory strategies/AD as needed. Short Term Goal 3: increased BUE strength by 1/2 grade to promote strength/ROM required for safety and IND with self care tasks. Short Term Goal 4: increased standing tolerance to > 8 mins w/ Good safety and use of AD as needed in order to promote standing balance and reduce the risk of falls. Short Term Goal 5: toileting tasks to SBA with Good safety and use of AD/grab bars/BSC as needed. Long Term Goals  Long Term Goal 1: Pt to be IND with fall prevention strategies, EC/WS tech, covid-19 education, COPD & smoking cessation education, discharge/equipment recommendations, and a BUE HEP with use of handouts as needed. Patient Goals   Patient goals : To go home!        Therapy Time   1st Session 2nd Session Group Co-treatment   Time In 0822 0974       Time Out 0907 1210       Minutes 12 35       Timed Code Treatment Minutes: [facility],

## 2023-01-18 ENCOUNTER — APPOINTMENT (OUTPATIENT)
Dept: GENERAL RADIOLOGY | Age: 78
End: 2023-01-18
Payer: MEDICARE

## 2023-01-18 LAB — D-DIMER QUANTITATIVE: 0.57 MG/L FEU (ref 0–0.59)

## 2023-01-18 PROCEDURE — 6370000000 HC RX 637 (ALT 250 FOR IP): Performed by: NURSE PRACTITIONER

## 2023-01-18 PROCEDURE — 97530 THERAPEUTIC ACTIVITIES: CPT

## 2023-01-18 PROCEDURE — 6370000000 HC RX 637 (ALT 250 FOR IP): Performed by: STUDENT IN AN ORGANIZED HEALTH CARE EDUCATION/TRAINING PROGRAM

## 2023-01-18 PROCEDURE — 94761 N-INVAS EAR/PLS OXIMETRY MLT: CPT

## 2023-01-18 PROCEDURE — 6360000002 HC RX W HCPCS: Performed by: NURSE PRACTITIONER

## 2023-01-18 PROCEDURE — 85379 FIBRIN DEGRADATION QUANT: CPT

## 2023-01-18 PROCEDURE — 97110 THERAPEUTIC EXERCISES: CPT

## 2023-01-18 PROCEDURE — 36415 COLL VENOUS BLD VENIPUNCTURE: CPT

## 2023-01-18 PROCEDURE — 71045 X-RAY EXAM CHEST 1 VIEW: CPT

## 2023-01-18 PROCEDURE — 6370000000 HC RX 637 (ALT 250 FOR IP): Performed by: INTERNAL MEDICINE

## 2023-01-18 PROCEDURE — 2580000003 HC RX 258: Performed by: NURSE PRACTITIONER

## 2023-01-18 PROCEDURE — 6360000002 HC RX W HCPCS: Performed by: FAMILY MEDICINE

## 2023-01-18 PROCEDURE — 94640 AIRWAY INHALATION TREATMENT: CPT

## 2023-01-18 PROCEDURE — 97535 SELF CARE MNGMENT TRAINING: CPT

## 2023-01-18 PROCEDURE — 99232 SBSQ HOSP IP/OBS MODERATE 35: CPT | Performed by: NURSE PRACTITIONER

## 2023-01-18 PROCEDURE — 2700000000 HC OXYGEN THERAPY PER DAY

## 2023-01-18 PROCEDURE — 2060000000 HC ICU INTERMEDIATE R&B

## 2023-01-18 RX ORDER — PREDNISONE 20 MG/1
20 TABLET ORAL DAILY
Status: DISCONTINUED | OUTPATIENT
Start: 2023-01-24 | End: 2023-01-18

## 2023-01-18 RX ORDER — PREDNISONE 10 MG/1
10 TABLET ORAL DAILY
Status: DISCONTINUED | OUTPATIENT
Start: 2023-01-27 | End: 2023-01-18

## 2023-01-18 RX ORDER — BUDESONIDE 0.5 MG/2ML
0.5 INHALANT ORAL 2 TIMES DAILY
Status: DISCONTINUED | OUTPATIENT
Start: 2023-01-18 | End: 2023-01-27 | Stop reason: HOSPADM

## 2023-01-18 RX ORDER — PREDNISONE 20 MG/1
40 TABLET ORAL DAILY
Status: DISCONTINUED | OUTPATIENT
Start: 2023-01-18 | End: 2023-01-18

## 2023-01-18 RX ORDER — ARFORMOTEROL TARTRATE 15 UG/2ML
15 SOLUTION RESPIRATORY (INHALATION) 2 TIMES DAILY
Status: DISCONTINUED | OUTPATIENT
Start: 2023-01-18 | End: 2023-01-27 | Stop reason: HOSPADM

## 2023-01-18 RX ORDER — METHYLPREDNISOLONE SODIUM SUCCINATE 40 MG/ML
40 INJECTION, POWDER, LYOPHILIZED, FOR SOLUTION INTRAMUSCULAR; INTRAVENOUS EVERY 6 HOURS
Status: DISCONTINUED | OUTPATIENT
Start: 2023-01-18 | End: 2023-01-24

## 2023-01-18 RX ORDER — CALCIUM CARBONATE 200(500)MG
500 TABLET,CHEWABLE ORAL 3 TIMES DAILY PRN
Status: DISCONTINUED | OUTPATIENT
Start: 2023-01-18 | End: 2023-01-27 | Stop reason: HOSPADM

## 2023-01-18 RX ORDER — FUROSEMIDE 10 MG/ML
20 INJECTION INTRAMUSCULAR; INTRAVENOUS ONCE
Status: COMPLETED | OUTPATIENT
Start: 2023-01-18 | End: 2023-01-18

## 2023-01-18 RX ADMIN — IPRATROPIUM BROMIDE AND ALBUTEROL SULFATE 1 AMPULE: 2.5; .5 SOLUTION RESPIRATORY (INHALATION) at 15:09

## 2023-01-18 RX ADMIN — ARFORMOTEROL TARTRATE 15 MCG: 15 SOLUTION RESPIRATORY (INHALATION) at 18:05

## 2023-01-18 RX ADMIN — METHYLPREDNISOLONE SODIUM SUCCINATE 40 MG: 40 INJECTION, POWDER, FOR SOLUTION INTRAMUSCULAR; INTRAVENOUS at 14:29

## 2023-01-18 RX ADMIN — METHYLPREDNISOLONE SODIUM SUCCINATE 40 MG: 40 INJECTION, POWDER, FOR SOLUTION INTRAMUSCULAR; INTRAVENOUS at 01:07

## 2023-01-18 RX ADMIN — IPRATROPIUM BROMIDE AND ALBUTEROL SULFATE 1 AMPULE: 2.5; .5 SOLUTION RESPIRATORY (INHALATION) at 06:10

## 2023-01-18 RX ADMIN — METOPROLOL SUCCINATE 25 MG: 25 TABLET, EXTENDED RELEASE ORAL at 08:51

## 2023-01-18 RX ADMIN — IPRATROPIUM BROMIDE AND ALBUTEROL SULFATE 1 AMPULE: 2.5; .5 SOLUTION RESPIRATORY (INHALATION) at 18:05

## 2023-01-18 RX ADMIN — ENOXAPARIN SODIUM 30 MG: 100 INJECTION SUBCUTANEOUS at 08:51

## 2023-01-18 RX ADMIN — GUAIFENESIN AND DEXTROMETHORPHAN 5 ML: 100; 10 SYRUP ORAL at 01:15

## 2023-01-18 RX ADMIN — FUROSEMIDE 20 MG: 10 INJECTION, SOLUTION INTRAMUSCULAR; INTRAVENOUS at 11:57

## 2023-01-18 RX ADMIN — QUETIAPINE FUMARATE 200 MG: 200 TABLET ORAL at 20:20

## 2023-01-18 RX ADMIN — QUETIAPINE FUMARATE 150 MG: 150 TABLET, EXTENDED RELEASE ORAL at 20:20

## 2023-01-18 RX ADMIN — DIVALPROEX SODIUM 1000 MG: 500 TABLET, EXTENDED RELEASE ORAL at 08:51

## 2023-01-18 RX ADMIN — BUDESONIDE AND FORMOTEROL FUMARATE DIHYDRATE 2 PUFF: 80; 4.5 AEROSOL RESPIRATORY (INHALATION) at 06:10

## 2023-01-18 RX ADMIN — SODIUM CHLORIDE, PRESERVATIVE FREE 10 ML: 5 INJECTION INTRAVENOUS at 08:51

## 2023-01-18 RX ADMIN — Medication 500 MG: at 15:37

## 2023-01-18 RX ADMIN — SODIUM CHLORIDE, PRESERVATIVE FREE 10 ML: 5 INJECTION INTRAVENOUS at 20:20

## 2023-01-18 RX ADMIN — MAGNESIUM HYDROXIDE/ALUMINUM HYDROXICE/SIMETHICONE 30 ML: 120; 1200; 1200 SUSPENSION ORAL at 10:46

## 2023-01-18 RX ADMIN — METHYLPREDNISOLONE SODIUM SUCCINATE 40 MG: 40 INJECTION, POWDER, FOR SOLUTION INTRAMUSCULAR; INTRAVENOUS at 20:20

## 2023-01-18 RX ADMIN — QUETIAPINE FUMARATE 200 MG: 200 TABLET ORAL at 08:51

## 2023-01-18 RX ADMIN — LAMOTRIGINE 100 MG: 100 TABLET ORAL at 08:51

## 2023-01-18 RX ADMIN — IPRATROPIUM BROMIDE AND ALBUTEROL SULFATE 1 AMPULE: 2.5; .5 SOLUTION RESPIRATORY (INHALATION) at 11:29

## 2023-01-18 RX ADMIN — METHYLPREDNISOLONE SODIUM SUCCINATE 40 MG: 40 INJECTION, POWDER, FOR SOLUTION INTRAMUSCULAR; INTRAVENOUS at 08:51

## 2023-01-18 RX ADMIN — GUAIFENESIN AND DEXTROMETHORPHAN 5 ML: 100; 10 SYRUP ORAL at 20:20

## 2023-01-18 RX ADMIN — ASPIRIN 81 MG: 81 TABLET, CHEWABLE ORAL at 08:51

## 2023-01-18 RX ADMIN — BUDESONIDE INHALATION 500 MCG: 0.5 SUSPENSION RESPIRATORY (INHALATION) at 18:05

## 2023-01-18 ASSESSMENT — PAIN SCALES - GENERAL
PAINLEVEL_OUTOF10: 0
PAINLEVEL_OUTOF10: 2

## 2023-01-18 NOTE — RT PROTOCOL NOTE
RT Inhaler-Nebulizer Bronchodilator Protocol Note    There is a bronchodilator order in the chart from a provider indicating to follow the RT Bronchodilator Protocol and there is an Initiate RT Inhaler-Nebulizer Bronchodilator Protocol order as well (see protocol at bottom of note). CXR Findings:  XR CHEST PORTABLE    Result Date: 1/16/2023  Recently described right basilar opacity with CT appears unchanged. No new findings identified in the interval.       The findings from the last RT Protocol Assessment were as follows:   History Pulmonary Disease: Chronic pulmonary disease  Respiratory Pattern: Mild dyspnea at rest, irregular pattern, or RR 21-25 bpm  Breath Sounds: Inspiratory and expiratory or bilateral wheezing and/or rhonchi  Cough: Strong, spontaneous, non-productive  Indication for Bronchodilator Therapy: Decreased or absent breath sounds  Bronchodilator Assessment Score: 12    Aerosolized bronchodilator medication orders have been revised according to the RT Inhaler-Nebulizer Bronchodilator Protocol below. Respiratory Therapist to perform RT Therapy Protocol Assessment initially then follow the protocol. Repeat RT Therapy Protocol Assessment PRN for score 0-3 or on second treatment, BID, and PRN for scores above 3. No Indications - adjust the frequency to every 6 hours PRN wheezing or bronchospasm, if no treatments needed after 48 hours then discontinue using Per Protocol order mode. If indication present, adjust the RT bronchodilator orders based on the Bronchodilator Assessment Score as indicated below. Use Inhaler orders unless patient has one or more of the following: on home nebulizer, not able to hold breath for 10 seconds, is not alert and oriented, cannot activate and use MDI correctly, or respiratory rate 25 breaths per minute or more, then use the equivalent nebulizer order(s) with same Frequency and PRN reasons based on the score.   If a patient is on this medication at home then do not decrease Frequency below that used at home. 0-3 - enter or revise RT bronchodilator order(s) to equivalent RT Bronchodilator order with Frequency of every 4 hours PRN for wheezing or increased work of breathing using Per Protocol order mode. 4-6 - enter or revise RT Bronchodilator order(s) to two equivalent RT bronchodilator orders with one order with BID Frequency and one order with Frequency of every 4 hours PRN wheezing or increased work of breathing using Per Protocol order mode. 7-10 - enter or revise RT Bronchodilator order(s) to two equivalent RT bronchodilator orders with one order with TID Frequency and one order with Frequency of every 4 hours PRN wheezing or increased work of breathing using Per Protocol order mode. 11-13 - enter or revise RT Bronchodilator order(s) to one equivalent RT bronchodilator order with QID Frequency and an Albuterol order with Frequency of every 4 hours PRN wheezing or increased work of breathing using Per Protocol order mode. Greater than 13 - enter or revise RT Bronchodilator order(s) to one equivalent RT bronchodilator order with every 4 hours Frequency and an Albuterol order with Frequency of every 2 hours PRN wheezing or increased work of breathing using Per Protocol order mode. RT to enter RT Home Evaluation for COPD & MDI Assessment order using Per Protocol order mode.     Electronically signed by Carolyne Gonzales RCP on 1/17/2023 at 8:37 PM

## 2023-01-18 NOTE — PLAN OF CARE
Patient is on 10L high flow cannula, 02 is at 94%. IV steroids given, 1 time dose IV lasix given. Patient has been using urinal at the bedside to conserve energy. He has been using the incentive spirometer and accapela. Patient is resting in bed with eyes closed.  VSS, call light within reach, safety maintained    Problem: Respiratory - Adult  Goal: Achieves optimal ventilation and oxygenation  1/18/2023 1824 by Jacky Lovelace RN  Outcome: Progressing     Problem: Pain  Goal: Verbalizes/displays adequate comfort level or baseline comfort level  1/18/2023 1824 by Jacky Lovelace RN  Outcome: Progressing     Problem: Safety - Adult  Goal: Free from fall injury  1/18/2023 1824 by Jacky Lovelace RN  Outcome: Progressing

## 2023-01-18 NOTE — PROGRESS NOTES
[facility]  Office: [address], DO, Meli Shey, DO, [place] Mail, DO, Federico Has Blood, DO, Veto Johnsno MD, Jalil Salinas MD, He Connors MD, Angela Chaney MD,  Eryn Lanza MD, Olivia Schmitt MD, James Martinez, DO, Ayaz Blackmon MD,  Abad Abreu MD, Mike Friedman MD, Jordan Martínez, DO, Mo Del Valle MD, Cynthia Schrader MD, Mazin Memory, DO, Nicolas Reis MD, Aditi Rayo MD, Chanel Cano MD, Geovani Baum MD, Praveen Rojas, DO, Jesse Pendleton MD, Filipe Young MD, Syeda Rubi, CNP,  Lashon Bustamante, CNP, Amor Handley, CNP, Georgiana Merrill, CNP,  Coral Reyna, [facility], Hugo Polanco, CNP, Yin Stevenson, CNP, Don Cordoba, CNP, Bartolome Sahu, CNP, Lory Ramos, CNP, Delisa Saucedo PA-C, Tiffany Delgadillo, CNS, Lindy Tyson, CNP, Cisco Obrien, [facility]    Progress Note    1/18/2023    9:56 AM    Name:   Dada Mary  MRN:     [id number]     Acct:      [de-identified]   Room:   [address] [place] [address] Day:  5  Admit Date:  1/13/2023  2:14 AM    PCP:   No primary care provider on file. Code Status:  Full Code    Subjective:     C/C:   Chief Complaint   Patient presents with    Fatigue     Pt was found on his knees outside of assisted living. Pt was outside smoking and slid out of a chair. Interval History Status: improved. Patient is sitting up in chair. Stats he feels pretty good today, he has been using the IS and acapella. PO2 91% on 4 L . Vitals otherwise stable. Staff reports he still gets dyspneic with minimal exertion and has low endurance   Brief History:   Mr. Beverly Bravo is a 78 yr old with a history of COPD lives in assisted living facility presented to hospital with weakness, shortness of breath. Patient states that he had a usual morning routine, drank his coffee and was trying to get out of wheel chair noted that he was very weak and fatigued.  he also had cough started yesterday with white phlegm, no chest pain, no fever, no chills. Patient also started feeling short of breath. No diarrhea. No sick contacts that patient knows of. Patient smokes 2 packs every day and he has copd diagnosis in chart but not on any treatment, no pft seen in the chart. In the ER patient was noted to be hypoxic, 88%, requiring 2 lt oxygen. He was afebrile. Electrolytes normal. Patient was seen to have elevated troponin of 34 which trended up to 51 and 47. Patient does not report any chest pain. D-dimer 1.63, COVID positive, x-ray does not show any acute abnormality. He has been started on Paxlovid  Review of Systems:     Constitutional:  negative for chills, fevers, sweats  Respiratory: Positive for cough, dyspnea on exertion, shortness of breath. negative for wheezing  Cardiovascular:  negative for chest pain, chest pressure/discomfort, lower extremity edema, palpitations  Gastrointestinal:  negative for abdominal pain, constipation, diarrhea, nausea, vomiting  Neurological:  negative for dizziness, headache    Medications:      Allergies:  No Known Allergies    Current Meds:   Scheduled Meds:    enoxaparin  30 mg SubCUTAneous Daily    metoprolol succinate  25 mg Oral Daily    vitamin D  50,000 Units Oral Once per day on Sat    divalproex  1,000 mg Oral Daily    sodium chloride flush  5-40 mL IntraVENous 2 times per day    QUEtiapine  150 mg Oral Nightly    ipratropium-albuterol  1 ampule Inhalation Q4H WA    aspirin  81 mg Oral Daily    nicotine  1 patch TransDERmal Daily    budesonide-formoterol  2 puff Inhalation BID    lamoTRIgine  100 mg Oral Daily    QUEtiapine  200 mg Oral BID     Continuous Infusions:    sodium chloride       PRN Meds: sodium chloride flush, sodium chloride, ondansetron **OR** ondansetron, polyethylene glycol, acetaminophen **OR** acetaminophen, guaiFENesin-dextromethorphan, aluminum & magnesium hydroxide-simethicone    Data:     Past Medical History:   has a past medical history of Back pain, Bipolar 1 disorder ([facility] Cancer ([facility] COPD (chronic obstructive pulmonary disease) ([facility] GERD (gastroesophageal reflux disease), and Heart attack ([facility] Social History:   reports that he has been smoking cigarettes and pipe. He has never used smokeless tobacco. He reports that he does not drink alcohol and does not use drugs. Family History:   Family History   Problem Relation Age of Onset    Cancer Father         Lung    Stroke Father     Cancer Brother         Lymphoma    Mental Illness Other         Aunt       Vitals:  /72   Pulse 70   Temp 98.1 °F (36.7 °C) (Oral)   Resp 22   Ht 5' 10\" (1.778 m)   Wt 206 lb (93.4 kg)   SpO2 93%   BMI 29.56 kg/m²   Temp (24hrs), Av.9 °F (36.6 °C), Min:97.3 °F (36.3 °C), Max:98.4 °F (36.9 °C)    No results for input(s): POCGLU in the last 72 hours. I/O (24Hr): Intake/Output Summary (Last 24 hours) at 2023 0956  Last data filed at 2023 0110  Gross per 24 hour   Intake 600 ml   Output 100 ml   Net 500 ml       Labs:  Hematology:  Recent Labs     23  0512   WBC 13.4*   RBC 5.20   HGB 16.0   HCT 50.6*   MCV 97.3   MCH 30.8   MCHC 31.6   RDW 14.2      MPV 10.3     Chemistry:  Recent Labs     23  0512      K 4.5      CO2 29   GLUCOSE 122*   BUN 24*   CREATININE 0.82   ANIONGAP 10   LABGLOM >60   CALCIUM 9.2     No results for input(s): PROT, LABALBU, LABA1C, O7KZSQS, N0YWSBV, FT4, TSH, AST, ALT, LDH, GGT, ALKPHOS, LABGGT, BILITOT, BILIDIR, AMMONIA, AMYLASE, LIPASE, LACTATE, CHOL, HDL, LDLCHOLESTEROL, CHOLHDLRATIO, TRIG, VLDL, ASV53EA, PHENYTOIN, PHENYF, URICACID, POCGLU in the last 72 hours.     ABG:No results found for: POCPH, PHART, PH, POCPCO2, TXJ5PPT, PCO2, POCPO2, PO2ART, PO2, POCHCO3, SHX0LON, HCO3, NBEA, PBEA, BEART, BE, THGBART, THB, BGI3HER, WJVI3QJY, I7JGBQFA, O2SAT, FIO2  No results found for: SPECIAL  No results found for: CULTURE    Radiology:  XR CHEST PORTABLE    Result Date: 1/16/2023  Recently described right basilar opacity with CT appears unchanged. No new findings identified in the interval.     XR CHEST PORTABLE    Result Date: 1/13/2023  Mild to moderate COPD but no acute cardiopulmonary abnormality evident. Possible soft tissue mass at the lateral left lower chest wall. Correlate clinically. CT CHEST PULMONARY EMBOLISM W CONTRAST    Result Date: 1/14/2023  1. Negative for pulmonary embolus. 2. Emphysema with mild right basilar segmental atelectasis versus pneumonia. 3. Indeterminate 9 mm lingular nodule. RECOMMENDATIONS: 9 mm suspicious left solid pulmonary nodule. Consider a non-contrast Chest CT at 3 months, a PET/CT, or tissue sampling. These guidelines do not apply to immunocompromised patients and patients with cancer. Follow up in patients with significant comorbidities as clinically warranted. For lung cancer screening, adhere to Lung-RADS guidelines. Reference: Radiology. 2017; 284(1):228-43. Physical Examination:        General appearance:  alert, cooperative and no distress  Mental Status:  oriented to person, place and time and normal affect  Lungs: Rhonchi auscultated to bilateral bases,fine rales note throughout, decreased air exchange.   Normal effort at rest but does get dyspneic with exertion  Heart:  regular rate and rhythm, no murmur  Abdomen:  soft, nontender, nondistended, normal bowel sounds, no masses, hepatomegaly, splenomegaly  Extremities:  no edema, redness, tenderness in the calves  Skin:  no gross lesions, rashes, induration  Positive for generalized weakness  Assessment:        Hospital Problems             Last Modified POA    * (Principal) COVID 1/13/2023 Yes    Acute respiratory failure with hypoxia ([address] 1/13/2023 Yes    COPD exacerbation ([address] 1/13/2023 Yes    Elevated troponin 1/13/2023 Yes    Tobacco abuse 1/13/2023 Yes       Plan:        Completed full course of Paxlovid this morning   Continue aerosols and bronchodilators  Give lasix x 1 dose  Monitor labs, replace electrolytes as needed  Start high dose prednisone taper   Continue DVT prophylaxis  Continue supplemental oxygen, wean as able  Incentive spirometer and acapella every hour while awake,   Monitor and control blood pressure  Continue PT/OT  Discharge planning back to assisted living versus SNF,daughter is on board with ECF, referrals sent, if accepted will need precert    Plan discussed with patient and staff    ALEXSANDRA Lozano NP  1/18/2023  9:56 AM

## 2023-01-18 NOTE — CARE COORDINATION
Social work: Sammi Mcelroy is OON with [facility]; Maribeth Runner is checking bed availability at [facility] location. [facility] is also able to accommodate, but they are second choice d/t location. [phone] ext [phone]), patient can return with oxygen if he can regulate himself. However, d/t COVID diagnosis he would have to be isolated to his apartment. Zaynab would prefer patient goes to SNF for short-term then return to [address] [place] [address]. SW confirmed they will keep his apartment.

## 2023-01-18 NOTE — CARE COORDINATION
Discharge planning    Patient up to 10 L HF. Did send francisco at [facility] the referral to follow. He does meet criteria and can start precert at any time. Will need to see how patient does tomorrow and discuss on rounds with attending tomorrow.

## 2023-01-18 NOTE — PROGRESS NOTES
Occupational Therapy  Facility/Department: [facility] PROGRESSIVE CARE  Rehabilitation Occupational Therapy Daily Treatment Note    Date: 23  Patient Name: Jaylin Humphrey       Room: 0472/4506-87  MRN: [id number]  Account: [de-identified]   : 1945  (78 y.o.) Gender: male      AMARI Dempsey reports patient is medically stable for therapy treatment this date. Chart reviewed prior to treatment and patient is agreeable for therapy. All lines intact and patient positioned comfortably at end of treatment. All patient needs addressed prior to ending therapy session. Pt currently functioning below baseline. Recommend daily inpatient skilled therapy at time of discharge to maximize long term outcomes and prevent re-admission. Please refer to AM-PAC score for current level of function. Past Medical History:  has a past medical history of Back pain, Bipolar 1 disorder ([facility] Cancer ([facility] COPD (chronic obstructive pulmonary disease) ([facility] GERD (gastroesophageal reflux disease), and Heart attack ([facility] Past Surgical History:   has a past surgical history that includes Anus surgery; hernia repair; and Colonoscopy (N/A, 2018). Restrictions  Restrictions/Precautions: Up as Tolerated;General Precautions;Isolation  Other position/activity restrictions: Up as tolerated, telemetry, 4L O2 via NC, R wrist IV, Droplet+ precautions d/t COVID+    Subjective  Subjective: Pt resting in bed upon arrival agreeable to therapy. No c/o at this time. Restrictions/Precautions: Up as Tolerated;General Precautions;Isolation             Objective     Cognition  Overall Cognitive Status: Exceptions  Arousal/Alertness: Appropriate responses to stimuli  Following Commands: Follows one step commands with repetition; Follows one step commands with increased time  Attention Span: Attends with cues to redirect  Memory: Decreased short term memory;Decreased recall of precautions;Decreased recall of recent events  Safety Judgement: Decreased awareness of need for assistance;Decreased awareness of need for safety  Problem Solving: Decreased awareness of errors;Assistance required to identify errors made;Assistance required to generate solutions;Assistance required to correct errors made;Assistance required to implement solutions  Insights: Decreased awareness of deficits  Initiation: Does not require cues  Sequencing: Requires cues for some  Orientation  Overall Orientation Status: Impaired  Orientation Level: Oriented to person;Oriented to time;Disoriented to situation;Oriented to place         ADL  Putting On/Taking Off Footwear  Assistance Level: Stand by assist;Set-up  Skilled Clinical Factors: Able to perform figure four to arnaud B socks while seated EOB with SpO2 decreasing to 87-88% and recovering back to 90-91% in ~20 seconds. Functional Mobility  Device: Rolling walker  Assistance Level: Contact guard assist;Minimal assistance  Skilled Clinical Factors: Ambulated in room to door <> EOB with SpO2 maintaining at 90%  and attempted again doubling distance with SpO2 decreasing to 87-88% taking ~20-30 to recover to 90-91%. No LOBs throughout, but poor awareness of lines. Max VC's for sequening of movement, pursed lip breathing, overall safety, taking proper rest breaks, RW safety/mgmt, slow/controlled movement, and awareness/assist with lines. Bed Mobility  Overall Assistance Level: Stand By Assist  Additional Factors: Head of bed raised; With handrails; Increased time to complete  Supine to Sit  Assistance Level: Stand by assist  Scooting  Assistance Level: Stand by assist  Skilled Clinical Factors: Scooting fully to EOB  Transfers  Additional Factors: Hand placement cues; Verbal cues; Increased time to complete  Device: Walker  Sit to Stand  Assistance Level: Contact guard assist  Skilled Clinical Factors:  Max VC's for squaring self/AD and reaching back prior to sitting, pushing up from surface with B hands when standing, upright posture, pursed lip breathing, controlled sit/stand, RW safety/mgmt, and awareness/assist with lines. Stand to Sit  Assistance Level: Contact guard assist   OT Exercises  Exercise Treatment: While seated in chair pt completed UB theraband exercises with lime green band for 1 set of 10 reps in all planes to increase UB strength for increased ease and IND with ADL tasks, functional transfers, and mobility. Demo'd difficulty with staying on task to current exercise and for counting reps. Required Min rest breaks throughout with SpO2 maintaining at 90-91%. Assessment  Assessment  Assessment: Pt slightly progressing toward goals but still limited by globalized deconditioning only able to maintain standing for ~1-2 before becoming SOB and SpO2 decreasing on 4L. Pt also with some decreased safety awareness having no awareness of lines when up and needing cues for taking proper rest breaks. Pt given handouts on theraband exercises, COPD, and EC/WS. Writer read through and summarized handouts for him but would benefit from increased edu on handouts. Continued skilled OT services are indicated at this time to maximize this pt's safety and IND with self care tasks and to facilitate safe return to PLOF as able. Activity Tolerance: Patient limited by endurance; Patient limited by fatigue  Discharge Recommendations: Patient would benefit from continued therapy after discharge  Safety Devices  Safety Devices in place: Yes  Type of devices: Left in chair;Chair alarm in place;Nurse notified;Call light within reach;Gait belt; All fall risk precautions in place; Patient at risk for falls    Patient Education  Education  Education Given To: Patient  Education Provided: Role of Therapy;Home Exercise Program;Safety;Precautions; Energy Conservation;Transfer Training;Mobility Training;ADL Function  Education Method: Demonstration;Verbal;Printed Information/Hand-outs  Barriers to Learning: Cognition;Vision (does not have glasses here)  Education Outcome: Verbalized understanding;Continued education needed    cess Code: [email or web address]  URL: [email or web address]  Date: 01/18/2023  Prepared by: Katrin Lizama II    Exercises  Seated Shoulder Horizontal Abduction with Resistance - 1 x daily - 7 x weekly - 3 sets - 10 reps  Seated Elbow Flexion with Self-Anchored Resistance - 1 x daily - 7 x weekly - 3 sets - 10 reps  Seated Shoulder Flexion with Self-Anchored Resistance - 1 x daily - 7 x weekly - 3 sets - 10 reps  Seated Single Shoulder PNF D2 with Resistance - 1 x daily - 7 x weekly - 3 sets - 10 reps  Standing Shoulder Row with Anchored Resistance - 1 x daily - 7 x weekly - 3 sets - 10 reps  Chest Press with Resistance - 1 x daily - 7 x weekly - 3 sets - 10 reps  Seated Elbow Extension with Self-Anchored Resistance - 1 x daily - 7 x weekly - 3 sets - 10 reps    Patient Education  COPD  What Is COPD? My COPD Action Plan  Understanding Energy Conservation  Home Management  Energy Conservation During Daily Tasks    Plan  Occupational Therapy Plan  Times Per Week: 4-5x/week 1x/day as tolerated  Current Treatment Recommendations: Strengthening;Balance training;Functional mobility training; Endurance training; Safety education & training;Cognitive reorientation;Patient/Caregiver education & training;Self-Care / ADL;Cognitive/Perceptual training;Equipment evaluation, education, & procurement    Goals  Patient Goals   Patient goals : To go home! Short Term Goals  Time Frame for Short Term Goals: By discharge, pt to demo:  Short Term Goal 1: ADL transfers and functional mobility tasks with Good safety and SBA with use of AD as needed. Short Term Goal 2: UB ADLs to SBA and LB ADLs to MinAx1 with Good safety and use of AE/compensatory strategies/AD as needed. Short Term Goal 3: increased BUE strength by 1/2 grade to promote strength/ROM required for safety and IND with self care tasks.   Short Term Goal 4: increased standing tolerance to > 8 mins w/ Good safety and use of AD as needed in order to promote standing balance and reduce the risk of falls. Short Term Goal 5: toileting tasks to SBA with Good safety and use of AD/grab bars/BSC as needed. Long Term Goals  Long Term Goal 1: Pt to be IND with fall prevention strategies, EC/WS tech, covid-19 education, COPD & smoking cessation education, discharge/equipment recommendations, and a BUE HEP with use of handouts as needed.     AM-PAC Score        AM-PAC Inpatient Daily Activity Raw Score: 16 (01/18/23 1100)  AM-PAC Inpatient ADL T-Scale Score : 35.96 (01/18/23 1100)  ADL Inpatient CMS 0-100% Score: 53.32 (01/18/23 1100)  ADL Inpatient CMS G-Code Modifier : CK (01/18/23 1100)      Therapy Time   Individual Concurrent Group Co-treatment   Time In 1042         Time Out 1124         Minutes Sahra Everett, COLIN

## 2023-01-18 NOTE — PROGRESS NOTES
Called to the room by PCT d/t oxygen sat of 87%. Scheduled IV lasix given. Writer increased oxygen to 5L and then 6L nasal cannula with no improvement. Respiratory notified and increased oxygen to 10L high flow salter. Oxygen currently 93%.  Patient instructed to use the urinal for the time being to conserve energy, he is agreeable    1300- CXR ordered and Dr. Ambreen Domingo with pulmonary consulted

## 2023-01-18 NOTE — PLAN OF CARE
Problem: Respiratory - Adult  Goal: Achieves optimal ventilation and oxygenation  1/17/2023 2037 by Pasquale Patel RCP  Outcome: Progressing

## 2023-01-18 NOTE — CARE COORDINATION
Social work:  [facility] is able to accept, however, patient is now up to 10L high flow oxygen which they cannot accommodate. Patient may need ltach placement, d/t respiratory status. [facility] will continue to follow.

## 2023-01-18 NOTE — PLAN OF CARE
Leela Hernandez is resting well this shift with no s/s or c/o pain. He is maintaining saturations in high 80s/low 90s on 4L via nc. He continues to decline to elevate HOB. He exhibits SOB with any activity. He has been ambulating to bathroom and using urinal at bedside. He reported difficulty starting urine stream recently. He reports he is frequently hungry; explained this could be s/e r/t steroid use. He had a lunch box after toileting at 0200. He is afebrile on antiviral therapy for COVID and isolation precautions observed. He is generally calm and cooperative with care. He has call light in reach and bed alarm active; safety maintained. Problem: Skin/Tissue Integrity  Goal: Absence of new skin breakdown  Description: 1.   Monitor for areas of redness and/or skin breakdown  1/18/2023 0447 by Patrick Shirley  Outcome: Progressing     Problem: Safety - Adult  Goal: Free from fall injury  1/18/2023 0447 by Patrick Shirley  Outcome: Progressing     Problem: Respiratory - Adult  Goal: Achieves optimal ventilation and oxygenation  1/18/2023 0447 by Janie CASTILLO [address] (Taken 1/18/2023 0447)  Achieves optimal ventilation and oxygenation:   Assess for changes in respiratory status   Position to facilitate oxygenation and minimize respiratory effort   Assess for changes in mentation and behavior   Oxygen supplementation based on oxygen saturation or arterial blood gases   Respiratory therapy support as indicated     Problem: Pain  Goal: Verbalizes/displays adequate comfort level or baseline comfort level  1/18/2023 0447 by Patrick Shirley  Outcome: Progressing  Flowsheets (Taken 1/17/2023 2014)  Verbalizes/displays adequate comfort level or baseline comfort level:   Encourage patient to monitor pain and request assistance   Assess pain using appropriate pain scale

## 2023-01-18 NOTE — CONSULTS
Pulmonary Medicine and [facility] APRN-CNP/Elba Alvarez MD      Patient - Geo Tanner   MRN -  [id number]   Acct # - [de-identified]   - 1945      Date of Admission -  2023  2:14 AM  Date of evaluation -  2023  Room - [address] [place] [address] [address], [place] Primary Care Physician - No primary care provider on file. Reason for Consult    Hypoxia    Assessment   Acute hypoxic respiratory failure  Acute exacerbation of COPD/emphysema  Active smoking, 30-pack-year history  COVID-19  Right basilar atelectasis versus pneumonia  Indeterminate 9 mm lingular nodule    Recommendations   Oxygen via high flow nasal cannula, keep SPO2 90% or greater   Incentive spirometry every hour while awake   Acapella  Discontinue Symbicort   Start budesonide and Brovana via nebulizer every 12 hours   Discontinue oral prednisone and start IV solu-medrol  Albuterol and Ipratropium Q 4 hours and prn  Status post course of paxlovid  Status post one-time dose of IV Lasix  X-ray chest in am  Labs: CBC and BMP in am  DVT prophylaxis with low molecular weight heparin  PT/OT as tolerated  PFTs as an outpatient  PET scan as an outpatient  Discussed with RN  Above assessment and plan will be reviewed with Dr. Oleg Alvarez. Patient plan will be finalized following review by Dr. Oleg Alvarez. Will follow with you    Problem List      Patient Active Problem List   Diagnosis    COVID    Acute respiratory failure with hypoxia (HCC)    COPD exacerbation (HCC)    Elevated troponin    Tobacco abuse       HPI     Geo Tanner is 78 y.o.,  male who has been at the hospital since 2023 with COVID. Today patient had oxygen desaturations to 87%. Patient had been given IV Lasix and oxygen has been increased to 6 L with no improvement. Patient had been been placed on high flow nasal cannula via salter at 10 L with improvement in saturations to 93%. We were consulted for further management.   He does have a known history of COPD according to his chart. He is an active smoker about 1 pack a day for the past 30 years or so. He does not use oxygen at home. He is not use any inhalers. He has never been checked for sleep apnea nor does he follow with a pulmonologist.  He is currently resting in bed on his side with SPO2 of 93% on 10 L high flow nasal cannula. He tells me he feels fine. He has occasional loose cough. No chest pain.     PMHx   Past Medical History      Diagnosis Date    Back pain     Bipolar 1 disorder ([facility]     Cancer ([facility]     bowel    COPD (chronic obstructive pulmonary disease) (HCC)     GERD (gastroesophageal reflux disease)     Heart attack [facility])       Past Surgical History        Procedure Laterality Date    ANUS SURGERY      bowel surgery r/t cancer approx 4 years ago    COLONOSCOPY N/A 7/30/2018    COLONOSCOPY POLYPECTOMY HOT BIOPSY performed by Korey Gaspar DO at [address]    Current Medications    predniSONE  40 mg Oral Daily    Followed by    Harry Maxwell ON 1/21/2023] predniSONE  30 mg Oral Daily    Followed by    Harry Maxwell ON 1/24/2023] predniSONE  20 mg Oral Daily    Followed by    Harry Maxwell ON 1/27/2023] predniSONE  10 mg Oral Daily    enoxaparin  30 mg SubCUTAneous Daily    metoprolol succinate  25 mg Oral Daily    vitamin D  50,000 Units Oral Once per day on Sat    divalproex  1,000 mg Oral Daily    sodium chloride flush  5-40 mL IntraVENous 2 times per day    QUEtiapine  150 mg Oral Nightly    ipratropium-albuterol  1 ampule Inhalation Q4H WA    aspirin  81 mg Oral Daily    nicotine  1 patch TransDERmal Daily    budesonide-formoterol  2 puff Inhalation BID    lamoTRIgine  100 mg Oral Daily    QUEtiapine  200 mg Oral BID     sodium chloride flush, sodium chloride, ondansetron **OR** ondansetron, polyethylene glycol, acetaminophen **OR** acetaminophen, guaiFENesin-dextromethorphan, aluminum & magnesium hydroxide-simethicone  IV Drips/Infusions   sodium chloride       Home Medications  Medications Prior to Admission: QUEtiapine (SEROQUEL) 400 MG tablet, Take 400 mg by mouth at bedtime  lamoTRIgine (LAMICTAL) 100 MG tablet, Take 100 mg by mouth daily  divalproex (DEPAKOTE ER) 500 MG extended release tablet, Take 2 tablets by mouth daily (Patient taking differently: Take 1,000 mg by mouth nightly)  QUEtiapine (SEROQUEL XR) 150 MG TB24 extended release tablet, Take 1 tablet by mouth daily (Patient taking differently: Take 150 mg by mouth at bedtime)    Allergies    Patient has no known allergies. Social History     Social History     Tobacco Use    Smoking status: Some Days     Types: Cigarettes, Pipe    Smokeless tobacco: Never    Tobacco comments:     unable to identify an amount   Substance Use Topics    Alcohol use: No     Family History          Problem Relation Age of Onset    Cancer Father         Lung    Stroke Father     Cancer Brother         Lymphoma    Mental Illness Other         Aunt     ROS - 11 systems   General Denies any fever or chills  HEENT Denies any diplopia, tinnitus or vertigo  Resp positive for  cough with sputum, dyspnea  Cardiac Denies any chest pain, palpitations, claudication or edema  GI Denies any melena, hematochezia, hematemesis or pyrosis   Denies any frequency, urgency, hesitancy or incontinence  Heme Denies bruising or bleeding easily  Endocrine Denies any history of diabetes or thyroid disease  Neuro Denies any focal motor or sensory deficits  Psychiatric Denies anxiety, depression, suicidal ideation  Skin Denies rashes, itching, open sores    Vitals     height is 5' 10\" (1.778 m) and weight is 206 lb (93.4 kg). His oral temperature is 97.3 °F (36.3 °C). His blood pressure is 119/67 and his pulse is 64. His respiration is 20 and oxygen saturation is 91%. Body mass index is 29.56 kg/m².   I/O      Intake/Output Summary (Last 24 hours) at 1/18/2023 1256  Last data filed at 1/18/2023 0110  Gross per 24 hour   Intake 600 ml   Output 100 ml   Net 500 ml     I/O last 3 completed shifts: In: 5 [P.O.:720]  Out: 1000 [Urine:1000]   Patient Vitals for the past 96 hrs (Last 3 readings):   Weight   01/15/23 0036 206 lb (93.4 kg)     Exam   General Appearance Awake, alert, oriented, in no acute distress  HEENT - Head is normocephalic, atraumatic. Pupil reactive to light  Neck - Supple,  trachea midline and straight  Lungs -decreased air exchange, positive wheezing  Cardiovascular - Heart sounds are normal.  Regular rhythm normal rate without murmur, gallop or rub. Abdomen - Soft, nontender, nondistended, no masses or organomegaly  Neurologic - CN II-XII are grossly intact.  There are no focal motor or sensory deficits  Skin - No bruising or bleeding  Extremities - No cyanosis, clubbing or edema    Labs  - Old records and notes have been reviewed in [facility]   CBC     Lab Results   Component Value Date/Time    WBC 13.4 01/17/2023 05:12 AM    RBC 5.20 01/17/2023 05:12 AM    HGB 16.0 01/17/2023 05:12 AM    HCT 50.6 01/17/2023 05:12 AM     01/17/2023 05:12 AM    MCV 97.3 01/17/2023 05:12 AM    MCH 30.8 01/17/2023 05:12 AM    MCHC 31.6 01/17/2023 05:12 AM    RDW 14.2 01/17/2023 05:12 AM    LYMPHOPCT 11 01/17/2023 05:12 AM    MONOPCT 3 01/17/2023 05:12 AM    BASOPCT 0 01/17/2023 05:12 AM    MONOSABS 0.46 01/17/2023 05:12 AM    LYMPHSABS 1.41 01/17/2023 05:12 AM    EOSABS <0.03 01/17/2023 05:12 AM    BASOSABS <0.03 01/17/2023 05:12 AM    DIFFTYPE NOT REPORTED 07/19/2018 12:03 PM     BMP   Lab Results   Component Value Date/Time     01/17/2023 05:12 AM    K 4.5 01/17/2023 05:12 AM     01/17/2023 05:12 AM    CO2 29 01/17/2023 05:12 AM    BUN 24 01/17/2023 05:12 AM    CREATININE 0.82 01/17/2023 05:12 AM    GLUCOSE 122 01/17/2023 05:12 AM    CALCIUM 9.2 01/17/2023 05:12 AM    MG 2.1 07/19/2018 12:03 PM     LFTS  Lab Results   Component Value Date/Time    ALKPHOS 51 01/14/2023 05:31 AM    ALT 16 01/14/2023 05:31 AM    AST 27 01/14/2023 05:31 AM    PROT 6.6 01/14/2023 05:31 AM    BILITOT 0.2 01/14/2023 05:31 AM    BILIDIR <0.1 01/13/2023 08:24 AM    IBILI Can not be calculated 01/13/2023 08:24 AM    LABALBU 3.4 01/14/2023 05:31 AM       Radiology    CXR  1/18/2023      CTA chest 1/14/2023     (See actual reports for details)    \"Thank you for asking us to see this patient\"    Case discussed with nurse and patient. Questions and concerns addressed.     Electronically signed by     ALEXSANDRA Salazar-CNP  Pulmonary Critical Care and Sleep Medicine,  Patient seen under the supervision of Eva Pan MD, [facility]

## 2023-01-19 PROCEDURE — 6370000000 HC RX 637 (ALT 250 FOR IP): Performed by: STUDENT IN AN ORGANIZED HEALTH CARE EDUCATION/TRAINING PROGRAM

## 2023-01-19 PROCEDURE — 94640 AIRWAY INHALATION TREATMENT: CPT

## 2023-01-19 PROCEDURE — 6360000002 HC RX W HCPCS: Performed by: NURSE PRACTITIONER

## 2023-01-19 PROCEDURE — 2700000000 HC OXYGEN THERAPY PER DAY

## 2023-01-19 PROCEDURE — 2580000003 HC RX 258: Performed by: NURSE PRACTITIONER

## 2023-01-19 PROCEDURE — 94761 N-INVAS EAR/PLS OXIMETRY MLT: CPT

## 2023-01-19 PROCEDURE — 6370000000 HC RX 637 (ALT 250 FOR IP): Performed by: INTERNAL MEDICINE

## 2023-01-19 PROCEDURE — 2060000000 HC ICU INTERMEDIATE R&B

## 2023-01-19 PROCEDURE — 6370000000 HC RX 637 (ALT 250 FOR IP): Performed by: NURSE PRACTITIONER

## 2023-01-19 PROCEDURE — 99232 SBSQ HOSP IP/OBS MODERATE 35: CPT | Performed by: NURSE PRACTITIONER

## 2023-01-19 RX ORDER — ENOXAPARIN SODIUM 100 MG/ML
40 INJECTION SUBCUTANEOUS DAILY
Status: DISCONTINUED | OUTPATIENT
Start: 2023-01-20 | End: 2023-01-27 | Stop reason: HOSPADM

## 2023-01-19 RX ADMIN — ARFORMOTEROL TARTRATE 15 MCG: 15 SOLUTION RESPIRATORY (INHALATION) at 20:26

## 2023-01-19 RX ADMIN — ARFORMOTEROL TARTRATE 15 MCG: 15 SOLUTION RESPIRATORY (INHALATION) at 08:05

## 2023-01-19 RX ADMIN — QUETIAPINE FUMARATE 200 MG: 200 TABLET ORAL at 08:23

## 2023-01-19 RX ADMIN — SODIUM CHLORIDE, PRESERVATIVE FREE 10 ML: 5 INJECTION INTRAVENOUS at 21:14

## 2023-01-19 RX ADMIN — BUDESONIDE INHALATION 500 MCG: 0.5 SUSPENSION RESPIRATORY (INHALATION) at 20:26

## 2023-01-19 RX ADMIN — METHYLPREDNISOLONE SODIUM SUCCINATE 40 MG: 40 INJECTION, POWDER, FOR SOLUTION INTRAMUSCULAR; INTRAVENOUS at 14:10

## 2023-01-19 RX ADMIN — ASPIRIN 81 MG: 81 TABLET, CHEWABLE ORAL at 08:23

## 2023-01-19 RX ADMIN — QUETIAPINE FUMARATE 150 MG: 150 TABLET, EXTENDED RELEASE ORAL at 21:14

## 2023-01-19 RX ADMIN — IPRATROPIUM BROMIDE AND ALBUTEROL SULFATE 1 AMPULE: 2.5; .5 SOLUTION RESPIRATORY (INHALATION) at 14:45

## 2023-01-19 RX ADMIN — METOPROLOL SUCCINATE 25 MG: 25 TABLET, EXTENDED RELEASE ORAL at 08:23

## 2023-01-19 RX ADMIN — Medication 500 MG: at 11:45

## 2023-01-19 RX ADMIN — DIVALPROEX SODIUM 1000 MG: 500 TABLET, EXTENDED RELEASE ORAL at 08:23

## 2023-01-19 RX ADMIN — METHYLPREDNISOLONE SODIUM SUCCINATE 40 MG: 40 INJECTION, POWDER, FOR SOLUTION INTRAMUSCULAR; INTRAVENOUS at 02:19

## 2023-01-19 RX ADMIN — BUDESONIDE INHALATION 500 MCG: 0.5 SUSPENSION RESPIRATORY (INHALATION) at 08:08

## 2023-01-19 RX ADMIN — QUETIAPINE FUMARATE 200 MG: 200 TABLET ORAL at 21:14

## 2023-01-19 RX ADMIN — IPRATROPIUM BROMIDE AND ALBUTEROL SULFATE 1 AMPULE: 2.5; .5 SOLUTION RESPIRATORY (INHALATION) at 11:00

## 2023-01-19 RX ADMIN — SODIUM CHLORIDE, PRESERVATIVE FREE 10 ML: 5 INJECTION INTRAVENOUS at 08:24

## 2023-01-19 RX ADMIN — IPRATROPIUM BROMIDE AND ALBUTEROL SULFATE 1 AMPULE: 2.5; .5 SOLUTION RESPIRATORY (INHALATION) at 20:27

## 2023-01-19 RX ADMIN — GUAIFENESIN AND DEXTROMETHORPHAN 5 ML: 100; 10 SYRUP ORAL at 11:45

## 2023-01-19 RX ADMIN — LAMOTRIGINE 100 MG: 100 TABLET ORAL at 08:23

## 2023-01-19 RX ADMIN — METHYLPREDNISOLONE SODIUM SUCCINATE 40 MG: 40 INJECTION, POWDER, FOR SOLUTION INTRAMUSCULAR; INTRAVENOUS at 08:23

## 2023-01-19 RX ADMIN — IPRATROPIUM BROMIDE AND ALBUTEROL SULFATE 1 AMPULE: 2.5; .5 SOLUTION RESPIRATORY (INHALATION) at 08:08

## 2023-01-19 RX ADMIN — METHYLPREDNISOLONE SODIUM SUCCINATE 40 MG: 40 INJECTION, POWDER, FOR SOLUTION INTRAMUSCULAR; INTRAVENOUS at 21:14

## 2023-01-19 RX ADMIN — ENOXAPARIN SODIUM 30 MG: 100 INJECTION SUBCUTANEOUS at 08:24

## 2023-01-19 ASSESSMENT — PAIN SCALES - GENERAL: PAINLEVEL_OUTOF10: 0

## 2023-01-19 NOTE — PROGRESS NOTES
[facility]  Office: [address], DO, Ozzie Jeanine, DO, Helen Guyo, DO, Jayro Pressley Blood, DO, Cordella Holstein, MD, Eligio Weaver MD, Gilberto Rodney MD, Kody Kruger MD,  Stuart Galvez MD, Shauna Babcock MD, Gonsalo Valenzuela, DO, Penelope Hurst MD,  Renetta Magallon MD, Selma Tilley MD, Ele Snell DO, Jeff Wright MD, Steven Nicole MD, Scarlet Kline DO, Slade Nguyễn MD, Patti Noonan MD, Emerson Robert MD, Brayan Ospina MD, Kenzie Wright DO, Tani Bermudez MD, Angelina Salas MD, Astrid Olguin, CNP,  Meenu Carcamo, CNP, Russell Howard, CNP, David Borges, CNP,  Chuck Vaughan, [facility], Michaela Montez, CNP, Ronny Matthews, CNP, Rigo Rizo, CNP, Andrea oChn, CNP, Mayte Kim, CNP, Mihir Bonilla PA-C, Patrick Marquez, CNS, Nacho Watson, CNP, Brenda Ndiaye, CNP         [facility]    Progress Note    1/19/2023    11:00 AM    Name:   Zaria Walters  MRN:     [id number]     Acct:      [de-identified]   Room:   [address] [place] [address] Day:  6  Admit Date:  1/13/2023  2:14 AM    PCP:   No primary care provider on file. Code Status:  Full Code    Subjective:     C/C:   Chief Complaint   Patient presents with    Fatigue     Pt was found on his knees outside of assisted living. Pt was outside smoking and slid out of a chair. Interval History Status: improved. Patient is lying comfortably in bed. Patient became more hypoxic yesterday afternoon and is now requiring 10 L of oxygen via salter nasal cannula. SPO2 91 to 95%. Repeat chest x-ray is stable, D-dimer has improved since admission and initial CT was negative for PE  Pulmonology was consulted yesterday  Brief History:   Mr. Shira Bartlett is a 78 yr old with a history of COPD lives in assisted living facility presented to hospital with weakness, shortness of breath.  Patient states that he had a usual morning routine, drank his coffee and was trying to get out of wheel chair noted that he was very weak and fatigued. he also had cough started yesterday with white phlegm, no chest pain, no fever, no chills. Patient also started feeling short of breath. No diarrhea. No sick contacts that patient knows of. Patient smokes 2 packs every day and he has copd diagnosis in chart but not on any treatment, no pft seen in the chart. In the ER patient was noted to be hypoxic, 88%, requiring 2 lt oxygen. He was afebrile. Electrolytes normal. Patient was seen to have elevated troponin of 34 which trended up to 51 and 47. Patient does not report any chest pain. D-dimer 1.63, COVID positive, x-ray does not show any acute abnormality. He has been started on Paxlovid  Review of Systems:     Constitutional:  negative for chills, fevers, sweats  Respiratory: Positive for cough, dyspnea on exertion, shortness of breath. negative for wheezing  Cardiovascular:  negative for chest pain, chest pressure/discomfort, lower extremity edema, palpitations  Gastrointestinal:  negative for abdominal pain, constipation, diarrhea, nausea, vomiting  Neurological:  negative for dizziness, headache    Medications:      Allergies:  No Known Allergies    Current Meds:   Scheduled Meds:    budesonide  0.5 mg Nebulization BID    arformoterol tartrate  15 mcg Nebulization BID    methylPREDNISolone  40 mg IntraVENous Q6H    enoxaparin  30 mg SubCUTAneous Daily    metoprolol succinate  25 mg Oral Daily    vitamin D  50,000 Units Oral Once per day on Sat    divalproex  1,000 mg Oral Daily    sodium chloride flush  5-40 mL IntraVENous 2 times per day    QUEtiapine  150 mg Oral Nightly    ipratropium-albuterol  1 ampule Inhalation Q4H WA    aspirin  81 mg Oral Daily    nicotine  1 patch TransDERmal Daily    lamoTRIgine  100 mg Oral Daily    QUEtiapine  200 mg Oral BID     Continuous Infusions:    sodium chloride       PRN Meds: calcium carbonate, sodium chloride flush, sodium chloride, ondansetron **OR** ondansetron, polyethylene glycol, acetaminophen **OR** acetaminophen, guaiFENesin-dextromethorphan, aluminum & magnesium hydroxide-simethicone    Data:     Past Medical History:   has a past medical history of Back pain, Bipolar 1 disorder ([facility] Cancer ([facility] COPD (chronic obstructive pulmonary disease) ([facility] GERD (gastroesophageal reflux disease), and Heart attack ([facility] Social History:   reports that he has been smoking cigarettes and pipe. He has never used smokeless tobacco. He reports that he does not drink alcohol and does not use drugs. Family History:   Family History   Problem Relation Age of Onset    Cancer Father         Lung    Stroke Father     Cancer Brother         Lymphoma    Mental Illness Other         Aunt       Vitals:  BP (!) 143/70   Pulse 67   Temp 97.6 °F (36.4 °C) (Oral)   Resp 18   Ht 5' 10\" (1.778 m)   Wt 206 lb (93.4 kg)   SpO2 92%   BMI 29.56 kg/m²   Temp (24hrs), Av.5 °F (36.4 °C), Min:97.2 °F (36.2 °C), Max:98.4 °F (36.9 °C)    No results for input(s): POCGLU in the last 72 hours. I/O (24Hr): Intake/Output Summary (Last 24 hours) at 2023 1100  Last data filed at 2023 0854  Gross per 24 hour   Intake 120 ml   Output 350 ml   Net -230 ml       Labs:  Hematology:  Recent Labs     23  1446   WBC 13.4*  --    RBC 5.20  --    HGB 16.0  --    HCT 50.6*  --    MCV 97.3  --    MCH 30.8  --    MCHC 31.6  --    RDW 14.2  --      --    MPV 10.3  --    DDIMER  --  0.57     Chemistry:  Recent Labs     23  0512      K 4.5      CO2 29   GLUCOSE 122*   BUN 24*   CREATININE 0.82   ANIONGAP 10   LABGLOM >60   CALCIUM 9.2     No results for input(s): PROT, LABALBU, LABA1C, G7AUUVY, H2XUIGR, FT4, TSH, AST, ALT, LDH, GGT, ALKPHOS, LABGGT, BILITOT, BILIDIR, AMMONIA, AMYLASE, LIPASE, LACTATE, CHOL, HDL, LDLCHOLESTEROL, CHOLHDLRATIO, TRIG, VLDL, HCS50UD, PHENYTOIN, PHENYF, URICACID, POCGLU in the last 72 hours.     ABG:No results found for: POCPH, PHART, PH, POCPCO2, AAK6DWF, PCO2, POCPO2, PO2ART, PO2, POCHCO3, RTC9ZNF, HCO3, NBEA, PBEA, BEART, BE, THGBART, THB, TKM4ZBR, VLHW4TCU, J3LFVXGL, O2SAT, FIO2  No results found for: SPECIAL  No results found for: CULTURE    Radiology:  XR CHEST PORTABLE    Result Date: 1/16/2023  Recently described right basilar opacity with CT appears unchanged. No new findings identified in the interval.     XR CHEST PORTABLE    Result Date: 1/13/2023  Mild to moderate COPD but no acute cardiopulmonary abnormality evident. Possible soft tissue mass at the lateral left lower chest wall. Correlate clinically. CT CHEST PULMONARY EMBOLISM W CONTRAST    Result Date: 1/14/2023  1. Negative for pulmonary embolus. 2. Emphysema with mild right basilar segmental atelectasis versus pneumonia. 3. Indeterminate 9 mm lingular nodule. RECOMMENDATIONS: 9 mm suspicious left solid pulmonary nodule. Consider a non-contrast Chest CT at 3 months, a PET/CT, or tissue sampling. These guidelines do not apply to immunocompromised patients and patients with cancer. Follow up in patients with significant comorbidities as clinically warranted. For lung cancer screening, adhere to Lung-RADS guidelines. Reference: Radiology. 2017; 284(1):228-43. Physical Examination:        General appearance:  alert, cooperative and no distress  Mental Status:  oriented to person, place and time and normal affect  Lungs: Rhonchi auscultated to bilateral bases,fine rales note throughout, decreased air exchange.   Normal effort at rest but does get dyspneic with exertion  Heart:  regular rate and rhythm, no murmur  Abdomen:  soft, nontender, nondistended, normal bowel sounds, no masses, hepatomegaly, splenomegaly  Extremities:  no edema, redness, tenderness in the calves  Skin:  no gross lesions, rashes, induration  Positive for generalized weakness  Assessment:        Hospital Problems             Last Modified POA    * (Principal) COVID 1/13/2023 Yes    Acute respiratory failure with hypoxia ([facility] 1/13/2023 Yes    COPD exacerbation ([facility] 1/13/2023 Yes    Elevated troponin 1/13/2023 Yes    Tobacco abuse 1/13/2023 Yes       Plan:        Completed full course of Paxlovid on 1/18/23  Continue aerosols and bronchodilators  S/p lasix x 1 dose on 1/18/23  Monitor labs, replace electrolytes as needed  IV steroids resumed per pulmonology, appreciate recommendations  Continue DVT prophylaxis  Continue supplemental oxygen, wean as able  Incentive spirometer every hour while awake, Acapella  Monitor and control blood pressure  Continue PT/OT  Discharge planning now to LTAC with increased oxygen requirements, patient understands and is agreeable and plan has been discussed with patient's daughter.   Await pre-CERT  Plan discussed with patient and staff    ALEXSANDRA Everett NP  1/19/2023  11:00 AM

## 2023-01-19 NOTE — PROGRESS NOTES
End Of Shift Note  [facility]  Summary of shift: Pt with VSS with O2 sats ranging from 90-94% on 10L HiFlo NC. Pt requested robitussin prior to bed. Pt noted to have a moist sounding but non-productive cough.     Vitals:    Vitals:    01/18/23 2055 01/18/23 2330 01/19/23 0052 01/19/23 0428   BP: (!) 140/68  136/71 (!) 149/63   Pulse: 63 64 66 65   Resp: 16 18 20 18   Temp: 97.2 °F (36.2 °C)  97.5 °F (36.4 °C) 97.3 °F (36.3 °C)   TempSrc: Axillary  Axillary Axillary   SpO2: 94% 91% 94% 90%   Weight:       Height:            LDA:   Peripheral IV 01/15/23 Right Wrist (Active)   Number of days: 3

## 2023-01-19 NOTE — PROGRESS NOTES
[address]  Occupational Therapy Not Seen    DATE: 2023    NAME: Quyen Hernandez  MRN: [id number]   : 1945    Patient not seen this date for Occupational Therapy due to:      [x] Cancel by RN or physician due to: ON 10 liters high flow, desats with minimal activity. [] Hemodialysis    [] Critical Lab Value Level     [] Blood transfusion in progress    [] Acute or unstable cardiovascular status   _MAP < 55 or more than >115  _HR < 40 or > 130    [] Acute or unstable pulmonary status   -FiO2 > 60%   _RR < 5 or >40    _O2 sats < 85%    [] Strict Bedrest    [] Off Unit for surgery or procedure    [] Off Unit for testing       [] Pending imaging to R/O fracture    [] Refusal by Patient      [] Other      [] OT being discontinued at this time. Patient independent. No further needs. [] OT being discontinued at this time as the patient has been transferred to hospice care. No further needs.       Soheila Soto, COLIN

## 2023-01-19 NOTE — PROGRESS NOTES
Physical Therapy  DATE: 2023    NAME: Patrick Celeste  MRN: [id number]   : 1945    Patient not seen this date for Physical Therapy due to:      [x] Cancel by RN or physician due to:ON 10 liters high flow, desats with minimal activity. [] Hemodialysis    [] Critical Lab Value Level     [] Blood transfusion in progress    [] Acute or unstable cardiovascular status   _MAP < 55 or more than >115  _HR < 40 or > 130    [] Acute or unstable pulmonary status   -FiO2 > 60%   _RR < 5 or >40    _O2 sats < 85%    [] Strict Bedrest    [] Off Unit for surgery or procedure    [] Off Unit for testing       [] Pending imaging to R/O fracture    [] Refusal by Patient      [] Other      [] PT being discontinued at this time. Patient independent. No further needs. [] PT being discontinued at this time as the patient has been transferred to hospice care. No further needs.       Maki Salamanca, PT

## 2023-01-19 NOTE — PLAN OF CARE
Problem: Discharge Planning  Goal: Discharge to home or other facility with appropriate resources  1/19/2023 0854 by Danyel Downs RN  Outcome: Progressing     Problem: Skin/Tissue Integrity  Goal: Absence of new skin breakdown  Description: 1.   Monitor for areas of redness and/or skin breakdown  1/19/2023 0854 by Danyel Downs RN  Outcome: Progressing       Problem: Respiratory - Adult  Goal: Achieves optimal ventilation and oxygenation  1/19/2023 0854 by Danyel Downs RN  Outcome: Progressing     Problem: Pain  Goal: Verbalizes/displays adequate comfort level or baseline comfort level  1/19/2023 0854 by Danyel Downs RN  Outcome: Progressing

## 2023-01-19 NOTE — PROGRESS NOTES
Pulmonary Critical Care Progress Note  Charlotte Henriquez CNP/Elba Tijerina MD     Patient seen for the follow up of  COVID, acute hypoxic respiratory failure, active smoking/COPD    Subjective:  Uneventful night. He is currently resting comfortably in bed no distress. He remains on oxygen at 10 L via salter high flow nasal cannula. He feels the shortness of breath is doing better. He cough, mostly nonproductive. He denies chest pain. He is tolerating orals. Examination:  Vitals: BP (!) 143/70   Pulse 67   Temp 97.6 °F (36.4 °C) (Oral)   Resp 18   Ht 5' 10\" (1.778 m)   Wt 206 lb (93.4 kg)   SpO2 92%   BMI 29.56 kg/m²   General appearance: alert and cooperative with exam  Neck: No JVD  Lungs: Moderate to decreased air exchange, faint wheezing  Heart: regular rate and rhythm, S1, S2 normal, no gallop  Abdomen: Soft, non tender, + BS  Extremities: no cyanosis or clubbing.  No significant edema    LABs:  CBC:   Recent Labs     01/17/23  0512   WBC 13.4*   HGB 16.0   HCT 50.6*        BMP:   Recent Labs     01/17/23  0512      K 4.5   CO2 29   BUN 24*   CREATININE 0.82   LABGLOM >60   GLUCOSE 122*     Radiology:  X-ray chest 1/18/23      Impression:  Acute hypoxic respiratory failure  Acute exacerbation of COPD/emphysema  Active smoking, 30-pack-year history  COVID-19  Right basilar atelectasis versus pneumonia  Indeterminate 9 mm lingular nodule    Recommendations:  Oxygen via high flow nasal cannula, keep SPO2 90% or greater   Incentive spirometry every hour while awake   Acapella  Budesonide and Brovana via nebulizer every 12 hours   IV solu-medrol 40 mg every 8 hours  Albuterol and Ipratropium Q 4 hours and prn  Status post course of paxlovid  Status post one-time dose of IV Lasix  Labs: CBC and BMP in am  DVT prophylaxis with low molecular weight heparin  PT/OT as tolerated  PFTs as an outpatient  PET scan as an outpatient  LTAC discharge planning  Discussed with RN  Above assessment and plan will be reviewed with Dr. Dioni Fonseca. Patient plan will be finalized following review by Dr. Dioni Fonseca.   Will follow with you    ALEXSANDRA Vasquez-CNP   Pulmonary Critical Care and Sleep Medicine,  Patient seen under the supervision of Jeff Ramirez MD, [facility]

## 2023-01-19 NOTE — RT PROTOCOL NOTE
RT Inhaler-Nebulizer Bronchodilator Protocol Note    There is a bronchodilator order in the chart from a provider indicating to follow the RT Bronchodilator Protocol and there is an Initiate RT Inhaler-Nebulizer Bronchodilator Protocol order as well (see protocol at bottom of note). CXR Findings:  XR CHEST PORTABLE    Result Date: 1/18/2023  COPD with scattered subsegmental atelectasis at the lung bases. The findings from the last RT Protocol Assessment were as follows:   History Pulmonary Disease: Chronic pulmonary disease  Respiratory Pattern: Dyspnea on exertion or RR 21-25 bpm  Breath Sounds: Severe inspiratory and expiratory wheezing or severely diminished  Cough: Strong, spontaneous, non-productive  Indication for Bronchodilator Therapy: Decreased or absent breath sounds  Bronchodilator Assessment Score: 12    Aerosolized bronchodilator medication orders have been revised according to the RT Inhaler-Nebulizer Bronchodilator Protocol below. Respiratory Therapist to perform RT Therapy Protocol Assessment initially then follow the protocol. Repeat RT Therapy Protocol Assessment PRN for score 0-3 or on second treatment, BID, and PRN for scores above 3. No Indications - adjust the frequency to every 6 hours PRN wheezing or bronchospasm, if no treatments needed after 48 hours then discontinue using Per Protocol order mode. If indication present, adjust the RT bronchodilator orders based on the Bronchodilator Assessment Score as indicated below. Use Inhaler orders unless patient has one or more of the following: on home nebulizer, not able to hold breath for 10 seconds, is not alert and oriented, cannot activate and use MDI correctly, or respiratory rate 25 breaths per minute or more, then use the equivalent nebulizer order(s) with same Frequency and PRN reasons based on the score. If a patient is on this medication at home then do not decrease Frequency below that used at home.     0-3 - enter or revise RT bronchodilator order(s) to equivalent RT Bronchodilator order with Frequency of every 4 hours PRN for wheezing or increased work of breathing using Per Protocol order mode. 4-6 - enter or revise RT Bronchodilator order(s) to two equivalent RT bronchodilator orders with one order with BID Frequency and one order with Frequency of every 4 hours PRN wheezing or increased work of breathing using Per Protocol order mode. 7-10 - enter or revise RT Bronchodilator order(s) to two equivalent RT bronchodilator orders with one order with TID Frequency and one order with Frequency of every 4 hours PRN wheezing or increased work of breathing using Per Protocol order mode. 11-13 - enter or revise RT Bronchodilator order(s) to one equivalent RT bronchodilator order with QID Frequency and an Albuterol order with Frequency of every 4 hours PRN wheezing or increased work of breathing using Per Protocol order mode. Greater than 13 - enter or revise RT Bronchodilator order(s) to one equivalent RT bronchodilator order with every 4 hours Frequency and an Albuterol order with Frequency of every 2 hours PRN wheezing or increased work of breathing using Per Protocol order mode. RT to enter RT Home Evaluation for COPD & MDI Assessment order using Per Protocol order mode.     Electronically signed by Magnus Medina RCP on 1/19/2023 at 8:14 AM

## 2023-01-19 NOTE — PLAN OF CARE
Problem: Respiratory - Adult  Goal: Achieves optimal ventilation and oxygenation  1/19/2023 0813 by Dodie Loaiza RCP  Outcome: Progressing  1/18/2023 2331 by Saira Martinez RN  Outcome: Progressing  Flowsheets (Taken 1/18/2023 2000)  Achieves optimal ventilation and oxygenation:   Assess for changes in respiratory status   Assess for changes in mentation and behavior   Position to facilitate oxygenation and minimize respiratory effort   Oxygen supplementation based on oxygen saturation or arterial blood gases   Encourage broncho-pulmonary hygiene including cough, deep breathe, incentive spirometry   Assess and instruct to report shortness of breath or any respiratory difficulty   Respiratory therapy support as indicated  1/18/2023 1824 by Winston Nicolas RN  Outcome: Progressing

## 2023-01-19 NOTE — PLAN OF CARE
Problem: Discharge Planning  Goal: Discharge to home or other facility with appropriate resources  1/18/2023 2331 by Pauline Painting RN  Outcome: Progressing  Flowsheets (Taken 1/18/2023 2000)  Discharge to home or other facility with appropriate resources:   Identify barriers to discharge with patient and caregiver   Arrange for needed discharge resources and transportation as appropriate   Identify discharge learning needs (meds, wound care, etc)   Arrange for interpreters to assist at discharge as needed   Refer to discharge planning if patient needs post-hospital services based on physician order or complex needs related to functional status, cognitive ability or social support system  1/18/2023 1824 by Brian Lemus RN  Outcome: Progressing     Problem: Skin/Tissue Integrity  Goal: Absence of new skin breakdown  Description: 1. Monitor for areas of redness and/or skin breakdown  2. Assess vascular access sites hourly  3. Every 4-6 hours minimum:  Change oxygen saturation probe site  4. Every 4-6 hours:  If on nasal continuous positive airway pressure, respiratory therapy assess nares and determine need for appliance change or resting period.   1/18/2023 2331 by Pauline Painting RN  Outcome: Progressing  1/18/2023 1824 by Brian Lemus RN  Outcome: Progressing     Problem: ABCDS Injury Assessment  Goal: Absence of physical injury  1/18/2023 2331 by Pauline Painting RN  Outcome: Progressing  Flowsheets (Taken 1/18/2023 2000)  Absence of Physical Injury: Implement safety measures based on patient assessment  1/18/2023 1824 by Brian Lemus RN  Outcome: Progressing     Problem: Safety - Adult  Goal: Free from fall injury  1/18/2023 2331 by Pauline Painting RN  Outcome: Progressing  Flowsheets (Taken 1/18/2023 2000)  Free From Fall Injury: Instruct family/caregiver on patient safety  1/18/2023 1824 by Brian Lemus RN  Outcome: Progressing     Problem: Respiratory - Adult  Goal: Achieves optimal ventilation and oxygenation  1/18/2023 2331 by Sina Joseph RN  Outcome: Progressing  Flowsheets (Taken 1/18/2023 2000)  Achieves optimal ventilation and oxygenation:   Assess for changes in respiratory status   Assess for changes in mentation and behavior   Position to facilitate oxygenation and minimize respiratory effort   Oxygen supplementation based on oxygen saturation or arterial blood gases   Encourage broncho-pulmonary hygiene including cough, deep breathe, incentive spirometry   Assess and instruct to report shortness of breath or any respiratory difficulty   Respiratory therapy support as indicated  1/18/2023 1824 by Edu Philip RN  Outcome: Progressing     Problem: Pain  Goal: Verbalizes/displays adequate comfort level or baseline comfort level  1/18/2023 2331 by Sina Joseph RN  Outcome: Progressing  1/18/2023 1824 by Edu Philip RN  Outcome: Progressing     Problem: Infection - Adult  Goal: Absence of infection at discharge  1/18/2023 2331 by Sina Joseph RN  Outcome: Progressing  Flowsheets (Taken 1/18/2023 2000)  Absence of infection at discharge:   Assess and monitor for signs and symptoms of infection   Monitor lab/diagnostic results   Monitor all insertion sites i.e., indwelling lines, tubes and drains   Administer medications as ordered   Instruct and encourage patient and family to use good hand hygiene technique   Identify and instruct in appropriate isolation precautions for identified infection/condition  1/18/2023 1824 by Edu Philip RN  Outcome: Progressing

## 2023-01-20 ENCOUNTER — APPOINTMENT (OUTPATIENT)
Dept: GENERAL RADIOLOGY | Age: 78
End: 2023-01-20
Payer: MEDICARE

## 2023-01-20 LAB
ANION GAP SERPL CALCULATED.3IONS-SCNC: 15 MMOL/L (ref 9–17)
BUN BLDV-MCNC: 35 MG/DL (ref 8–23)
BUN/CREAT BLD: 36 (ref 9–20)
CALCIUM SERPL-MCNC: 8.6 MG/DL (ref 8.6–10.4)
CHLORIDE BLD-SCNC: 102 MMOL/L (ref 98–107)
CO2: 26 MMOL/L (ref 20–31)
CREAT SERPL-MCNC: 0.98 MG/DL (ref 0.7–1.2)
GFR SERPL CREATININE-BSD FRML MDRD: >60 ML/MIN/1.73M2
GLUCOSE BLD-MCNC: 324 MG/DL (ref 70–99)
POTASSIUM SERPL-SCNC: 4.1 MMOL/L (ref 3.7–5.3)
SODIUM BLD-SCNC: 143 MMOL/L (ref 135–144)

## 2023-01-20 PROCEDURE — 6370000000 HC RX 637 (ALT 250 FOR IP): Performed by: NURSE PRACTITIONER

## 2023-01-20 PROCEDURE — 99232 SBSQ HOSP IP/OBS MODERATE 35: CPT | Performed by: NURSE PRACTITIONER

## 2023-01-20 PROCEDURE — 94640 AIRWAY INHALATION TREATMENT: CPT

## 2023-01-20 PROCEDURE — 94761 N-INVAS EAR/PLS OXIMETRY MLT: CPT

## 2023-01-20 PROCEDURE — 6370000000 HC RX 637 (ALT 250 FOR IP): Performed by: INTERNAL MEDICINE

## 2023-01-20 PROCEDURE — 6370000000 HC RX 637 (ALT 250 FOR IP): Performed by: STUDENT IN AN ORGANIZED HEALTH CARE EDUCATION/TRAINING PROGRAM

## 2023-01-20 PROCEDURE — 80048 BASIC METABOLIC PNL TOTAL CA: CPT

## 2023-01-20 PROCEDURE — 97110 THERAPEUTIC EXERCISES: CPT

## 2023-01-20 PROCEDURE — 6360000002 HC RX W HCPCS: Performed by: NURSE PRACTITIONER

## 2023-01-20 PROCEDURE — 71045 X-RAY EXAM CHEST 1 VIEW: CPT

## 2023-01-20 PROCEDURE — 2580000003 HC RX 258: Performed by: NURSE PRACTITIONER

## 2023-01-20 PROCEDURE — 2060000000 HC ICU INTERMEDIATE R&B

## 2023-01-20 PROCEDURE — 94669 MECHANICAL CHEST WALL OSCILL: CPT

## 2023-01-20 PROCEDURE — 2700000000 HC OXYGEN THERAPY PER DAY

## 2023-01-20 PROCEDURE — 97530 THERAPEUTIC ACTIVITIES: CPT

## 2023-01-20 PROCEDURE — 36415 COLL VENOUS BLD VENIPUNCTURE: CPT

## 2023-01-20 RX ADMIN — ASPIRIN 81 MG: 81 TABLET, CHEWABLE ORAL at 09:33

## 2023-01-20 RX ADMIN — ENOXAPARIN SODIUM 40 MG: 100 INJECTION SUBCUTANEOUS at 09:33

## 2023-01-20 RX ADMIN — Medication 500 MG: at 14:05

## 2023-01-20 RX ADMIN — SODIUM CHLORIDE, PRESERVATIVE FREE 10 ML: 5 INJECTION INTRAVENOUS at 20:04

## 2023-01-20 RX ADMIN — Medication 500 MG: at 20:10

## 2023-01-20 RX ADMIN — METOPROLOL SUCCINATE 25 MG: 25 TABLET, EXTENDED RELEASE ORAL at 09:33

## 2023-01-20 RX ADMIN — SODIUM CHLORIDE, PRESERVATIVE FREE 10 ML: 5 INJECTION INTRAVENOUS at 09:34

## 2023-01-20 RX ADMIN — IPRATROPIUM BROMIDE AND ALBUTEROL SULFATE 1 AMPULE: 2.5; .5 SOLUTION RESPIRATORY (INHALATION) at 08:15

## 2023-01-20 RX ADMIN — METHYLPREDNISOLONE SODIUM SUCCINATE 40 MG: 40 INJECTION, POWDER, FOR SOLUTION INTRAMUSCULAR; INTRAVENOUS at 20:04

## 2023-01-20 RX ADMIN — LAMOTRIGINE 100 MG: 100 TABLET ORAL at 12:09

## 2023-01-20 RX ADMIN — QUETIAPINE FUMARATE 200 MG: 200 TABLET ORAL at 09:33

## 2023-01-20 RX ADMIN — DIVALPROEX SODIUM 1000 MG: 500 TABLET, EXTENDED RELEASE ORAL at 09:33

## 2023-01-20 RX ADMIN — QUETIAPINE FUMARATE 150 MG: 150 TABLET, EXTENDED RELEASE ORAL at 20:04

## 2023-01-20 RX ADMIN — QUETIAPINE FUMARATE 200 MG: 200 TABLET ORAL at 20:04

## 2023-01-20 RX ADMIN — IPRATROPIUM BROMIDE AND ALBUTEROL SULFATE 1 AMPULE: 2.5; .5 SOLUTION RESPIRATORY (INHALATION) at 14:24

## 2023-01-20 RX ADMIN — ARFORMOTEROL TARTRATE 15 MCG: 15 SOLUTION RESPIRATORY (INHALATION) at 08:12

## 2023-01-20 RX ADMIN — IPRATROPIUM BROMIDE AND ALBUTEROL SULFATE 1 AMPULE: 2.5; .5 SOLUTION RESPIRATORY (INHALATION) at 11:04

## 2023-01-20 RX ADMIN — SODIUM CHLORIDE, PRESERVATIVE FREE 10 ML: 5 INJECTION INTRAVENOUS at 03:17

## 2023-01-20 RX ADMIN — METHYLPREDNISOLONE SODIUM SUCCINATE 40 MG: 40 INJECTION, POWDER, FOR SOLUTION INTRAMUSCULAR; INTRAVENOUS at 09:34

## 2023-01-20 RX ADMIN — METHYLPREDNISOLONE SODIUM SUCCINATE 40 MG: 40 INJECTION, POWDER, FOR SOLUTION INTRAMUSCULAR; INTRAVENOUS at 03:17

## 2023-01-20 RX ADMIN — METHYLPREDNISOLONE SODIUM SUCCINATE 40 MG: 40 INJECTION, POWDER, FOR SOLUTION INTRAMUSCULAR; INTRAVENOUS at 14:05

## 2023-01-20 RX ADMIN — BUDESONIDE INHALATION 500 MCG: 0.5 SUSPENSION RESPIRATORY (INHALATION) at 08:15

## 2023-01-20 NOTE — PROGRESS NOTES
Comprehensive Nutrition Assessment    Type and Reason for Visit:  RD Nutrition Re-Screen/LOS (Length of stay)    Nutrition Recommendations/Plan:   Continue Regular diet  Monitor p.o intakes and labs  Patient declined ONS     Malnutrition Assessment:  Malnutrition Status: At risk for malnutrition (Comment) (01/20/23 2673)      Nutrition Assessment:    Patient assessed for length of stay. Nasreen reports when he was first admitted he was eating very well but over time his appetite had decreased. Patient states he is eating about 30% at meals. Patient declined offer for oral nutrition supplements. Will continue Regular diet. Monitor p.o intakes and labs. Nutrition Related Findings:    No edema. Active bowel sounds. Wheelchair Wound Type: None       Current Nutrition Intake & Therapies:    Average Meal Intake: 26-50%  Average Supplements Intake: None Ordered  ADULT DIET; Regular    Anthropometric Measures:  Height: 5' 10\" (177.8 cm)  Ideal Body Weight (IBW): 166 lbs (75 kg)       Current Body Weight: 205 lb 14.6 oz (93.4 kg), 124 % IBW. Weight Source: Bed Scale  Current BMI (kg/m2): 29.5                          BMI Categories: Overweight (BMI 25.0-29. 9)    Estimated Daily Nutrient Needs:  Energy Requirements Based On: Kcal/kg  Weight Used for Energy Requirements: Current  Energy (kcal/day): 1528-9622 kcal (20-22 kcal/kg)  Weight Used for Protein Requirements: Current  Protein (g/day): 90-98 gm of protein (1.2-1.3 gm/kg)     Fluid (ml/day):      Nutrition Diagnosis:   Predicted inadequate energy intake related to inadequate protein-energy intake as evidenced by intake 26-50%    Nutrition Interventions:   Food and/or Nutrient Delivery: Continue Current Diet  Nutrition Education/Counseling: Education not indicated  Coordination of Nutrition Care: Continue to monitor while inpatient       Goals:     Goals: PO intake 75% or greater       Nutrition Monitoring and Evaluation:      Food/Nutrient Intake Outcomes: Food and Nutrient Intake  Physical Signs/Symptoms Outcomes: Biochemical Data, Fluid Status or Edema, Skin, Weight    Discharge Planning:    Continue current diet           [facility]  MFN, RDN, LDN  Lead Clinical Dietitian  RD Office Phone [phone]

## 2023-01-20 NOTE — CARE COORDINATION
Discharge planning    COVID +. . Patient maintains at 10 L HF. Pulmonary is following. Has had full course of Paxlovid. Anshu Pryor from [facility] following. .message to f/u on pre cert .  Patient agreeable to plan of LTAC per NP notes

## 2023-01-20 NOTE — PROGRESS NOTES
[facility]  Office: [address], DO, Rosalie Sahuy, DO, [facility], DO, Abe Loza Blood, DO, Peg Bolanos MD, Ashely Carr MD, Manfred Oliva MD, Anabelle Chaney MD,  Roya Guzman MD, Radha Arredondo MD, Dameon Dutta DO, Tamiko Day MD,  Cisco Schmitz MD, Davy Chahal MD, Harjeet Jones, DO, Sushma Nava MD, Roman Botello MD, Vinnie Ramos, DO, Jd Jordan MD, Claudette Rodriguez MD, Brea Thornton MD, Rigo Stern MD, Radha Farrell, DO, Rober Leary MD, Cordelia Guzman MD, Nikole He CNP,  Mara Lu, CNP, Kd Hamm CNP, Ignacio Rosenberg, CNP,  Denver Parks, [facility], Mariluz Verdin, CNP, Charli Mary, CNP, Elliott Richards, CNP, Orin Ashraf, CNP, Luisa Tinoco, CNP, Gia Holliday PA-C, Colby Keith, [facility], Pipe Seth, CNP, Jose Zacarias, [facility]    Progress Note    1/20/2023    1:09 PM    Name:   Yuly Padilla  MRN:     [id number]     Acct:      [de-identified]   Room:   [address] [place] [address] Day:  7  Admit Date:  1/13/2023  2:14 AM    PCP:   No primary care provider on file. Code Status:  Full Code    Subjective:     C/C:   Chief Complaint   Patient presents with    Fatigue     Pt was found on his knees outside of assisted living. Pt was outside smoking and slid out of a chair. Interval History Status: improved. Patient condition has been stable. Patient continues to require heated high flow oxygen. He is at 8 L today. SPO2 has been stable around 93%. We will continue to benefit from hospitalization versus LTAC. Patient is stabilized and can be discharged once placement is arranged at [facility]. Brief History:     Mr. Tristin Small is a 78 yr old with a history of COPD lives in assisted living facility presented to hospital with weakness, shortness of breath.  Patient states that he had a usual morning routine, drank his coffee and was trying to get out of wheel chair noted that he was very weak and fatigued. he also had cough started yesterday with white phlegm, no chest pain, no fever, no chills. Patient also started feeling short of breath. No diarrhea. No sick contacts that patient knows of. Patient smokes 2 packs every day and he has copd diagnosis in chart but not on any treatment, no pft seen in the chart. In the ER patient was noted to be hypoxic, 88%, requiring 2 lt oxygen. He was afebrile. Electrolytes normal. Patient was seen to have elevated troponin of 34 which trended up to 51 and 47. Patient does not report any chest pain. D-dimer 1.63, COVID positive, x-ray does not show any acute abnormality. He has been started on Paxlovid    1/20 -condition has been stable. Patient continues to require heated high flow oxygen. He is at 8 L today. SPO2 has been stable around 93%. We will continue to benefit from hospitalization versus LTAC. Review of Systems:     Constitutional:  negative for chills, fevers, sweats  Respiratory: Positive for cough, dyspnea on exertion, shortness of breath. negative for wheezing  Cardiovascular:  negative for chest pain, chest pressure/discomfort, lower extremity edema, palpitations  Gastrointestinal:  negative for abdominal pain, constipation, diarrhea, nausea, vomiting  Neurological:  negative for dizziness, headache    Medications:      Allergies:  No Known Allergies    Current Meds:   Scheduled Meds:    enoxaparin  40 mg SubCUTAneous Daily    budesonide  0.5 mg Nebulization BID    arformoterol tartrate  15 mcg Nebulization BID    methylPREDNISolone  40 mg IntraVENous Q6H    metoprolol succinate  25 mg Oral Daily    vitamin D  50,000 Units Oral Once per day on Sat    divalproex  1,000 mg Oral Daily    sodium chloride flush  5-40 mL IntraVENous 2 times per day    QUEtiapine  150 mg Oral Nightly    ipratropium-albuterol  1 ampule Inhalation Q4H WA    aspirin  81 mg Oral Daily    nicotine  1 patch TransDERmal Daily lamoTRIgine  100 mg Oral Daily    QUEtiapine  200 mg Oral BID     Continuous Infusions:    sodium chloride       PRN Meds: calcium carbonate, sodium chloride flush, sodium chloride, ondansetron **OR** ondansetron, polyethylene glycol, acetaminophen **OR** acetaminophen, guaiFENesin-dextromethorphan, aluminum & magnesium hydroxide-simethicone    Data:     Past Medical History:   has a past medical history of Back pain, Bipolar 1 disorder ([facility] Cancer ([facility] COPD (chronic obstructive pulmonary disease) ([facility] GERD (gastroesophageal reflux disease), and Heart attack ([facility] Social History:   reports that he has been smoking cigarettes and pipe. He has never used smokeless tobacco. He reports that he does not drink alcohol and does not use drugs. Family History:   Family History   Problem Relation Age of Onset    Cancer Father         Lung    Stroke Father     Cancer Brother         Lymphoma    Mental Illness Other         Aunt       Vitals:  /60   Pulse 68   Temp 97.8 °F (36.6 °C)   Resp 18   Ht 5' 10\" (1.778 m)   Wt 206 lb (93.4 kg)   SpO2 91%   BMI 29.56 kg/m²   Temp (24hrs), Av.8 °F (36.6 °C), Min:97.7 °F (36.5 °C), Max:97.8 °F (36.6 °C)    No results for input(s): POCGLU in the last 72 hours. I/O (24Hr): Intake/Output Summary (Last 24 hours) at 2023 1309  Last data filed at 2023 2135  Gross per 24 hour   Intake --   Output 350 ml   Net -350 ml       Labs:  Hematology:  Recent Labs     23  1446   DDIMER 0.57     Chemistry:  Recent Labs     23  1035      K 4.1      CO2 26   GLUCOSE 324*   BUN 35*   CREATININE 0.98   ANIONGAP 15   LABGLOM >60   CALCIUM 8.6     No results for input(s): PROT, LABALBU, LABA1C, H5EQYRR, H6PDLBT, FT4, TSH, AST, ALT, LDH, GGT, ALKPHOS, LABGGT, BILITOT, BILIDIR, AMMONIA, AMYLASE, LIPASE, LACTATE, CHOL, HDL, LDLCHOLESTEROL, CHOLHDLRATIO, TRIG, VLDL, DFQ54HF, PHENYTOIN, PHENYF, URICACID, POCGLU in the last 72 hours.     ABG:No results found for: POCPH, PHART, PH, POCPCO2, JXJ5IIH, PCO2, POCPO2, PO2ART, PO2, POCHCO3, EVY7RRW, HCO3, NBEA, PBEA, BEART, BE, THGBART, THB, CDC9MCG, HVLW0FMK, L4WZAQIL, O2SAT, FIO2  No results found for: SPECIAL  No results found for: CULTURE    Radiology:  XR CHEST PORTABLE    Result Date: 1/16/2023  Recently described right basilar opacity with CT appears unchanged. No new findings identified in the interval.     XR CHEST PORTABLE    Result Date: 1/13/2023  Mild to moderate COPD but no acute cardiopulmonary abnormality evident. Possible soft tissue mass at the lateral left lower chest wall. Correlate clinically. CT CHEST PULMONARY EMBOLISM W CONTRAST    Result Date: 1/14/2023  1. Negative for pulmonary embolus. 2. Emphysema with mild right basilar segmental atelectasis versus pneumonia. 3. Indeterminate 9 mm lingular nodule. RECOMMENDATIONS: 9 mm suspicious left solid pulmonary nodule. Consider a non-contrast Chest CT at 3 months, a PET/CT, or tissue sampling. These guidelines do not apply to immunocompromised patients and patients with cancer. Follow up in patients with significant comorbidities as clinically warranted. For lung cancer screening, adhere to Lung-RADS guidelines. Reference: Radiology. 2017; 284(1):228-43. Physical Examination:        General appearance:  alert, cooperative and no distress  Mental Status:  oriented to person, place and time and normal affect  Lungs: Rhonchi auscultated to bilateral bases,fine rales note throughout, decreased air exchange.   Normal effort at rest but does get dyspneic with exertion  Heart:  regular rate and rhythm, no murmur  Abdomen:  soft, nontender, nondistended, normal bowel sounds, no masses, hepatomegaly, splenomegaly  Extremities:  no edema, redness, tenderness in the calves  Skin:  no gross lesions, rashes, induration  Positive for generalized weakness  Assessment:        Hospital Problems             Last Modified POA    * (Principal) COVID 1/13/2023 Yes    Acute respiratory failure with hypoxia ([facility] 1/13/2023 Yes    COPD exacerbation ([facility] 1/13/2023 Yes    Elevated troponin 1/13/2023 Yes    Tobacco abuse 1/13/2023 Yes       Plan:        Acute respiratory failure secondary to COVID-19 with a known history of COPD  Continue steroids, DuoNeb/bronchodilators  Completed full course of Paxlovid on 1/18/23  S/p lasix x 1 dose on 1/18/23  Continue supplemental oxygen with nasal cannula, heated high flow, BiPAP at night  Discharge planning now to [place] [address] with increased oxygen requirements, patient understands and is agreeable and plan has been discussed with patient's daughter.   Await pre-CERT      ALEXSANDRA Moody NP  1/20/2023  1:09 PM

## 2023-01-20 NOTE — PROGRESS NOTES
Physical Therapy  Facility/Department: [facility] PROGRESSIVE CARE  Rehabilitation Physical Therapy Treatment Note    NAME: Huber Kelly  : 1945 (78 y.o.)  MRN: [id number]  CODE STATUS: Full Code    Date of Service: 23   Pt currently functioning below baseline. Recommend daily inpatient skilled therapy at time of discharge to maximize long term outcomes and prevent re-admission. Please refer to AM-PAC score for current level of function. Restrictions:  Restrictions/Precautions: Up as Tolerated;General Precautions;Isolation  Position Activity Restriction  Other position/activity restrictions: Up as tolerated, telemetry, 4L O2 via NC, R wrist IV, Droplet+ precautions d/t COVID+     SUBJECTIVE  Subjective  Subjective: Patient sleeping in bed upon therapists arrival. Patient roused and agreeable to PT treatment this date. RN states patient is appropriate for PT treatment. OBJECTIVE  Cognition  Overall Cognitive Status: Exceptions  Arousal/Alertness: Appropriate responses to stimuli  Following Commands: Follows one step commands with repetition; Follows one step commands with increased time  Attention Span: Attends with cues to redirect  Memory: Decreased short term memory;Decreased recall of precautions;Decreased recall of recent events  Safety Judgement: Decreased awareness of need for assistance;Decreased awareness of need for safety  Problem Solving: Decreased awareness of errors;Assistance required to identify errors made;Assistance required to generate solutions;Assistance required to correct errors made;Assistance required to implement solutions  Insights: Decreased awareness of deficits  Initiation: Does not require cues  Sequencing: Requires cues for some  Cognition Comment: Pt demo'd decreased safety awareness throughout session, would attempt mobility w/o awareness for lines. Pt required education throughout to pace self, engage in pursed lip breathing, and for RW safety.   Orientation  Overall Orientation Status: Impaired  Orientation Level: Oriented to person;Oriented to time;Disoriented to situation;Oriented to place    Functional Mobility  Bed Mobility  Overall Assistance Level: Modified Independent  Additional Factors: Increased time to complete; Head of bed raised; With handrails  Roll Left  Assistance Level: Modified independent  Roll Right  Assistance Level: Modified independent  Sit to Supine  Assistance Level: Contact guard assist  Skilled Clinical Factors: vc's and assist for line managment back into bed this date. Patient with decreased awarness of lines and upon therapists arrival patient was tangled in lines. Therapist assist for managment. Supine to Sit  Assistance Level: Supervision  Skilled Clinical Factors: Increased time and effort to progress to upright posture. Patient requires vc's for hand placement at times and line negotiation skills. Scooting  Assistance Level: Supervision  Skilled Clinical Factors: vc's to scoot all the way out and place feet flat on the floor for increased support and stability. Balance  Sitting Balance: Modified independent   Standing Balance: Contact guard assistance  Standing Balance  Time: 10 minutes  Activity: Seated EOB working on core control and stability while performing lateral and posterior/anterior ws along with seated JAUN LE exercises 1 set x10 reps with frequent rest break throughout SpO2 % remained WNL throughout activities. Transfers  Surface: To bed;From bed  Additional Factors: Verbal cues; Hand placement cues; With handrails  Device:  (No Device)  Sit to Stand  Assistance Level: Contact guard assist  Skilled Clinical Factors: vc's for hand placement on bed and to push off bed into stance. Patient with good return on education  Stand to Sit  Assistance Level: Contact guard assist  Skilled Clinical Factors: vc's for self pacing and slowing down to maximize eccentric quad control into seated posture.     Neuromuscular Education  Neuromuscular Education: Yes  NDT Treatment: Lower extremity; Sitting;Standing      PT Exercises  Exercise Treatment: yes  A/AROM Exercises: seated AROM JAUN LE including LAQ, marches, Hip abduction 1 set x10 reps. Circulation/Endurance Exercises: AP and glute sets 1 set x10 reps  Pressure Relief Exercises: anterior/posterior and lateral ws x10  Functional Mobility Circuit Training: 3 sit to stands upon stance patient with noted increased lateral sway and shakiness  Breathing Techniques: pursed lip and deep breathing techniques to promote respiratory exchange. Education on [employer] including frequency and reps. ASSESSMENT/PROGRESS TOWARDS GOALS     AM-PAC Inpatient Mobility Raw Score  16   AM-PAC Inpatient T-Scale Score  40.78   Mobility Inpatient CMS 0-100% Score 54.16   Mobility Inpatient CMS G-Code Modifier  CK       Assessment  Assessment: Patient with decreased aerobic capacity and requries frequent rest breaks to maximize endurance throughout treatment this date. Patient requries CGA for sit to stand and in stance to promote safety and tolerance to position. Patient would benefit from continued skilled PT treatment to maximzie return to PLOF. Activity Tolerance: Patient limited by endurance; Patient limited by fatigue  Discharge Recommendations: Patient would benefit from continued therapy after discharge    Goals  Patient Goals   Patient Goals : Breathe better  Short Term Goals  Time Frame for Short Term Goals: 12 treatments  Short Term Goal 1: Independent transfers  Short Term Goal 2: Independent ambulation 100' x 1 w/ SpO2 >92%  Short Term Goal 3: Tolerate 30 min ther act    PLAN OF CARE/SAFETY  Physcial Therapy Plan  General Plan: 5-7 times per week  Current Treatment Recommendations: Endurance training;Gait training;Transfer training  Safety Devices  Type of Devices: Gait belt; Chair alarm in place;Nurse notified; Patient at risk for falls; Bed alarm in place; Left in bed  Restraints  Restraints Initially in Place: No    EDUCATION  Education  Education Given To: Patient  Education Provided: Safety;Precautions; Energy Conservation;Transfer Training  Education Provided Comments: Patient educated on alternative side lying position if prone position is not tolerable. Breathing techniques discussed and demonstrated to patient including pursed lip breathing and diaphragmatic techniques. Patient educated as to energy conservation techniques to maximize endurance and maintain SpO2 levels. Patient educated as to the importance of OOB activities to promote functional activities and maintain overall well being. Patient educated on the importance of mental health and engaging their minds in activities that help to orient them to time and place as well as current events. Patient encouraged to utilize puzzles in hand out and play puzzle games electronically. Patient encouraged to open blinds in morning to normalize circadian rhythms to help promote rest and mental health.   Education Method: Verbal;Teach Back  Barriers to Learning: Cognition  Education Outcome: Verbalized understanding;Continued education needed        Therapy Time   Individual Concurrent Group Co-treatment   Time In 1646         Time Out 1400         Minutes [address], [place], 01/20/23 at 2:28 PM

## 2023-01-20 NOTE — PLAN OF CARE
Pt in bed all day other than working with therapy. Pt needed encouragement to work with PT. Pt continues to have occasional cough. Denies pain/SOB. Safety precautions remain in place.        Problem: Respiratory - Adult  Goal: Achieves optimal ventilation and oxygenation  1/20/2023 1649 by Rufina Jacobs RN  Outcome: Progressing  Flowsheets (Taken 1/20/2023 1649)  Achieves optimal ventilation and oxygenation:   Assess for changes in respiratory status   Assess for changes in mentation and behavior   Position to facilitate oxygenation and minimize respiratory effort   Oxygen supplementation based on oxygen saturation or arterial blood gases   Assess and instruct to report shortness of breath or any respiratory difficulty     Problem: Nutrition Deficit:  Goal: Optimize nutritional status  Outcome: Progressing

## 2023-01-20 NOTE — PLAN OF CARE
Problem: Respiratory - Adult  Goal: Achieves optimal ventilation and oxygenation  1/19/2023 2030 by Magaly Jauregui RCP  Outcome: Progressing

## 2023-01-20 NOTE — PLAN OF CARE
Patient in Covid precautions. Patient in dull spirits d/t missing a planned trip to [place]. Patient was on 8L NC salter at shift change and had to be increased to 10L d/t Spo2 in high 80's. Patient denied any pain and stayed quiet the entire shift and did not want to be bothered. Patient was cooperative however and remained free from falls. Safety measures in place. Call light and bedside table within reach. Patient slept most of the night. Problem: Discharge Planning  Goal: Discharge to home or other facility with appropriate resources  Outcome: Progressing     Problem: Skin/Tissue Integrity  Goal: Absence of new skin breakdown  Description: 1. Monitor for areas of redness and/or skin breakdown  2. Assess vascular access sites hourly  3. Every 4-6 hours minimum:  Change oxygen saturation probe site  4. Every 4-6 hours:  If on nasal continuous positive airway pressure, respiratory therapy assess nares and determine need for appliance change or resting period.   Outcome: Progressing     Problem: ABCDS Injury Assessment  Goal: Absence of physical injury  Outcome: Progressing     Problem: Safety - Adult  Goal: Free from fall injury  Outcome: Progressing     Problem: Respiratory - Adult  Goal: Achieves optimal ventilation and oxygenation  1/19/2023 2304 by Santy Whitlock RN  Outcome: Progressing     Problem: Pain  Goal: Verbalizes/displays adequate comfort level or baseline comfort level  Outcome: Progressing     Problem: Infection - Adult  Goal: Absence of infection at discharge  Outcome: Progressing

## 2023-01-20 NOTE — RT PROTOCOL NOTE
RT Inhaler-Nebulizer Bronchodilator Protocol Note    There is a bronchodilator order in the chart from a provider indicating to follow the RT Bronchodilator Protocol and there is an Initiate RT Inhaler-Nebulizer Bronchodilator Protocol order as well (see protocol at bottom of note). CXR Findings:  XR CHEST PORTABLE    Result Date: 1/18/2023  COPD with scattered subsegmental atelectasis at the lung bases. The findings from the last RT Protocol Assessment were as follows:   History Pulmonary Disease: Chronic pulmonary disease  Respiratory Pattern: Dyspnea on exertion or RR 21-25 bpm  Breath Sounds: Severe inspiratory and expiratory wheezing or severely diminished  Cough: Strong, spontaneous, non-productive  Indication for Bronchodilator Therapy: Decreased or absent breath sounds  Bronchodilator Assessment Score: 12    Aerosolized bronchodilator medication orders have been revised according to the RT Inhaler-Nebulizer Bronchodilator Protocol below. Respiratory Therapist to perform RT Therapy Protocol Assessment initially then follow the protocol. Repeat RT Therapy Protocol Assessment PRN for score 0-3 or on second treatment, BID, and PRN for scores above 3. No Indications - adjust the frequency to every 6 hours PRN wheezing or bronchospasm, if no treatments needed after 48 hours then discontinue using Per Protocol order mode. If indication present, adjust the RT bronchodilator orders based on the Bronchodilator Assessment Score as indicated below. Use Inhaler orders unless patient has one or more of the following: on home nebulizer, not able to hold breath for 10 seconds, is not alert and oriented, cannot activate and use MDI correctly, or respiratory rate 25 breaths per minute or more, then use the equivalent nebulizer order(s) with same Frequency and PRN reasons based on the score. If a patient is on this medication at home then do not decrease Frequency below that used at home.     0-3 - enter or revise RT bronchodilator order(s) to equivalent RT Bronchodilator order with Frequency of every 4 hours PRN for wheezing or increased work of breathing using Per Protocol order mode. 4-6 - enter or revise RT Bronchodilator order(s) to two equivalent RT bronchodilator orders with one order with BID Frequency and one order with Frequency of every 4 hours PRN wheezing or increased work of breathing using Per Protocol order mode. 7-10 - enter or revise RT Bronchodilator order(s) to two equivalent RT bronchodilator orders with one order with TID Frequency and one order with Frequency of every 4 hours PRN wheezing or increased work of breathing using Per Protocol order mode. 11-13 - enter or revise RT Bronchodilator order(s) to one equivalent RT bronchodilator order with QID Frequency and an Albuterol order with Frequency of every 4 hours PRN wheezing or increased work of breathing using Per Protocol order mode. Greater than 13 - enter or revise RT Bronchodilator order(s) to one equivalent RT bronchodilator order with every 4 hours Frequency and an Albuterol order with Frequency of every 2 hours PRN wheezing or increased work of breathing using Per Protocol order mode. RT to enter RT Home Evaluation for COPD & MDI Assessment order using Per Protocol order mode.     Electronically signed by Stella Jordan RCP on 1/20/2023 at 8:16 AM

## 2023-01-20 NOTE — PLAN OF CARE
Problem: Respiratory - Adult  Goal: Achieves optimal ventilation and oxygenation  1/20/2023 0711 by Baron Olsen RCP  Outcome: Progressing  1/19/2023 2304 by Yonis Venegas RN  Outcome: Progressing  1/19/2023 2030 by Loreto Rosenthal RCP  Outcome: Progressing

## 2023-01-20 NOTE — RT PROTOCOL NOTE
RT Inhaler-Nebulizer Bronchodilator Protocol Note    There is a bronchodilator order in the chart from a provider indicating to follow the RT Bronchodilator Protocol and there is an Initiate RT Inhaler-Nebulizer Bronchodilator Protocol order as well (see protocol at bottom of note). CXR Findings:  XR CHEST PORTABLE    Result Date: 1/18/2023  COPD with scattered subsegmental atelectasis at the lung bases. The findings from the last RT Protocol Assessment were as follows:   History Pulmonary Disease: Chronic pulmonary disease  Respiratory Pattern: Dyspnea on exertion or RR 21-25 bpm  Breath Sounds: Severe inspiratory and expiratory wheezing or severely diminished  Cough: Strong, spontaneous, non-productive  Indication for Bronchodilator Therapy: Decreased or absent breath sounds  Bronchodilator Assessment Score: 12    Aerosolized bronchodilator medication orders have been revised according to the RT Inhaler-Nebulizer Bronchodilator Protocol below. Respiratory Therapist to perform RT Therapy Protocol Assessment initially then follow the protocol. Repeat RT Therapy Protocol Assessment PRN for score 0-3 or on second treatment, BID, and PRN for scores above 3. No Indications - adjust the frequency to every 6 hours PRN wheezing or bronchospasm, if no treatments needed after 48 hours then discontinue using Per Protocol order mode. If indication present, adjust the RT bronchodilator orders based on the Bronchodilator Assessment Score as indicated below. Use Inhaler orders unless patient has one or more of the following: on home nebulizer, not able to hold breath for 10 seconds, is not alert and oriented, cannot activate and use MDI correctly, or respiratory rate 25 breaths per minute or more, then use the equivalent nebulizer order(s) with same Frequency and PRN reasons based on the score. If a patient is on this medication at home then do not decrease Frequency below that used at home.     0-3 - enter or revise RT bronchodilator order(s) to equivalent RT Bronchodilator order with Frequency of every 4 hours PRN for wheezing or increased work of breathing using Per Protocol order mode. 4-6 - enter or revise RT Bronchodilator order(s) to two equivalent RT bronchodilator orders with one order with BID Frequency and one order with Frequency of every 4 hours PRN wheezing or increased work of breathing using Per Protocol order mode. 7-10 - enter or revise RT Bronchodilator order(s) to two equivalent RT bronchodilator orders with one order with TID Frequency and one order with Frequency of every 4 hours PRN wheezing or increased work of breathing using Per Protocol order mode. 11-13 - enter or revise RT Bronchodilator order(s) to one equivalent RT bronchodilator order with QID Frequency and an Albuterol order with Frequency of every 4 hours PRN wheezing or increased work of breathing using Per Protocol order mode. Greater than 13 - enter or revise RT Bronchodilator order(s) to one equivalent RT bronchodilator order with every 4 hours Frequency and an Albuterol order with Frequency of every 2 hours PRN wheezing or increased work of breathing using Per Protocol order mode. RT to enter RT Home Evaluation for COPD & MDI Assessment order using Per Protocol order mode.     Electronically signed by Melanie Couch RCP on 1/19/2023 at 8:29 PM

## 2023-01-20 NOTE — PROGRESS NOTES
Pulmonary Critical Care Progress Note  Dorita Retana CNP/Elba Tijerina MD     Patient seen for the follow up of  COVID, acute hypoxic respiratory failure, active smoking/COPD    Subjective:  He is resting comfortably in the bed, no distress. He denies any chest pain, significant cough. He feels shortness of breath is about the same. He remains on high flow nasal cannula at 10 L. He is tolerating orals. He has been using his I-S and Acapella he tells me. Examination:  Vitals: BP (!) 150/76   Pulse 57   Temp 97.7 °F (36.5 °C) (Oral)   Resp 18   Ht 5' 10\" (1.778 m)   Wt 206 lb (93.4 kg)   SpO2 93%   BMI 29.56 kg/m²   General appearance: alert and cooperative with exam  Neck: No JVD  Lungs: Moderate to decreased air exchange, no wheezing or crackles at this time  Heart: regular rate and rhythm, S1, S2 normal, no gallop  Abdomen: Soft, non tender, + BS  Extremities: no cyanosis or clubbing. No significant edema      Radiology:  X-ray chest 1/18/23      Impression:  Acute hypoxic respiratory failure  Acute exacerbation of COPD/emphysema  Active smoking, 30-pack-year history  COVID-19  Right basilar atelectasis versus pneumonia  Indeterminate 9 mm lingular nodule    Recommendations:  Oxygen via high flow nasal cannula, keep SPO2 90% or greater   Incentive spirometry every hour while awake   Acapella  Budesonide and Brovana via nebulizer every 12 hours   IV solu-medrol 40 mg every 8 hours  Albuterol and Ipratropium Q 4 hours and prn  Status post course of paxlovid  Status post one-time dose of IV Lasix  DVT prophylaxis with low molecular weight heparin  X-ray chest and labs today  PT/OT as tolerated  PFTs as an outpatient  PET scan as an outpatient  LTAC discharge planning once pre-CERT obtained  Above assessment and plan will be reviewed with Dr. Remy Champion. Patient plan will be finalized following review by Dr. Remy Champion.   Will follow with you    ALEXSANDRA Briggs-CNP   Pulmonary Critical Care and Sleep Medicine,  Patient seen under the supervision of Main Whitlock MD, [facility]

## 2023-01-21 LAB
ANION GAP SERPL CALCULATED.3IONS-SCNC: 10 MMOL/L (ref 9–17)
BUN BLDV-MCNC: 34 MG/DL (ref 8–23)
BUN/CREAT BLD: 43 (ref 9–20)
CALCIUM SERPL-MCNC: 8.8 MG/DL (ref 8.6–10.4)
CHLORIDE BLD-SCNC: 100 MMOL/L (ref 98–107)
CO2: 31 MMOL/L (ref 20–31)
CREAT SERPL-MCNC: 0.79 MG/DL (ref 0.7–1.2)
GFR SERPL CREATININE-BSD FRML MDRD: >60 ML/MIN/1.73M2
GLUCOSE BLD-MCNC: 167 MG/DL (ref 70–99)
GLUCOSE BLD-MCNC: 175 MG/DL (ref 75–110)
POTASSIUM SERPL-SCNC: 4.7 MMOL/L (ref 3.7–5.3)
SODIUM BLD-SCNC: 141 MMOL/L (ref 135–144)

## 2023-01-21 PROCEDURE — 6370000000 HC RX 637 (ALT 250 FOR IP): Performed by: NURSE PRACTITIONER

## 2023-01-21 PROCEDURE — 94640 AIRWAY INHALATION TREATMENT: CPT

## 2023-01-21 PROCEDURE — 6370000000 HC RX 637 (ALT 250 FOR IP): Performed by: STUDENT IN AN ORGANIZED HEALTH CARE EDUCATION/TRAINING PROGRAM

## 2023-01-21 PROCEDURE — 82947 ASSAY GLUCOSE BLOOD QUANT: CPT

## 2023-01-21 PROCEDURE — 6370000000 HC RX 637 (ALT 250 FOR IP): Performed by: INTERNAL MEDICINE

## 2023-01-21 PROCEDURE — 6370000000 HC RX 637 (ALT 250 FOR IP): Performed by: FAMILY MEDICINE

## 2023-01-21 PROCEDURE — 6360000002 HC RX W HCPCS: Performed by: NURSE PRACTITIONER

## 2023-01-21 PROCEDURE — 2580000003 HC RX 258: Performed by: NURSE PRACTITIONER

## 2023-01-21 PROCEDURE — 94669 MECHANICAL CHEST WALL OSCILL: CPT

## 2023-01-21 PROCEDURE — 80048 BASIC METABOLIC PNL TOTAL CA: CPT

## 2023-01-21 PROCEDURE — 94761 N-INVAS EAR/PLS OXIMETRY MLT: CPT

## 2023-01-21 PROCEDURE — 99232 SBSQ HOSP IP/OBS MODERATE 35: CPT | Performed by: NURSE PRACTITIONER

## 2023-01-21 PROCEDURE — 2060000000 HC ICU INTERMEDIATE R&B

## 2023-01-21 PROCEDURE — 36415 COLL VENOUS BLD VENIPUNCTURE: CPT

## 2023-01-21 PROCEDURE — 2700000000 HC OXYGEN THERAPY PER DAY

## 2023-01-21 RX ORDER — PANTOPRAZOLE SODIUM 40 MG/1
40 TABLET, DELAYED RELEASE ORAL
Status: DISCONTINUED | OUTPATIENT
Start: 2023-01-21 | End: 2023-01-27 | Stop reason: HOSPADM

## 2023-01-21 RX ADMIN — QUETIAPINE FUMARATE 200 MG: 200 TABLET ORAL at 21:22

## 2023-01-21 RX ADMIN — IPRATROPIUM BROMIDE AND ALBUTEROL SULFATE 1 AMPULE: 2.5; .5 SOLUTION RESPIRATORY (INHALATION) at 19:59

## 2023-01-21 RX ADMIN — BUDESONIDE INHALATION 500 MCG: 0.5 SUSPENSION RESPIRATORY (INHALATION) at 19:59

## 2023-01-21 RX ADMIN — ARFORMOTEROL TARTRATE 15 MCG: 15 SOLUTION RESPIRATORY (INHALATION) at 19:59

## 2023-01-21 RX ADMIN — BUDESONIDE INHALATION 500 MCG: 0.5 SUSPENSION RESPIRATORY (INHALATION) at 07:43

## 2023-01-21 RX ADMIN — METHYLPREDNISOLONE SODIUM SUCCINATE 40 MG: 40 INJECTION, POWDER, FOR SOLUTION INTRAMUSCULAR; INTRAVENOUS at 10:01

## 2023-01-21 RX ADMIN — LAMOTRIGINE 100 MG: 100 TABLET ORAL at 10:01

## 2023-01-21 RX ADMIN — ERGOCALCIFEROL 50000 UNITS: 1.25 CAPSULE ORAL at 17:58

## 2023-01-21 RX ADMIN — GUAIFENESIN AND DEXTROMETHORPHAN HYDROBROMIDE 2 TABLET: 600; 30 TABLET, EXTENDED RELEASE ORAL at 21:23

## 2023-01-21 RX ADMIN — METOPROLOL SUCCINATE 25 MG: 25 TABLET, EXTENDED RELEASE ORAL at 10:01

## 2023-01-21 RX ADMIN — SODIUM CHLORIDE, PRESERVATIVE FREE 10 ML: 5 INJECTION INTRAVENOUS at 21:22

## 2023-01-21 RX ADMIN — DIVALPROEX SODIUM 1000 MG: 500 TABLET, EXTENDED RELEASE ORAL at 10:01

## 2023-01-21 RX ADMIN — ARFORMOTEROL TARTRATE 15 MCG: 15 SOLUTION RESPIRATORY (INHALATION) at 07:43

## 2023-01-21 RX ADMIN — METHYLPREDNISOLONE SODIUM SUCCINATE 40 MG: 40 INJECTION, POWDER, FOR SOLUTION INTRAMUSCULAR; INTRAVENOUS at 21:22

## 2023-01-21 RX ADMIN — GUAIFENESIN AND DEXTROMETHORPHAN HYDROBROMIDE 2 TABLET: 600; 30 TABLET, EXTENDED RELEASE ORAL at 10:12

## 2023-01-21 RX ADMIN — IPRATROPIUM BROMIDE AND ALBUTEROL SULFATE 1 AMPULE: 2.5; .5 SOLUTION RESPIRATORY (INHALATION) at 10:48

## 2023-01-21 RX ADMIN — ENOXAPARIN SODIUM 40 MG: 100 INJECTION SUBCUTANEOUS at 10:02

## 2023-01-21 RX ADMIN — SODIUM CHLORIDE, PRESERVATIVE FREE 10 ML: 5 INJECTION INTRAVENOUS at 02:05

## 2023-01-21 RX ADMIN — METHYLPREDNISOLONE SODIUM SUCCINATE 40 MG: 40 INJECTION, POWDER, FOR SOLUTION INTRAMUSCULAR; INTRAVENOUS at 02:05

## 2023-01-21 RX ADMIN — METHYLPREDNISOLONE SODIUM SUCCINATE 40 MG: 40 INJECTION, POWDER, FOR SOLUTION INTRAMUSCULAR; INTRAVENOUS at 16:25

## 2023-01-21 RX ADMIN — IPRATROPIUM BROMIDE AND ALBUTEROL SULFATE 1 AMPULE: 2.5; .5 SOLUTION RESPIRATORY (INHALATION) at 15:08

## 2023-01-21 RX ADMIN — SODIUM CHLORIDE, PRESERVATIVE FREE 10 ML: 5 INJECTION INTRAVENOUS at 10:02

## 2023-01-21 RX ADMIN — QUETIAPINE FUMARATE 150 MG: 150 TABLET, EXTENDED RELEASE ORAL at 21:22

## 2023-01-21 RX ADMIN — QUETIAPINE FUMARATE 200 MG: 200 TABLET ORAL at 10:01

## 2023-01-21 RX ADMIN — ASPIRIN 81 MG: 81 TABLET, CHEWABLE ORAL at 10:01

## 2023-01-21 RX ADMIN — PANTOPRAZOLE SODIUM 40 MG: 40 TABLET, DELAYED RELEASE ORAL at 10:01

## 2023-01-21 RX ADMIN — IPRATROPIUM BROMIDE AND ALBUTEROL SULFATE 1 AMPULE: 2.5; .5 SOLUTION RESPIRATORY (INHALATION) at 07:43

## 2023-01-21 NOTE — RT PROTOCOL NOTE
RT Inhaler-Nebulizer Bronchodilator Protocol Note    There is a bronchodilator order in the chart from a provider indicating to follow the RT Bronchodilator Protocol and there is an Initiate RT Inhaler-Nebulizer Bronchodilator Protocol order as well (see protocol at bottom of note). CXR Findings:  XR CHEST PORTABLE    Result Date: 1/20/2023  New linear opacity in the right perihilar region favored to represent atelectasis. Elsewhere, there is no confluent airspace consolidation. COPD. The findings from the last RT Protocol Assessment were as follows:   History Pulmonary Disease: Chronic pulmonary disease  Respiratory Pattern: Dyspnea on exertion or RR 21-25 bpm  Breath Sounds: Severe inspiratory and expiratory wheezing or severely diminished  Cough: Strong, spontaneous, non-productive  Indication for Bronchodilator Therapy: Decreased or absent breath sounds  Bronchodilator Assessment Score: 12    Aerosolized bronchodilator medication orders have been revised according to the RT Inhaler-Nebulizer Bronchodilator Protocol below. Respiratory Therapist to perform RT Therapy Protocol Assessment initially then follow the protocol. Repeat RT Therapy Protocol Assessment PRN for score 0-3 or on second treatment, BID, and PRN for scores above 3. No Indications - adjust the frequency to every 6 hours PRN wheezing or bronchospasm, if no treatments needed after 48 hours then discontinue using Per Protocol order mode. If indication present, adjust the RT bronchodilator orders based on the Bronchodilator Assessment Score as indicated below. Use Inhaler orders unless patient has one or more of the following: on home nebulizer, not able to hold breath for 10 seconds, is not alert and oriented, cannot activate and use MDI correctly, or respiratory rate 25 breaths per minute or more, then use the equivalent nebulizer order(s) with same Frequency and PRN reasons based on the score.   If a patient is on this medication at home then do not decrease Frequency below that used at home. 0-3 - enter or revise RT bronchodilator order(s) to equivalent RT Bronchodilator order with Frequency of every 4 hours PRN for wheezing or increased work of breathing using Per Protocol order mode. 4-6 - enter or revise RT Bronchodilator order(s) to two equivalent RT bronchodilator orders with one order with BID Frequency and one order with Frequency of every 4 hours PRN wheezing or increased work of breathing using Per Protocol order mode. 7-10 - enter or revise RT Bronchodilator order(s) to two equivalent RT bronchodilator orders with one order with TID Frequency and one order with Frequency of every 4 hours PRN wheezing or increased work of breathing using Per Protocol order mode. 11-13 - enter or revise RT Bronchodilator order(s) to one equivalent RT bronchodilator order with QID Frequency and an Albuterol order with Frequency of every 4 hours PRN wheezing or increased work of breathing using Per Protocol order mode. Greater than 13 - enter or revise RT Bronchodilator order(s) to one equivalent RT bronchodilator order with every 4 hours Frequency and an Albuterol order with Frequency of every 2 hours PRN wheezing or increased work of breathing using Per Protocol order mode. RT to enter RT Home Evaluation for COPD & MDI Assessment order using Per Protocol order mode.     Electronically signed by Fly Weber RCP on 1/21/2023 at 7:20 AM

## 2023-01-21 NOTE — PROGRESS NOTES
[facility]  Office: [address], DO, Lorjerson Din, DO, Chai Acron, DO, Kory Ruiz Blood, DO, Vannesa Everett MD, Aly Valdes MD, Phuong Harman MD, Susie Sorto MD,  Rodrigo Mcgee MD, Julissa Waite MD, Vincent Wallace, DO, Dena Feliz MD,  Cleveland Ladd MD, Theron Almanza MD, Roland Lora, DO, Haylee Cortes MD, Lynn Zhang MD, Sheree Escoto DO, Carola Leonard MD, Cindy Scanlon MD, Reina Dejesus MD, Kerri Arias MD, Aubrey Tobar DO, Mo Simons MD, Retia Bosworth, MD, Sunshine Ballesteros, CNP,  Adelia Alpers, CNP, Abby Gaitan, CNP, Xi Espinosa, CNP,  Francoise Espinoza, [facility], Dick Collins, CNP, Jada Mims, CNP, Emily Maldonado, CNP, Binh Suazo, CNP, Sophia Villanueva, CNP, Rodrick Paz PA-C, Kyra Mills, CNS, Cristian Hoffman, CNP, Montserrat Hernandez, [facility]    Progress Note    1/21/2023    8:40 AM    Name:   Andrew Ambriz  MRN:     [id number]     Acct:      [de-identified]   Room:   [address] [place] [address] Day:  8  Admit Date:  1/13/2023  2:14 AM    PCP:   No primary care provider on file. Code Status:  Full Code    Subjective:     C/C:   Chief Complaint   Patient presents with    Fatigue     Pt was found on his knees outside of assisted living. Pt was outside smoking and slid out of a chair. Interval History Status:     Patient is lying comfortably in bed. He states he feels pretty good today, he does complain of ongoing heartburn and congested cough, otherwise he denies any other complaints. He continues to go back and forth between 8 and 10 L of oxygen on salter nasal cannula. Vitals otherwise stable  Brief History:   Mr. Gema Perez is a 78 yr old with a history of COPD lives in assisted living facility presented to hospital with weakness, shortness of breath.  Patient states that he had a usual morning routine, ank his coffee and was trying to get out of wheel chair noted that he was very weak and fatigued. he also had cough started yesterday with white phlegm, no chest pain, no fever, no chills. Patient also started feeling short of breath. No diarrhea. No sick contacts that patient knows of. Patient smokes 2 packs every day and he has copd diagnosis in chart but not on any treatment, no pft seen in the chart. In the ER patient was noted to be hypoxic, 88%, requiring 2 lt oxygen. He was afebrile. Electrolytes normal. Patient was seen to have elevated troponin of 34 which trended up to 51 and 47. Patient does not report any chest pain. D-dimer 1.63, COVID positive, x-ray does not show any acute abnormality. He has been started on Paxlovid  Review of Systems:     Constitutional:  negative for chills, fevers, sweats  Respiratory: Positive for cough, dyspnea on exertion, shortness of breath. negative for wheezing  Cardiovascular:  negative for chest pain, chest pressure/discomfort, lower extremity edema, palpitations  Gastrointestinal:  negative for abdominal pain, constipation, diarrhea, nausea, vomiting  Neurological:  negative for dizziness, headache    Medications:      Allergies:  No Known Allergies    Current Meds:   Scheduled Meds:    enoxaparin  40 mg SubCUTAneous Daily    budesonide  0.5 mg Nebulization BID    arformoterol tartrate  15 mcg Nebulization BID    methylPREDNISolone  40 mg IntraVENous Q6H    metoprolol succinate  25 mg Oral Daily    vitamin D  50,000 Units Oral Once per day on Sat    divalproex  1,000 mg Oral Daily    sodium chloride flush  5-40 mL IntraVENous 2 times per day    QUEtiapine  150 mg Oral Nightly    ipratropium-albuterol  1 ampule Inhalation Q4H WA    aspirin  81 mg Oral Daily    nicotine  1 patch TransDERmal Daily    lamoTRIgine  100 mg Oral Daily    QUEtiapine  200 mg Oral BID     Continuous Infusions:    sodium chloride       PRN Meds: calcium carbonate, sodium chloride flush, sodium chloride, ondansetron **OR** ondansetron, polyethylene glycol, acetaminophen **OR** acetaminophen, guaiFENesin-dextromethorphan, aluminum & magnesium hydroxide-simethicone    Data:     Past Medical History:   has a past medical history of Back pain, Bipolar 1 disorder ([facility] Cancer ([facility] COPD (chronic obstructive pulmonary disease) ([facility] GERD (gastroesophageal reflux disease), and Heart attack ([facility] Social History:   reports that he has been smoking cigarettes and pipe. He has never used smokeless tobacco. He reports that he does not drink alcohol and does not use drugs. Family History:   Family History   Problem Relation Age of Onset    Cancer Father         Lung    Stroke Father     Cancer Brother         Lymphoma    Mental Illness Other         Aunt       Vitals:  BP (!) 156/85   Pulse 57   Temp 97.7 °F (36.5 °C) (Oral)   Resp 16   Ht 5' 10\" (1.778 m)   Wt 206 lb (93.4 kg)   SpO2 94%   BMI 29.56 kg/m²   Temp (24hrs), Av.9 °F (36.6 °C), Min:97.7 °F (36.5 °C), Max:98.2 °F (36.8 °C)    Recent Labs     23  042   POCGLU 175*       I/O (24Hr): Intake/Output Summary (Last 24 hours) at 2023 0840  Last data filed at 2023 1547  Gross per 24 hour   Intake --   Output 450 ml   Net -450 ml       Labs:  Hematology:  Recent Labs     23  1446   DDIMER 0.57     Chemistry:  Recent Labs     23  1035 23  0515    141   K 4.1 4.7    100   CO2 26 31   GLUCOSE 324* 167*   BUN 35* 34*   CREATININE 0.98 0.79   ANIONGAP 15 10   LABGLOM >60 >60   CALCIUM 8.6 8.8     Recent Labs     23  0426   POCGLU 175*       ABG:No results found for: POCPH, PHART, PH, POCPCO2, TZY4KLL, PCO2, POCPO2, PO2ART, PO2, POCHCO3, HWA1ARJ, HCO3, NBEA, PBEA, BEART, BE, THGBART, THB, RAE5FLB, JRKT4ENU, F1SLFZPQ, O2SAT, FIO2  No results found for: SPECIAL  No results found for: CULTURE    Radiology:  XR CHEST PORTABLE    Result Date: 2023  Recently described right basilar opacity with CT appears unchanged.   No new findings identified in the interval.     XR CHEST PORTABLE    Result Date: 1/13/2023  Mild to moderate COPD but no acute cardiopulmonary abnormality evident. Possible soft tissue mass at the lateral left lower chest wall. Correlate clinically. CT CHEST PULMONARY EMBOLISM W CONTRAST    Result Date: 1/14/2023  1. Negative for pulmonary embolus. 2. Emphysema with mild right basilar segmental atelectasis versus pneumonia. 3. Indeterminate 9 mm lingular nodule. RECOMMENDATIONS: 9 mm suspicious left solid pulmonary nodule. Consider a non-contrast Chest CT at 3 months, a PET/CT, or tissue sampling. These guidelines do not apply to immunocompromised patients and patients with cancer. Follow up in patients with significant comorbidities as clinically warranted. For lung cancer screening, adhere to Lung-RADS guidelines. Reference: Radiology. 2017; 284(1):228-43. Physical Examination:        General appearance:  alert, cooperative and no distress  Mental Status:  oriented to person, place and time and normal affect  Lungs: Rhonchi auscultated to bilateral bases,fine rales note throughout, decreased air exchange.   Normal effort at rest but does get dyspneic with exertion  Heart:  regular rate and rhythm, no murmur  Abdomen:  soft, nontender, nondistended, normal bowel sounds, no masses, hepatomegaly, splenomegaly  Extremities:  no edema, redness, tenderness in the calves  Skin:  no gross lesions, rashes, induration  Positive for generalized weakness  Assessment:        Hospital Problems             Last Modified POA    * (Principal) COVID 1/13/2023 Yes    Acute respiratory failure with hypoxia ([address] 1/13/2023 Yes    COPD exacerbation ([address] 1/13/2023 Yes    Elevated troponin 1/13/2023 Yes    Tobacco abuse 1/13/2023 Yes       Plan:        Completed full course of Paxlovid on 1/18/23  Continue aerosols and bronchodilators  S/p lasix x 1 dose on 1/18/23 with little change  Monitor labs, replace electrolytes as needed  IV steroids resumed per pulmonology, appreciate recommendations  Continue DVT prophylaxis  Continue supplemental oxygen, wean as able  Incentive spirometer every hour while awake, Acapella  Add MetaNeb to help loosen mucus/secretions   Add Mucinex DM twice daily  Start protonix daily  Monitor and control blood pressure  Continue PT/OT  Continue droplet precautions through 1/22/23  Discharge planning to LTAC due to increased oxygen requirements, patient understands and is agreeable and plan has been discussed with patient's daughter.   Await pre-CERT  Plan discussed with patient and staff    ALEXSANDRA Cordova NP  1/21/2023  8:40 AM

## 2023-01-21 NOTE — PLAN OF CARE
Problem: Respiratory - Adult  Goal: Achieves optimal ventilation and oxygenation  1/20/2023 2140 by Damaris Zuniga RCP  Outcome: Progressing   BRONCHOSPASM/BRONCHOCONSTRICTION    IMPROVE  AERATION/BREATHSOUNDS  ADMINISTER BRONCHODILATOR THERAPY AS APPROPRIATE  ASSESS BREATH SOUNDS  PATIENT EDUCATION AS NEEDED

## 2023-01-21 NOTE — PLAN OF CARE
Problem: Respiratory - Adult  Goal: Achieves optimal ventilation and oxygenation  1/21/2023 0719 by Haley More RCP  Outcome: Progressing

## 2023-01-21 NOTE — PROGRESS NOTES
Pulmonary Critical Care Progress Note  Ferny Griffith MD     Patient seen for the follow up of  COVID, acute hypoxic respiratory failure, active smoking/COPD    Subjective:  He is resting comfortably in the bed, no distress. He denies any chest pain, significant cough. He is on high flow nasal cannula at 10 L. He states his shortness of breath has improved. He is tolerating orals. He is using his IS. Examination:  Vitals: BP (!) 156/85   Pulse 57   Temp 97.7 °F (36.5 °C) (Oral)   Resp 16   Ht 5' 10\" (1.778 m)   Wt 206 lb (93.4 kg)   SpO2 94%   BMI 29.56 kg/m²   General appearance: alert and cooperative with exam  Neck: No JVD  Lungs: Moderate to decreased air exchange, no wheezing or crackles  Heart: regular rate and rhythm, S1, S2 normal, no gallop  Abdomen: Soft, non tender, + BS  Extremities: no cyanosis or clubbing.  No significant edema      Radiology:  X-ray chest 1/20/23      Impression:  Acute hypoxic respiratory failure  Acute exacerbation of COPD/emphysema  Active smoking, 30-pack-year history  COVID-19  Right basilar atelectasis versus pneumonia  Indeterminate 9 mm lingular nodule    Recommendations:  Oxygen via high flow nasal cannula, keep SPO2 90% or greater   Incentive spirometry every hour while awake   Acapella  Budesonide and Brovana via nebulizer every 12 hours   IV solu-medrol 40 mg every 6 hours, no taper today  Albuterol and Ipratropium Q 4 hours and prn  Status post course of paxlovid  DVT prophylaxis with low molecular weight heparin  PT/OT as tolerated  PFTs as an outpatient  PET scan as an outpatient  LTAC discharge planning once pre-CERT obtained  Will follow with you    Electronically signed by     Mark Carvajal MD on 1/21/2023 at 1:17 PM  Pulmonary Critical Care and Sleep Medicine,  [facility]  Cell: [phone]  Office: [phone]

## 2023-01-21 NOTE — PLAN OF CARE
Problem: Discharge Planning  Goal: Discharge to home or other facility with appropriate resources  Outcome: Progressing     Problem: Skin/Tissue Integrity  Goal: Absence of new skin breakdown  Description: 1. Monitor for areas of redness and/or skin breakdown  2. Assess vascular access sites hourly  3. Every 4-6 hours minimum:  Change oxygen saturation probe site  4. Every 4-6 hours:  If on nasal continuous positive airway pressure, respiratory therapy assess nares and determine need for appliance change or resting period.   Outcome: Progressing  Note: Waffle mattress on bed and properly inflated   Q2 turn  Meiplex to coccyx  Frequent Brief check and change PRN    Pure wick in place   Moisture wicking pad  Only necessary bedding under patient       Problem: ABCDS Injury Assessment  Goal: Absence of physical injury  Outcome: Progressing  Flowsheets (Taken 1/18/2023 2000 by Tyrese Diaz RN)  Absence of Physical Injury: Implement safety measures based on patient assessment     Problem: Safety - Adult  Goal: Free from fall injury  Outcome: Progressing

## 2023-01-21 NOTE — PLAN OF CARE
Patient slept without issue all evening. Patient had no complaints. Patient continues to be on 10L nc. Patient remains free from falls. Call light and bedside table within reach. Problem: Discharge Planning  Goal: Discharge to home or other facility with appropriate resources  1/20/2023 2041 by Roberto Brown RN  Outcome: Progressing     Problem: Skin/Tissue Integrity  Goal: Absence of new skin breakdown  Description: 1. Monitor for areas of redness and/or skin breakdown  2. Assess vascular access sites hourly  3. Every 4-6 hours minimum:  Change oxygen saturation probe site  4. Every 4-6 hours:  If on nasal continuous positive airway pressure, respiratory therapy assess nares and determine need for appliance change or resting period.   1/20/2023 2041 by Roberto Brown RN  Outcome: Progressing     Problem: ABCDS Injury Assessment  Goal: Absence of physical injury  1/20/2023 2041 by Roberto Brown RN  Outcome: Progressing     Problem: Safety - Adult  Goal: Free from fall injury  1/20/2023 2041 by Roberto Brown RN  Outcome: Progressing     Problem: Respiratory - Adult  Goal: Achieves optimal ventilation and oxygenation  1/20/2023 2041 by Roberto Brown RN  Outcome: Progressing     Problem: Pain  Goal: Verbalizes/displays adequate comfort level or baseline comfort level  1/20/2023 2041 by Roberto Brown RN  Outcome: Progressing     Problem: Infection - Adult  Goal: Absence of infection at discharge  1/20/2023 2041 by Roberto Brown RN  Outcome: Progressing

## 2023-01-21 NOTE — PLAN OF CARE
Problem: Respiratory - Adult  Goal: Achieves optimal ventilation and oxygenation  1/21/2023 1813 by Jovanny Esposito RCP  Outcome: Progressing

## 2023-01-22 PROCEDURE — 6370000000 HC RX 637 (ALT 250 FOR IP): Performed by: INTERNAL MEDICINE

## 2023-01-22 PROCEDURE — 6370000000 HC RX 637 (ALT 250 FOR IP): Performed by: STUDENT IN AN ORGANIZED HEALTH CARE EDUCATION/TRAINING PROGRAM

## 2023-01-22 PROCEDURE — 94640 AIRWAY INHALATION TREATMENT: CPT

## 2023-01-22 PROCEDURE — 6360000002 HC RX W HCPCS: Performed by: NURSE PRACTITIONER

## 2023-01-22 PROCEDURE — 2700000000 HC OXYGEN THERAPY PER DAY

## 2023-01-22 PROCEDURE — 6370000000 HC RX 637 (ALT 250 FOR IP): Performed by: NURSE PRACTITIONER

## 2023-01-22 PROCEDURE — 2060000000 HC ICU INTERMEDIATE R&B

## 2023-01-22 PROCEDURE — 94761 N-INVAS EAR/PLS OXIMETRY MLT: CPT

## 2023-01-22 PROCEDURE — 2580000003 HC RX 258: Performed by: NURSE PRACTITIONER

## 2023-01-22 PROCEDURE — 99232 SBSQ HOSP IP/OBS MODERATE 35: CPT | Performed by: NURSE PRACTITIONER

## 2023-01-22 PROCEDURE — 94669 MECHANICAL CHEST WALL OSCILL: CPT

## 2023-01-22 RX ADMIN — LAMOTRIGINE 100 MG: 100 TABLET ORAL at 09:31

## 2023-01-22 RX ADMIN — BUDESONIDE INHALATION 500 MCG: 0.5 SUSPENSION RESPIRATORY (INHALATION) at 07:10

## 2023-01-22 RX ADMIN — METHYLPREDNISOLONE SODIUM SUCCINATE 40 MG: 40 INJECTION, POWDER, FOR SOLUTION INTRAMUSCULAR; INTRAVENOUS at 16:06

## 2023-01-22 RX ADMIN — SODIUM CHLORIDE, PRESERVATIVE FREE 10 ML: 5 INJECTION INTRAVENOUS at 20:02

## 2023-01-22 RX ADMIN — ASPIRIN 81 MG: 81 TABLET, CHEWABLE ORAL at 09:31

## 2023-01-22 RX ADMIN — METHYLPREDNISOLONE SODIUM SUCCINATE 40 MG: 40 INJECTION, POWDER, FOR SOLUTION INTRAMUSCULAR; INTRAVENOUS at 09:32

## 2023-01-22 RX ADMIN — IPRATROPIUM BROMIDE AND ALBUTEROL SULFATE 1 AMPULE: 2.5; .5 SOLUTION RESPIRATORY (INHALATION) at 10:36

## 2023-01-22 RX ADMIN — IPRATROPIUM BROMIDE AND ALBUTEROL SULFATE 1 AMPULE: 2.5; .5 SOLUTION RESPIRATORY (INHALATION) at 20:28

## 2023-01-22 RX ADMIN — SODIUM CHLORIDE, PRESERVATIVE FREE 10 ML: 5 INJECTION INTRAVENOUS at 02:15

## 2023-01-22 RX ADMIN — DIVALPROEX SODIUM 1000 MG: 500 TABLET, EXTENDED RELEASE ORAL at 09:31

## 2023-01-22 RX ADMIN — QUETIAPINE FUMARATE 150 MG: 150 TABLET, EXTENDED RELEASE ORAL at 20:01

## 2023-01-22 RX ADMIN — METHYLPREDNISOLONE SODIUM SUCCINATE 40 MG: 40 INJECTION, POWDER, FOR SOLUTION INTRAMUSCULAR; INTRAVENOUS at 20:01

## 2023-01-22 RX ADMIN — GUAIFENESIN AND DEXTROMETHORPHAN HYDROBROMIDE 2 TABLET: 600; 30 TABLET, EXTENDED RELEASE ORAL at 09:31

## 2023-01-22 RX ADMIN — METOPROLOL SUCCINATE 25 MG: 25 TABLET, EXTENDED RELEASE ORAL at 09:31

## 2023-01-22 RX ADMIN — IPRATROPIUM BROMIDE AND ALBUTEROL SULFATE 1 AMPULE: 2.5; .5 SOLUTION RESPIRATORY (INHALATION) at 07:10

## 2023-01-22 RX ADMIN — SODIUM CHLORIDE, PRESERVATIVE FREE 10 ML: 5 INJECTION INTRAVENOUS at 09:36

## 2023-01-22 RX ADMIN — BUDESONIDE INHALATION 500 MCG: 0.5 SUSPENSION RESPIRATORY (INHALATION) at 20:28

## 2023-01-22 RX ADMIN — ENOXAPARIN SODIUM 40 MG: 100 INJECTION SUBCUTANEOUS at 09:32

## 2023-01-22 RX ADMIN — ARFORMOTEROL TARTRATE 15 MCG: 15 SOLUTION RESPIRATORY (INHALATION) at 20:27

## 2023-01-22 RX ADMIN — QUETIAPINE FUMARATE 200 MG: 200 TABLET ORAL at 20:01

## 2023-01-22 RX ADMIN — METHYLPREDNISOLONE SODIUM SUCCINATE 40 MG: 40 INJECTION, POWDER, FOR SOLUTION INTRAMUSCULAR; INTRAVENOUS at 02:14

## 2023-01-22 RX ADMIN — GUAIFENESIN AND DEXTROMETHORPHAN HYDROBROMIDE 2 TABLET: 600; 30 TABLET, EXTENDED RELEASE ORAL at 20:01

## 2023-01-22 RX ADMIN — PANTOPRAZOLE SODIUM 40 MG: 40 TABLET, DELAYED RELEASE ORAL at 05:28

## 2023-01-22 RX ADMIN — IPRATROPIUM BROMIDE AND ALBUTEROL SULFATE 1 AMPULE: 2.5; .5 SOLUTION RESPIRATORY (INHALATION) at 14:33

## 2023-01-22 RX ADMIN — ARFORMOTEROL TARTRATE 15 MCG: 15 SOLUTION RESPIRATORY (INHALATION) at 07:10

## 2023-01-22 RX ADMIN — QUETIAPINE FUMARATE 200 MG: 200 TABLET ORAL at 09:31

## 2023-01-22 NOTE — DISCHARGE INSTR - COC
Continuity of Care Form    Patient Name: Odessa Duran   :  1945  MRN:  [id number]    Admit date:  2023  Discharge date:      Code Status Order: Full Code   Advance Directives:     Admitting Physician:  Burnette Frankel, MD  PCP: No primary care provider on file. Discharging Nurse: Elvira Arcos [id number] Unit/Room#: 9417/1997-68  Discharging Unit Phone Number: [phone]    Emergency Contact:   Extended Emergency Contact Information  Primary Emergency Contact: Marcia Estefani [address] Phone: [phone]  Relation: Child    Past Surgical History:  Past Surgical History:   Procedure Laterality Date    ANUS SURGERY      bowel surgery r/t cancer approx 4 years ago    COLONOSCOPY N/A 2018    COLONOSCOPY POLYPECTOMY HOT BIOPSY performed by Madi Lujan DO at [address] [place] [address],[address]      umbilical       Immunization History: There is no immunization history on file for this patient.     Active Problems:  Patient Active Problem List   Diagnosis Code    COVID U07.1    Acute respiratory failure with hypoxia (HCC) J96.01    COPD exacerbation (HCC) J44.1    Elevated troponin R77.8    Tobacco abuse Z72.0       Isolation/Infection:   Isolation            Droplet Plus          Patient Infection Status       Infection Onset Added Last Indicated Last Indicated By Review Planned Expiration Resolved Resolved By    COVID-19 23 COVID-19, Rapid 23      Resolved    COVID-19 (Rule Out) 23 COVID-19, Rapid (Ordered)   23 Rule-Out Test Resulted            Nurse Assessment:  Last Vital Signs: BP (!) 144/94   Pulse 71   Temp 98.6 °F (37 °C) (Oral)   Resp 16   Ht 5' 10\" (1.778 m)   Wt 206 lb (93.4 kg)   SpO2 92%   BMI 29.56 kg/m²     Last documented pain score (0-10 scale): Pain Level: 0  Last Weight:   Wt Readings from Last 1 Encounters:   01/15/23 206 lb (93.4 kg)     Mental Status:  oriented and alert    IV Access:  - None    Nursing Mobility/ADLs:  Walking   Independent  Transfer  Independent  Bathing  Independent  Dressing  Independent  [address]  Med Delivery   whole    Wound Care Documentation and Therapy:        Elimination:  Continence: Bowel: Yes  Bladder: Yes  Urinary Catheter: None   Colostomy/Ileostomy/Ileal Conduit: No       Date of Last BM: 1/26/2023    Intake/Output Summary (Last 24 hours) at 1/22/2023 0920  Last data filed at 1/21/2023 2326  Gross per 24 hour   Intake --   Output 450 ml   Net -450 ml     I/O last 3 completed shifts:  In: -   Out: 450 [Urine:450]    Safety Concerns:     History of Falls (last 30 days)    Impairments/Disabilities:      None    Nutrition Therapy:  Current Nutrition Therapy:   - Oral Diet:  General    Routes of Feeding: Oral  Liquids: Thin Liquids  Daily Fluid Restriction: no  Last Modified Barium Swallow with Video (Video Swallowing Test): not done    Treatments at the Time of Hospital Discharge:   Respiratory Treatments: yes. Nebulizer's and inhalers  Oxygen Therapy:  is on oxygen at 3 L/min per nasal cannula.   Ventilator:    - BiPAP   IPAP: 16 cmH20, CPAP/EPAP: 10 cmH2O only when sleeping    Rehab Therapies: Physical Therapy and Occupational Therapy  Weight Bearing Status/Restrictions: No weight bearing restrictions  Other Medical Equipment (for information only, NOT a DME order):  none  Other Treatments: none    Patient's personal belongings (please select all that are sent with patient):  Dentures upper and lower    RN SIGNATURE:  Electronically signed by Gabino King RN on 1/26/23 at 3:48 PM EST    CASE MANAGEMENT/SOCIAL WORK SECTION    Inpatient Status Date: 1/13/2023    Readmission Risk Assessment Score:  Readmission Risk              Risk of Unplanned Readmission:  16           Discharging to Facility/ Agency   Name: Name: [facility] [place]  Address: [address] [facility], [address] [place] [address]  Phone: [phone]/central intake: [phone]  Fax: [phone]/central intake fax: [phone]      / signature: Electronically signed by JOSE RAUL Waters on 1/26/23 at 4:15 PM EST    PHYSICIAN SECTION    Prognosis: Fair    Condition at Discharge: Stable    Rehab Potential (if transferring to Rehab): Good    Recommended Labs or Other Treatments After Discharge:  See PCP 1 week  Check BMP 1 week    Physician Certification: I certify the above information and transfer of Mohsen Sims  is necessary for the continuing treatment of the diagnosis listed and that he requires SNF for less 30 days.      Update Admission H&P: No change in H&P    PHYSICIAN SIGNATURE:  Electronically signed by Nima Lopez DO on 1/27/2023 at 1:01 PM

## 2023-01-22 NOTE — PROGRESS NOTES
[facility]  Office: [address], DO, Trisha Lanza, DO, Sha Corbin, DO, Alena Ember Richards, DO, Mahesh Bruce MD, Myesha Houser MD, Oumar Harrison MD, Whit Zepeda MD,  Julia Fabian MD, Earl Ace MD, Derick Kerr, DO, Kourtney Peck MD,  Bret Simons MD, Lieutenant Carrington MD, Estela Anderson, DO, David Sharpe MD, Destiney Jeong MD, Franklyn Rodriguez, DO, Eugene Yañez MD, Jorge Bradford MD, Thierry Shine MD, Giovanna Dennis MD, Donell Lawson, DO, Chet Hernandez MD, Chel Block MD, Lance Barrow, CNP,  Ken Negrete, CNP, Radha Eckert, CNP, Juan De Leon, CNP,  Michelle Barone, [facility], Zeb Barros, CNP, Florence Rojas, CNP, Tom Blue, CNP, Georgette King, CNP, Caroline Riley, CNP, Rose Bhatt PA-C, Bryant Castro, CNS, Jamie Cui, CNP, Aj Plunkett, [facility]         [facility]    Progress Note    1/22/2023    9:16 AM    Name:   Patrick Celeste  MRN:     [id number]     Acct:      [de-identified]   Room:   [address] [place] [address] Day:  9  Admit Date:  1/13/2023  2:14 AM    PCP:   No primary care provider on file. Code Status:  Full Code    Subjective:     C/C:   Chief Complaint   Patient presents with    Fatigue     Pt was found on his knees outside of assisted living. Pt was outside smoking and slid out of a chair. Interval History Status:     Patient is lying comfortably in bed. He denies any new complaints. He continues to go back and forth between 8 and 10 L of oxygen on salter nasal cannula. Vitals otherwise stable  Brief History:   Mr. Tamera Harkins is a 78 yr old with a history of COPD lives in assisted living facility presented to hospital with weakness, shortness of breath. Patient states that he had a usual morning routine, drank his coffee and was trying to get out of wheel chair noted that he was very weak and fatigued.  he also had cough started yesterday with white phlegm, no chest pain, no fever, no chills. Patient also started feeling short of breath. No diarrhea. No sick contacts that patient knows of. Patient smokes 2 packs every day and he has copd diagnosis in chart but not on any treatment, no pft seen in the chart. In the ER patient was noted to be hypoxic, 88%, requiring 2 lt oxygen. He was afebrile. Electrolytes normal. Patient was seen to have elevated troponin of 34 which trended up to 51 and 47. Patient does not report any chest pain. D-dimer 1.63, COVID positive, x-ray does not show any acute abnormality. He has been started on Paxlovid  Review of Systems:     Constitutional:  negative for chills, fevers, sweats  Respiratory: Positive for cough, dyspnea on exertion, shortness of breath. negative for wheezing  Cardiovascular:  negative for chest pain, chest pressure/discomfort, lower extremity edema, palpitations  Gastrointestinal:  negative for abdominal pain, constipation, diarrhea, nausea, vomiting  Neurological:  negative for dizziness, headache    Medications:      Allergies:  No Known Allergies    Current Meds:   Scheduled Meds:    pantoprazole  40 mg Oral QAM AC    dextromethorphan-guaiFENesin  2 tablet Oral BID    enoxaparin  40 mg SubCUTAneous Daily    budesonide  0.5 mg Nebulization BID    arformoterol tartrate  15 mcg Nebulization BID    methylPREDNISolone  40 mg IntraVENous Q6H    metoprolol succinate  25 mg Oral Daily    vitamin D  50,000 Units Oral Once per day on Sat    divalproex  1,000 mg Oral Daily    sodium chloride flush  5-40 mL IntraVENous 2 times per day    QUEtiapine  150 mg Oral Nightly    ipratropium-albuterol  1 ampule Inhalation Q4H WA    aspirin  81 mg Oral Daily    nicotine  1 patch TransDERmal Daily    lamoTRIgine  100 mg Oral Daily    QUEtiapine  200 mg Oral BID     Continuous Infusions:    sodium chloride       PRN Meds: calcium carbonate, sodium chloride flush, sodium chloride, ondansetron **OR** ondansetron, polyethylene glycol, acetaminophen **OR** acetaminophen, aluminum & magnesium hydroxide-simethicone    Data:     Past Medical History:   has a past medical history of Back pain, Bipolar 1 disorder ([facility] Cancer ([facility] COPD (chronic obstructive pulmonary disease) ([facility] GERD (gastroesophageal reflux disease), and Heart attack ([facility] Social History:   reports that he has been smoking cigarettes and pipe. He has never used smokeless tobacco. He reports that he does not drink alcohol and does not use drugs. Family History:   Family History   Problem Relation Age of Onset    Cancer Father         Lung    Stroke Father     Cancer Brother         Lymphoma    Mental Illness Other         Aunt       Vitals:  BP (!) 144/94   Pulse 71   Temp 98.6 °F (37 °C) (Oral)   Resp 16   Ht 5' 10\" (1.778 m)   Wt 206 lb (93.4 kg)   SpO2 92%   BMI 29.56 kg/m²   Temp (24hrs), Av.1 °F (36.7 °C), Min:97.7 °F (36.5 °C), Max:98.6 °F (37 °C)    Recent Labs     23  0426   POCGLU 175*       I/O (24Hr): Intake/Output Summary (Last 24 hours) at 2023 0916  Last data filed at 2023 2326  Gross per 24 hour   Intake --   Output 450 ml   Net -450 ml       Labs:  Hematology:  No results for input(s): WBC, RBC, HGB, HCT, MCV, MCH, MCHC, RDW, PLT, MPV, SEDRATE, CRP, INR, DDIMER, LS5NRGSW, LABABSO in the last 72 hours.     Invalid input(s): PT    Chemistry:  Recent Labs     23  1035 23  0515    141   K 4.1 4.7    100   CO2 26 31   GLUCOSE 324* 167*   BUN 35* 34*   CREATININE 0.98 0.79   ANIONGAP 15 10   LABGLOM >60 >60   CALCIUM 8.6 8.8     Recent Labs     23  0426   POCGLU 175*       ABG:No results found for: POCPH, PHART, PH, POCPCO2, DQR8YHK, PCO2, POCPO2, PO2ART, PO2, POCHCO3, RDU7XAY, HCO3, NBEA, PBEA, BEART, BE, THGBART, THB, AYX9MTA, HCKB5MLO, Q8GMLRRA, O2SAT, FIO2  No results found for: SPECIAL  No results found for: CULTURE    Radiology:  XR CHEST PORTABLE    Result Date: 2023  Recently described right basilar opacity with CT appears unchanged. No new findings identified in the interval.     XR CHEST PORTABLE    Result Date: 1/13/2023  Mild to moderate COPD but no acute cardiopulmonary abnormality evident. Possible soft tissue mass at the lateral left lower chest wall. Correlate clinically. CT CHEST PULMONARY EMBOLISM W CONTRAST    Result Date: 1/14/2023  1. Negative for pulmonary embolus. 2. Emphysema with mild right basilar segmental atelectasis versus pneumonia. 3. Indeterminate 9 mm lingular nodule. RECOMMENDATIONS: 9 mm suspicious left solid pulmonary nodule. Consider a non-contrast Chest CT at 3 months, a PET/CT, or tissue sampling. These guidelines do not apply to immunocompromised patients and patients with cancer. Follow up in patients with significant comorbidities as clinically warranted. For lung cancer screening, adhere to Lung-RADS guidelines. Reference: Radiology. 2017; 284(1):228-43. Physical Examination:        General appearance:  alert, cooperative and no distress  Mental Status:  oriented to person, place and time and normal affect  Lungs: Rhonchi auscultated to bilateral bases,fine rales note throughout, decreased air exchange.   Normal effort at rest but does get dyspneic with exertion  Heart:  regular rate and rhythm, no murmur  Abdomen:  soft, nontender, nondistended, normal bowel sounds, no masses, hepatomegaly, splenomegaly  Extremities:  no edema, redness, tenderness in the calves  Skin:  no gross lesions, rashes, induration  Positive for generalized weakness  Assessment:        Hospital Problems             Last Modified POA    * (Principal) COVID 1/13/2023 Yes    Acute respiratory failure with hypoxia ([address] 1/13/2023 Yes    COPD exacerbation ([address] 1/13/2023 Yes    Elevated troponin 1/13/2023 Yes    Tobacco abuse 1/13/2023 Yes       Plan:        Completed full course of Paxlovid on 1/18/23  Continue aerosols and bronchodilators  S/p lasix x 1 dose on 1/18/23 with little change  Monitor labs, replace electrolytes as needed  IV steroids resumed per pulmonology, appreciate recommendations  Continue DVT prophylaxis  Continue supplemental oxygen, wean as able  Incentive spirometer every hour while awake, Acapella and MetaNeb  Continue Mucinex DM twice daily  Continue PPI  Monitor and control blood pressure-fairly well controlled on current meds  Continue PT/OT  Continue droplet precautions through 1/22/23  Discharge planning to LTAC due to increased oxygen requirements, patient understands and is agreeable and plan has been discussed with patient's daughter.   Await pre-CERT  Plan discussed with patient and staff    ALEXSANDRA Sal NP  1/22/2023  9:16 AM

## 2023-01-22 NOTE — PLAN OF CARE
Problem: Respiratory - Adult  Goal: Achieves optimal ventilation and oxygenation  1/22/2023 0715 by Claribel Thompson RCP  Outcome: Progressing

## 2023-01-22 NOTE — PROGRESS NOTES
Patient states \"I am depressed. Just don't feel like doing anything. \" Patient encouraged to express feelings. Patient asked if he would like to speak with spiritual care. He refused. Patient encouraged to sit up to chair for all meals and reminded to change positions at least every two hours. Patient states he is up to chair on/off. Writer knows patient not doing as expressed per hourly rounding. Point made to go in and advocate for patient to get up before each meal and give patient time to communicate with staff. Will continue to monitor.

## 2023-01-22 NOTE — PLAN OF CARE
Patient quiet in bed. Writer asked if patient got up today and patient stated yes he did. Writer reminded patient to rotate from side to side as patient turns self and regulates bed independently. Patient agreed to do so but needs reminded. Writer asked patient if he was interested in watching anything on television and patient stated he does not watch television. Writer offered patient some ice cream and patient accepted. Patient has remained quiet and resting comfortably in the dark. Patient BP ranges from 120's to 150's/60's. Patient was on 8L NC high flow with SPO2 ranging in low 90's. At 0200 patient began dropping to high 80's and would not come up until O2 was titrated to 10L again. Patient sleeps with mouth open. Patient remains cooperative and free from falls. Safety measures in place. Call light and bedside table within reach. Problem: Discharge Planning  Goal: Discharge to home or other facility with appropriate resources  1/22/2023 0134 by Jesus Lizama RN  Outcome: Progressing     Problem: Skin/Tissue Integrity  Goal: Absence of new skin breakdown  Description: 1. Monitor for areas of redness and/or skin breakdown  2. Assess vascular access sites hourly  3. Every 4-6 hours minimum:  Change oxygen saturation probe site  4. Every 4-6 hours:  If on nasal continuous positive airway pressure, respiratory therapy assess nares and determine need for appliance change or resting period.   1/22/2023 0134 by Jesus Lizama RN  Outcome: Progressing     Problem: ABCDS Injury Assessment  Goal: Absence of physical injury  1/22/2023 0134 by Jesus Lizama RN  Outcome: Progressing     Problem: Safety - Adult  Goal: Free from fall injury  1/22/2023 0134 by Jesus Lizama RN  Outcome: Progressing     Problem: Respiratory - Adult  Goal: Achieves optimal ventilation and oxygenation  1/22/2023 0134 by Jesus Lizama RN  Outcome: Progressing     Problem: Pain  Goal: Verbalizes/displays adequate comfort level or baseline comfort level  Outcome: Progressing     Problem: Infection - Adult  Goal: Absence of infection at discharge  Outcome: Progressing     Problem: Nutrition Deficit:  Goal: Optimize nutritional status  Outcome: Progressing

## 2023-01-22 NOTE — RT PROTOCOL NOTE
RT Inhaler-Nebulizer Bronchodilator Protocol Note    There is a bronchodilator order in the chart from a provider indicating to follow the RT Bronchodilator Protocol and there is an Initiate RT Inhaler-Nebulizer Bronchodilator Protocol order as well (see protocol at bottom of note). CXR Findings:  XR CHEST PORTABLE    Result Date: 1/20/2023  New linear opacity in the right perihilar region favored to represent atelectasis. Elsewhere, there is no confluent airspace consolidation. COPD. The findings from the last RT Protocol Assessment were as follows:   History Pulmonary Disease: Chronic pulmonary disease  Respiratory Pattern: Dyspnea on exertion or RR 21-25 bpm  Breath Sounds: Severe inspiratory and expiratory wheezing or severely diminished  Cough: Strong, spontaneous, non-productive  Indication for Bronchodilator Therapy: Decreased or absent breath sounds  Bronchodilator Assessment Score: 12    Aerosolized bronchodilator medication orders have been revised according to the RT Inhaler-Nebulizer Bronchodilator Protocol below. Respiratory Therapist to perform RT Therapy Protocol Assessment initially then follow the protocol. Repeat RT Therapy Protocol Assessment PRN for score 0-3 or on second treatment, BID, and PRN for scores above 3. No Indications - adjust the frequency to every 6 hours PRN wheezing or bronchospasm, if no treatments needed after 48 hours then discontinue using Per Protocol order mode. If indication present, adjust the RT bronchodilator orders based on the Bronchodilator Assessment Score as indicated below. Use Inhaler orders unless patient has one or more of the following: on home nebulizer, not able to hold breath for 10 seconds, is not alert and oriented, cannot activate and use MDI correctly, or respiratory rate 25 breaths per minute or more, then use the equivalent nebulizer order(s) with same Frequency and PRN reasons based on the score.   If a patient is on this medication at home then do not decrease Frequency below that used at home. 0-3 - enter or revise RT bronchodilator order(s) to equivalent RT Bronchodilator order with Frequency of every 4 hours PRN for wheezing or increased work of breathing using Per Protocol order mode. 4-6 - enter or revise RT Bronchodilator order(s) to two equivalent RT bronchodilator orders with one order with BID Frequency and one order with Frequency of every 4 hours PRN wheezing or increased work of breathing using Per Protocol order mode. 7-10 - enter or revise RT Bronchodilator order(s) to two equivalent RT bronchodilator orders with one order with TID Frequency and one order with Frequency of every 4 hours PRN wheezing or increased work of breathing using Per Protocol order mode. 11-13 - enter or revise RT Bronchodilator order(s) to one equivalent RT bronchodilator order with QID Frequency and an Albuterol order with Frequency of every 4 hours PRN wheezing or increased work of breathing using Per Protocol order mode. Greater than 13 - enter or revise RT Bronchodilator order(s) to one equivalent RT bronchodilator order with every 4 hours Frequency and an Albuterol order with Frequency of every 2 hours PRN wheezing or increased work of breathing using Per Protocol order mode. RT to enter RT Home Evaluation for COPD & MDI Assessment order using Per Protocol order mode.     Electronically signed by Lacey Geiger RCP on 1/22/2023 at 7:15 AM

## 2023-01-22 NOTE — PLAN OF CARE
Problem: Skin/Tissue Integrity  Goal: Absence of new skin breakdown  Description: 1. Monitor for areas of redness and/or skin breakdown  2. Assess vascular access sites hourly  3. Every 4-6 hours minimum:  Change oxygen saturation probe site  4. Every 4-6 hours:  If on nasal continuous positive airway pressure, respiratory therapy assess nares and determine need for appliance change or resting period. Outcome: Progressing  Note: No new skin breakdown. Patient encouraged to turn every 2 hours. Ambulation and up to chair encouraged.       Problem: ABCDS Injury Assessment  Goal: Absence of physical injury  Outcome: Progressing     Problem: Respiratory - Adult  Goal: Achieves optimal ventilation and oxygenation  1/22/2023 1703 by Herb Almendarez RN  Outcome: Progressing  1/22/2023 0715 by Kristal Richardson RCP  Outcome: Progressing

## 2023-01-22 NOTE — PLAN OF CARE
Problem: Respiratory - Adult  Goal: Achieves optimal ventilation and oxygenation  1/22/2023 1805 by Gilda Blanco RCP  Outcome: Progressing

## 2023-01-22 NOTE — PROGRESS NOTES
Pulmonary Critical Care Progress Note  Christina Lopez MD     Patient seen for the follow up of  COVID, acute hypoxic respiratory failure, active smoking/COPD    Subjective:  He is resting comfortably in the bed, no distress. He denies any chest pain, significant cough or shortness of breath. He is on high flow nasal cannula at 10 L. He is tolerating orals. He is using his IS and acapella. Examination:  Vitals: BP (!) 134/58   Pulse 58   Temp 98.1 °F (36.7 °C) (Axillary)   Resp 16   Ht 5' 10\" (1.778 m)   Wt 206 lb (93.4 kg)   SpO2 91%   BMI 29.56 kg/m²   General appearance: alert and cooperative with exam  Neck: No JVD  Lungs: Moderate to decreased air exchange, no wheezing or crackles  Heart: regular rate and rhythm, S1, S2 normal, no gallop  Abdomen: Soft, non tender, + BS  Extremities: no cyanosis or clubbing.  No significant edema      Radiology:  X-ray chest 1/20/23      Impression:  Acute hypoxic respiratory failure  Acute exacerbation of COPD/emphysema  Active smoking, 30-pack-year history  COVID-19  Right basilar atelectasis versus pneumonia  Indeterminate 9 mm lingular nodule    Recommendations:  Oxygen via high flow nasal cannula, keep SPO2 90% or greater   Incentive spirometry every hour while awake   Acapella  Budesonide and Brovana via nebulizer every 12 hours   IV solu-medrol 40 mg every 6 hours  Albuterol and Ipratropium Q 4 hours and prn  Mucinex DM  Status post course of paxlovid  DVT prophylaxis with low molecular weight heparin  GI prophylaxis with protonix  PT/OT as tolerated  PFTs as an outpatient  PET scan as an outpatient  LTAC discharge planning once pre-CERT obtained  Will follow with you    Electronically signed by     Maricarmen Rice MD on 1/22/2023 at 1:20 PM  Pulmonary Critical Care and Sleep Medicine,  [facility]  Cell: [phone]  Office: [phone]

## 2023-01-23 PROCEDURE — 6370000000 HC RX 637 (ALT 250 FOR IP): Performed by: STUDENT IN AN ORGANIZED HEALTH CARE EDUCATION/TRAINING PROGRAM

## 2023-01-23 PROCEDURE — 6370000000 HC RX 637 (ALT 250 FOR IP): Performed by: NURSE PRACTITIONER

## 2023-01-23 PROCEDURE — 97110 THERAPEUTIC EXERCISES: CPT

## 2023-01-23 PROCEDURE — 94669 MECHANICAL CHEST WALL OSCILL: CPT

## 2023-01-23 PROCEDURE — 94640 AIRWAY INHALATION TREATMENT: CPT

## 2023-01-23 PROCEDURE — 97116 GAIT TRAINING THERAPY: CPT

## 2023-01-23 PROCEDURE — 99232 SBSQ HOSP IP/OBS MODERATE 35: CPT | Performed by: NURSE PRACTITIONER

## 2023-01-23 PROCEDURE — 6360000002 HC RX W HCPCS: Performed by: NURSE PRACTITIONER

## 2023-01-23 PROCEDURE — 94761 N-INVAS EAR/PLS OXIMETRY MLT: CPT

## 2023-01-23 PROCEDURE — 6370000000 HC RX 637 (ALT 250 FOR IP): Performed by: INTERNAL MEDICINE

## 2023-01-23 PROCEDURE — 2700000000 HC OXYGEN THERAPY PER DAY

## 2023-01-23 PROCEDURE — 2060000000 HC ICU INTERMEDIATE R&B

## 2023-01-23 PROCEDURE — 2580000003 HC RX 258: Performed by: NURSE PRACTITIONER

## 2023-01-23 RX ADMIN — METOPROLOL SUCCINATE 25 MG: 25 TABLET, EXTENDED RELEASE ORAL at 08:36

## 2023-01-23 RX ADMIN — METHYLPREDNISOLONE SODIUM SUCCINATE 40 MG: 40 INJECTION, POWDER, FOR SOLUTION INTRAMUSCULAR; INTRAVENOUS at 13:40

## 2023-01-23 RX ADMIN — GUAIFENESIN AND DEXTROMETHORPHAN HYDROBROMIDE 2 TABLET: 600; 30 TABLET, EXTENDED RELEASE ORAL at 20:41

## 2023-01-23 RX ADMIN — BUDESONIDE INHALATION 500 MCG: 0.5 SUSPENSION RESPIRATORY (INHALATION) at 20:12

## 2023-01-23 RX ADMIN — QUETIAPINE FUMARATE 200 MG: 200 TABLET ORAL at 08:33

## 2023-01-23 RX ADMIN — QUETIAPINE FUMARATE 200 MG: 200 TABLET ORAL at 20:40

## 2023-01-23 RX ADMIN — QUETIAPINE FUMARATE 150 MG: 150 TABLET, EXTENDED RELEASE ORAL at 20:41

## 2023-01-23 RX ADMIN — ARFORMOTEROL TARTRATE 15 MCG: 15 SOLUTION RESPIRATORY (INHALATION) at 20:12

## 2023-01-23 RX ADMIN — IPRATROPIUM BROMIDE AND ALBUTEROL SULFATE 1 AMPULE: 2.5; .5 SOLUTION RESPIRATORY (INHALATION) at 14:55

## 2023-01-23 RX ADMIN — IPRATROPIUM BROMIDE AND ALBUTEROL SULFATE 1 AMPULE: 2.5; .5 SOLUTION RESPIRATORY (INHALATION) at 20:12

## 2023-01-23 RX ADMIN — ARFORMOTEROL TARTRATE 15 MCG: 15 SOLUTION RESPIRATORY (INHALATION) at 08:20

## 2023-01-23 RX ADMIN — SODIUM CHLORIDE, PRESERVATIVE FREE 10 ML: 5 INJECTION INTRAVENOUS at 08:34

## 2023-01-23 RX ADMIN — SODIUM CHLORIDE, PRESERVATIVE FREE 10 ML: 5 INJECTION INTRAVENOUS at 20:41

## 2023-01-23 RX ADMIN — IPRATROPIUM BROMIDE AND ALBUTEROL SULFATE 1 AMPULE: 2.5; .5 SOLUTION RESPIRATORY (INHALATION) at 11:04

## 2023-01-23 RX ADMIN — BUDESONIDE INHALATION 500 MCG: 0.5 SUSPENSION RESPIRATORY (INHALATION) at 08:20

## 2023-01-23 RX ADMIN — METHYLPREDNISOLONE SODIUM SUCCINATE 40 MG: 40 INJECTION, POWDER, FOR SOLUTION INTRAMUSCULAR; INTRAVENOUS at 08:34

## 2023-01-23 RX ADMIN — METHYLPREDNISOLONE SODIUM SUCCINATE 40 MG: 40 INJECTION, POWDER, FOR SOLUTION INTRAMUSCULAR; INTRAVENOUS at 03:21

## 2023-01-23 RX ADMIN — PANTOPRAZOLE SODIUM 40 MG: 40 TABLET, DELAYED RELEASE ORAL at 05:34

## 2023-01-23 RX ADMIN — LAMOTRIGINE 100 MG: 100 TABLET ORAL at 08:33

## 2023-01-23 RX ADMIN — ENOXAPARIN SODIUM 40 MG: 100 INJECTION SUBCUTANEOUS at 08:34

## 2023-01-23 RX ADMIN — DIVALPROEX SODIUM 1000 MG: 500 TABLET, EXTENDED RELEASE ORAL at 08:33

## 2023-01-23 RX ADMIN — ASPIRIN 81 MG: 81 TABLET, CHEWABLE ORAL at 08:33

## 2023-01-23 RX ADMIN — GUAIFENESIN AND DEXTROMETHORPHAN HYDROBROMIDE 2 TABLET: 600; 30 TABLET, EXTENDED RELEASE ORAL at 08:33

## 2023-01-23 RX ADMIN — IPRATROPIUM BROMIDE AND ALBUTEROL SULFATE 1 AMPULE: 2.5; .5 SOLUTION RESPIRATORY (INHALATION) at 08:20

## 2023-01-23 RX ADMIN — METHYLPREDNISOLONE SODIUM SUCCINATE 40 MG: 40 INJECTION, POWDER, FOR SOLUTION INTRAMUSCULAR; INTRAVENOUS at 20:41

## 2023-01-23 NOTE — CARE COORDINATION
Discharge planning    Call from Dr Funmi Swift and patient denied the P2P. Can appeal or consider [facility]. Patient on HF 7 liters. They do not have RT on site 24/7. Call to francisco and asked to start the appeal.  Oxygen is new to patient and resides in an AL. Ana Cristina from [facility] will fax over form for doctor to sign to initiate the appeal. Will follow. [phone]  Expedited appeal paper received. NP perfect serve to come and sign. [phone]  Signed and faxed back to [facility].  Let francisco know

## 2023-01-23 NOTE — CARE COORDINATION
Discharge planning     COVID +. . Patient maintains at 8 L HF. Pulmonary is following. Has had full course of Paxlovid. Francis Seaman from [place] following. .pre cert pending .  Patient agreeable

## 2023-01-23 NOTE — PLAN OF CARE
Problem: Safety - Adult  Goal: Free from fall injury  1/23/2023 1211 by Robert Murphy RN  Outcome: Progressing    Patient has remained free from falls this shift. Patient is alert and oriented times four. Bed to lowest position with door open. Patient care items and call light in reach. Patient uses call light appropriately for assist. Will continue to monitor. Please see fall assessment.

## 2023-01-23 NOTE — PLAN OF CARE
Problem: Respiratory - Adult  Goal: Achieves optimal ventilation and oxygenation  1/22/2023 2023 by Shayy Castelan RCP  Outcome: Progressing

## 2023-01-23 NOTE — PROGRESS NOTES
Physical Therapy  Facility/Department: [facility] PROGRESSIVE CARE  Rehabilitation Physical Therapy Treatment Note    NAME: Melinda Nash  : 1945 (78 y.o.)  MRN: [id number]  CODE STATUS: Full Code    Date of Service: 23  Assessment: Pt with deficits regarding cognition, gait, balance, safety awareness, and aerobic endurance and would benefit from cont skilled PT services in order to safely return to [facility]. Pt seemed to have flat affect and making very little eye contact throughout. AMPAC score of 18/24 for functional mobility. Activity Tolerance: Patient limited by endurance; Patient limited by fatigue  Discharge Recommendations: Patient would benefit from continued therapy after discharge    Restrictions:  Restrictions/Precautions: Up as Tolerated;General Precautions;Isolation  Position Activity Restriction  Other position/activity restrictions: Up as tolerated, telemetry, 4L O2 via NC, R wrist IV, Droplet+ precautions d/t COVID+     SUBJECTIVE  Subjective  Subjective: Pt sleeping in bed with lights off upon entry, easily arousable and agreeable with PT. AMARI Lundberg PT.    OBJECTIVE  Cognition  Overall Cognitive Status: Exceptions  Arousal/Alertness: Appropriate responses to stimuli  Following Commands: Follows one step commands with repetition; Follows one step commands with increased time  Attention Span: Attends with cues to redirect  Memory: Decreased short term memory;Decreased recall of precautions;Decreased recall of recent events  Safety Judgement: Decreased awareness of need for assistance;Decreased awareness of need for safety  Problem Solving: Decreased awareness of errors;Assistance required to identify errors made;Assistance required to generate solutions;Assistance required to correct errors made;Assistance required to implement solutions  Insights: Decreased awareness of deficits  Initiation: Does not require cues  Sequencing: Requires cues for some  Cognition Comment: Pt engages with little eye contact and seemed to be zoning out at times however states he feels fine. Pt also states \"I am tired of this routine\" referring to being in the hospital.  Orientation  Overall Orientation Status: Within Functional Limits  Orientation Level: Oriented X4    Functional Mobility  Sit to Supine  Assistance Level: Modified independent  Skilled Clinical Factors: Pt able to return supine with minimal difficulty - requires assist managing lines. Supine to Sit  Assistance Level: Modified independent  Skilled Clinical Factors: Pt able to achieve EOB from flat bed with increased time. VC's for movement initiation. Transfers  Surface: From bed; To chair with arms  Additional Factors: Verbal cues; Hand placement cues; Set-up  Device:  (HHA)  Sit to Stand  Assistance Level: Contact guard assist  Skilled Clinical Factors: VC's to push up from bed to promote to stance  Stand to Sit  Assistance Level: Contact guard assist  Skilled Clinical Factors: VC's to reach back for chair for slow, controlled descent with fair return demo. Environmental Mobility  Ambulation  Surface: Level surface  Device: Rolling walker  Distance: x1 lap around room  Activity: Within Room  Activity Comments: SpO2 maintained >93% with ambulation, however, unsure of accurate reading. Mild audible sob noted s/p amb, VC's for pursed lip breathing as pt tends to mouth breath - poor return demo  Additional Factors: Increased time to complete;Verbal cues; Set-up  Assistance Level: Contact guard assist  Gait Deviations: Slow april;Decreased step length bilateral;Decreased heel strike right;Decreased heel strike left;Narrow base of support  Skilled Clinical Factors: Pt with shuffle-like gait, taking very small steps with RW ahead too far. Manual cues for safe RW proximity/placement, TC for upright posture and for greater step length, however, unable to maintain with fleeting return noted.  Demos little awareness of lines involved requiring assist to avoid tangling throughout. PT Exercises  A/AROM Exercises: Seated BLE marches, LAQ, AP, nadine add/abd with minimal AROM noted - encouraged pt to attempt full range, however, no change was made. Functional Mobility Circuit Training: x4 STS with focus on safe hand placement 100% of the time  Breathing Techniques: pursed lip breathing tech throughout    Goals  Patient Goals   Patient Goals : Breathe better  Short Term Goals  Time Frame for Short Term Goals: 12 treatments  Short Term Goal 1: Independent transfers  Short Term Goal 2: Independent ambulation 100' x 1 w/ SpO2 >92%  Short Term Goal 3: Tolerate 30 min ther act    PLAN OF CARE/SAFETY  Physcial Therapy Plan  General Plan: 5-7 times per week  Current Treatment Recommendations: Endurance training;Gait training;Transfer training  Safety Devices  Type of Devices: Left in chair;Chair alarm in place;Call light within reach;Nurse notified    EDUCATION  Education  Education Given To: Patient  Education Provided: Safety;Precautions; Energy Conservation;Transfer Training  Education Provided Comments: OOB mobility, endurance training, deep breathing tech, safety awareness, energy conservation  Education Method: Verbal;Teach Back  Barriers to Learning: Cognition  Education Outcome: Verbalized understanding;Continued education needed    Therapy Time   Individual Concurrent Group Co-treatment   Time In  0571         Time Out  1345         Minutes  [address], 01/23/23 at 2:37 PM

## 2023-01-23 NOTE — RT PROTOCOL NOTE
RT Inhaler-Nebulizer Bronchodilator Protocol Note    There is a bronchodilator order in the chart from a provider indicating to follow the RT Bronchodilator Protocol and there is an Initiate RT Inhaler-Nebulizer Bronchodilator Protocol order as well (see protocol at bottom of note). CXR Findings:  No results found. The findings from the last RT Protocol Assessment were as follows:   History Pulmonary Disease: Chronic pulmonary disease  Respiratory Pattern: Dyspnea on exertion or RR 21-25 bpm  Breath Sounds: Severe inspiratory and expiratory wheezing or severely diminished  Cough: Strong, productive  Indication for Bronchodilator Therapy: Decreased or absent breath sounds  Bronchodilator Assessment Score: 12    Aerosolized bronchodilator medication orders have been revised according to the RT Inhaler-Nebulizer Bronchodilator Protocol below. Respiratory Therapist to perform RT Therapy Protocol Assessment initially then follow the protocol. Repeat RT Therapy Protocol Assessment PRN for score 0-3 or on second treatment, BID, and PRN for scores above 3. No Indications - adjust the frequency to every 6 hours PRN wheezing or bronchospasm, if no treatments needed after 48 hours then discontinue using Per Protocol order mode. If indication present, adjust the RT bronchodilator orders based on the Bronchodilator Assessment Score as indicated below. Use Inhaler orders unless patient has one or more of the following: on home nebulizer, not able to hold breath for 10 seconds, is not alert and oriented, cannot activate and use MDI correctly, or respiratory rate 25 breaths per minute or more, then use the equivalent nebulizer order(s) with same Frequency and PRN reasons based on the score. If a patient is on this medication at home then do not decrease Frequency below that used at home.     0-3 - enter or revise RT bronchodilator order(s) to equivalent RT Bronchodilator order with Frequency of every 4 hours PRN for wheezing or increased work of breathing using Per Protocol order mode. 4-6 - enter or revise RT Bronchodilator order(s) to two equivalent RT bronchodilator orders with one order with BID Frequency and one order with Frequency of every 4 hours PRN wheezing or increased work of breathing using Per Protocol order mode. 7-10 - enter or revise RT Bronchodilator order(s) to two equivalent RT bronchodilator orders with one order with TID Frequency and one order with Frequency of every 4 hours PRN wheezing or increased work of breathing using Per Protocol order mode. 11-13 - enter or revise RT Bronchodilator order(s) to one equivalent RT bronchodilator order with QID Frequency and an Albuterol order with Frequency of every 4 hours PRN wheezing or increased work of breathing using Per Protocol order mode. Greater than 13 - enter or revise RT Bronchodilator order(s) to one equivalent RT bronchodilator order with every 4 hours Frequency and an Albuterol order with Frequency of every 2 hours PRN wheezing or increased work of breathing using Per Protocol order mode. RT to enter RT Home Evaluation for COPD & MDI Assessment order using Per Protocol order mode.     Electronically signed by Maribel Bowers RCP on 1/23/2023 at 8:21 AM

## 2023-01-23 NOTE — RT PROTOCOL NOTE
RT Inhaler-Nebulizer Bronchodilator Protocol Note    There is a bronchodilator order in the chart from a provider indicating to follow the RT Bronchodilator Protocol and there is an Initiate RT Inhaler-Nebulizer Bronchodilator Protocol order as well (see protocol at bottom of note). CXR Findings:  No results found. The findings from the last RT Protocol Assessment were as follows:   History Pulmonary Disease: Chronic pulmonary disease  Respiratory Pattern: Dyspnea on exertion or RR 21-25 bpm  Breath Sounds: Severe inspiratory and expiratory wheezing or severely diminished  Cough: Strong, spontaneous, non-productive  Indication for Bronchodilator Therapy: Decreased or absent breath sounds  Bronchodilator Assessment Score: 12    Aerosolized bronchodilator medication orders have been revised according to the RT Inhaler-Nebulizer Bronchodilator Protocol below. Respiratory Therapist to perform RT Therapy Protocol Assessment initially then follow the protocol. Repeat RT Therapy Protocol Assessment PRN for score 0-3 or on second treatment, BID, and PRN for scores above 3. No Indications - adjust the frequency to every 6 hours PRN wheezing or bronchospasm, if no treatments needed after 48 hours then discontinue using Per Protocol order mode. If indication present, adjust the RT bronchodilator orders based on the Bronchodilator Assessment Score as indicated below. Use Inhaler orders unless patient has one or more of the following: on home nebulizer, not able to hold breath for 10 seconds, is not alert and oriented, cannot activate and use MDI correctly, or respiratory rate 25 breaths per minute or more, then use the equivalent nebulizer order(s) with same Frequency and PRN reasons based on the score. If a patient is on this medication at home then do not decrease Frequency below that used at home.     0-3 - enter or revise RT bronchodilator order(s) to equivalent RT Bronchodilator order with Frequency of every 4 hours PRN for wheezing or increased work of breathing using Per Protocol order mode. 4-6 - enter or revise RT Bronchodilator order(s) to two equivalent RT bronchodilator orders with one order with BID Frequency and one order with Frequency of every 4 hours PRN wheezing or increased work of breathing using Per Protocol order mode. 7-10 - enter or revise RT Bronchodilator order(s) to two equivalent RT bronchodilator orders with one order with TID Frequency and one order with Frequency of every 4 hours PRN wheezing or increased work of breathing using Per Protocol order mode. 11-13 - enter or revise RT Bronchodilator order(s) to one equivalent RT bronchodilator order with QID Frequency and an Albuterol order with Frequency of every 4 hours PRN wheezing or increased work of breathing using Per Protocol order mode. Greater than 13 - enter or revise RT Bronchodilator order(s) to one equivalent RT bronchodilator order with every 4 hours Frequency and an Albuterol order with Frequency of every 2 hours PRN wheezing or increased work of breathing using Per Protocol order mode. RT to enter RT Home Evaluation for COPD & MDI Assessment order using Per Protocol order mode.     Electronically signed by Joan Farfan RCP on 1/22/2023 at 8:41 PM

## 2023-01-23 NOTE — PLAN OF CARE
Pt had an uneventful night. Pt is currently on 8L saulter, saturating at 94%. Pt had no complaints of pain, continuing to monitor. Pt was up to the restroom a couple times throughout of the night with assist. Pt safety maintained, call light within reach, bed alarm on for safety, all questions asked and answered. Problem: Discharge Planning  Goal: Discharge to home or other facility with appropriate resources  1/23/2023 0350 by Daisy Rebolledo RN  Outcome: Progressing     Problem: Skin/Tissue Integrity  Goal: Absence of new skin breakdown  Description: 1. Monitor for areas of redness and/or skin breakdown  2. Assess vascular access sites hourly  3. Every 4-6 hours minimum:  Change oxygen saturation probe site  4. Every 4-6 hours:  If on nasal continuous positive airway pressure, respiratory therapy assess nares and determine need for appliance change or resting period.   1/23/2023 0350 by Daisy Rebolledo RN  Outcome: Progressing     Problem: ABCDS Injury Assessment  Goal: Absence of physical injury  1/23/2023 0350 by Daisy Rebolledo RN  Outcome: Progressing     Problem: Safety - Adult  Goal: Free from fall injury  1/23/2023 0350 by Daisy Rebolledo RN  Outcome: Progressing     Problem: Respiratory - Adult  Goal: Achieves optimal ventilation and oxygenation  1/23/2023 0350 by Daisy Rebolledo RN  Outcome: Progressing     Problem: Pain  Goal: Verbalizes/displays adequate comfort level or baseline comfort level  1/23/2023 0350 by Daisy Rebolledo RN  Outcome: Progressing

## 2023-01-23 NOTE — CARE COORDINATION
Discharge planning    Message from Trina at [facility] is offering P2P. Must be completed by 1 pm 23  Call [phone]  Must have patient name,    Include ID/ref number of [id number]    Sent peer to peer information to Dr Aracely Bettencourt to see if can do. Will await     1300  Call to the above number and was told that Dr Johnathon Parikh just calls in directly and they will connect to doctor directly .  Email to Dr Johnathon Parikh and updated on the above

## 2023-01-23 NOTE — PROGRESS NOTES
Pulmonary Critical Care Progress Note  Nya Lantigua CNP/Elba Tijerina MD     Patient seen for the follow up of  COVID, acute hypoxic respiratory failure, active smoking/COPD    Subjective:  He is resting comfortably in the bed, no distress. He is lying on his right side. He denies any chest pain, significant cough or shortness of breath. He remains on high flow nasal cannula, currently at 8 L. He is tolerating orals. He is using his IS and acapella. Examination:  Vitals: BP (!) 150/84   Pulse 60   Temp 97.5 °F (36.4 °C) (Oral)   Resp 22   Ht 5' 10\" (1.778 m)   Wt 206 lb (93.4 kg)   SpO2 94%   BMI 29.56 kg/m²   General appearance: alert and cooperative with exam  Neck: No JVD  Lungs: Moderate to decreased air exchange, no wheezing or crackles  Heart: regular rate and rhythm, S1, S2 normal, no gallop  Abdomen: Soft, non tender, + BS  Extremities: no cyanosis or clubbing. No significant edema      Radiology:  X-ray chest 1/20/23      Impression:  Acute hypoxic respiratory failure  Acute exacerbation of COPD/emphysema  Active smoking, 30-pack-year history  COVID-19  Right basilar atelectasis versus pneumonia  Indeterminate 9 mm lingular nodule    Recommendations:  Oxygen via high flow nasal cannula, keep SPO2 90% or greater   Incentive spirometry every hour while awake   Acapella  Budesonide and Brovana via nebulizer every 12 hours   IV solu-medrol 40 mg every 6 hours  Albuterol and Ipratropium Q 4 hours and prn  Mucinex DM  Status post course of paxlovid  DVT prophylaxis with low molecular weight heparin  GI prophylaxis with protonix  PT/OT as tolerated  PFTs as an outpatient  PET scan as an outpatient  LTAC discharge planning once pre-CERT obtained  Patient is being seen in collaboration with Dr. Cherri Segovia. Final/further recommendations and plan to follow once evaluated by collaborating physician.   Will follow with you    Electronically signed by     ALEXSANDRA Huber CNP on 1/23/2023 at 10:07 AM  Pulmonary Critical Care and Sleep Medicine,  [facility] [place]: [phone]

## 2023-01-23 NOTE — PLAN OF CARE
Problem: Respiratory - Adult  Goal: Achieves optimal ventilation and oxygenation  1/23/2023 0821 by Markel Xie RCP  Outcome: Progressing  1/23/2023 0350 by Mehnaz Gutierrez RN  Outcome: Progressing  1/22/2023 2023 by Giancarlo Connors RCP  Outcome: Progressing

## 2023-01-23 NOTE — PROGRESS NOTES
[facility]  Office: [address], DO, Adriana Arora, DO, Robin De Souza, DO, Parul Aguayo [facility], DO, Xi Pelaez MD, Yannick Roman MD, Mallory Kelly MD, Brandon Srivastava MD,  Wenceslao Marie MD, Parmjit Castorena MD, Niles Ratliff DO, Helga Ladd MD,  Linda Holliday MD, Tayo Padilla MD, Kasey Shepard DO, Jose Simms MD, Cindy Garcia MD, Naty Major DO, Zia Gambino MD, Gia Calvo MD, Severiano Cake, MD, Freddie Sabillon MD, Rico Alonso, DO, Mere Ruiz MD, Kasia Hooper MD, Lupe Flowers, CNP,  Ehsan Bhatt, CNP, Haley Lu, CNP, Akira Camejo, CNP,  Shannan Robins, [facility], Ayaan Thomas, CNP, Uriel Chapin, CNP, Siva Reagan, CNP, Owen Lizarraga, CNP, Anthony Jones, CNP, Med Acevedo PA-C, Manuel Tovar, CNS, Braxton Smith, CNP, Keyonna Khan, [facility]    Progress Note    1/23/2023    10:52 AM    Name:   Radha Lockhart  MRN:     [id number]     Acct:      [de-identified]   Room:   [address] [place] [address] Day:  10  Admit Date:  1/13/2023  2:14 AM    PCP:   No primary care provider on file. Code Status:  Full Code    Subjective:     C/C:   Chief Complaint   Patient presents with    Fatigue     Pt was found on his knees outside of assisted living. Pt was outside smoking and slid out of a chair. Interval History Status: improved. Patient condition has been stable. He is back on high intensity steroids. Continues to require submental oxygen. Plan for discharge to LTAC once pre-CERT obtained. Incentive spirometry and pulmonary hygiene has improved his rhonchorous lung sounds. Did receive 1 dose of diuretics and this improved his rails. Brief History:     Mr. Joceline Byrne is a 78 yr old with a history of COPD lives in assisted living facility presented to hospital with weakness, shortness of breath.  Patient states that he had a usual morning routine, ank his coffee and was trying to get out of wheel chair noted that he was very weak and fatigued. he also had cough started yesterday with white phlegm, no chest pain, no fever, no chills. Patient also started feeling short of breath. No diarrhea. No sick contacts that patient knows of. Patient smokes 2 packs every day and he has copd diagnosis in chart but not on any treatment, no pft seen in the chart. In the ER patient was noted to be hypoxic, 88%, requiring 2 lt oxygen. He was afebrile. Electrolytes normal. Patient was seen to have elevated troponin of 34 which trended up to 51 and 47. Patient does not report any chest pain. D-dimer 1.63, COVID positive, x-ray does not show any acute abnormality. He has been started on Paxlovid    1/20 -condition has been stable. Patient continues to require heated high flow oxygen. He is at 8 L today. SPO2 has been stable around 93%. We will continue to benefit from hospitalization versus LTAC.    1/23 -patient back on high intensity steroids. Continues to require submental oxygen. Plan for discharge to LTAC once pre-CERT obtained    Review of Systems:     Constitutional:  negative for chills, fevers, sweats  Respiratory: Positive for cough, dyspnea on exertion, shortness of breath. negative for wheezing  Cardiovascular:  negative for chest pain, chest pressure/discomfort, lower extremity edema, palpitations  Gastrointestinal:  negative for abdominal pain, constipation, diarrhea, nausea, vomiting  Neurological:  negative for dizziness, headache    Medications:      Allergies:  No Known Allergies    Current Meds:   Scheduled Meds:    pantoprazole  40 mg Oral QAM AC    dextromethorphan-guaiFENesin  2 tablet Oral BID    enoxaparin  40 mg SubCUTAneous Daily    budesonide  0.5 mg Nebulization BID    arformoterol tartrate  15 mcg Nebulization BID    methylPREDNISolone  40 mg IntraVENous Q6H    metoprolol succinate  25 mg Oral Daily    vitamin D  50,000 Units Oral Once per day on Sat divalproex  1,000 mg Oral Daily    sodium chloride flush  5-40 mL IntraVENous 2 times per day    QUEtiapine  150 mg Oral Nightly    ipratropium-albuterol  1 ampule Inhalation Q4H WA    aspirin  81 mg Oral Daily    nicotine  1 patch TransDERmal Daily    lamoTRIgine  100 mg Oral Daily    QUEtiapine  200 mg Oral BID     Continuous Infusions:    sodium chloride       PRN Meds: calcium carbonate, sodium chloride flush, sodium chloride, ondansetron **OR** ondansetron, polyethylene glycol, acetaminophen **OR** acetaminophen, aluminum & magnesium hydroxide-simethicone    Data:     Past Medical History:   has a past medical history of Back pain, Bipolar 1 disorder ([facility] ([facility] COPD (chronic obstructive pulmonary disease) ([facility] GERD (gastroesophageal reflux disease), and Heart attack ([facility] Social History:   reports that he has been smoking cigarettes and pipe. He has never used smokeless tobacco. He reports that he does not drink alcohol and does not use drugs. Family History:   Family History   Problem Relation Age of Onset    Cancer Father         Lung    Stroke Father     Cancer Brother         Lymphoma    Mental Illness Other         Aunt       Vitals:  BP (!) 150/84   Pulse 60   Temp 97.5 °F (36.4 °C) (Oral)   Resp 22   Ht 5' 10\" (1.778 m)   Wt 206 lb (93.4 kg)   SpO2 94%   BMI 29.56 kg/m²   Temp (24hrs), Av.4 °F (36.3 °C), Min:97.3 °F (36.3 °C), Max:97.5 °F (36.4 °C)    Recent Labs     23  0426   POCGLU 175*       I/O (24Hr): Intake/Output Summary (Last 24 hours) at 2023 1052  Last data filed at 2023 0347  Gross per 24 hour   Intake --   Output 400 ml   Net -400 ml       Labs:  Hematology:  No results for input(s): WBC, RBC, HGB, HCT, MCV, MCH, MCHC, RDW, PLT, MPV, SEDRATE, CRP, INR, DDIMER, JK4GNUTN, LABABSO in the last 72 hours.     Invalid input(s): PT    Chemistry:  Recent Labs     23  0515      K 4.7      CO2 31   GLUCOSE 167*   BUN 34*   CREATININE 0. 79   ANIONGAP 10   LABGLOM >60   CALCIUM 8.8     Recent Labs     01/21/23  0426   POCGLU 175*       ABG:No results found for: POCPH, PHART, PH, POCPCO2, GXC7MJA, PCO2, POCPO2, PO2ART, PO2, POCHCO3, VGE6CDF, HCO3, NBEA, PBEA, BEART, BE, THGBART, THB, GJB8DXZ, HLRH4FYK, R7PYZNES, O2SAT, FIO2  No results found for: SPECIAL  No results found for: CULTURE    Radiology:  XR CHEST PORTABLE    Result Date: 1/16/2023  Recently described right basilar opacity with CT appears unchanged. No new findings identified in the interval.     XR CHEST PORTABLE    Result Date: 1/13/2023  Mild to moderate COPD but no acute cardiopulmonary abnormality evident. Possible soft tissue mass at the lateral left lower chest wall. Correlate clinically. CT CHEST PULMONARY EMBOLISM W CONTRAST    Result Date: 1/14/2023  1. Negative for pulmonary embolus. 2. Emphysema with mild right basilar segmental atelectasis versus pneumonia. 3. Indeterminate 9 mm lingular nodule. RECOMMENDATIONS: 9 mm suspicious left solid pulmonary nodule. Consider a non-contrast Chest CT at 3 months, a PET/CT, or tissue sampling. These guidelines do not apply to immunocompromised patients and patients with cancer. Follow up in patients with significant comorbidities as clinically warranted. For lung cancer screening, adhere to Lung-RADS guidelines. Reference: Radiology. 2017; 284(1):228-43. Physical Examination:        General appearance:  alert, cooperative and no distress  Mental Status:  oriented to person, place and time and normal affect  Lungs: Rhonchi auscultated to bilateral bases improved from 3 days ago,fine rales note throughout again today, decreased air exchange.   Normal effort at rest but does get dyspneic with exertion and this is unchanged  Heart:  regular rate and rhythm, no murmur  Abdomen:  soft, nontender, nondistended, normal bowel sounds, no masses, hepatomegaly, splenomegaly  Extremities:  no edema, redness, tenderness in the calves  Skin: no gross lesions, rashes, induration      Assessment:        Hospital Problems             Last Modified POA    * (Principal) COVID 1/13/2023 Yes    Acute respiratory failure with hypoxia (HCC) 1/13/2023 Yes    COPD exacerbation (HCC) 1/13/2023 Yes    Elevated troponin 1/13/2023 Yes    Tobacco abuse 1/13/2023 Yes       Plan:        Acute respiratory failure secondary to COVID-19 with a known history of COPD  Continue IV steroids at pulmonology recommendation   DuoNeb/bronchodilators  Completed full course of Paxlovid on 1/18/23  S/p lasix x 1 dose on 1/18/23  Continue supplemental oxygen with nasal cannula, heated high flow, BiPAP at night  Discharge planning now to LTAC with increased oxygen requirements, patient understands and is agreeable and plan has been discussed with patient's daughter.   Await pre-CERT      ALEXSANDRA Lozoya NP  1/23/2023  10:52 AM

## 2023-01-23 NOTE — PROGRESS NOTES
Pt has remained safe and free from falls thus far in shift. On 7L high flow stating 93%, COVID +. SOB when up and moving, 1+ walker. IV solumedrol Q6. Pt was only up in the chair for 15 minutes and then wanted back in bed. Writer educated the pt on the importance of sitting up in the chair. Bed alarm on, bed locked and lowest position. Call light in reach. Will continue to monitor pt.

## 2023-01-23 NOTE — PROGRESS NOTES
Occupational Therapy  Facility/Department: [facility] PROGRESSIVE CARE  Rehabilitation Occupational Therapy Daily Treatment Note    Date: 23  Patient Name: Bridgett Alegre       Room: 0938/1809-14  MRN: [id number]  Account: [de-identified]   : 1945  (78 y.o.) Gender: male         Past Medical History:  has a past medical history of Back pain, Bipolar 1 disorder ([facility] Cancer ([facility] COPD (chronic obstructive pulmonary disease) ([facility] GERD (gastroesophageal reflux disease), and Heart attack ([facility] Past Surgical History:   has a past surgical history that includes Anus surgery; hernia repair; and Colonoscopy (N/A, 2018). Restrictions  Restrictions/Precautions: Up as Tolerated;General Precautions;Isolation  Other position/activity restrictions: Up as tolerated, telemetry, 4L O2 via NC, R wrist IV, Droplet+ precautions d/t COVID+  Required Braces or Orthoses?: No    Subjective  Subjective: Pt. supine in bed and agreed to therapy at bedside only d/t just returning to bed. Restrictions/Precautions: Up as Tolerated;General Precautions;Isolation  Objective     Cognition  Overall Cognitive Status: Exceptions  Arousal/Alertness: Appropriate responses to stimuli  Following Commands: Follows one step commands with repetition; Follows one step commands with increased time  Attention Span: Attends with cues to redirect  Memory: Decreased short term memory;Decreased recall of precautions;Decreased recall of recent events  Safety Judgement: Decreased awareness of need for assistance;Decreased awareness of need for safety  Problem Solving: Decreased awareness of errors;Assistance required to identify errors made;Assistance required to generate solutions;Assistance required to correct errors made;Assistance required to implement solutions  Insights: Decreased awareness of deficits  Initiation: Does not require cues  Sequencing: Requires cues for some  Cognition Comment: Pt. required cues to stay focused on task at hand and often fell asleep during rest between exercises. Orientation  Overall Orientation Status: Within Functional Limits  Orientation Level: Oriented X4         ADL     Functional Mobility  Activity:  (Pt. completed B UE exercise program to promote strengthening)  Assistance Level: Supervision  Skilled Clinical Factors: Pt. completed B UE strengthening program while sitting upright in bed. Pt. declined getting out of bed d/t just completing P.T. treatment. Bed Mobility  Overall Assistance Level: Stand By Assist  Additional Factors: Head of bed raised; With handrails; Increased time to complete   OT Exercises  Exercise Treatment: Pt. completed B UE exercise program by [employer] with graded theraband. Pt.completed exercises to promote shoulder flexion/extension, elbow flexion/extension and horizontal shoulder ab/adduction. Pt. required mod rest breaks from fatigue between sets but completed all sets with success. Assessment  Assessment  Assessment: Pt. completed B UE exercise program by Lilly while sitting upright in bed. Pt. completed each exercise with success and positive encouragement. Mod rest breaks required between each set from fatigue. Skilled OT warranted to promote I/safety to return pt to prior living arrangement with assist as needed. Activity Tolerance: Patient limited by endurance; Patient limited by fatigue  Discharge Recommendations: Patient would benefit from continued therapy after discharge  Safety Devices  Safety Devices in place: Yes  Type of devices: All fall risk precautions in place; Bed alarm in place;Call light within reach;Nurse notified; Left in bed  Restraints  Initially in place: No    Patient Education  Education  Education Given To: Patient  Education Provided: Role of Therapy;Home Exercise Program  Education Provided Comments: Pt. educated on [address] [place] [address] program with graded theraband to promote functional strength with B UE. Pt. appeared attentive during exercise instruction and complete each exercise with success. Education Method: Demonstration;Verbal;Printed Information/Hand-outs  Barriers to Learning: Cognition;Vision  Education Outcome: Verbalized understanding;Continued education needed    Plan  Occupational Therapy Plan  Times Per Week: 4-5x/week 1x/day as tolerated  Times Per Day: Once a day  Current Treatment Recommendations: Strengthening;Balance training;Functional mobility training; Endurance training; Safety education & training;Cognitive reorientation;Patient/Caregiver education & training;Self-Care / ADL;Cognitive/Perceptual training;Equipment evaluation, education, & procurement  Additional Comments: Cont with stated POC    Goals  Patient Goals   Patient goals : To go home! Short Term Goals  Time Frame for Short Term Goals: By discharge, pt to demo:  Short Term Goal 1: ADL transfers and functional mobility tasks with Good safety and SBA with use of AD as needed. Short Term Goal 2: UB ADLs to SBA and LB ADLs to MinAx1 with Good safety and use of AE/compensatory strategies/AD as needed. Short Term Goal 3: increased BUE strength by 1/2 grade to promote strength/ROM required for safety and IND with self care tasks. Short Term Goal 4: increased standing tolerance to > 8 mins w/ Good safety and use of AD as needed in order to promote standing balance and reduce the risk of falls. Short Term Goal 5: toileting tasks to SBA with Good safety and use of AD/grab bars/BSC as needed. Additional Goals?: No  Long Term Goals  Long Term Goal 1: Pt to be IND with fall prevention strategies, EC/WS tech, covid-19 education, COPD & smoking cessation education, discharge/equipment recommendations, and a BUE HEP with use of handouts as needed.     AM-PAC Score        AM-PAC Inpatient Daily Activity Raw Score: 16 (01/23/23 1537)  AM-PAC Inpatient ADL T-Scale Score : 35.96 (01/23/23 1537)  ADL Inpatient CMS 0-100% Score: 53.32 (01/23/23 1537)  ADL Inpatient CMS G-Code Modifier : CK (01/23/23 1537)      Therapy Time   Individual Concurrent Group Co-treatment   Time In 0205         Time Out 0228         Minutes 21             RN reports patient is medically stable for therapy treatment this date. Chart reviewed prior to treatment and patient is agreeable for therapy. All lines intact and patient positioned comfortably at end of treatment. All patient needs addressed prior to ending therapy session.        COLIN Sherwood

## 2023-01-24 PROCEDURE — 94669 MECHANICAL CHEST WALL OSCILL: CPT

## 2023-01-24 PROCEDURE — 6370000000 HC RX 637 (ALT 250 FOR IP): Performed by: STUDENT IN AN ORGANIZED HEALTH CARE EDUCATION/TRAINING PROGRAM

## 2023-01-24 PROCEDURE — 97530 THERAPEUTIC ACTIVITIES: CPT

## 2023-01-24 PROCEDURE — 99232 SBSQ HOSP IP/OBS MODERATE 35: CPT | Performed by: NURSE PRACTITIONER

## 2023-01-24 PROCEDURE — 94761 N-INVAS EAR/PLS OXIMETRY MLT: CPT

## 2023-01-24 PROCEDURE — 2700000000 HC OXYGEN THERAPY PER DAY

## 2023-01-24 PROCEDURE — 6360000002 HC RX W HCPCS: Performed by: NURSE PRACTITIONER

## 2023-01-24 PROCEDURE — 6370000000 HC RX 637 (ALT 250 FOR IP): Performed by: INTERNAL MEDICINE

## 2023-01-24 PROCEDURE — 2580000003 HC RX 258: Performed by: NURSE PRACTITIONER

## 2023-01-24 PROCEDURE — 6370000000 HC RX 637 (ALT 250 FOR IP): Performed by: NURSE PRACTITIONER

## 2023-01-24 PROCEDURE — 94640 AIRWAY INHALATION TREATMENT: CPT

## 2023-01-24 PROCEDURE — 2060000000 HC ICU INTERMEDIATE R&B

## 2023-01-24 PROCEDURE — 97110 THERAPEUTIC EXERCISES: CPT

## 2023-01-24 RX ORDER — METHYLPREDNISOLONE SODIUM SUCCINATE 40 MG/ML
30 INJECTION, POWDER, LYOPHILIZED, FOR SOLUTION INTRAMUSCULAR; INTRAVENOUS EVERY 8 HOURS
Status: DISCONTINUED | OUTPATIENT
Start: 2023-01-24 | End: 2023-01-26

## 2023-01-24 RX ORDER — IPRATROPIUM BROMIDE AND ALBUTEROL SULFATE 2.5; .5 MG/3ML; MG/3ML
3 SOLUTION RESPIRATORY (INHALATION)
Qty: 360 ML | Refills: 3 | Status: CANCELLED | OUTPATIENT
Start: 2023-01-24

## 2023-01-24 RX ADMIN — GUAIFENESIN AND DEXTROMETHORPHAN HYDROBROMIDE 2 TABLET: 600; 30 TABLET, EXTENDED RELEASE ORAL at 08:07

## 2023-01-24 RX ADMIN — ARFORMOTEROL TARTRATE 15 MCG: 15 SOLUTION RESPIRATORY (INHALATION) at 08:11

## 2023-01-24 RX ADMIN — GUAIFENESIN AND DEXTROMETHORPHAN HYDROBROMIDE 2 TABLET: 600; 30 TABLET, EXTENDED RELEASE ORAL at 21:11

## 2023-01-24 RX ADMIN — METHYLPREDNISOLONE SODIUM SUCCINATE 30 MG: 40 INJECTION, POWDER, FOR SOLUTION INTRAMUSCULAR; INTRAVENOUS at 23:35

## 2023-01-24 RX ADMIN — SODIUM CHLORIDE, PRESERVATIVE FREE 10 ML: 5 INJECTION INTRAVENOUS at 08:09

## 2023-01-24 RX ADMIN — METHYLPREDNISOLONE SODIUM SUCCINATE 30 MG: 40 INJECTION, POWDER, FOR SOLUTION INTRAMUSCULAR; INTRAVENOUS at 17:35

## 2023-01-24 RX ADMIN — IPRATROPIUM BROMIDE AND ALBUTEROL SULFATE 1 AMPULE: 2.5; .5 SOLUTION RESPIRATORY (INHALATION) at 08:11

## 2023-01-24 RX ADMIN — BUDESONIDE INHALATION 500 MCG: 0.5 SUSPENSION RESPIRATORY (INHALATION) at 08:11

## 2023-01-24 RX ADMIN — BUDESONIDE INHALATION 500 MCG: 0.5 SUSPENSION RESPIRATORY (INHALATION) at 20:18

## 2023-01-24 RX ADMIN — METHYLPREDNISOLONE SODIUM SUCCINATE 40 MG: 40 INJECTION, POWDER, FOR SOLUTION INTRAMUSCULAR; INTRAVENOUS at 08:08

## 2023-01-24 RX ADMIN — METOPROLOL SUCCINATE 25 MG: 25 TABLET, EXTENDED RELEASE ORAL at 08:07

## 2023-01-24 RX ADMIN — QUETIAPINE FUMARATE 150 MG: 150 TABLET, EXTENDED RELEASE ORAL at 21:11

## 2023-01-24 RX ADMIN — ASPIRIN 81 MG: 81 TABLET, CHEWABLE ORAL at 08:08

## 2023-01-24 RX ADMIN — ENOXAPARIN SODIUM 40 MG: 100 INJECTION SUBCUTANEOUS at 08:08

## 2023-01-24 RX ADMIN — IPRATROPIUM BROMIDE AND ALBUTEROL SULFATE 1 AMPULE: 2.5; .5 SOLUTION RESPIRATORY (INHALATION) at 11:00

## 2023-01-24 RX ADMIN — QUETIAPINE FUMARATE 200 MG: 200 TABLET ORAL at 08:07

## 2023-01-24 RX ADMIN — QUETIAPINE FUMARATE 200 MG: 200 TABLET ORAL at 21:11

## 2023-01-24 RX ADMIN — LAMOTRIGINE 100 MG: 100 TABLET ORAL at 08:08

## 2023-01-24 RX ADMIN — METHYLPREDNISOLONE SODIUM SUCCINATE 40 MG: 40 INJECTION, POWDER, FOR SOLUTION INTRAMUSCULAR; INTRAVENOUS at 02:16

## 2023-01-24 RX ADMIN — IPRATROPIUM BROMIDE AND ALBUTEROL SULFATE 1 AMPULE: 2.5; .5 SOLUTION RESPIRATORY (INHALATION) at 15:24

## 2023-01-24 RX ADMIN — SODIUM CHLORIDE, PRESERVATIVE FREE 10 ML: 5 INJECTION INTRAVENOUS at 21:12

## 2023-01-24 RX ADMIN — DIVALPROEX SODIUM 1000 MG: 500 TABLET, EXTENDED RELEASE ORAL at 08:07

## 2023-01-24 RX ADMIN — ARFORMOTEROL TARTRATE 15 MCG: 15 SOLUTION RESPIRATORY (INHALATION) at 20:18

## 2023-01-24 RX ADMIN — PANTOPRAZOLE SODIUM 40 MG: 40 TABLET, DELAYED RELEASE ORAL at 06:20

## 2023-01-24 RX ADMIN — IPRATROPIUM BROMIDE AND ALBUTEROL SULFATE 1 AMPULE: 2.5; .5 SOLUTION RESPIRATORY (INHALATION) at 20:18

## 2023-01-24 ASSESSMENT — PAIN SCALES - GENERAL: PAINLEVEL_OUTOF10: 0

## 2023-01-24 NOTE — CARE COORDINATION
Discharge planning    Call to  [place] still no response from appeal. Patient down to 4 L salter 90% at rest.

## 2023-01-24 NOTE — PLAN OF CARE
Problem: Discharge Planning  Goal: Discharge to home or other facility with appropriate resources  Outcome: Progressing  Flowsheets (Taken 1/23/2023 2000)  Discharge to home or other facility with appropriate resources:   Identify barriers to discharge with patient and caregiver   Arrange for needed discharge resources and transportation as appropriate     Problem: Skin/Tissue Integrity  Goal: Absence of new skin breakdown  Description: 1. Monitor for areas of redness and/or skin breakdown  2. Assess vascular access sites hourly  3. Every 4-6 hours minimum:  Change oxygen saturation probe site  4. Every 4-6 hours:  If on nasal continuous positive airway pressure, respiratory therapy assess nares and determine need for appliance change or resting period.   Outcome: Progressing     Problem: ABCDS Injury Assessment  Goal: Absence of physical injury  Outcome: Progressing     Problem: Safety - Adult  Goal: Free from fall injury  1/24/2023 0101 by Wyatt Good RN  Outcome: Progressing  1/23/2023 1211 by Domitila Loya RN  Outcome: Progressing     Problem: Respiratory - Adult  Goal: Achieves optimal ventilation and oxygenation  1/24/2023 0101 by Wyatt Good RN  Outcome: Progressing  1/23/2023 2017 by Evin Otto RCP  Outcome: Progressing  Flowsheets (Taken 1/23/2023 2000 by Wyatt Good RN)  Achieves optimal ventilation and oxygenation:   Assess for changes in respiratory status   Assess for changes in mentation and behavior   Position to facilitate oxygenation and minimize respiratory effort   Oxygen supplementation based on oxygen saturation or arterial blood gases   Respiratory therapy support as indicated     Problem: Pain  Goal: Verbalizes/displays adequate comfort level or baseline comfort level  Outcome: Progressing     Problem: Infection - Adult  Goal: Absence of infection at discharge  Outcome: Progressing  Flowsheets (Taken 1/23/2023 2000)  Absence of infection at discharge:   Assess and monitor for signs and symptoms of infection   Monitor lab/diagnostic results   Monitor all insertion sites i.e., indwelling lines, tubes and drains     Problem: Nutrition Deficit:  Goal: Optimize nutritional status  Outcome: Progressing

## 2023-01-24 NOTE — PROGRESS NOTES
Pt remained calm and cooperative throughout shift. Pt still on 7L heated high flow. Will continue with care plan.

## 2023-01-24 NOTE — RT PROTOCOL NOTE
RT Inhaler-Nebulizer Bronchodilator Protocol Note    There is a bronchodilator order in the chart from a provider indicating to follow the RT Bronchodilator Protocol and there is an Initiate RT Inhaler-Nebulizer Bronchodilator Protocol order as well (see protocol at bottom of note). CXR Findings:  No results found. The findings from the last RT Protocol Assessment were as follows:   History Pulmonary Disease: Chronic pulmonary disease  Respiratory Pattern: Dyspnea on exertion or RR 21-25 bpm  Breath Sounds: Severe inspiratory and expiratory wheezing or severely diminished  Cough: Strong, spontaneous, non-productive  Indication for Bronchodilator Therapy: Decreased or absent breath sounds  Bronchodilator Assessment Score: 12    Aerosolized bronchodilator medication orders have been revised according to the RT Inhaler-Nebulizer Bronchodilator Protocol below. Respiratory Therapist to perform RT Therapy Protocol Assessment initially then follow the protocol. Repeat RT Therapy Protocol Assessment PRN for score 0-3 or on second treatment, BID, and PRN for scores above 3. No Indications - adjust the frequency to every 6 hours PRN wheezing or bronchospasm, if no treatments needed after 48 hours then discontinue using Per Protocol order mode. If indication present, adjust the RT bronchodilator orders based on the Bronchodilator Assessment Score as indicated below. Use Inhaler orders unless patient has one or more of the following: on home nebulizer, not able to hold breath for 10 seconds, is not alert and oriented, cannot activate and use MDI correctly, or respiratory rate 25 breaths per minute or more, then use the equivalent nebulizer order(s) with same Frequency and PRN reasons based on the score. If a patient is on this medication at home then do not decrease Frequency below that used at home.     0-3 - enter or revise RT bronchodilator order(s) to equivalent RT Bronchodilator order with Frequency of every 4 hours PRN for wheezing or increased work of breathing using Per Protocol order mode. 4-6 - enter or revise RT Bronchodilator order(s) to two equivalent RT bronchodilator orders with one order with BID Frequency and one order with Frequency of every 4 hours PRN wheezing or increased work of breathing using Per Protocol order mode. 7-10 - enter or revise RT Bronchodilator order(s) to two equivalent RT bronchodilator orders with one order with TID Frequency and one order with Frequency of every 4 hours PRN wheezing or increased work of breathing using Per Protocol order mode. 11-13 - enter or revise RT Bronchodilator order(s) to one equivalent RT bronchodilator order with QID Frequency and an Albuterol order with Frequency of every 4 hours PRN wheezing or increased work of breathing using Per Protocol order mode. Greater than 13 - enter or revise RT Bronchodilator order(s) to one equivalent RT bronchodilator order with every 4 hours Frequency and an Albuterol order with Frequency of every 2 hours PRN wheezing or increased work of breathing using Per Protocol order mode. RT to enter RT Home Evaluation for COPD & MDI Assessment order using Per Protocol order mode.     Electronically signed by brian valencia RCP on 1/24/2023 at 8:15 AM

## 2023-01-24 NOTE — PROGRESS NOTES
[facility]  Office: [address], DO, Jenaro Foote, DO, Emily Durán, DO, Rigo Richards, DO, Becca Sharp MD, Estefania Gama MD, Meenu Meyers MD, Miguelito Mcleod MD,  Phuong Mcclendon MD, Solomon Mercer MD, Duane Gregory DO, Honorio Sue MD,  Lizeth Ge MD, Terra Fuentes MD, Chandra Olguin DO, Clara Adhikari MD, Carissa Barajas MD, Eddie Easley DO, David Littlejohn MD, Camilo Stephens MD, Ted Tony MD, Zhen Thomas MD, Ruthann Mercdao DO, Ajith Spence MD, Yola Ramirez MD, Andrew Moody, CNP,  Ashley Bernstein, CNP, Luis Tobin, CNP, Salima Amaya, CNP,  Demetrio Salinas, [facility], Jaswant Case, CNP, Allegra Giles, CNP, Andrew Casillas, CNP, Cora Martin, CNP, Rosie Swan, CNP, Rachelle Nicole PA-C, Yobani Jackson, CNS, Amada Rosenberg, CNP, Hiren Treadwell, [facility]    Progress Note    1/24/2023    9:42 AM    Name:   Stacy Moreira  MRN:     [id number]     Acct:      [de-identified]   Room:   [address] [place] [address] Day:  11  Admit Date:  1/13/2023  2:14 AM    PCP:   No primary care provider on file. Code Status:  Full Code    Subjective:     C/C:   Chief Complaint   Patient presents with    Fatigue     Pt was found on his knees outside of assisted living. Pt was outside smoking and slid out of a chair. Interval History Status: improved. Patient condition has been stable. He is back on high intensity steroids. Continues to require submental oxygen. Plan for discharge to LTAC once pre-CERT obtained. Incentive spirometry and pulmonary hygiene has improved his rhonchorous lung sounds. Did receive 1 dose of diuretics and this improved his rails. Brief History:     Mr. Mejia Burris is a 78 yr old with a history of COPD lives in assisted living facility presented to hospital with weakness, shortness of breath.  Patient states that he had a usual morning routine, drank his coffee and was trying to get out of wheel chair noted that he was very weak and fatigued. he also had cough started yesterday with white phlegm, no chest pain, no fever, no chills. Patient also started feeling short of breath. No diarrhea. No sick contacts that patient knows of. Patient smokes 2 packs every day and he has copd diagnosis in chart but not on any treatment, no pft seen in the chart. In the ER patient was noted to be hypoxic, 88%, requiring 2 lt oxygen. He was afebrile. Electrolytes normal. Patient was seen to have elevated troponin of 34 which trended up to 51 and 47. Patient does not report any chest pain. D-dimer 1.63, COVID positive, x-ray does not show any acute abnormality. He has been started on Paxlovid    1/20 -condition has been stable. Patient continues to require heated high flow oxygen. He is at 8 L today. SPO2 has been stable around 93%. We will continue to benefit from hospitalization versus LTAC.    1/23-1/24 -patient back on high intensity steroids. Continues to require submental oxygen. Plan for discharge to LTAC once pre-CERT obtained    Review of Systems:     Constitutional:  negative for chills, fevers, sweats  Respiratory: Positive for cough, dyspnea on exertion, shortness of breath. negative for wheezing  Cardiovascular:  negative for chest pain, chest pressure/discomfort, lower extremity edema, palpitations  Gastrointestinal:  negative for abdominal pain, constipation, diarrhea, nausea, vomiting  Neurological:  negative for dizziness, headache    Medications:      Allergies:  No Known Allergies    Current Meds:   Scheduled Meds:    pantoprazole  40 mg Oral QAM AC    dextromethorphan-guaiFENesin  2 tablet Oral BID    enoxaparin  40 mg SubCUTAneous Daily    budesonide  0.5 mg Nebulization BID    arformoterol tartrate  15 mcg Nebulization BID    methylPREDNISolone  40 mg IntraVENous Q6H    metoprolol succinate  25 mg Oral Daily    vitamin D  50,000 Units Oral Once per day on Sat divalproex  1,000 mg Oral Daily    sodium chloride flush  5-40 mL IntraVENous 2 times per day    QUEtiapine  150 mg Oral Nightly    ipratropium-albuterol  1 ampule Inhalation Q4H WA    aspirin  81 mg Oral Daily    nicotine  1 patch TransDERmal Daily    lamoTRIgine  100 mg Oral Daily    QUEtiapine  200 mg Oral BID     Continuous Infusions:    sodium chloride       PRN Meds: calcium carbonate, sodium chloride flush, sodium chloride, ondansetron **OR** ondansetron, polyethylene glycol, acetaminophen **OR** acetaminophen, aluminum & magnesium hydroxide-simethicone    Data:     Past Medical History:   has a past medical history of Back pain, Bipolar 1 disorder ([facility] COPD (chronic obstructive pulmonary disease) [facility] GERD (gastroesophageal reflux disease), and Heart attack ([facility] Social History:   reports that he has been smoking cigarettes and pipe. He has never used smokeless tobacco. He reports that he does not drink alcohol and does not use drugs. Family History:   Family History   Problem Relation Age of Onset    Cancer Father         Lung    Stroke Father     Cancer Brother         Lymphoma    Mental Illness Other         Aunt       Vitals:  /72   Pulse 54   Temp 97.5 °F (36.4 °C) (Oral)   Resp 16   Ht 5' 10\" (1.778 m)   Wt 205 lb (93 kg)   SpO2 96%   BMI 29.41 kg/m²   Temp (24hrs), Av.6 °F (36.4 °C), Min:97.3 °F (36.3 °C), Max:98.2 °F (36.8 °C)    No results for input(s): POCGLU in the last 72 hours. I/O (24Hr): Intake/Output Summary (Last 24 hours) at 2023 0942  Last data filed at 2023 0028  Gross per 24 hour   Intake --   Output 1200 ml   Net -1200 ml       Labs:  Hematology:  No results for input(s): WBC, RBC, HGB, HCT, MCV, MCH, MCHC, RDW, PLT, MPV, SEDRATE, CRP, INR, DDIMER, AD4ZRJIH, LABABSO in the last 72 hours.     Invalid input(s): PT    Chemistry:  No results for input(s): NA, K, CL, CO2, GLUCOSE, BUN, CREATININE, MG, ANIONGAP, LABGLOM, GFRAA, CALCIUM, CAION, PHOS, PSA, PROBNP, TROPHS, CKTOTAL, CKMB, CKMBINDEX, MYOGLOBIN, DIGOXIN, LACTACIDWB in the last 72 hours. No results for input(s): PROT, LABALBU, LABA1C, H1QMRLQ, G3QVQGP, FT4, TSH, AST, ALT, LDH, GGT, ALKPHOS, LABGGT, BILITOT, BILIDIR, AMMONIA, AMYLASE, LIPASE, LACTATE, CHOL, HDL, LDLCHOLESTEROL, CHOLHDLRATIO, TRIG, VLDL, UVF46DL, PHENYTOIN, PHENYF, URICACID, POCGLU in the last 72 hours. ABG:No results found for: POCPH, PHART, PH, POCPCO2, CSB1OQL, PCO2, POCPO2, PO2ART, PO2, POCHCO3, VLD0UXL, HCO3, NBEA, PBEA, BEART, BE, THGBART, THB, VQU9YSB, TMDP0VOL, A8MXRQZF, O2SAT, FIO2  No results found for: SPECIAL  No results found for: CULTURE    Radiology:  XR CHEST PORTABLE    Result Date: 1/16/2023  Recently described right basilar opacity with CT appears unchanged. No new findings identified in the interval.     XR CHEST PORTABLE    Result Date: 1/13/2023  Mild to moderate COPD but no acute cardiopulmonary abnormality evident. Possible soft tissue mass at the lateral left lower chest wall. Correlate clinically. CT CHEST PULMONARY EMBOLISM W CONTRAST    Result Date: 1/14/2023  1. Negative for pulmonary embolus. 2. Emphysema with mild right basilar segmental atelectasis versus pneumonia. 3. Indeterminate 9 mm lingular nodule. RECOMMENDATIONS: 9 mm suspicious left solid pulmonary nodule. Consider a non-contrast Chest CT at 3 months, a PET/CT, or tissue sampling. These guidelines do not apply to immunocompromised patients and patients with cancer. Follow up in patients with significant comorbidities as clinically warranted. For lung cancer screening, adhere to Lung-RADS guidelines. Reference: Radiology. 2017; 284(1):228-43.        Physical Examination:        General appearance:  alert, cooperative and no distress  Mental Status:  oriented to person, place and time and normal affect  Lungs: Rhonchi auscultated to bilateral bases improved from 3 days ago,fine rales note throughout again today, decreased air exchange. Normal effort at rest but does get dyspneic with exertion and this is unchanged  Heart:  regular rate and rhythm, no murmur  Abdomen:  soft, nontender, nondistended, normal bowel sounds, no masses, hepatomegaly, splenomegaly  Extremities:  no edema, redness, tenderness in the calves  Skin:  no gross lesions, rashes, induration      Assessment:        Hospital Problems             Last Modified POA    * (Principal) COVID 1/13/2023 Yes    Acute respiratory failure with hypoxia ([address] 1/13/2023 Yes    COPD exacerbation ([address] 1/13/2023 Yes    Elevated troponin 1/13/2023 Yes    Tobacco abuse 1/13/2023 Yes       Plan:        Acute respiratory failure secondary to COVID-19 with a known history of COPD  Continue IV steroids at pulmonology recommendation   DuoNeb/bronchodilators  Completed full course of Paxlovid on 1/18/23  S/p lasix x 1 dose on 1/18/23  Continue supplemental oxygen with nasal cannula, heated high flow, BiPAP at night  Discharge planning now to [facility] with increased oxygen requirements, patient understands and is agreeable and plan has been discussed with patient's daughter.   Await pre-CERT      ALEXSANDRA Magallanes NP  1/24/2023  9:42 AM

## 2023-01-24 NOTE — PLAN OF CARE
Problem: Respiratory - Adult  Goal: Achieves optimal ventilation and oxygenation  1/24/2023 0814 by Nuris Sanders RCP  Outcome: Progressing  1/24/2023 0101 by Robi Rizvi RN  Outcome: Progressing  1/23/2023 2017 by Scott Daniels RCP  Outcome: Progressing  Flowsheets (Taken 1/23/2023 2000 by Robi Rizvi, AMARI)  Achieves optimal ventilation and oxygenation:   Assess for changes in respiratory status   Assess for changes in mentation and behavior   Position to facilitate oxygenation and minimize respiratory effort   Oxygen supplementation based on oxygen saturation or arterial blood gases   Respiratory therapy support as indicated

## 2023-01-24 NOTE — PLAN OF CARE
Problem: Respiratory - Adult  Goal: Achieves optimal ventilation and oxygenation  1/23/2023 2017 by Amaris Arriola RCP  Outcome: Progressing

## 2023-01-24 NOTE — RT PROTOCOL NOTE
RT Inhaler-Nebulizer Bronchodilator Protocol Note    There is a bronchodilator order in the chart from a provider indicating to follow the RT Bronchodilator Protocol and there is an Initiate RT Inhaler-Nebulizer Bronchodilator Protocol order as well (see protocol at bottom of note). CXR Findings:  No results found. The findings from the last RT Protocol Assessment were as follows:   History Pulmonary Disease: Chronic pulmonary disease  Respiratory Pattern: Dyspnea on exertion or RR 21-25 bpm  Breath Sounds: Severe inspiratory and expiratory wheezing or severely diminished  Cough: Strong, spontaneous, non-productive  Indication for Bronchodilator Therapy: Decreased or absent breath sounds  Bronchodilator Assessment Score: 12    Aerosolized bronchodilator medication orders have been revised according to the RT Inhaler-Nebulizer Bronchodilator Protocol below. Respiratory Therapist to perform RT Therapy Protocol Assessment initially then follow the protocol. Repeat RT Therapy Protocol Assessment PRN for score 0-3 or on second treatment, BID, and PRN for scores above 3. No Indications - adjust the frequency to every 6 hours PRN wheezing or bronchospasm, if no treatments needed after 48 hours then discontinue using Per Protocol order mode. If indication present, adjust the RT bronchodilator orders based on the Bronchodilator Assessment Score as indicated below. Use Inhaler orders unless patient has one or more of the following: on home nebulizer, not able to hold breath for 10 seconds, is not alert and oriented, cannot activate and use MDI correctly, or respiratory rate 25 breaths per minute or more, then use the equivalent nebulizer order(s) with same Frequency and PRN reasons based on the score. If a patient is on this medication at home then do not decrease Frequency below that used at home.     0-3 - enter or revise RT bronchodilator order(s) to equivalent RT Bronchodilator order with Frequency of every 4 hours PRN for wheezing or increased work of breathing using Per Protocol order mode. 4-6 - enter or revise RT Bronchodilator order(s) to two equivalent RT bronchodilator orders with one order with BID Frequency and one order with Frequency of every 4 hours PRN wheezing or increased work of breathing using Per Protocol order mode. 7-10 - enter or revise RT Bronchodilator order(s) to two equivalent RT bronchodilator orders with one order with TID Frequency and one order with Frequency of every 4 hours PRN wheezing or increased work of breathing using Per Protocol order mode. 11-13 - enter or revise RT Bronchodilator order(s) to one equivalent RT bronchodilator order with QID Frequency and an Albuterol order with Frequency of every 4 hours PRN wheezing or increased work of breathing using Per Protocol order mode. Greater than 13 - enter or revise RT Bronchodilator order(s) to one equivalent RT bronchodilator order with every 4 hours Frequency and an Albuterol order with Frequency of every 2 hours PRN wheezing or increased work of breathing using Per Protocol order mode. RT to enter RT Home Evaluation for COPD & MDI Assessment order using Per Protocol order mode.     Electronically signed by Holden Leong RCP on 1/23/2023 at 8:16 PM

## 2023-01-24 NOTE — CARE COORDINATION
Social work: Appeal pending for [employer]. If denied, Fred Antonio and Regis Gandara following (d/t pulm needs).

## 2023-01-24 NOTE — PROGRESS NOTES
Pulmonary Critical Care Progress Note  Brittany Miller CNP/Elba Tijerina MD     Patient seen for the follow up of  COVID, acute hypoxic respiratory failure, active smoking/COPD    Subjective:  He is resting comfortably in the bed, no distress. He is lying on his right side. He denies any chest pain, significant cough or shortness of breath. He been weaned down to 4 L nasal cannula from 8 L yesterday. He is tolerating orals. He is using his IS and acapella. Examination:  Vitals: /72   Pulse 54   Temp 97.5 °F (36.4 °C) (Oral)   Resp 16   Ht 5' 10\" (1.778 m)   Wt 205 lb (93 kg)   SpO2 96%   BMI 29.41 kg/m²   General appearance: alert and cooperative with exam  Neck: No JVD  Lungs: Moderate to decreased air exchange, no wheezing or crackles  Heart: regular rate and rhythm, S1, S2 normal, no gallop  Abdomen: Soft, non tender, + BS  Extremities: no cyanosis or clubbing. No significant edema      Radiology:  X-ray chest 1/20/23      Impression:  Acute hypoxic respiratory failure  Acute exacerbation of COPD/emphysema  Active smoking, 30-pack-year history  COVID-19  Right basilar atelectasis versus pneumonia  Indeterminate 9 mm lingular nodule    Recommendations:  Oxygen via nasal cannula, keep SPO2 90% or greater   Incentive spirometry every hour while awake   Acapella  Budesonide and Brovana via nebulizer every 12 hours   IV solu-medrol 40 mg every 6 hours decrease dose and frequency  Albuterol and Ipratropium Q 4 hours and prn  Mucinex DM  Status post course of paxlovid  DVT prophylaxis with low molecular weight heparin  GI prophylaxis with protonix  PT/OT as tolerated  PFTs as an outpatient  PET scan as an outpatient  Discharge planning once pre-CERT obtained. No objection from pulmonary standpoint with outpatient follow-up in 1 to 2 weeks. Patient is being seen in collaboration with Dr. Anjel Torres. Final/further recommendations and plan to follow once evaluated by collaborating physician.   Will follow with you    Electronically signed by     ALEXSANDRA Vasquez CNP on 1/24/2023 at 10:51 AM  Pulmonary Critical Care and Sleep Medicine,  [facility] [place]: [phone]

## 2023-01-25 ENCOUNTER — APPOINTMENT (OUTPATIENT)
Dept: GENERAL RADIOLOGY | Age: 78
End: 2023-01-25
Payer: MEDICARE

## 2023-01-25 LAB
PRO-BNP: 930 PG/ML
PROCALCITONIN: 0.05 NG/ML

## 2023-01-25 PROCEDURE — 2700000000 HC OXYGEN THERAPY PER DAY

## 2023-01-25 PROCEDURE — 94761 N-INVAS EAR/PLS OXIMETRY MLT: CPT

## 2023-01-25 PROCEDURE — 6360000002 HC RX W HCPCS: Performed by: NURSE PRACTITIONER

## 2023-01-25 PROCEDURE — 6370000000 HC RX 637 (ALT 250 FOR IP): Performed by: NURSE PRACTITIONER

## 2023-01-25 PROCEDURE — 6370000000 HC RX 637 (ALT 250 FOR IP): Performed by: INTERNAL MEDICINE

## 2023-01-25 PROCEDURE — 97116 GAIT TRAINING THERAPY: CPT

## 2023-01-25 PROCEDURE — 6370000000 HC RX 637 (ALT 250 FOR IP): Performed by: STUDENT IN AN ORGANIZED HEALTH CARE EDUCATION/TRAINING PROGRAM

## 2023-01-25 PROCEDURE — 97110 THERAPEUTIC EXERCISES: CPT

## 2023-01-25 PROCEDURE — 2060000000 HC ICU INTERMEDIATE R&B

## 2023-01-25 PROCEDURE — 6360000002 HC RX W HCPCS: Performed by: INTERNAL MEDICINE

## 2023-01-25 PROCEDURE — 84145 PROCALCITONIN (PCT): CPT

## 2023-01-25 PROCEDURE — 2580000003 HC RX 258: Performed by: NURSE PRACTITIONER

## 2023-01-25 PROCEDURE — 94660 CPAP INITIATION&MGMT: CPT

## 2023-01-25 PROCEDURE — 94640 AIRWAY INHALATION TREATMENT: CPT

## 2023-01-25 PROCEDURE — 71045 X-RAY EXAM CHEST 1 VIEW: CPT

## 2023-01-25 PROCEDURE — 83880 ASSAY OF NATRIURETIC PEPTIDE: CPT

## 2023-01-25 PROCEDURE — 99232 SBSQ HOSP IP/OBS MODERATE 35: CPT | Performed by: NURSE PRACTITIONER

## 2023-01-25 PROCEDURE — 94669 MECHANICAL CHEST WALL OSCILL: CPT

## 2023-01-25 PROCEDURE — 36415 COLL VENOUS BLD VENIPUNCTURE: CPT

## 2023-01-25 RX ORDER — IPRATROPIUM BROMIDE AND ALBUTEROL SULFATE 2.5; .5 MG/3ML; MG/3ML
1 SOLUTION RESPIRATORY (INHALATION) 3 TIMES DAILY
Status: DISCONTINUED | OUTPATIENT
Start: 2023-01-26 | End: 2023-01-27

## 2023-01-25 RX ORDER — FUROSEMIDE 10 MG/ML
40 INJECTION INTRAMUSCULAR; INTRAVENOUS ONCE
Status: COMPLETED | OUTPATIENT
Start: 2023-01-25 | End: 2023-01-25

## 2023-01-25 RX ADMIN — METHYLPREDNISOLONE SODIUM SUCCINATE 30 MG: 40 INJECTION, POWDER, FOR SOLUTION INTRAMUSCULAR; INTRAVENOUS at 15:31

## 2023-01-25 RX ADMIN — BUDESONIDE INHALATION 500 MCG: 0.5 SUSPENSION RESPIRATORY (INHALATION) at 08:01

## 2023-01-25 RX ADMIN — METHYLPREDNISOLONE SODIUM SUCCINATE 30 MG: 40 INJECTION, POWDER, FOR SOLUTION INTRAMUSCULAR; INTRAVENOUS at 23:34

## 2023-01-25 RX ADMIN — METHYLPREDNISOLONE SODIUM SUCCINATE 30 MG: 40 INJECTION, POWDER, FOR SOLUTION INTRAMUSCULAR; INTRAVENOUS at 08:38

## 2023-01-25 RX ADMIN — QUETIAPINE FUMARATE 150 MG: 150 TABLET, EXTENDED RELEASE ORAL at 20:46

## 2023-01-25 RX ADMIN — QUETIAPINE FUMARATE 200 MG: 200 TABLET ORAL at 08:37

## 2023-01-25 RX ADMIN — IPRATROPIUM BROMIDE AND ALBUTEROL SULFATE 1 AMPULE: 2.5; .5 SOLUTION RESPIRATORY (INHALATION) at 10:53

## 2023-01-25 RX ADMIN — ARFORMOTEROL TARTRATE 15 MCG: 15 SOLUTION RESPIRATORY (INHALATION) at 20:50

## 2023-01-25 RX ADMIN — GUAIFENESIN AND DEXTROMETHORPHAN HYDROBROMIDE 2 TABLET: 600; 30 TABLET, EXTENDED RELEASE ORAL at 08:37

## 2023-01-25 RX ADMIN — LAMOTRIGINE 100 MG: 100 TABLET ORAL at 08:37

## 2023-01-25 RX ADMIN — PANTOPRAZOLE SODIUM 40 MG: 40 TABLET, DELAYED RELEASE ORAL at 05:37

## 2023-01-25 RX ADMIN — ENOXAPARIN SODIUM 40 MG: 100 INJECTION SUBCUTANEOUS at 08:38

## 2023-01-25 RX ADMIN — BUDESONIDE INHALATION 500 MCG: 0.5 SUSPENSION RESPIRATORY (INHALATION) at 20:50

## 2023-01-25 RX ADMIN — DIVALPROEX SODIUM 1000 MG: 500 TABLET, EXTENDED RELEASE ORAL at 08:38

## 2023-01-25 RX ADMIN — ASPIRIN 81 MG: 81 TABLET, CHEWABLE ORAL at 08:37

## 2023-01-25 RX ADMIN — SODIUM CHLORIDE, PRESERVATIVE FREE 10 ML: 5 INJECTION INTRAVENOUS at 08:39

## 2023-01-25 RX ADMIN — IPRATROPIUM BROMIDE AND ALBUTEROL SULFATE 1 AMPULE: 2.5; .5 SOLUTION RESPIRATORY (INHALATION) at 08:01

## 2023-01-25 RX ADMIN — GUAIFENESIN AND DEXTROMETHORPHAN HYDROBROMIDE 2 TABLET: 600; 30 TABLET, EXTENDED RELEASE ORAL at 20:46

## 2023-01-25 RX ADMIN — IPRATROPIUM BROMIDE AND ALBUTEROL SULFATE 1 AMPULE: 2.5; .5 SOLUTION RESPIRATORY (INHALATION) at 20:50

## 2023-01-25 RX ADMIN — QUETIAPINE FUMARATE 200 MG: 200 TABLET ORAL at 20:46

## 2023-01-25 RX ADMIN — ARFORMOTEROL TARTRATE 15 MCG: 15 SOLUTION RESPIRATORY (INHALATION) at 08:01

## 2023-01-25 RX ADMIN — SODIUM CHLORIDE, PRESERVATIVE FREE 10 ML: 5 INJECTION INTRAVENOUS at 20:46

## 2023-01-25 RX ADMIN — METOPROLOL SUCCINATE 25 MG: 25 TABLET, EXTENDED RELEASE ORAL at 08:38

## 2023-01-25 RX ADMIN — FUROSEMIDE 40 MG: 10 INJECTION, SOLUTION INTRAMUSCULAR; INTRAVENOUS at 15:31

## 2023-01-25 RX ADMIN — IPRATROPIUM BROMIDE AND ALBUTEROL SULFATE 1 AMPULE: 2.5; .5 SOLUTION RESPIRATORY (INHALATION) at 14:53

## 2023-01-25 NOTE — PROGRESS NOTES
Nutrition Assessment     Type and Reason for Visit: Reassess    Nutrition Recommendations/Plan:   Continue Regular diet  Monitor p.o intakes     Malnutrition Assessment:  Malnutrition Status: At risk for malnutrition (Comment)    Nutrition Assessment:  Patient is eating well at meals and has a good appetite. P.O intakes is 51-75% at meals. Pro-BNP continues to be elevated. Monitor p.o intakes and labs. Estimated Daily Nutrient Needs:  Energy (kcal):  6777-8028 kcal (20-22 kcal/kg) Weight Used for Energy Requirements: Current     Protein (g):  90-98 gm of protein (1.2-1.3 gm/kg) Weight Used for Protein Requirements: Current            Nutrition Related Findings:   No edema. Active bowel sounds. Wheelchair Wound Type: None    Current Nutrition Therapies:    ADULT DIET;  Regular    Anthropometric Measures:  Height: 5' 10\" (177.8 cm)  Current Body Wt: 205 lb 14.6 oz (93.4 kg)   BMI: 29.5    Nutrition Diagnosis:   No nutrition diagnosis at this time related to inadequate protein-energy intake as evidenced by intake 26-50%    Nutrition Interventions:   Food and/or Nutrient Delivery: Continue Current Diet  Nutrition Education/Counseling: Education not indicated  Coordination of Nutrition Care: Continue to monitor while inpatient       Goals:  Previous Goal Met: Progressing toward Goal(s)  Goals: PO intake 75% or greater       Nutrition Monitoring and Evaluation:      Food/Nutrient Intake Outcomes: Food and Nutrient Intake  Physical Signs/Symptoms Outcomes: Biochemical Data    Discharge Planning:    Continue current diet               Trevor MADRIGALN, RDN, LDN  Lead Clinical Dietitian  RD Office Phone [phone]

## 2023-01-25 NOTE — PLAN OF CARE
Patient has been resting tonight. Patient was on 4L saltar NC but while sleeping was destating to 87-88 and is now on 15L saltar NC. Vitals have been stable. Patient remains free from falls with call light within reach. Problem: Discharge Planning  Goal: Discharge to home or other facility with appropriate resources  Outcome: Progressing  Flowsheets (Taken 1/23/2023 2000 by Cate Marti RN)  Discharge to home or other facility with appropriate resources:   Identify barriers to discharge with patient and caregiver   Arrange for needed discharge resources and transportation as appropriate       Problem: Skin/Tissue Integrity  Goal: Absence of new skin breakdown  Description: 1. Monitor for areas of redness and/or skin breakdown  2. Assess vascular access sites hourly  3. Every 4-6 hours minimum:  Change oxygen saturation probe site  4. Every 4-6 hours:  If on nasal continuous positive airway pressure, respiratory therapy assess nares and determine need for appliance change or resting period.   Outcome: Progressing       Problem: ABCDS Injury Assessment  Goal: Absence of physical injury  Outcome: Progressing  Flowsheets (Taken 1/18/2023 2000 by Mine Johnson RN)  Absence of Physical Injury: Implement safety measures based on patient assessment       Problem: Safety - Adult  Goal: Free from fall injury  Outcome: Progressing  Flowsheets (Taken 1/20/2023 1649 by Dilia Mello RN)  Free From Fall Injury: Macrina Correa family/caregiver on patient safety       Problem: Respiratory - Adult  Goal: Achieves optimal ventilation and oxygenation  1/25/2023 0454 by Angela Botello RN  Outcome: Progressing  Flowsheets (Taken 1/23/2023 2000 by Cate Marti RN)  Achieves optimal ventilation and oxygenation:   Assess for changes in respiratory status   Assess for changes in mentation and behavior   Position to facilitate oxygenation and minimize respiratory effort   Oxygen supplementation based on oxygen saturation or arterial blood gases   Respiratory therapy support as indicated      Problem: Infection - Adult  Goal: Absence of infection at discharge  Outcome: Progressing  Flowsheets (Taken 1/23/2023 2000 by Jonathan Sanchez RN)  Absence of infection at discharge:   Assess and monitor for signs and symptoms of infection   Monitor lab/diagnostic results   Monitor all insertion sites i.e., indwelling lines, tubes and drains          Problem: Pain  Goal: Verbalizes/displays adequate comfort level or baseline comfort level  Outcome: Progressing  Flowsheets (Taken 1/17/2023 2014 by Renetta Raines)  Verbalizes/displays adequate comfort level or baseline comfort level:   Encourage patient to monitor pain and request assistance   Assess pain using appropriate pain scale

## 2023-01-25 NOTE — CARE COORDINATION
DC Planning    Spoke with Yesenia Cote from [address] pending. Pt had desaturations overnight requiring 15L down to 6l during the day.

## 2023-01-25 NOTE — PLAN OF CARE
Pt remained safe and free from falls thus far in shift. A&O x4. On 4L NC, bipap at night ordered. Received 1x dose lasix IV. VSS. No c/o of pain. Brother came for a visit which put him in good spirits. IV solumedrol Q8. Call light in reach. Bed locked and lowest position. Bed alarm on for safety. Will continue to monitor pt. Problem: Discharge Planning  Goal: Discharge to home or other facility with appropriate resources  1/25/2023 1552 by Penelope Conley RN  Outcome: Progressing  Flowsheets (Taken 1/25/2023 1552)  Discharge to home or other facility with appropriate resources:   Identify barriers to discharge with patient and caregiver   Arrange for needed discharge resources and transportation as appropriate     Problem: Skin/Tissue Integrity  Goal: Absence of new skin breakdown  Description: 1. Monitor for areas of redness and/or skin breakdown  2. Assess vascular access sites hourly  3. Every 4-6 hours minimum:  Change oxygen saturation probe site  4. Every 4-6 hours:  If on nasal continuous positive airway pressure, respiratory therapy assess nares and determine need for appliance change or resting period.   1/25/2023 1552 by Penelope Conley RN  Outcome: Progressing     Problem: ABCDS Injury Assessment  Goal: Absence of physical injury  1/25/2023 1552 by Penelope Conley RN  Outcome: Progressing  Flowsheets (Taken 1/25/2023 1552)  Absence of Physical Injury: Implement safety measures based on patient assessment     Problem: Safety - Adult  Goal: Free from fall injury  1/25/2023 1552 by Penelope Conley RN  Outcome: Progressing  [facility] (Taken 1/25/2023 1552)  Free From Fall Injury: Instruct family/caregiver on patient safety     Problem: Respiratory - Adult  Goal: Achieves optimal ventilation and oxygenation  1/25/2023 1552 by Penelope Conley RN  Outcome: Progressing  Flowsheets (Taken 1/25/2023 1552)  Achieves optimal ventilation and oxygenation:   Assess for changes in respiratory status   Assess for changes in mentation and behavior   Position to facilitate oxygenation and minimize respiratory effort   Oxygen supplementation based on oxygen saturation or arterial blood gases     Problem: Pain  Goal: Verbalizes/displays adequate comfort level or baseline comfort level  1/25/2023 1552 by Lee Ann Echeverria RN  Outcome: Progressing  Flowsheets (Taken 1/25/2023 1552)  Verbalizes/displays adequate comfort level or baseline comfort level: Encourage patient to monitor pain and request assistance     Problem: Infection - Adult  Goal: Absence of infection at discharge  1/25/2023 1552 by Lee Ann Echeverria RN  Outcome: Progressing  Flowsheets (Taken 1/25/2023 1552)  Absence of infection at discharge:   Assess and monitor for signs and symptoms of infection   Monitor lab/diagnostic results     Problem: Nutrition Deficit:  Goal: Optimize nutritional status  1/25/2023 1552 by Lee Ann Echeverria RN  Outcome: Progressing

## 2023-01-25 NOTE — PROGRESS NOTES
Occupational Therapy  Facility/Department: [facility]  Rehabilitation Occupational Therapy Daily Treatment Note    Date: 23  Patient Name: Niraj Kwon       Room: 6075/7675-80  MRN: [id number]  Account: [de-identified]   : 1945  (78 y.o.) Gender: male         Past Medical History:  has a past medical history of Back pain, Bipolar 1 disorder ([facility] Cancer ([facility] COPD (chronic obstructive pulmonary disease) ([facility] GERD (gastroesophageal reflux disease), and Heart attack ([facility] Past Surgical History:   has a past surgical history that includes Anus surgery; hernia repair; and Colonoscopy (N/A, 2018). Restrictions  Restrictions/Precautions: Up as Tolerated;General Precautions;Isolation  Other position/activity restrictions: Up as tolerated, telemetry, 4L O2 via NC, R wrist IV, Droplet+ precautions d/t COVID+  Required Braces or Orthoses?: No    Subjective  Subjective: Pt. resting supine in bed and agreed to treatment. \"Will you put my food in the refrigerator and get me some icecream?\"  Restrictions/Precautions: Up as Tolerated;General Precautions;Isolation   Objective     Cognition  Overall Cognitive Status: Exceptions  Arousal/Alertness: Appropriate responses to stimuli  Following Commands: Follows one step commands with repetition; Follows one step commands with increased time  Attention Span: Attends with cues to redirect  Memory: Decreased short term memory;Decreased recall of precautions;Decreased recall of recent events  Safety Judgement: Decreased awareness of need for assistance;Decreased awareness of need for safety  Problem Solving: Decreased awareness of errors;Assistance required to identify errors made;Assistance required to generate solutions;Assistance required to correct errors made;Assistance required to implement solutions  Insights: Decreased awareness of deficits  Initiation: Does not require cues  Sequencing: Requires cues for some  Orientation  Overall Orientation Status: Within Functional Limits  Orientation Level: Oriented X4         ADL  Grooming/Oral Hygiene  Skilled Clinical Factors: Pt. seen in afternoon and stated ADLS had already been done. Functional Mobility  Activity:  (Pt. sat unsupported on EOB to complete functional exercise program.)  Assistance Level: Supervision  Skilled Clinical Factors: Pt. completed exercise program while sitting unsupported at EOB. Bed Mobility  Overall Assistance Level: Independent  Additional Factors: Set-up; Verbal cues; Increased time to complete  Roll Left  Assistance Level: Independent  Roll Right  Assistance Level: Independent  Sit to Supine  Assistance Level: Independent  Skilled Clinical Factors: Pt. returned to supine position independently with use of grab bars to lay on side for comfort. Supine to Sit  Assistance Level: Independent  Skilled Clinical Factors: Pt. moved from supine to sit with use grab bars without difficulty. Scooting  Assistance Level: Independent  Skilled Clinical Factors: Scooting fully to EOB   OT Exercises  Exercise Treatment: Pt. sat unsupported on EOB to complete B UE exercise with success. Pt tolerated 20 min of sitting unsupported and completed 5 sets of exercises with mod rest breaks between sets. Pt. completed shoulder flexion/extension, elbow flexion/extension, and horizontal ab/adduction of both shoulders. O2 levels decreased to 87-88% but pt recovered quickly with rest break. Assessment  Assessment  Assessment: Pt. very cooperative and willing to participate in exercise program. Pt. completed each exercise with success with O2 saturation decreased 87-88% but recovered quickly to 93-94%. Pt. completed independent bed mobility with supine to sit and sit to supine. Skilled OT warranted to promote I/safety to return pt to prior living arrangment with assist as needed.   Activity Tolerance: Patient limited by endurance  Discharge Recommendations: Patient would benefit from continued therapy after discharge  Safety Devices  Safety Devices in place: Yes  Type of devices: All fall risk precautions in place;Call light within reach;Nurse notified; Bed alarm in place; Left in bed  Restraints  Initially in place: No    Patient Education  Education  Education Given To: Patient  Education Provided: Role of Therapy;Home Exercise Program;Plan of Care;Safety;Precautions; Energy Conservation;Transfer Training;Mobility Training;ADL Function;DME/Home Modifications; Fall Prevention Strategies  Education Provided Comments: Pt. educated on importance of changing position and mobility throughout day to maintain endurance for selfcare tasks. Education Method: Demonstration;Verbal  Barriers to Learning: Cognition;Vision  Education Outcome: Verbalized understanding;Continued education needed    Plan  Occupational Therapy Plan  Times Per Week: 4-5x/week 1x/day as tolerated  Times Per Day: Once a day  Current Treatment Recommendations: Strengthening;Balance training;Functional mobility training; Endurance training; Safety education & training;Cognitive reorientation;Patient/Caregiver education & training;Self-Care / ADL;Cognitive/Perceptual training;Equipment evaluation, education, & procurement  Additional Comments: Cont with stated POC    Goals  Patient Goals   Patient goals : To go home! Short Term Goals  Time Frame for Short Term Goals: By discharge, pt to demo:  Short Term Goal 1: ADL transfers and functional mobility tasks with Good safety and SBA with use of AD as needed. Short Term Goal 2: UB ADLs to SBA and LB ADLs to MinAx1 with Good safety and use of AE/compensatory strategies/AD as needed. Short Term Goal 3: increased BUE strength by 1/2 grade to promote strength/ROM required for safety and IND with self care tasks. Short Term Goal 4: increased standing tolerance to > 8 mins w/ Good safety and use of AD as needed in order to promote standing balance and reduce the risk of falls.   Short Term Goal 5: toileting tasks to SBA with Good safety and use of AD/grab bars/BSC as needed. Additional Goals?: No  Long Term Goals  Long Term Goal 1: Pt to be IND with fall prevention strategies, EC/WS tech, covid-19 education, COPD & smoking cessation education, discharge/equipment recommendations, and a BUE HEP with use of handouts as needed. AM-PAC Score        AM-PAC Inpatient Daily Activity Raw Score: 16 (01/25/23 1536)  AM-PAC Inpatient ADL T-Scale Score : 35.96 (01/25/23 1536)  ADL Inpatient CMS 0-100% Score: 53.32 (01/25/23 1536)  ADL Inpatient CMS G-Code Modifier : CK (01/25/23 1536)      Therapy Time   Individual Concurrent Group Co-treatment   Time In 1410         Time Out 1433         Minutes 23             RN reports patient is medically stable for therapy treatment this date. Chart reviewed prior to treatment and patient is agreeable for therapy. All lines intact and patient positioned comfortably at end of treatment. All patient needs addressed prior to ending therapy session.        Brianne Blanton COLIN

## 2023-01-25 NOTE — PLAN OF CARE
Problem: Respiratory - Adult  Goal: Achieves optimal ventilation and oxygenation  1/25/2023 1046 by Jeanenne Essex, RCP  Outcome: Progressing

## 2023-01-25 NOTE — PROGRESS NOTES
Pulmonary Critical Care Progress Note  [facility]     Patient seen for the follow up of  COVID, acute hypoxic respiratory failure, active smoking/COPD    Subjective:  He is resting in bed, currently 6 L saturating 94 to 95%. He feels the shortness of breath is doing fine. He has no significant cough or any chest pain. Overnight he had desaturations requiring increase in his oxygen to 15 L but has been able to be weaned back down now that he is awake to 6 L. Examination:  Vitals: /74   Pulse 55   Temp 97.7 °F (36.5 °C) (Oral)   Resp 19   Ht 5' 10\" (1.778 m)   Wt 205 lb (93 kg)   SpO2 95%   BMI 29.41 kg/m²   General appearance: alert and cooperative with exam  Neck: No JVD  Lungs: Moderate to decreased air exchange, no wheezing or crackles  Heart: regular rate and rhythm, S1, S2 normal, no gallop  Abdomen: Soft, non tender, + BS  Extremities: no cyanosis or clubbing. No significant edema    Radiology:  X-ray chest 1/25/23      Impression:  Acute hypoxic respiratory failure  Acute exacerbation of COPD/emphysema  Active smoking, 30-pack-year history  COVID-19  Right basilar atelectasis versus pneumonia  Indeterminate 9 mm lingular nodule  Possible obstructive sleep apnea    Recommendations:  Oxygen via nasal cannula, keep SPO2 90% or greater   BIPAP support while asleep  Incentive spirometry every hour while awake   Acapella  Budesonide and Brovana via nebulizer every 12 hours   IV solu-medrol 30 mg Q 8 hours   Albuterol and Ipratropium Q 4 hours and prn  Mucinex DM  Status post course of paxlovid  DVT prophylaxis with low molecular weight heparin  GI prophylaxis with protonix  PT/OT as tolerated  PFTs as an outpatient  PET scan as an outpatient  Discharge planning once pre-CERT obtained. No objection from pulmonary standpoint with outpatient follow-up in 1 to 2 weeks. Patient is being seen in collaboration with Dr. Canelo Strong.   Final/further recommendations and plan to follow once evaluated by collaborating physician.   Will follow with you    Electronically signed by     ALEXSANDRA Iyer CNP on 1/25/2023 at 10:07 AM  Pulmonary Critical Care and Sleep Medicine,  [facility], [place] AT [place]: [phone]

## 2023-01-25 NOTE — PROGRESS NOTES
[facility]  Office: [address], DO, Iqraneida Loza, DO, Ruby Looneyesau, DO, Mony Allie Richards, DO, Lashaun Stallings MD, Cecilio Burgos MD, Rupinder Mairano MD, Hilton Taveras MD,  Tyra Jenkins MD, Yue Cole MD, Bonny Velazco, DO, Vianey Lewis MD,  Bryant Farias MD, Gemini Pena MD, Silverio Reilly DO, Mikhail Saleh MD, Shoaib Bravo MD, Gia Soriano DO, Karey Osborne MD, Ke Roy MD, Juan Diego Dejesus MD, Anel Paul MD, John Vega DO, Laci Ramirez MD, Becky Quezada MD, Tosha Coulter, CNP,  Brant Gardner, CNP, Reba Sales, CNP, Robi Magana, CNP,  Fracisco Jon, [facility], Charlotte Hernandez, CNP, Clarissa Parrish, CNP, Maday Zamarripa, CNP, Manjinder Howard, CNP, Tuyet Johnson, CNP, JONNY MaresC, Jl Turcios, CNS, Mundo Farah, CNP, Boone Bustillos, [facility]    Progress Note    1/25/2023    10:04 AM    Name:   Josiah Alvarez  MRN:     [id number]     Acct:      [de-identified]   Room:   [address] [place] [address] Day:  12  Admit Date:  1/13/2023  2:14 AM    PCP:   No primary care provider on file. Code Status:  Full Code    Subjective:     C/C:   Chief Complaint   Patient presents with    Fatigue     Pt was found on his knees outside of assisted living. Pt was outside smoking and slid out of a chair. Interval History Status: Mildly improved. Patient condition has been stable versus minimally improved each day. He is back on high intensity steroids at pulmonology recommendations. Continues to require submental oxygen that does increase in demand at night. Plan for discharge to LTAC once pre-CERT obtained. Placement has been challenging as insurance refused LTAC and his oxygen support requires heated high flow at night which precludes his placement at SNF. Shira Nicoals     Brief History:     Mr. Juliann Bonner is a 78 yr old with a history of COPD lives in assisted living facility presented to hospital with weakness, shortness of breath. Patient states that he had a usual morning routine, drank his coffee and was trying to get out of wheel chair noted that he was very weak and fatigued. he also had cough started yesterday with white phlegm, no chest pain, no fever, no chills. Patient also started feeling short of breath. No diarrhea. No sick contacts that patient knows of. Patient smokes 2 packs every day and he has copd diagnosis in chart but not on any treatment, no pft seen in the chart. In the ER patient was noted to be hypoxic, 88%, requiring 2 lt oxygen. He was afebrile. Electrolytes normal. Patient was seen to have elevated troponin of 34 which trended up to 51 and 47. Patient does not report any chest pain. D-dimer 1.63, COVID positive, x-ray does not show any acute abnormality. He has been started on Paxlovid    1/20 -condition has been stable. Patient continues to require heated high flow oxygen. He is at 8 L today. SPO2 has been stable around 93%. We will continue to benefit from hospitalization versus LTAC.    1/23-1/25 -patient back on high intensity steroids. Continues to require submental oxygen. Plan for discharge to LTAC once pre-CERT obtained    Review of Systems:     Constitutional:  negative for chills, fevers, sweats  Respiratory: Positive for cough, dyspnea on exertion, shortness of breath. negative for wheezing  Cardiovascular:  negative for chest pain, chest pressure/discomfort, lower extremity edema, palpitations  Gastrointestinal:  negative for abdominal pain, constipation, diarrhea, nausea, vomiting  Neurological:  negative for dizziness, headache    Medications:      Allergies:  No Known Allergies    Current Meds:   Scheduled Meds:    methylPREDNISolone  30 mg IntraVENous Q8H    pantoprazole  40 mg Oral QAM AC    dextromethorphan-guaiFENesin  2 tablet Oral BID    enoxaparin  40 mg SubCUTAneous Daily    budesonide  0.5 mg Nebulization BID    arformoterol tartrate 15 mcg Nebulization BID    metoprolol succinate  25 mg Oral Daily    vitamin D  50,000 Units Oral Once per day on Sat    divalproex  1,000 mg Oral Daily    sodium chloride flush  5-40 mL IntraVENous 2 times per day    QUEtiapine  150 mg Oral Nightly    ipratropium-albuterol  1 ampule Inhalation Q4H WA    aspirin  81 mg Oral Daily    nicotine  1 patch TransDERmal Daily    lamoTRIgine  100 mg Oral Daily    QUEtiapine  200 mg Oral BID     Continuous Infusions:    sodium chloride       PRN Meds: calcium carbonate, sodium chloride flush, sodium chloride, ondansetron **OR** ondansetron, polyethylene glycol, acetaminophen **OR** acetaminophen, aluminum & magnesium hydroxide-simethicone    Data:     Past Medical History:   has a past medical history of Back pain, Bipolar 1 disorder ([facility] Cancer ([facility] GERD (gastroesophageal reflux disease), and Heart attack ([facility] Social History:   reports that he has been smoking cigarettes and pipe. He has never used smokeless tobacco. He reports that he does not drink alcohol and does not use drugs. Family History:   Family History   Problem Relation Age of Onset    Cancer Father         Lung    Stroke Father     Cancer Brother         Lymphoma    Mental Illness Other         Aunt       Vitals:  /74   Pulse 55   Temp 97.7 °F (36.5 °C) (Oral)   Resp 19   Ht 5' 10\" (1.778 m)   Wt 205 lb (93 kg)   SpO2 95%   BMI 29.41 kg/m²   Temp (24hrs), Av.8 °F (36.6 °C), Min:97.5 °F (36.4 °C), Max:98.3 °F (36.8 °C)    No results for input(s): POCGLU in the last 72 hours. I/O (24Hr): Intake/Output Summary (Last 24 hours) at 2023 1004  Last data filed at 2023 0522  Gross per 24 hour   Intake --   Output 1050 ml   Net -1050 ml       Labs:  Hematology:  No results for input(s): WBC, RBC, HGB, HCT, MCV, MCH, MCHC, RDW, PLT, MPV, SEDRATE, CRP, INR, DDIMER, SC2RCYIV, LABABSO in the last 72 hours.     Invalid input(s): PT    Chemistry:  Recent Labs     01/25/23  0837   PROBNP 930*       No results for input(s): PROT, LABALBU, LABA1C, K4FXYYC, H8SNSAC, FT4, TSH, AST, ALT, LDH, GGT, ALKPHOS, LABGGT, BILITOT, BILIDIR, AMMONIA, AMYLASE, LIPASE, LACTATE, CHOL, HDL, LDLCHOLESTEROL, CHOLHDLRATIO, TRIG, VLDL, VBC09TS, PHENYTOIN, PHENYF, URICACID, POCGLU in the last 72 hours. ABG:No results found for: POCPH, PHART, PH, POCPCO2, SQN1WXB, PCO2, POCPO2, PO2ART, PO2, POCHCO3, MZZ1AHY, HCO3, NBEA, PBEA, BEART, BE, THGBART, THB, VPW7YDD, ZRMO3IFA, P5MFINBA, O2SAT, FIO2  No results found for: SPECIAL  No results found for: CULTURE    Radiology:  XR CHEST PORTABLE    Result Date: 1/16/2023  Recently described right basilar opacity with CT appears unchanged. No new findings identified in the interval.     XR CHEST PORTABLE    Result Date: 1/13/2023  Mild to moderate COPD but no acute cardiopulmonary abnormality evident. Possible soft tissue mass at the lateral left lower chest wall. Correlate clinically. CT CHEST PULMONARY EMBOLISM W CONTRAST    Result Date: 1/14/2023  1. Negative for pulmonary embolus. 2. Emphysema with mild right basilar segmental atelectasis versus pneumonia. 3. Indeterminate 9 mm lingular nodule. RECOMMENDATIONS: 9 mm suspicious left solid pulmonary nodule. Consider a non-contrast Chest CT at 3 months, a PET/CT, or tissue sampling. These guidelines do not apply to immunocompromised patients and patients with cancer. Follow up in patients with significant comorbidities as clinically warranted. For lung cancer screening, adhere to Lung-RADS guidelines. Reference: Radiology. 2017; 284(1):228-43. Physical Examination:        General appearance:  alert, cooperative and no distress  Mental Status:  oriented to person, place and time and normal affect  Lungs: Rhonchi auscultated to bilateral bases improved from 3 days ago,fine rales note throughout again today, decreased air exchange.   Normal effort at rest but does get dyspneic with exertion and this is unchanged  Heart:  regular rate and rhythm, no murmur  Abdomen:  soft, nontender, nondistended, normal bowel sounds, no masses, hepatomegaly, splenomegaly  Extremities:  no edema, redness, tenderness in the calves  Skin:  no gross lesions, rashes, induration      Assessment:        Hospital Problems             Last Modified POA    * (Principal) COVID 1/13/2023 Yes    Acute respiratory failure with hypoxia (HCC) 1/13/2023 Yes    COPD exacerbation ([address] 1/13/2023 Yes    Elevated troponin 1/13/2023 Yes    Tobacco abuse 1/13/2023 Yes       Plan:        Acute respiratory failure secondary to COVID-19 with a known history of COPD  Continue IV steroids at pulmonology recommendation   DuoNeb/bronchodilators  Completed full course of Paxlovid on 1/18/23  S/p lasix x 1 dose on 1/18/23  Continue supplemental oxygen with nasal cannula, heated high flow, BiPAP at night  Discharge planning now to [place] [id number] with increased oxygen requirements, patient understands and is agreeable and plan has been discussed with patient's daughter.   Await pre-CERT      ALEXSANDRA Carpio NP  1/25/2023  10:04 AM

## 2023-01-25 NOTE — PROGRESS NOTES
Physical Therapy  Facility/Department: [facility] PROGRESSIVE CARE  Daily Treatment Note  NAME: Elizabeth Brandt  : 1945  MRN: [id number]    Date of Service: 2023    Discharge Recommendations:  Patient would benefit from continued therapy after discharge      Pt currently functioning below baseline. Recommend daily inpatient skilled therapy at time of discharge to maximize long term outcomes and prevent re-admission. Please refer to AM-PAC score for current level of function. Patient Diagnosis(es): The primary encounter diagnosis was COVID-19. A diagnosis of Elevated troponin was also pertinent to this visit. AM-PAC Score: 18  Assessment   Assessment: Patient is significantly limited by decreased aerobic capacity and requried 6L of [place]. Patient easily desats with mobility and exercises with cue for pured lip breathing. Patient would benefit from continued skilled PT services at this time. Activity Tolerance: Patient limited by endurance     Plan    Physcial Therapy Plan  General Plan: 5-7 times per week  Current Treatment Recommendations: Endurance training;Gait training;Transfer training     Restrictions  Restrictions/Precautions  Restrictions/Precautions: Up as Tolerated, General Precautions, Isolation  Required Braces or Orthoses?: No  Position Activity Restriction  Other position/activity restrictions: Up as tolerated, telemetry, 4L O2 via NC, R wrist IV, Droplet+ precautions d/t COVID+     Subjective    Subjective  Subjective: Pt sleeping in bed with lights off upon entry, easily arousable and agreeable with PT. AMARI Reddy PT. Orientation  Overall Orientation Status: Within Functional Limits  Orientation Level: Oriented X4  Cognition  Overall Cognitive Status: Exceptions  Arousal/Alertness: Appropriate responses to stimuli  Following Commands: Follows one step commands with repetition; Follows one step commands with increased time  Attention Span: Attends with cues to redirect  Memory: Decreased short term memory;Decreased recall of precautions;Decreased recall of recent events  Safety Judgement: Decreased awareness of need for assistance;Decreased awareness of need for safety  Problem Solving: Decreased awareness of errors;Assistance required to identify errors made;Assistance required to generate solutions;Assistance required to correct errors made;Assistance required to implement solutions  Insights: Decreased awareness of deficits  Initiation: Does not require cues  Sequencing: Requires cues for some     Objective   Bed Mobility Training  Bed Mobility Training: Yes  Overall Level of Assistance: Contact-guard assistance  Interventions: Safety awareness training;Verbal cues  Rolling: Contact-guard assistance  Supine to Sit: Contact-guard assistance  Scooting: Stand-by assistance  Comment: Patient required Mod VCs with hand over hand assist for UE placement on bed rail and to initiate proper log roll tech, extended assistance required for line mgmt. Assist for scooting to EOB and achieve upright posture with UB. Assist and VCs for scooting to EOB with ru LE foot placement on floor to establish safety sitting balance therefore reducing fall risk. Balance  Sitting: Intact  Standing: With support (w/ RW and CGA)  Transfer Training  Transfer Training: Yes  Overall Level of Assistance: Contact-guard assistance  Interventions: Safety awareness training;Verbal cues; Visual cues  Sit to Stand: Contact-guard assistance  Stand to Sit: Contact-guard assistance  Bed to Chair: Contact-guard assistance  Comment: Patient requires VCs and tactile cues to use arms of chairs to push up from for sit to stand transfer, instead of pulling of RW, to promote safety and correct transfer technique.     Gait Training  Gait Training: Yes  Gait  Overall Level of Assistance: Contact-guard assistance;Minimum assistance (1 slightly LOB to the Left side with writer attempting to [employer] and assist with patient with correction.)  Interventions: Safety awareness training;Verbal cues; Tactile cues  Speed/Laura: Slow;Shuffled  Gait Abnormalities: Shuffling gait (Slight lateral sway)  Distance (ft):  (15' x 1, 10' x 1)  Assistive Device: Gait belt;Walker, rolling  Comment: SpO2 85-92% during ambulation. Seated rest breaks needed prior to beginning exercises. Neuromuscular Education  Neuromuscular Education: Yes  Treatment: Lower extremity; Sitting;Standing  Neuromuscular Comments: STS x 5 reps with fair tolerance. SpO2 dropped to 80% during quick recovery when patient rested and focused on pursed lip breathing, VCs required for patient to perform pursed lip breathing correctly. PT Exercises  Exercise Treatment: yes  A/AROM Exercises: Standing ru LE AROM x 10 reps with seated rest break between each exercises d/t fatigued and SpO2 desating to 80%. Focus on pursed lip breathing with fair recovery. Safety Devices  Type of Devices: Call light within reach;Nurse notified; All fall risk precautions in place;Gait belt;Bed alarm in place; Left in bed (patient declined sitting in the chair several time even though writer and RT attempt to encouragement with educated. Patient assist back to bed.)  Restraints  Restraints Initially in Place: No       Goals  Short Term Goals  Time Frame for Short Term Goals: 12 treatments  Short Term Goal 1: Independent transfers  Short Term Goal 2:  Independent ambulation 100' x 1 w/ SpO2 >92%  Short Term Goal 3: Tolerate 30 min ther act  Patient Goals   Patient Goals : Breathe better    Education  Patient Education  Education Given To: Patient  Education Provided: Role of Therapy;Plan of Care;Energy Conservation  Education Method: Verbal  Barriers to Learning: None  Education Outcome: Verbalized understanding    Therapy Time   Individual Concurrent Group Co-treatment   Time In 1435         Time Out 1500         Minutes 7900 Fm 1826, PTA

## 2023-01-25 NOTE — PLAN OF CARE
Problem: Respiratory - Adult  Goal: Achieves optimal ventilation and oxygenation  1/24/2023 2013 by Ulises Rain RCP  Outcome: Progressing   BRONCHOSPASM/BRONCHOCONSTRICTION    IMPROVE  AERATION/BREATHSOUNDS  ADMINISTER BRONCHODILATOR THERAPY AS APPROPRIATE  ASSESS BREATH SOUNDS  PATIENT EDUCATION AS NEEDED

## 2023-01-25 NOTE — RT PROTOCOL NOTE
RT Inhaler-Nebulizer Bronchodilator Protocol Note    There is a bronchodilator order in the chart from a provider indicating to follow the RT Bronchodilator Protocol and there is an Initiate RT Inhaler-Nebulizer Bronchodilator Protocol order as well (see protocol at bottom of note). CXR Findings:  No results found. The findings from the last RT Protocol Assessment were as follows:   History Pulmonary Disease: Chronic pulmonary disease  Respiratory Pattern: Dyspnea on exertion or RR 21-25 bpm  Breath Sounds: Severe inspiratory and expiratory wheezing or severely diminished  Cough: Strong, spontaneous, non-productive  Indication for Bronchodilator Therapy: Decreased or absent breath sounds  Bronchodilator Assessment Score: 12    Aerosolized bronchodilator medication orders have been revised according to the RT Inhaler-Nebulizer Bronchodilator Protocol below. Respiratory Therapist to perform RT Therapy Protocol Assessment initially then follow the protocol. Repeat RT Therapy Protocol Assessment PRN for score 0-3 or on second treatment, BID, and PRN for scores above 3. No Indications - adjust the frequency to every 6 hours PRN wheezing or bronchospasm, if no treatments needed after 48 hours then discontinue using Per Protocol order mode. If indication present, adjust the RT bronchodilator orders based on the Bronchodilator Assessment Score as indicated below. Use Inhaler orders unless patient has one or more of the following: on home nebulizer, not able to hold breath for 10 seconds, is not alert and oriented, cannot activate and use MDI correctly, or respiratory rate 25 breaths per minute or more, then use the equivalent nebulizer order(s) with same Frequency and PRN reasons based on the score. If a patient is on this medication at home then do not decrease Frequency below that used at home.     0-3 - enter or revise RT bronchodilator order(s) to equivalent RT Bronchodilator order with Frequency of every 4 hours PRN for wheezing or increased work of breathing using Per Protocol order mode. 4-6 - enter or revise RT Bronchodilator order(s) to two equivalent RT bronchodilator orders with one order with BID Frequency and one order with Frequency of every 4 hours PRN wheezing or increased work of breathing using Per Protocol order mode. 7-10 - enter or revise RT Bronchodilator order(s) to two equivalent RT bronchodilator orders with one order with TID Frequency and one order with Frequency of every 4 hours PRN wheezing or increased work of breathing using Per Protocol order mode. 11-13 - enter or revise RT Bronchodilator order(s) to one equivalent RT bronchodilator order with QID Frequency and an Albuterol order with Frequency of every 4 hours PRN wheezing or increased work of breathing using Per Protocol order mode. Greater than 13 - enter or revise RT Bronchodilator order(s) to one equivalent RT bronchodilator order with every 4 hours Frequency and an Albuterol order with Frequency of every 2 hours PRN wheezing or increased work of breathing using Per Protocol order mode. RT to enter RT Home Evaluation for COPD & MDI Assessment order using Per Protocol order mode.     Electronically signed by Ignacio oK RCP on 1/25/2023 at 10:58 AM

## 2023-01-25 NOTE — PROGRESS NOTES
Nurse showed pts brother Luis Rajan and his wife to pts room. Nurse asked pt if it was okay to give medical information to brother while he is present in the room, he said it was okay. Brother updated and questions were answered.

## 2023-01-26 LAB
ALBUMIN SERPL-MCNC: 2.8 G/DL (ref 3.5–5.2)
ANION GAP SERPL CALCULATED.3IONS-SCNC: 7 MMOL/L (ref 9–17)
BUN BLDV-MCNC: 29 MG/DL (ref 8–23)
BUN/CREAT BLD: 34 (ref 9–20)
CALCIUM SERPL-MCNC: 8.3 MG/DL (ref 8.6–10.4)
CHLORIDE BLD-SCNC: 102 MMOL/L (ref 98–107)
CO2: 34 MMOL/L (ref 20–31)
CREAT SERPL-MCNC: 0.85 MG/DL (ref 0.7–1.2)
GFR SERPL CREATININE-BSD FRML MDRD: >60 ML/MIN/1.73M2
GLUCOSE BLD-MCNC: 105 MG/DL (ref 70–99)
HCT VFR BLD CALC: 46.9 % (ref 40.7–50.3)
HEMOGLOBIN: 14.8 G/DL (ref 13–17)
MAGNESIUM: 2.2 MG/DL (ref 1.6–2.6)
MCH RBC QN AUTO: 30.5 PG (ref 25.2–33.5)
MCHC RBC AUTO-ENTMCNC: 31.6 G/DL (ref 28.4–34.8)
MCV RBC AUTO: 96.5 FL (ref 82.6–102.9)
NRBC AUTOMATED: 0 PER 100 WBC
PDW BLD-RTO: 12.9 % (ref 11.8–14.4)
PHOSPHORUS: 3.1 MG/DL (ref 2.5–4.5)
PLATELET # BLD: 233 K/UL (ref 138–453)
PMV BLD AUTO: 9.6 FL (ref 8.1–13.5)
POTASSIUM SERPL-SCNC: 4.6 MMOL/L (ref 3.7–5.3)
RBC # BLD: 4.86 M/UL (ref 4.21–5.77)
SODIUM BLD-SCNC: 143 MMOL/L (ref 135–144)
WBC # BLD: 10.1 K/UL (ref 3.5–11.3)

## 2023-01-26 PROCEDURE — 83735 ASSAY OF MAGNESIUM: CPT

## 2023-01-26 PROCEDURE — 6370000000 HC RX 637 (ALT 250 FOR IP): Performed by: NURSE PRACTITIONER

## 2023-01-26 PROCEDURE — 36415 COLL VENOUS BLD VENIPUNCTURE: CPT

## 2023-01-26 PROCEDURE — 6370000000 HC RX 637 (ALT 250 FOR IP): Performed by: INTERNAL MEDICINE

## 2023-01-26 PROCEDURE — 2580000003 HC RX 258: Performed by: NURSE PRACTITIONER

## 2023-01-26 PROCEDURE — 6360000002 HC RX W HCPCS: Performed by: NURSE PRACTITIONER

## 2023-01-26 PROCEDURE — 2500000003 HC RX 250 WO HCPCS: Performed by: INTERNAL MEDICINE

## 2023-01-26 PROCEDURE — 2060000000 HC ICU INTERMEDIATE R&B

## 2023-01-26 PROCEDURE — 94640 AIRWAY INHALATION TREATMENT: CPT

## 2023-01-26 PROCEDURE — 99232 SBSQ HOSP IP/OBS MODERATE 35: CPT | Performed by: NURSE PRACTITIONER

## 2023-01-26 PROCEDURE — 2700000000 HC OXYGEN THERAPY PER DAY

## 2023-01-26 PROCEDURE — 94669 MECHANICAL CHEST WALL OSCILL: CPT

## 2023-01-26 PROCEDURE — 94660 CPAP INITIATION&MGMT: CPT

## 2023-01-26 PROCEDURE — 6370000000 HC RX 637 (ALT 250 FOR IP): Performed by: STUDENT IN AN ORGANIZED HEALTH CARE EDUCATION/TRAINING PROGRAM

## 2023-01-26 PROCEDURE — 2500000003 HC RX 250 WO HCPCS: Performed by: NURSE PRACTITIONER

## 2023-01-26 PROCEDURE — 94761 N-INVAS EAR/PLS OXIMETRY MLT: CPT

## 2023-01-26 PROCEDURE — 80069 RENAL FUNCTION PANEL: CPT

## 2023-01-26 PROCEDURE — 85027 COMPLETE CBC AUTOMATED: CPT

## 2023-01-26 RX ORDER — METOPROLOL SUCCINATE 25 MG/1
25 TABLET, EXTENDED RELEASE ORAL DAILY
Qty: 30 TABLET | Refills: 3 | Status: SHIPPED | OUTPATIENT
Start: 2023-01-26

## 2023-01-26 RX ORDER — PREDNISONE 20 MG/1
40 TABLET ORAL DAILY
Status: DISCONTINUED | OUTPATIENT
Start: 2023-01-26 | End: 2023-01-27

## 2023-01-26 RX ORDER — ERGOCALCIFEROL 1.25 MG/1
50000 CAPSULE ORAL WEEKLY
Qty: 5 CAPSULE | Refills: 1 | Status: SHIPPED | OUTPATIENT
Start: 2023-01-26

## 2023-01-26 RX ORDER — POLYETHYLENE GLYCOL 3350 17 G/17G
17 POWDER, FOR SOLUTION ORAL DAILY PRN
Qty: 527 G | Refills: 1 | Status: SHIPPED | OUTPATIENT
Start: 2023-01-26 | End: 2023-02-25

## 2023-01-26 RX ORDER — NICOTINE 21 MG/24HR
1 PATCH, TRANSDERMAL 24 HOURS TRANSDERMAL DAILY
Qty: 30 PATCH | Refills: 3 | Status: SHIPPED | OUTPATIENT
Start: 2023-01-26

## 2023-01-26 RX ORDER — BUDESONIDE 0.5 MG/2ML
0.5 INHALANT ORAL 2 TIMES DAILY
Qty: 60 EACH | Refills: 3 | Status: SHIPPED | OUTPATIENT
Start: 2023-01-26

## 2023-01-26 RX ORDER — BUMETANIDE 1 MG/1
1 TABLET ORAL DAILY
Qty: 30 TABLET | Refills: 2 | Status: SHIPPED | OUTPATIENT
Start: 2023-01-26

## 2023-01-26 RX ORDER — ASPIRIN 81 MG/1
81 TABLET, CHEWABLE ORAL DAILY
Qty: 30 TABLET | Refills: 3 | Status: SHIPPED | OUTPATIENT
Start: 2023-01-26

## 2023-01-26 RX ORDER — ARFORMOTEROL TARTRATE 15 UG/2ML
15 SOLUTION RESPIRATORY (INHALATION) 2 TIMES DAILY
Qty: 120 ML | Refills: 3 | Status: SHIPPED | OUTPATIENT
Start: 2023-01-26

## 2023-01-26 RX ORDER — BUMETANIDE 0.25 MG/ML
1 INJECTION INTRAMUSCULAR; INTRAVENOUS ONCE
Status: COMPLETED | OUTPATIENT
Start: 2023-01-26 | End: 2023-01-26

## 2023-01-26 RX ORDER — PREDNISONE 10 MG/1
TABLET ORAL
Qty: 30 TABLET | Refills: 0 | Status: ON HOLD | OUTPATIENT
Start: 2023-01-26 | End: 2023-02-17 | Stop reason: ALTCHOICE

## 2023-01-26 RX ADMIN — QUETIAPINE FUMARATE 200 MG: 200 TABLET ORAL at 21:11

## 2023-01-26 RX ADMIN — IPRATROPIUM BROMIDE AND ALBUTEROL SULFATE 1 AMPULE: 2.5; .5 SOLUTION RESPIRATORY (INHALATION) at 09:11

## 2023-01-26 RX ADMIN — DIVALPROEX SODIUM 1000 MG: 500 TABLET, EXTENDED RELEASE ORAL at 08:10

## 2023-01-26 RX ADMIN — SODIUM CHLORIDE, PRESERVATIVE FREE 10 ML: 5 INJECTION INTRAVENOUS at 08:09

## 2023-01-26 RX ADMIN — BUDESONIDE INHALATION 500 MCG: 0.5 SUSPENSION RESPIRATORY (INHALATION) at 09:11

## 2023-01-26 RX ADMIN — BUDESONIDE INHALATION 500 MCG: 0.5 SUSPENSION RESPIRATORY (INHALATION) at 20:03

## 2023-01-26 RX ADMIN — QUETIAPINE FUMARATE 200 MG: 200 TABLET ORAL at 08:10

## 2023-01-26 RX ADMIN — ENOXAPARIN SODIUM 40 MG: 100 INJECTION SUBCUTANEOUS at 08:10

## 2023-01-26 RX ADMIN — METOPROLOL SUCCINATE 25 MG: 25 TABLET, EXTENDED RELEASE ORAL at 08:10

## 2023-01-26 RX ADMIN — PREDNISONE 40 MG: 20 TABLET ORAL at 11:51

## 2023-01-26 RX ADMIN — LAMOTRIGINE 100 MG: 100 TABLET ORAL at 08:10

## 2023-01-26 RX ADMIN — ASPIRIN 81 MG: 81 TABLET, CHEWABLE ORAL at 08:09

## 2023-01-26 RX ADMIN — BUMETANIDE 1 MG: 0.25 INJECTION INTRAMUSCULAR; INTRAVENOUS at 09:56

## 2023-01-26 RX ADMIN — METHYLPREDNISOLONE SODIUM SUCCINATE 30 MG: 40 INJECTION, POWDER, FOR SOLUTION INTRAMUSCULAR; INTRAVENOUS at 08:09

## 2023-01-26 RX ADMIN — ARFORMOTEROL TARTRATE 15 MCG: 15 SOLUTION RESPIRATORY (INHALATION) at 09:11

## 2023-01-26 RX ADMIN — GUAIFENESIN AND DEXTROMETHORPHAN HYDROBROMIDE 2 TABLET: 600; 30 TABLET, EXTENDED RELEASE ORAL at 21:10

## 2023-01-26 RX ADMIN — IPRATROPIUM BROMIDE AND ALBUTEROL SULFATE 1 AMPULE: 2.5; .5 SOLUTION RESPIRATORY (INHALATION) at 20:03

## 2023-01-26 RX ADMIN — PANTOPRAZOLE SODIUM 40 MG: 40 TABLET, DELAYED RELEASE ORAL at 05:12

## 2023-01-26 RX ADMIN — BUMETANIDE 1 MG: 0.25 INJECTION INTRAMUSCULAR; INTRAVENOUS at 16:33

## 2023-01-26 RX ADMIN — ARFORMOTEROL TARTRATE 15 MCG: 15 SOLUTION RESPIRATORY (INHALATION) at 20:03

## 2023-01-26 RX ADMIN — QUETIAPINE FUMARATE 150 MG: 150 TABLET, EXTENDED RELEASE ORAL at 21:10

## 2023-01-26 RX ADMIN — GUAIFENESIN AND DEXTROMETHORPHAN HYDROBROMIDE 2 TABLET: 600; 30 TABLET, EXTENDED RELEASE ORAL at 08:10

## 2023-01-26 RX ADMIN — SODIUM CHLORIDE, PRESERVATIVE FREE 10 ML: 5 INJECTION INTRAVENOUS at 21:11

## 2023-01-26 NOTE — RT PROTOCOL NOTE
RT Inhaler-Nebulizer Bronchodilator Protocol Note    There is a bronchodilator order in the chart from a provider indicating to follow the RT Bronchodilator Protocol and there is an Initiate RT Inhaler-Nebulizer Bronchodilator Protocol order as well (see protocol at bottom of note). CXR Findings:  No results found. The findings from the last RT Protocol Assessment were as follows:   History Pulmonary Disease: Chronic pulmonary disease  Respiratory Pattern: Mild dyspnea at rest, irregular pattern, or RR 21-25 bpm (Pt on 4L O2. Pt would be SOB with O2 off.)  Breath Sounds: Slightly diminished and/or crackles  Cough: Strong, spontaneous, non-productive  Indication for Bronchodilator Therapy: Decreased or absent breath sounds  Bronchodilator Assessment Score: 8    Aerosolized bronchodilator medication orders have been revised according to the RT Inhaler-Nebulizer Bronchodilator Protocol below. Respiratory Therapist to perform RT Therapy Protocol Assessment initially then follow the protocol. Repeat RT Therapy Protocol Assessment PRN for score 0-3 or on second treatment, BID, and PRN for scores above 3. No Indications - adjust the frequency to every 6 hours PRN wheezing or bronchospasm, if no treatments needed after 48 hours then discontinue using Per Protocol order mode. If indication present, adjust the RT bronchodilator orders based on the Bronchodilator Assessment Score as indicated below. Use Inhaler orders unless patient has one or more of the following: on home nebulizer, not able to hold breath for 10 seconds, is not alert and oriented, cannot activate and use MDI correctly, or respiratory rate 25 breaths per minute or more, then use the equivalent nebulizer order(s) with same Frequency and PRN reasons based on the score. If a patient is on this medication at home then do not decrease Frequency below that used at home.     0-3 - enter or revise RT bronchodilator order(s) to equivalent RT Bronchodilator order with Frequency of every 4 hours PRN for wheezing or increased work of breathing using Per Protocol order mode. 4-6 - enter or revise RT Bronchodilator order(s) to two equivalent RT bronchodilator orders with one order with BID Frequency and one order with Frequency of every 4 hours PRN wheezing or increased work of breathing using Per Protocol order mode. 7-10 - enter or revise RT Bronchodilator order(s) to two equivalent RT bronchodilator orders with one order with TID Frequency and one order with Frequency of every 4 hours PRN wheezing or increased work of breathing using Per Protocol order mode. 11-13 - enter or revise RT Bronchodilator order(s) to one equivalent RT bronchodilator order with QID Frequency and an Albuterol order with Frequency of every 4 hours PRN wheezing or increased work of breathing using Per Protocol order mode. Greater than 13 - enter or revise RT Bronchodilator order(s) to one equivalent RT bronchodilator order with every 4 hours Frequency and an Albuterol order with Frequency of every 2 hours PRN wheezing or increased work of breathing using Per Protocol order mode. RT to enter RT Home Evaluation for COPD & MDI Assessment order using Per Protocol order mode.     Electronically signed by Yasmin Mandujano RCP on 1/25/2023 at 9:01 PM

## 2023-01-26 NOTE — CARE COORDINATION
Social work:  Spoke to patient and dtr, confirmed snf preference is [facility] d/t RT on site. Precert pending for [facility]. HENS done.

## 2023-01-26 NOTE — PROGRESS NOTES
[facility]  Office: [address], DO, Lorjerson Din, DO, Chai Acron, DO, Kory Ruiz Blood, DO, Vanensa Everett MD, Aly Valdes MD, Phuong Harman MD, Susie Sorto MD,  Rodrigo Mcgee MD, Julissa Waite MD, Vincent Wallace, DO, Dena Feliz MD,  Cleveland Ladd MD, Theron Almanza MD, Roland Lora, DO, Haylee Cortes MD, Lynn Zhang MD, Sheree Escoto, DO, Carola Leonard MD, Cindy Scanlon MD, Reina Dejesus MD, Kerri Arias MD, Aubrey Tobar DO, Mo Simons MD, Retia Bosworth, MD, Sunshien Ballesteros, CNP,  Adelia Alpers, CNP, Abby Gaitan, CNP, Xi Espinosa, CNP,  Francoise Espinoza, [facility], Dick Collins, CNP, Jada Mims, CNP, Emily Maldonado, CNP, Binh Suazo, CNP, Sophia Villanueva, CNP, Rodrick Paz PA-C, Kyra Mills, CNS, Cristian Hoffman, CNP, Montserrat Hernandez, CNP         [facility]    Progress Note    1/26/2023    9:49 AM    Name:   Andrew Ambriz  MRN:     [id number]     Acct:      [de-identified]   Room:   [address] [place] [address] Day:  15  Admit Date:  1/13/2023  2:14 AM    PCP:   No primary care provider on file. Code Status:  Full Code    Subjective:     C/C:   Chief Complaint   Patient presents with    Fatigue     Pt was found on his knees outside of assisted living. Pt was outside smoking and slid out of a chair. Interval History Status: Mildly improved. Patient condition has improved overnight. Patient was able to utilize BiPAP all night last night and is requiring submental oxygen at less than 4 L today. Chest x-ray completed yesterday did show mild interstitial edema and patient does have mild rales. We will initiate diuretics. Discharge planning has been challenging as patient has required heated high flow at night and was stable throughout the day.   With BiPAP usage last night patient is stable for discharge provided BiPAP can be used in a skilled facility over the nighttime hours while we arrange for outpatient sleep study to evaluate for CYNTHIA. Brief History:     Mr. Elieser Kelly is a 78 yr old with a history of COPD lives in assisted living facility presented to hospital with weakness, shortness of breath. Patient states that he had a usual morning routine, drank his coffee and was trying to get out of wheel chair noted that he was very weak and fatigued. he also had cough started yesterday with white phlegm, no chest pain, no fever, no chills. Patient also started feeling short of breath. No diarrhea. No sick contacts that patient knows of. Patient smokes 2 packs every day and he has copd diagnosis in chart but not on any treatment, no pft seen in the chart. In the ER patient was noted to be hypoxic, 88%, requiring 2 lt oxygen. He was afebrile. Electrolytes normal. Patient was seen to have elevated troponin of 34 which trended up to 51 and 47. Patient does not report any chest pain. D-dimer 1.63, COVID positive, x-ray does not show any acute abnormality. He has been started on Paxlovid    1/20 -condition has been stable. Patient continues to require heated high flow oxygen. He is at 8 L today. SPO2 has been stable around 93%. We will continue to benefit from hospitalization versus LTAC.    1/23-1/25 -patient back on high intensity steroids. Continues to require submental oxygen. Plan for discharge to [place] [id number] once pre-CERT obtained    1/00 - Patient condition has improved overnight. Patient was able to utilize BiPAP all night last night and is requiring submental oxygen at less than 4 L today. Chest x-ray completed yesterday did show mild interstitial edema and patient does have mild rales. We will initiate diuretics. Discharge planning has been challenging as patient has required heated high flow at night and was stable throughout the day.   With BiPAP usage last night patient is stable for discharge provided BiPAP can be used in a skilled facility over the nighttime hours while we arrange for outpatient sleep study to evaluate for CYNTHIA. Review of Systems:     Constitutional:  negative for chills, fevers, sweats  Respiratory: Positive for cough, dyspnea on exertion, shortness of breath. negative for wheezing  Cardiovascular:  negative for chest pain, chest pressure/discomfort, lower extremity edema, palpitations  Gastrointestinal:  negative for abdominal pain, constipation, diarrhea, nausea, vomiting  Neurological:  negative for dizziness, headache    Medications: Allergies:  No Known Allergies    Current Meds:   Scheduled Meds:    bumetanide  1 mg IntraVENous Once    ipratropium-albuterol  1 ampule Inhalation TID    methylPREDNISolone  30 mg IntraVENous Q8H    pantoprazole  40 mg Oral QAM AC    dextromethorphan-guaiFENesin  2 tablet Oral BID    enoxaparin  40 mg SubCUTAneous Daily    budesonide  0.5 mg Nebulization BID    arformoterol tartrate  15 mcg Nebulization BID    metoprolol succinate  25 mg Oral Daily    vitamin D  50,000 Units Oral Once per day on Sat    divalproex  1,000 mg Oral Daily    sodium chloride flush  5-40 mL IntraVENous 2 times per day    QUEtiapine  150 mg Oral Nightly    aspirin  81 mg Oral Daily    nicotine  1 patch TransDERmal Daily    lamoTRIgine  100 mg Oral Daily    QUEtiapine  200 mg Oral BID     Continuous Infusions:    sodium chloride       PRN Meds: calcium carbonate, sodium chloride flush, sodium chloride, ondansetron **OR** ondansetron, polyethylene glycol, acetaminophen **OR** acetaminophen, aluminum & magnesium hydroxide-simethicone    Data:     Past Medical History:   has a past medical history of Back pain, Bipolar 1 disorder ([facility] COPD (chronic obstructive pulmonary disease) ([facility] GERD (gastroesophageal reflux disease), and Heart attack ([facility] Social History:   reports that he has been smoking cigarettes and pipe. He has never used smokeless tobacco. He reports that he does not drink alcohol and does not use drugs.      Family History:   Family History   Problem Relation Age of Onset    Cancer Father         Lung    Stroke Father     Cancer Brother         Lymphoma    Mental Illness Other         Aunt       Vitals:  BP (!) 140/81   Pulse 55   Temp 97.9 °F (36.6 °C) (Oral)   Resp 18   Ht 5' 10\" (1.778 m)   Wt 205 lb (93 kg)   SpO2 95%   BMI 29.41 kg/m²   Temp (24hrs), Av.9 °F (36.6 °C), Min:97.3 °F (36.3 °C), Max:98.2 °F (36.8 °C)    No results for input(s): POCGLU in the last 72 hours. I/O (24Hr): Intake/Output Summary (Last 24 hours) at 2023 0949  Last data filed at 2023 0820  Gross per 24 hour   Intake --   Output 1250 ml   Net -1250 ml       Labs:  Hematology:  Recent Labs     23  0755   WBC 10.1   RBC 4.86   HGB 14.8   HCT 46.9   MCV 96.5   MCH 30.5   MCHC 31.6   RDW 12.9      MPV 9.6       Chemistry:  Recent Labs     23  0837 23  0755   NA  --  143   K  --  4.6   CL  --  102   CO2  --  34*   GLUCOSE  --  105*   BUN  --  29*   CREATININE  --  0.85   MG  --  2.2   ANIONGAP  --  7*   LABGLOM  --  >60   CALCIUM  --  8.3*   PHOS  --  3.1   PROBNP 930*  --        Recent Labs     23  0755   LABALBU 2.8*         ABG:No results found for: POCPH, PHART, PH, POCPCO2, PUF2LQQ, PCO2, POCPO2, PO2ART, PO2, POCHCO3, GQT2HDX, HCO3, NBEA, PBEA, BEART, BE, THGBART, THB, FRZ7PPU, KMKM1JXU, T6RNRKKD, O2SAT, FIO2  No results found for: SPECIAL  No results found for: CULTURE    Radiology:  XR CHEST PORTABLE    Result Date: 2023  Recently described right basilar opacity with CT appears unchanged. No new findings identified in the interval.     XR CHEST PORTABLE    Result Date: 2023  Mild to moderate COPD but no acute cardiopulmonary abnormality evident. Possible soft tissue mass at the lateral left lower chest wall. Correlate clinically. CT CHEST PULMONARY EMBOLISM W CONTRAST    Result Date: 2023  1. Negative for pulmonary embolus.  2. Emphysema with mild right basilar segmental atelectasis versus pneumonia. 3. Indeterminate 9 mm lingular nodule. RECOMMENDATIONS: 9 mm suspicious left solid pulmonary nodule. Consider a non-contrast Chest CT at 3 months, a PET/CT, or tissue sampling. These guidelines do not apply to immunocompromised patients and patients with cancer. Follow up in patients with significant comorbidities as clinically warranted. For lung cancer screening, adhere to Lung-RADS guidelines. Reference: Radiology. 2017; 284(1):228-43. Physical Examination:        General appearance:  alert, cooperative and no distress  Mental Status:  oriented to person, place and time and normal affect  Lungs: Fine rales noted throughout.   Decreased air movement in the bases  Heart:  regular rate and rhythm, no murmur  Abdomen:  soft, nontender, nondistended, normal bowel sounds, no masses, hepatomegaly, splenomegaly  Extremities:  no edema, redness, tenderness in the calves  Skin:  no gross lesions, rashes, induration      Assessment:        Hospital Problems             Last Modified POA    * (Principal) COVID 1/13/2023 Yes    Acute respiratory failure with hypoxia (HCC) 1/13/2023 Yes    COPD exacerbation ([address] 1/13/2023 Yes    Elevated troponin 1/13/2023 Yes    Tobacco abuse 1/13/2023 Yes       Plan:        Acute respiratory failure secondary to COVID-19 with a known history of COPD  Transition to prednisone taper Dosepak   DuoNeb/bronchodilators  Completed full course of Paxlovid on 1/18/23  Add Bumex and continue as an outpatient  Continue supplemental oxygen with nasal cannula, heated high flow, BiPAP at night  Will require outpatient PFTs and sleep study  Discharge planning to SNF with BiPAP at night capabilities      ALEXSANDRA Diaz NP  1/26/2023  9:49 AM

## 2023-01-26 NOTE — PLAN OF CARE
Patient has been resting tonight. Patient wore BIPAP tonight until around 2300 then continued on 4L salter NC and stating in the 90s. Patient calls out appropriately but bed alarm on for safety. Problem: Discharge Planning  Goal: Discharge to home or other facility with appropriate resources  1/26/2023 0421 by Chriss Joel RN  Outcome: Progressing  Flowsheets (Taken 1/25/2023 1704 by Adam Cowan, RN)  Discharge to home or other facility with appropriate resources: Identify barriers to discharge with patient and caregiver      Problem: Skin/Tissue Integrity  Goal: Absence of new skin breakdown  Description: 1. Monitor for areas of redness and/or skin breakdown  2. Assess vascular access sites hourly  3. Every 4-6 hours minimum:  Change oxygen saturation probe site  4. Every 4-6 hours:  If on nasal continuous positive airway pressure, respiratory therapy assess nares and determine need for appliance change or resting period.   1/26/2023 0421 by Chriss Joel RN  Outcome: Progressing      Problem: ABCDS Injury Assessment  Goal: Absence of physical injury  1/26/2023 0421 by Chriss Joel RN  Outcome: Progressing  Flowsheets (Taken 1/25/2023 1552 by Adam Cowan, RN)  Absence of Physical Injury: Implement safety measures based on patient assessment      Problem: Safety - Adult  Goal: Free from fall injury  1/26/2023 0421 by Chriss Joel RN  Outcome: Progressing  Flowsheets (Taken 1/25/2023 1704 by Adam Cowan, RN)  Free From Fall Injury: Instruct family/caregiver on patient safety      Problem: Respiratory - Adult  Goal: Achieves optimal ventilation and oxygenation  1/26/2023 0421 by Chriss Joel RN  Outcome: Progressing  Flowsheets (Taken 1/25/2023 1704 by Adam Cowan, AMARI)  Achieves optimal ventilation and oxygenation:   Assess for changes in respiratory status   Position to facilitate oxygenation and minimize respiratory effort      Problem: Infection - Adult  Goal: Absence of infection at discharge  1/26/2023 0421 by Zoë Quijano RN  Outcome: Progressing  Flowsheets (Taken 1/25/2023 1704 by Mey Valdez RN)  Absence of infection at discharge:   Assess and monitor for signs and symptoms of infection   Monitor all insertion sites i.e., indwelling lines, tubes and drains

## 2023-01-26 NOTE — PROGRESS NOTES
DATE: 2023    NAME: Mariela Blanco  MRN: [id number]   : 1945    Patient not seen this date for Occupational Therapy due to:      [] Cancel by RN or physician due to:    [] Hemodialysis    [] Critical Lab Value Level     [] Blood transfusion in progress    [] Acute or unstable cardiovascular status   _MAP < 55 or more than >115  _HR < 40 or > 130    [] Acute or unstable pulmonary status   -FiO2 > 60%   _RR < 5 or >40    _O2 sats < 85%    [] Strict Bedrest    [] Off Unit for surgery or procedure    [] Off Unit for testing       [] Pending imaging to R/O fracture    [x] Refusal by Patient :Treatment attempted after lunch and pt declined stating \"No not today\". OT will cont to follow. [] Other      [] OT being discontinued at this time. Patient independent. No further needs. [] OT being discontinued at this time as the patient has been transferred to hospice care. No further needs.       Mei Iqbal, COLIN

## 2023-01-26 NOTE — RT PROTOCOL NOTE
RT Inhaler-Nebulizer Bronchodilator Protocol Note    There is a bronchodilator order in the chart from a provider indicating to follow the RT Bronchodilator Protocol and there is an Initiate RT Inhaler-Nebulizer Bronchodilator Protocol order as well (see protocol at bottom of note). CXR Findings:  No results found. The findings from the last RT Protocol Assessment were as follows:   History Pulmonary Disease: Chronic pulmonary disease  Respiratory Pattern: Mild dyspnea at rest, irregular pattern, or RR 21-25 bpm  Breath Sounds: Slightly diminished and/or crackles  Cough: Strong, spontaneous, non-productive  Indication for Bronchodilator Therapy: Decreased or absent breath sounds  Bronchodilator Assessment Score: 8    Aerosolized bronchodilator medication orders have been revised according to the RT Inhaler-Nebulizer Bronchodilator Protocol below. Respiratory Therapist to perform RT Therapy Protocol Assessment initially then follow the protocol. Repeat RT Therapy Protocol Assessment PRN for score 0-3 or on second treatment, BID, and PRN for scores above 3. No Indications - adjust the frequency to every 6 hours PRN wheezing or bronchospasm, if no treatments needed after 48 hours then discontinue using Per Protocol order mode. If indication present, adjust the RT bronchodilator orders based on the Bronchodilator Assessment Score as indicated below. Use Inhaler orders unless patient has one or more of the following: on home nebulizer, not able to hold breath for 10 seconds, is not alert and oriented, cannot activate and use MDI correctly, or respiratory rate 25 breaths per minute or more, then use the equivalent nebulizer order(s) with same Frequency and PRN reasons based on the score. If a patient is on this medication at home then do not decrease Frequency below that used at home.     0-3 - enter or revise RT bronchodilator order(s) to equivalent RT Bronchodilator order with Frequency of every 4 hours PRN for wheezing or increased work of breathing using Per Protocol order mode. 4-6 - enter or revise RT Bronchodilator order(s) to two equivalent RT bronchodilator orders with one order with BID Frequency and one order with Frequency of every 4 hours PRN wheezing or increased work of breathing using Per Protocol order mode. 7-10 - enter or revise RT Bronchodilator order(s) to two equivalent RT bronchodilator orders with one order with TID Frequency and one order with Frequency of every 4 hours PRN wheezing or increased work of breathing using Per Protocol order mode. 11-13 - enter or revise RT Bronchodilator order(s) to one equivalent RT bronchodilator order with QID Frequency and an Albuterol order with Frequency of every 4 hours PRN wheezing or increased work of breathing using Per Protocol order mode. Greater than 13 - enter or revise RT Bronchodilator order(s) to one equivalent RT bronchodilator order with every 4 hours Frequency and an Albuterol order with Frequency of every 2 hours PRN wheezing or increased work of breathing using Per Protocol order mode. RT to enter RT Home Evaluation for COPD & MDI Assessment order using Per Protocol order mode.     Electronically signed by Tanner Frances RCP on 1/26/2023 at 9:17 AM

## 2023-01-26 NOTE — CARE COORDINATION
Social work: Patient down to 4L NC and bipap at night, therefore will not qualify for LTACH. SW re-faxed referral to [employer] and [facility], await c/b from dtr to confirm choice. Hens started.

## 2023-01-26 NOTE — PROGRESS NOTES
Pulmonary Critical Care Progress Note  Leatha Schroeder CNP/Elba Tijerina MD     Patient seen for the follow up of  COVID, acute hypoxic respiratory failure, active smoking/COPD    Subjective:  Patient had uneventful night. He wore BiPAP overnight and tolerated well. He is currently on oxygen at 3 to 4 L nasal cannula saturating well. Shortness of breath is doing better. He has occasional dry cough, no chest pain. Tolerating orals. Examination:  Vitals: BP (!) 140/81   Pulse 55   Temp 97.9 °F (36.6 °C) (Oral)   Resp 18   Ht 5' 10\" (1.778 m)   Wt 205 lb (93 kg)   SpO2 95%   BMI 29.41 kg/m²   General appearance: alert and cooperative with exam  Neck: No JVD  Lungs: Moderate to decreased air exchange, faint wheeze  Heart: regular rate and rhythm, S1, S2 normal, no gallop  Abdomen: Soft, non tender, + BS  Extremities: no cyanosis or clubbing. No significant edema    Radiology:  X-ray chest 1/25/23      Impression:  Acute hypoxic respiratory failure  Acute exacerbation of COPD/emphysema  Active smoking, 30-pack-year history  COVID-19  Right basilar atelectasis versus pneumonia  Indeterminate 9 mm lingular nodule  Possible obstructive sleep apnea    Recommendations:  Oxygen via nasal cannula, keep SPO2 90% or greater   BIPAP support while asleep  Incentive spirometry every hour while awake   Acapella  Budesonide and Brovana via nebulizer every 12 hours   Discontinue IV Solu-Medrol  Start prednisone taper  Albuterol and Ipratropium Q 4 hours and prn  Mucinex DM  Diuresis per primary team  Status post course of paxlovid  DVT prophylaxis with low molecular weight heparin  GI prophylaxis with protonix  PT/OT   Discharge planning once pre-CERT obtained. No objection from pulmonary standpoint with outpatient follow-up in 1 to 2 weeks. Sleep study as an outpatient  PFTs as an outpatient  PET scan as an outpatient  Patient is being seen in collaboration with Dr. Eva Pan.   Final/further recommendations and plan to follow once evaluated by collaborating physician.   Will follow with you    Electronically signed by     ALEXSANDRA Boykin CNP on 1/26/2023 at 9:56 AM  Pulmonary Critical Care and Sleep Medicine,  [facility] AT [place]: [phone]

## 2023-01-26 NOTE — PLAN OF CARE
Problem: Respiratory - Adult  Goal: Achieves optimal ventilation and oxygenation  1/25/2023 2048 by Barbie Morales RCP  Outcome: Progressing     Problem: Respiratory - Adult  Goal: Able to breathe comfortably  Outcome: Progressing     Problem: Respiratory - Adult  Goal: Adequate oxygenation  Description: Adequate oxygenation  Outcome: Progressing     Problem: Respiratory - Adult  Goal: Patient's breath sounds will be clear and equal  Outcome: Progressing        BRONCHOSPASM/BRONCHOCONSTRICTION    IMPROVE  AERATION/BREATHSOUNDS  ADMINISTER BRONCHODILATOR THERAPY AS APPROPRIATE  ASSESS BREATH SOUNDS  INITIATE AEROSOL PROTOCOL IF ORDERED TO DO SO  PATIENT EDUCATION AS NEEDED       NONINVASIVE VENTILATION    PROVIDE OPTIMAL VENTILATION/ACCEPTABLE SPO2   IMPLEMENT NONINVASIVE VENTILATION PROTOCOL   MAINTAIN ACCEPTABLE SPO2   ASSESS SKIN INTEGRITY/BREAKDOWN SCORE   PATIENT EDUCATION AS NEEDED   BIPAP AS NEEDED       PROVIDE ADEQUATE OXYGENATION WITH ACCEPTABLE SP02/ABG'S    [x]  IDENTIFY APPROPRIATE OXYGEN THERAPY  [x]   MONITOR SP02/ABG'S AS NEEDED   [x]   PATIENT EDUCATION AS NEEDED

## 2023-01-26 NOTE — PROGRESS NOTES
RT in to check pt's Bipap, but pt sound asleep on his 4L [place] cannula. SaO2 95%.   RN informed RT that pt wanted Bipap off around 11pm.

## 2023-01-26 NOTE — PLAN OF CARE
Pt remained safe and free from falls thus far in shift. Down to 3L NC and alert and oriented. Brother Tito Mixon was here for visit and pt gave permission to give medical information while pt was there. Transport tomorrow potentially at [facility] if precert is approved for [facility] [place]. IV bumex given 2x as one time doses. Wore bipap for a few hours during the day. Call light in reach. Bed locked and lowest position. Bed alarm on for safety. Will continue to monitor. Problem: Discharge Planning  Goal: Discharge to home or other facility with appropriate resources  1/26/2023 1646 by Obi York RN  Outcome: Progressing  Flowsheets (Taken 1/26/2023 1646)  Discharge to home or other facility with appropriate resources: Identify barriers to discharge with patient and caregiver     Problem: Skin/Tissue Integrity  Goal: Absence of new skin breakdown  Description: 1. Monitor for areas of redness and/or skin breakdown  2. Assess vascular access sites hourly  3. Every 4-6 hours minimum:  Change oxygen saturation probe site  4. Every 4-6 hours:  If on nasal continuous positive airway pressure, respiratory therapy assess nares and determine need for appliance change or resting period.   1/26/2023 1646 by Obi York RN  Outcome: Progressing     Problem: ABCDS Injury Assessment  Goal: Absence of physical injury  1/26/2023 1646 by Obi York RN  Outcome: Progressing  Flowsheets (Taken 1/26/2023 1646)  Absence of Physical Injury: Implement safety measures based on patient assessment     Problem: Safety - Adult  Goal: Free from fall injury  1/26/2023 1646 by Obi York RN  Outcome: Progressing     Problem: Respiratory - Adult  Goal: Achieves optimal ventilation and oxygenation  1/26/2023 1646 by Obi York RN  Outcome: Progressing     Problem: Nutrition Deficit:  Goal: Optimize nutritional status  1/26/2023 1646 by Obi York RN  Outcome: Progressing  Flowsheets (Taken 1/26/2023 1646)  Nutrient intake appropriate for improving, restoring, or maintaining nutritional needs: Monitor oral intake, labs, and treatment plans

## 2023-01-27 VITALS
DIASTOLIC BLOOD PRESSURE: 65 MMHG | WEIGHT: 205 LBS | TEMPERATURE: 98.2 F | BODY MASS INDEX: 29.35 KG/M2 | SYSTOLIC BLOOD PRESSURE: 98 MMHG | HEART RATE: 80 BPM | RESPIRATION RATE: 16 BRPM | OXYGEN SATURATION: 91 % | HEIGHT: 70 IN

## 2023-01-27 LAB
ALBUMIN SERPL-MCNC: 3 G/DL (ref 3.5–5.2)
ANION GAP SERPL CALCULATED.3IONS-SCNC: 5 MMOL/L (ref 9–17)
BUN BLDV-MCNC: 30 MG/DL (ref 8–23)
BUN/CREAT BLD: 34 (ref 9–20)
CALCIUM SERPL-MCNC: 8.3 MG/DL (ref 8.6–10.4)
CHLORIDE BLD-SCNC: 101 MMOL/L (ref 98–107)
CO2: 36 MMOL/L (ref 20–31)
CREAT SERPL-MCNC: 0.88 MG/DL (ref 0.7–1.2)
GFR SERPL CREATININE-BSD FRML MDRD: >60 ML/MIN/1.73M2
GLUCOSE BLD-MCNC: 124 MG/DL (ref 70–99)
HCT VFR BLD CALC: 45.1 % (ref 40.7–50.3)
HEMOGLOBIN: 14.1 G/DL (ref 13–17)
MAGNESIUM: 2.1 MG/DL (ref 1.6–2.6)
MCH RBC QN AUTO: 30.5 PG (ref 25.2–33.5)
MCHC RBC AUTO-ENTMCNC: 31.3 G/DL (ref 28.4–34.8)
MCV RBC AUTO: 97.6 FL (ref 82.6–102.9)
NRBC AUTOMATED: 0 PER 100 WBC
PDW BLD-RTO: 13.1 % (ref 11.8–14.4)
PHOSPHORUS: 3 MG/DL (ref 2.5–4.5)
PLATELET # BLD: 207 K/UL (ref 138–453)
PMV BLD AUTO: 9.6 FL (ref 8.1–13.5)
POTASSIUM SERPL-SCNC: 4 MMOL/L (ref 3.7–5.3)
RBC # BLD: 4.62 M/UL (ref 4.21–5.77)
SODIUM BLD-SCNC: 142 MMOL/L (ref 135–144)
WBC # BLD: 9.6 K/UL (ref 3.5–11.3)

## 2023-01-27 PROCEDURE — 36415 COLL VENOUS BLD VENIPUNCTURE: CPT

## 2023-01-27 PROCEDURE — 6360000002 HC RX W HCPCS: Performed by: NURSE PRACTITIONER

## 2023-01-27 PROCEDURE — 6370000000 HC RX 637 (ALT 250 FOR IP): Performed by: INTERNAL MEDICINE

## 2023-01-27 PROCEDURE — 6370000000 HC RX 637 (ALT 250 FOR IP): Performed by: STUDENT IN AN ORGANIZED HEALTH CARE EDUCATION/TRAINING PROGRAM

## 2023-01-27 PROCEDURE — 6370000000 HC RX 637 (ALT 250 FOR IP): Performed by: NURSE PRACTITIONER

## 2023-01-27 PROCEDURE — 99239 HOSP IP/OBS DSCHRG MGMT >30: CPT | Performed by: INTERNAL MEDICINE

## 2023-01-27 PROCEDURE — 2580000003 HC RX 258: Performed by: NURSE PRACTITIONER

## 2023-01-27 PROCEDURE — 80069 RENAL FUNCTION PANEL: CPT

## 2023-01-27 PROCEDURE — 2700000000 HC OXYGEN THERAPY PER DAY

## 2023-01-27 PROCEDURE — 83735 ASSAY OF MAGNESIUM: CPT

## 2023-01-27 PROCEDURE — 2500000003 HC RX 250 WO HCPCS: Performed by: INTERNAL MEDICINE

## 2023-01-27 PROCEDURE — 94660 CPAP INITIATION&MGMT: CPT

## 2023-01-27 PROCEDURE — 85027 COMPLETE CBC AUTOMATED: CPT

## 2023-01-27 PROCEDURE — 94761 N-INVAS EAR/PLS OXIMETRY MLT: CPT

## 2023-01-27 PROCEDURE — 94640 AIRWAY INHALATION TREATMENT: CPT

## 2023-01-27 PROCEDURE — 94669 MECHANICAL CHEST WALL OSCILL: CPT

## 2023-01-27 RX ORDER — IPRATROPIUM BROMIDE AND ALBUTEROL SULFATE 2.5; .5 MG/3ML; MG/3ML
1 SOLUTION RESPIRATORY (INHALATION) 4 TIMES DAILY
Status: DISCONTINUED | OUTPATIENT
Start: 2023-01-27 | End: 2023-01-27 | Stop reason: HOSPADM

## 2023-01-27 RX ORDER — PANTOPRAZOLE SODIUM 40 MG/1
40 TABLET, DELAYED RELEASE ORAL
Qty: 30 TABLET | Refills: 3 | Status: SHIPPED | OUTPATIENT
Start: 2023-01-28

## 2023-01-27 RX ORDER — BUMETANIDE 0.25 MG/ML
1 INJECTION INTRAMUSCULAR; INTRAVENOUS ONCE
Status: COMPLETED | OUTPATIENT
Start: 2023-01-27 | End: 2023-01-27

## 2023-01-27 RX ORDER — PREDNISONE 20 MG/1
20 TABLET ORAL DAILY
Status: DISCONTINUED | OUTPATIENT
Start: 2023-01-27 | End: 2023-01-27 | Stop reason: HOSPADM

## 2023-01-27 RX ADMIN — ASPIRIN 81 MG: 81 TABLET, CHEWABLE ORAL at 09:19

## 2023-01-27 RX ADMIN — GUAIFENESIN AND DEXTROMETHORPHAN HYDROBROMIDE 2 TABLET: 600; 30 TABLET, EXTENDED RELEASE ORAL at 09:19

## 2023-01-27 RX ADMIN — METOPROLOL SUCCINATE 25 MG: 25 TABLET, EXTENDED RELEASE ORAL at 09:19

## 2023-01-27 RX ADMIN — QUETIAPINE FUMARATE 200 MG: 200 TABLET ORAL at 09:22

## 2023-01-27 RX ADMIN — ARFORMOTEROL TARTRATE 15 MCG: 15 SOLUTION RESPIRATORY (INHALATION) at 07:48

## 2023-01-27 RX ADMIN — LAMOTRIGINE 100 MG: 100 TABLET ORAL at 09:19

## 2023-01-27 RX ADMIN — IPRATROPIUM BROMIDE AND ALBUTEROL SULFATE 1 AMPULE: .5; 2.5 SOLUTION RESPIRATORY (INHALATION) at 11:27

## 2023-01-27 RX ADMIN — IPRATROPIUM BROMIDE AND ALBUTEROL SULFATE 1 AMPULE: 2.5; .5 SOLUTION RESPIRATORY (INHALATION) at 07:49

## 2023-01-27 RX ADMIN — DIVALPROEX SODIUM 1000 MG: 500 TABLET, EXTENDED RELEASE ORAL at 09:19

## 2023-01-27 RX ADMIN — BUDESONIDE INHALATION 500 MCG: 0.5 SUSPENSION RESPIRATORY (INHALATION) at 07:50

## 2023-01-27 RX ADMIN — SODIUM CHLORIDE, PRESERVATIVE FREE 10 ML: 5 INJECTION INTRAVENOUS at 09:22

## 2023-01-27 RX ADMIN — BUMETANIDE 1 MG: 0.25 INJECTION INTRAMUSCULAR; INTRAVENOUS at 09:22

## 2023-01-27 RX ADMIN — ENOXAPARIN SODIUM 40 MG: 100 INJECTION SUBCUTANEOUS at 09:21

## 2023-01-27 RX ADMIN — PREDNISONE 20 MG: 20 TABLET ORAL at 09:19

## 2023-01-27 RX ADMIN — PANTOPRAZOLE SODIUM 40 MG: 40 TABLET, DELAYED RELEASE ORAL at 06:27

## 2023-01-27 NOTE — PROGRESS NOTES
Pulmonary Critical Care Progress Note  Oswaldo Kennedy CNP/Elba Tijerina MD     Patient seen for the follow up of  COVID, acute hypoxic respiratory failure, active smoking/COPD    Subjective:  Patient had uneventful night. He wore BiPAP overnight and tolerated well. He is currently on oxygen 6L nasal cannula is increased from 3 L yesterday. He denies significant shortness of breath. He has occasional cough with clear sputum, no hemoptysis. He is tolerating orals. Examination:  Vitals: /67   Pulse 70   Temp 98.2 °F (36.8 °C) (Oral)   Resp 18   Ht 5' 10\" (1.778 m)   Wt 205 lb (93 kg)   SpO2 90%   BMI 29.41 kg/m²   General appearance: alert and cooperative with exam  Neck: No JVD  Lungs: Moderate to decreased air exchange, faint wheeze  Heart: regular rate and rhythm, S1, S2 normal, no gallop  Abdomen: Soft, non tender, + BS  Extremities: no cyanosis or clubbing. No significant edema    Radiology:  X-ray chest 1/25/23      Impression:  Acute hypoxic respiratory failure  Acute exacerbation of COPD/emphysema  Active smoking, 30-pack-year history  COVID-19  Right basilar atelectasis versus pneumonia  Indeterminate 9 mm lingular nodule  Possible obstructive sleep apnea    Recommendations:  Oxygen via nasal cannula, keep SPO2 90% or greater   BIPAP support while asleep  Incentive spirometry every hour while awake   Acapella  Budesonide and Brovana via nebulizer every 12 hours   Prednisone taper  Albuterol and Ipratropium Q 4 hours and prn  Mucinex DM  Diuresis per primary team  Status post course of paxlovid  DVT prophylaxis with low molecular weight heparin  GI prophylaxis with protonix  PT/OT   Discharge planning once pre-CERT obtained. No objection from pulmonary standpoint with outpatient follow-up in 1 to 2 weeks. Sleep study as an outpatient  PFTs as an outpatient  PET scan as an outpatient  Patient is being seen in collaboration with Dr. Walt Barakat.   Final/further recommendations and plan to follow once evaluated by collaborating physician.   Will follow with you    Electronically signed by     ALEXSANDRA Zimmerman CNP on 1/27/2023 at 10:51 AM  Pulmonary Critical Care and Sleep Medicine,  [facility] AT [place]: [phone]

## 2023-01-27 NOTE — PLAN OF CARE
Problem: Respiratory - Adult  Goal: Achieves optimal ventilation and oxygenation  1/27/2023 0406 by Cindy Silva [place]  Outcome: Progressing     Problem: Respiratory - Adult  Goal: Able to breathe comfortably  Outcome: Progressing     Problem: Respiratory - Adult  Goal: Adequate oxygenation  Description: Adequate oxygenation  Outcome: Progressing     Problem: Respiratory - Adult  Goal: Patient's breath sounds will be clear and equal  Outcome: Progressing

## 2023-01-27 NOTE — PROGRESS NOTES
Patient discharged via [facility]. Report called to facility by writer   THERON completed and signed per writer and physician for [facility]   Discharge packet given to EMS to deliver to RN receiving patient       Telemetry monitor returned       Patient was positive and treated for COVID-19.

## 2023-01-27 NOTE — DISCHARGE SUMMARY
[facility]  Office: [address], DO, Drake Boyd, DO, Georgia Dewitt, DO, Kizzyiam Richards, DO, Sanjuana Schultz MD, Maeve Lucas MD, Michael Choudhury MD, Aissatou Tate MD,  Karl Egan MD, Gi Addison MD, Mahamed Sousa DO, Cheo Burton MD,  Shiraz Chris MD, Pat Da Silva MD, Darryl Palma DO, Chidi Cm MD, Stephanie Valentin MD, Spencer Johansen DO, Kell Boss MD, Bart Booth MD, Sher Johnson MD, Autumn Diaz MD, Elaina Guzman DO, Jamshid Mcgarry MD, Laron Pardo MD, Carlene Cross, CNP,  Debi Gupta, CNP, Adelaida Love, CNP, Melchor Randolph, CNP,  Cleone Denver, [facility], Erma Ross, [facility], Jerman Whittaker, CNP, Angely Moran, CNP, Wm Carvajal, CNP, Logan Bañuelos, CNP, JONNY NapolesC, Gala Vicente, CNS, Donny Carlson, CNP, Lexii Lockwood, [facility]    Discharge Summary     Patient ID: Elizabeth Brandt  :  1945   MRN: [id number]     ACCOUNT:  [de-identified]   Patient's PCP: No primary care provider on file. Admit Date: 2023   Discharge Date: 2023 ***    Length of Stay: 14  Code Status:  Full Code  Admitting Physician: Sarah Keith MD  Discharge Physician: Mahamed Sousa DO     Active Discharge Diagnoses:     Hospital Problem Lists:  Principal Problem:    COVID  Active Problems:    Acute respiratory failure with hypoxia (HCC)    COPD exacerbation (HCC)    Elevated troponin    Tobacco abuse  Resolved Problems:    * No resolved hospital problems. *      Admission Condition:  {condition:58727}     Discharged Condition: {condition:53523}    Hospital Stay:     Hospital Course:  Elizabeth Brandt is a 78 y.o. male who was admitted for the management of  *** COVID , presented to ER with ***Fatigue (Pt was found on his knees outside of assisted living.  Pt was outside smoking and slid out of a chair. )          Significant therapeutic interventions: ***    Significant Diagnostic Studies:   Labs / Micro:  {UWXK:[id number]}   ***  Radiology:  XR CHEST (SINGLE VIEW FRONTAL)    Result Date: 1/25/2023  Mild increased hazy density at the lung bases, which may be related to atelectasis, edema, or atypical/viral pneumonia. Consultations:    Consults:     Final Specialist Recommendations/Findings:   IP CONSULT TO HOSPITALIST  IP CONSULT TO CARDIOLOGY  IP CONSULT TO PHARMACY  IP CONSULT TO PULMONOLOGY      The patient was seen and examined on day of discharge and this discharge summary is in conjunction with any daily progress note from day of discharge. Discharge plan:     Disposition: {DISPOSITIONS:113180369}    Physician Follow Up:   ***  Nidhi Tony MD  [address]  [address]  [phone]    Follow up in 2 week(s)         Requiring Further Evaluation/Follow Up POST HOSPITALIZATION/Incidental Findings: ***    Diet: {diet:85694}    Activity: As tolerated***    Instructions to Patient: ***    Discharge Medications:      Medication List        START taking these medications      arformoterol tartrate 15 MCG/2ML Nebu  Commonly known as: BROVANA  Take 2 mLs by nebulization in the morning and 2 mLs in the evening. aspirin 81 MG chewable tablet  Take 1 tablet by mouth daily     budesonide 0.5 MG/2ML nebulizer suspension  Commonly known as: PULMICORT  Take 2 mLs by nebulization in the morning and 2 mLs in the evening.      bumetanide 1 MG tablet  Commonly known as: BUMEX  Take 1 tablet by mouth daily     dextromethorphan-guaiFENesin  MG per extended release tablet  Commonly known as: MUCINEX DM  Take 2 tablets by mouth 2 times daily for 10 days     metoprolol succinate 25 MG extended release tablet  Commonly known as: TOPROL XL  Take 1 tablet by mouth daily     nicotine 14 MG/24HR  Commonly known as: NICODERM CQ  Place 1 patch onto the skin daily     pantoprazole 40 MG tablet  Commonly known as: PROTONIX  Take 1 tablet by mouth every morning (before breakfast)  Start taking on: January 28, 2023     polyethylene glycol 17 g packet  Commonly known as: GLYCOLAX  Take 17 g by mouth daily as needed for Constipation     predniSONE 10 MG tablet  Commonly known as: DELTASONE  Take 20 mg twice a day for 3 days, take 20 mg in the morning and 10 mg at night for 3 days, take 10 mg twice a day for 3 days, take 10 mg only in the morning for 3 days, then stop     Vitamin D (Ergocalciferol) 90941 units Caps  Take 50,000 Units by mouth once a week            CHANGE how you take these medications      divalproex 500 MG extended release tablet  Commonly known as: DEPAKOTE ER  Take 2 tablets by mouth daily  What changed: when to take this     * SEROquel 400 MG tablet  Generic drug: QUEtiapine  What changed: Another medication with the same name was changed. Make sure you understand how and when to take each. * QUEtiapine 150 MG Tb24 extended release tablet  Commonly known as: SEROQUEL XR  Take 1 tablet by mouth daily  What changed: when to take this           * This list has 2 medication(s) that are the same as other medications prescribed for you. Read the directions carefully, and ask your doctor or other care provider to review them with you. CONTINUE taking these medications      lamoTRIgine 100 MG tablet  Commonly known as: LAMICTAL            ASK your doctor about these medications      nirmatrelvir/ritonavir 20 x 150 MG & 10 x 100MG Tbpk  Commonly known as: Paxlovid (300/100)  Take 3 tablets (two 150 mg nirmatrelvir and one 100 mg ritonavir tablets) by mouth every 12 hours for 5 days. Ask about: Should I take this medication?                Where to Get Your Medications        These medications were sent to [address] [place] [address]., [address]  [address]      Phone: [phone]   arformoterol tartrate 15 MCG/2ML Nebu  aspirin 81 MG chewable tablet  budesonide 0.5 MG/2ML nebulizer suspension  bumetanide 1 MG tablet  dextromethorphan-guaiFENesin  MG per extended release tablet  metoprolol succinate 25 MG extended release tablet  nicotine 14 MG/24HR  nirmatrelvir/ritonavir 20 x 150 MG & 10 x 100MG Tbpk  pantoprazole 40 MG tablet  polyethylene glycol 17 g packet  predniSONE 10 MG tablet  Vitamin D (Ergocalciferol) 45511 units Caps         No discharge procedures on file. Time Spent on discharge is  40 mins in patient examination, evaluation, counseling as well as medication reconciliation, prescriptions for required medications, discharge plan and follow up. Electronically signed by   Jaziel Alex DO  1/27/2023  1:02 PM      Thank you Dr. Hines [facility] primary care provider on file. for the opportunity to be involved in this patient's care. night for 3 days, take 10 mg twice a day for 3 days, take 10 mg only in the morning for 3 days, then stop     Vitamin D (Ergocalciferol) 83872 units Caps  Take 50,000 Units by mouth once a week            CHANGE how you take these medications      divalproex 500 MG extended release tablet  Commonly known as: DEPAKOTE ER  Take 2 tablets by mouth daily  What changed: when to take this     * SEROquel 400 MG tablet  Generic drug: QUEtiapine  What changed: Another medication with the same name was changed. Make sure you understand how and when to take each. * QUEtiapine 150 MG Tb24 extended release tablet  Commonly known as: SEROQUEL XR  Take 1 tablet by mouth daily  What changed: when to take this           * This list has 2 medication(s) that are the same as other medications prescribed for you. Read the directions carefully, and ask your doctor or other care provider to review them with you. CONTINUE taking these medications      lamoTRIgine 100 MG tablet  Commonly known as: LAMICTAL            ASK your doctor about these medications      nirmatrelvir/ritonavir 20 x 150 MG & 10 x 100MG Tbpk  Commonly known as: Paxlovid (300/100)  Take 3 tablets (two 150 mg nirmatrelvir and one 100 mg ritonavir tablets) by mouth every 12 hours for 5 days. Ask about: Should I take this medication?                Where to Get Your Medications        These medications were sent to [facility] [address]  [address]      Phone: [phone]   arformoterol tartrate 15 MCG/2ML Nebu  aspirin 81 MG chewable tablet  budesonide 0.5 MG/2ML nebulizer suspension  bumetanide 1 MG tablet  dextromethorphan-guaiFENesin  MG per extended release tablet  metoprolol succinate 25 MG extended release tablet  nicotine 14 MG/24HR  nirmatrelvir/ritonavir 20 x 150 MG & 10 x 100MG Tbpk  pantoprazole 40 MG tablet  polyethylene glycol 17 g packet  predniSONE 10 MG tablet  Vitamin D (Ergocalciferol) 82282 units Caps         No discharge procedures on file. Time Spent on discharge is  40 mins in patient examination, evaluation, counseling as well as medication reconciliation, prescriptions for required medications, discharge plan and follow up. Electronically signed by   Gaston Yepez DO  1/27/2023  1:02 PM      Thank you Dr. David Rudd primary care provider on file. for the opportunity to be involved in this patient's care.

## 2023-01-27 NOTE — PLAN OF CARE
Problem: Respiratory - Adult  Goal: Achieves optimal ventilation and oxygenation  1/27/2023 0750 by Dodie Loaiza RCP  Outcome: Progressing  1/27/2023 0436 by Timbo Yee RN  Outcome: Progressing  1/27/2023 0406 by Kemar Samuels RCP  Outcome: Progressing  Goal: Able to breathe comfortably  1/27/2023 0750 by Dodie Loaiza, RCP  Outcome: Progressing  1/27/2023 0406 by Kemar Samuels RCP  Outcome: Progressing  Goal: Adequate oxygenation  Description: Adequate oxygenation  1/27/2023 0750 by Dodie Loaiza RCP  Outcome: Progressing  1/27/2023 0406 by Kemar Samuels RCP  Outcome: Progressing  Goal: Patient's breath sounds will be clear and equal  1/27/2023 0750 by Dodie Loaiza RCP  Outcome: Progressing  1/27/2023 0406 by Kemar Samuels RCP  Outcome: Progressing

## 2023-01-27 NOTE — PROGRESS NOTES
Physical Therapy  DATE: 2023    NAME: Bridgett Alegre  MRN: [id number]   : 1945    Patient not seen this date for Physical Therapy due to:      [] Cancel by RN or physician due to:    [] Hemodialysis    [] Critical Lab Value Level     [] Blood transfusion in progress    [] Acute or unstable cardiovascular status   _MAP < 55 or more than >115  _HR < 40 or > 130    [] Acute or unstable pulmonary status   -FiO2 > 60%   _RR < 5 or >40    _O2 sats < 85%    [] Strict Bedrest    [] Off Unit for surgery or procedure    [] Off Unit for testing       [] Pending imaging to R/O fracture    [x] Refusal by Patient (Patient refused PT treatment reporting \"I already told the other lady no, I don't want to do therapy today)      [] Other      [] PT being discontinued at this time. Patient independent. No further needs. [] PT being discontinued at this time as the patient has been transferred to hospice care. No further needs.       La Friday, PTA

## 2023-01-27 NOTE — RT PROTOCOL NOTE
RT Nebulizer Bronchodilator Protocol Note    There is a bronchodilator order in the chart from a provider indicating to follow the RT Bronchodilator Protocol and there is an Initiate RT Bronchodilator Protocol order as well (see protocol at bottom of note). CXR Findings:  No results found. The findings from the last RT Protocol Assessment were as follows:  Smoking: Chronic pulmonary disease  Respiratory Pattern: Mild dyspnea at rest, irregular pattern, or RR 21-25 bpm  Breath Sounds: Slightly diminished and/or crackles  Cough: Strong, spontaneous, non-productive  Indication for Bronchodilator Therapy: Decreased or absent breath sounds  Bronchodilator Assessment Score: 8    Aerosolized bronchodilator medication orders have been revised according to the RT Nebulizer Bronchodilator Protocol below. Respiratory Therapist to perform RT Therapy Protocol Assessment initially then follow the protocol. Repeat RT Therapy Protocol Assessment PRN for score 0-3 or on second treatment, BID, and PRN for scores above 3. No Indications - adjust the frequency to every 6 hours PRN wheezing or bronchospasm, if no treatments needed after 48 hours then discontinue using Per Protocol order mode. If indication present, adjust the RT bronchodilator orders based on the Bronchodilator Assessment Score as indicated below. If a patient is on this medication at home then do not decrease Frequency below that used at home. 0-3 - enter or revise RT bronchodilator order(s) to equivalent RT Bronchodilator order with Frequency of every 4 hours PRN for wheezing or increased work of breathing using Per Protocol order mode. 4-6 - enter or revise RT Bronchodilator order(s) to two equivalent RT bronchodilator orders with one order with BID Frequency and one order with Frequency of every 4 hours PRN wheezing or increased work of breathing using Per Protocol order mode.          7-10 - enter or revise RT Bronchodilator order(s) to two equivalent RT bronchodilator orders with one order with TID Frequency and one order with Frequency of every 4 hours PRN wheezing or increased work of breathing using Per Protocol order mode. 11-13 - enter or revise RT Bronchodilator order(s) to one equivalent RT bronchodilator order with QID Frequency and an Albuterol order with Frequency of every 4 hours PRN wheezing or increased work of breathing using Per Protocol order mode. Greater than 13 - enter or revise RT Bronchodilator order(s) to one equivalent RT bronchodilator order with every 4 hours Frequency and an Albuterol order with Frequency of every 2 hours PRN wheezing or increased work of breathing using Per Protocol order mode. RT to enter RT Home Evaluation for COPD & MDI Assessment order using Per Protocol order mode.     Electronically signed by Dagoberto Patino RCP on 1/26/2023 at 8:27 PM

## 2023-01-27 NOTE — FLOWSHEET NOTE
01/27/23 0915   Oxygen Therapy   SpO2 90 %   O2 Device Nasal cannula   O2 Flow Rate (L/min) (S)  6 L/min       [place]ar[place] NP for Pulm updated of increase of Oxygen need  Patient's needs have been fluctuating   Okay discharge is facility can accept     Majo Wilburn from [place] work checked with discharge facility, [facility].  They can accept patient on 6L NC

## 2023-01-27 NOTE — RT PROTOCOL NOTE
RT Inhaler-Nebulizer Bronchodilator Protocol Note    There is a bronchodilator order in the chart from a provider indicating to follow the RT Bronchodilator Protocol and there is an Initiate RT Inhaler-Nebulizer Bronchodilator Protocol order as well (see protocol at bottom of note). CXR Findings:  No results found. The findings from the last RT Protocol Assessment were as follows:   History Pulmonary Disease: Chronic pulmonary disease  Respiratory Pattern: Dyspnea on exertion or RR 21-25 bpm  Breath Sounds: Severe inspiratory and expiratory wheezing or severely diminished  Cough: Strong, productive  Indication for Bronchodilator Therapy: Decreased or absent breath sounds  Bronchodilator Assessment Score: 13    Aerosolized bronchodilator medication orders have been revised according to the RT Inhaler-Nebulizer Bronchodilator Protocol below. Respiratory Therapist to perform RT Therapy Protocol Assessment initially then follow the protocol. Repeat RT Therapy Protocol Assessment PRN for score 0-3 or on second treatment, BID, and PRN for scores above 3. No Indications - adjust the frequency to every 6 hours PRN wheezing or bronchospasm, if no treatments needed after 48 hours then discontinue using Per Protocol order mode. If indication present, adjust the RT bronchodilator orders based on the Bronchodilator Assessment Score as indicated below. Use Inhaler orders unless patient has one or more of the following: on home nebulizer, not able to hold breath for 10 seconds, is not alert and oriented, cannot activate and use MDI correctly, or respiratory rate 25 breaths per minute or more, then use the equivalent nebulizer order(s) with same Frequency and PRN reasons based on the score. If a patient is on this medication at home then do not decrease Frequency below that used at home.     0-3 - enter or revise RT bronchodilator order(s) to equivalent RT Bronchodilator order with Frequency of every 4 hours PRN for wheezing or increased work of breathing using Per Protocol order mode. 4-6 - enter or revise RT Bronchodilator order(s) to two equivalent RT bronchodilator orders with one order with BID Frequency and one order with Frequency of every 4 hours PRN wheezing or increased work of breathing using Per Protocol order mode. 7-10 - enter or revise RT Bronchodilator order(s) to two equivalent RT bronchodilator orders with one order with TID Frequency and one order with Frequency of every 4 hours PRN wheezing or increased work of breathing using Per Protocol order mode. 11-13 - enter or revise RT Bronchodilator order(s) to one equivalent RT bronchodilator order with QID Frequency and an Albuterol order with Frequency of every 4 hours PRN wheezing or increased work of breathing using Per Protocol order mode. Greater than 13 - enter or revise RT Bronchodilator order(s) to one equivalent RT bronchodilator order with every 4 hours Frequency and an Albuterol order with Frequency of every 2 hours PRN wheezing or increased work of breathing using Per Protocol order mode. RT to enter RT Home Evaluation for COPD & MDI Assessment order using Per Protocol order mode.     Electronically signed by Janett Broderick RCP on 1/27/2023 at 7:54 AM

## 2023-01-27 NOTE — PLAN OF CARE
Problem: Discharge Planning  Goal: Discharge to home or other facility with appropriate resources  1/27/2023 1709 by Jesse Mercado RN  Outcome: Progressing     Problem: Skin/Tissue Integrity  Goal: Absence of new skin breakdown  Description: 1. Monitor for areas of redness and/or skin breakdown  2. Assess vascular access sites hourly  3. Every 4-6 hours minimum:  Change oxygen saturation probe site  4. Every 4-6 hours:  If on nasal continuous positive airway pressure, respiratory therapy assess nares and determine need for appliance change or resting period.   1/27/2023 1709 by Jesse Mercado RN  Outcome: Progressing     Problem: ABCDS Injury Assessment  Goal: Absence of physical injury  1/27/2023 1709 by Jesse Mercado RN  Outcome: Progressing     Problem: Safety - Adult  Goal: Free from fall injury  1/27/2023 1709 by Jesse Mercado RN  Outcome: Progressing     Problem: Respiratory - Adult  Goal: Achieves optimal ventilation and oxygenation  1/27/2023 1709 by Jesse Mercado RN  Outcome: Progressing  Patient was placed on 6L NC to maintain O2 90% or greater       Problem: Pain  Goal: Verbalizes/displays adequate comfort level or baseline comfort level  1/27/2023 1709 by Jesse Mercado RN  Outcome: Progressing     Problem: Infection - Adult  Goal: Absence of infection at discharge  1/27/2023 1709 by Jesse Mercado RN  Outcome: Progressing     Problem: Nutrition Deficit:  Goal: Optimize nutritional status  1/27/2023 1709 by Jesse Mercado RN  Outcome: Progressing

## 2023-01-27 NOTE — CARE COORDINATION
Social work: Anticipate dc today for [facility]- transport scheduled for 4:00PM; await auth. Spoke to [employer] and confirmed precert is still pending at this time. ZHANNA done.

## 2023-01-27 NOTE — CARE COORDINATION
Social work: SW confirmed with Shantal Lawrence and Cash Nichole that patient is approved for admission to SNF, they received a faxed confirmation from [facility]. .   Patient to dc to [address] via [facility]  at [facility].  # for RN report: [phone]. Completed THERON faxed to [phone]. Informed RN, pt, and facility of dc time, agreeable to plan. HENS completed.

## 2023-01-27 NOTE — PROGRESS NOTES
DATE: 2023    NAME: Nahun Florence  MRN: [id number]   : 1945    Patient not seen this date for Occupational Therapy due to:      [] Cancel by RN or physician due to:    [] Hemodialysis    [] Critical Lab Value Level     [] Blood transfusion in progress    [] Acute or unstable cardiovascular status   _MAP < 55 or more than >115  _HR < 40 or > 130    [] Acute or unstable pulmonary status   -FiO2 > 60%   _RR < 5 or >40    _O2 sats < 85%    [] Strict Bedrest    [] Off Unit for surgery or procedure    [] Off Unit for testing       [] Pending imaging to R/O fracture    [x] Refusal by Patient : Pt. Declined treatment d/t feeling too fatigued to participate. Pt. Educated on the importance of mobility and pt. Cont. To decline session. [] Other      [] OT being discontinued at this time. Patient independent. No further needs. [] OT being discontinued at this time as the patient has been transferred to hospice care. No further needs.       Shirley Carmen, COLIN

## 2023-01-27 NOTE — PLAN OF CARE
Pt still on 3L NC and wore Bipap for a couple hours during this shift. Tolerating both well with oxygen stable. Pulse ox and tele ox both on pt.      Problem: Respiratory - Adult  Goal: Achieves optimal ventilation and oxygenation  1/27/2023 0436 by Annita Cole RN  Outcome: Progressing    Problem: Safety - Adult  Goal: Free from fall injury  1/27/2023 0436 by Annita Cole RN  Outcome: Progressing    Problem: Discharge Planning  Goal: Discharge to home or other facility with appropriate resources  1/27/2023 0436 by Annita Cole RN  Outcome: Progressing

## 2023-01-30 NOTE — ADT AUTH CERT
Viral Illness, Acute - Care Day 14 (1/26/2023) by Andres Sesay RN       Review Entered Review Status   1/30/2023 1359 Completed      Criteria Review      Care Day: 14 Care Date: 1/26/2023 Level of Care: Intermediate Care    Guideline Day 2    Level Of Care    (X) Floor    Clinical Status    ( ) * Hypotension absent    1/30/2023 1:59 PM EST by Jt Rah      140/81>> 94/48    ( ) * No requirement for mechanical ventilation    1/30/2023 1:59 PM EST by Blaiseloinés Monreal      pt on  and off of bipap    ( ) * Oxygenation at baseline or improved    (X) * Mental status at baseline    1/30/2023 1:59 PM EST by Ijeoma Canseco    Activity    (X) Advance activity as tolerated    1/30/2023 1:59 PM EST by Grayloinés Rah      cont same    Routes    (X) * Oral hydration    1/30/2023 1:59 PM EST by Graylon Rah      cont same    (X) Oral or IV medications    1/30/2023 1:59 PM EST by Graylon Rah      cont current meds plus  iv bumex 1 mg x2    (X) Usual diet    1/30/2023 1:59 PM EST by Graylon Rah      cont same    Interventions    (X) Possible Isolation    1/30/2023 1:59 PM EST by Graylon Rah      cont same    (X) Pulse oximetry    1/30/2023 1:59 PM EST by Graylon Rah      cont    (X) Possible oxygen    1/30/2023 1:59 PM EST by Blaiseloinés Rah      on and off of bipap fio2 @ 35   or O2 @ 4 L nc    Medications    (X) Possible corticosteroid    1/30/2023 1:59 PM EST by Graylon Rah      cont solumedrol    (X) Possible DVT prophylaxis    1/30/2023 1:59 PM EST by Graylon Rah      cont lovenox    * Milestone   Additional Notes   DATE: 1/26/2023      RELEVANT BASELINES: (lab values, vitals, o2 amount/delivery, etc.)   RA      PERTINENT UPDATES:   Pt on and off of bipap, or O2 @ 4 L nc      VITALS:   97.9 22 52 94/48 95% 4 L nc   91% bipap fio2 @ 35%      ABNL/PERTINENT LABS/RADIOLOGY/DIAGNOSTIC STUDIES:      CO2: 34 (H)   BUN,BUNPL: 29 (H)   Bun/Cre Ratio: 34 (H)   Anion Gap: 7 (L)      Glucose, Random: 105 (H)   CALCIUM, SERUM, [id number]: 8.3 (L)   Albumin: 2.8 (L)      Per IM (NP)   PHYSICAL EXAM:   Lungs: Fine rales noted throughout. Decreased air movement in the bases      MD CONSULTS/ASSESSMENT AND PLAN:       Plan:          Acute respiratory failure secondary to COVID-19 with a known history of COPD   Transition to prednisone taper Dosepak    DuoNeb/bronchodilators   Completed full course of Paxlovid on 1/18/23   Add Bumex and continue as an outpatient   Continue supplemental oxygen with nasal cannula, heated high flow, BiPAP at night   Will require outpatient PFTs and sleep study   Discharge planning to SNF with BiPAP at night capabilities   ============================================================      Per Pulmo (NP)   Impression:   Acute hypoxic respiratory failure   Acute exacerbation of COPD/emphysema   Active smoking, 30-pack-year history   COVID-19   Right basilar atelectasis versus pneumonia   Indeterminate 9 mm lingular nodule   Possible obstructive sleep apnea       Recommendations:   Oxygen via nasal cannula, keep SPO2 90% or greater    BIPAP support while asleep   Incentive spirometry every hour while awake    Acapella   Budesonide and Brovana via nebulizer every 12 hours    Discontinue IV Solu-Medrol   Start prednisone taper   Albuterol and Ipratropium Q 4 hours and prn   Mucinex DM   Diuresis per primary team   Status post course of paxlovid   DVT prophylaxis with low molecular weight heparin   GI prophylaxis with protonix   PT/OT    Discharge planning once pre-CERT obtained. No objection from pulmonary standpoint with outpatient follow-up in 1 to 2 weeks. Sleep study as an outpatient   PFTs as an outpatient   PET scan as an outpatient   Patient is being seen in collaboration with Dr. Ericka Botello. Final/further recommendations and plan to follow once evaluated by collaborating physician.    Will follow with you      MEDICATIONS:   As noted in above criteria      ORDERS:   No new orders         PT/OT/SLP/CM ASSESSMENT OR NOTES:   Per [facility]   Social work:  Spoke to patient and dtr, confirmed snf preference is [facility] d/t RT on site. Precert pending for [facility]. HENS done                1/21/2023-1/24/2023 by Chinyere Brar RN       Review Entered Review Status   1/30/2023 1318 In Primary      Criteria Review   DATE: 1/21/2023     RELEVANT BASELINES: (lab values, vitals, o2 amount/delivery, etc.)  RA     PERTINENT UPDATES:  Cont isolation  Pt continues to need supplemental O2 via HFNC  Cont nebs, iv solumedrol     VITALS:  98.2 24 63 139/59 91%  10 L HFNC  rr 16-24     ABNL/PERTINENT LABS/RADIOLOGY/DIAGNOSTIC STUDIES:     POC Glucose: 175 (H)     BUN,BUNPL: 34 (H)  Bun/Cre Ratio: 43 (H)  Glucose, Random: 167 (H)     Per IM (NP)  PHYSICAL EXAM:  Lungs: Rhonchi auscultated to bilateral bases,fine rales note throughout, decreased air exchange.   Normal effort at rest but does get dyspneic with exertion      MD CONSULTS/ASSESSMENT AND PLAN:  Assessment:         Hospital Problems               Last Modified POA     * (Principal) COVID 1/13/2023 Yes     Acute respiratory failure with hypoxia ([facility] [address] 1/13/2023 Yes     COPD exacerbation ([facility] [address] 1/13/2023 Yes     Elevated troponin 1/13/2023 Yes     Tobacco abuse 1/13/2023 Yes         Plan:         Completed full course of Paxlovid on 1/18/23  Continue aerosols and bronchodilators  S/p lasix x 1 dose on 1/18/23 with little change  Monitor labs, replace electrolytes as needed  IV steroids resumed per pulmonology, appreciate recommendations  Continue DVT prophylaxis  Continue supplemental oxygen, wean as able  Incentive spirometer every hour while awake, Acapella  Add MetaNeb to help loosen mucus/secretions   Add Mucinex DM twice daily  Start protonix daily  Monitor and control blood pressure  Continue PT/OT  Continue droplet precautions through 1/22/23  Discharge planning to LTAC due to increased oxygen requirements, patient understands and is agreeable and plan has been discussed with patient's daughter. Await pre-CERT  Plan discussed with patient and staff  ========================================================================================================================     Per Pulmo  Impression:  Acute hypoxic respiratory failure  Acute exacerbation of COPD/emphysema  Active smoking, 30-pack-year history  COVID-19  Right basilar atelectasis versus pneumonia  Indeterminate 9 mm lingular nodule     Recommendations:  Oxygen via high flow nasal cannula, keep SPO2 90% or greater   Incentive spirometry every hour while awake   Acapella  Budesonide and Brovana via nebulizer every 12 hours   IV solu-medrol 40 mg every 6 hours, no taper today  Albuterol and Ipratropium Q 4 hours and prn  Status post course of paxlovid  DVT prophylaxis with low molecular weight heparin  PT/OT as tolerated  PFTs as an outpatient  PET scan as an outpatient  LTAC discharge planning once pre-CERT obtained  Will follow with you        MEDICATIONS:  Cont current meds  plus     pantoprazole (PROTONIX) tablet 40 mg  Dose: 40 mg  Freq: DAILY BEFORE BREAKFAST Route: PO      dextromethorphan-guaiFENesin (MUCINEX DM)  MG per extended release tablet 2 tablet  Dose: 2 tablet  Freq: 2 TIMES DAILY Route: PO      ORDERS:  MetaNe   EVERY 4 HOURS WHILE AWAKE   ========================================================================================================================     DATE: 1/22/2023     RELEVANT BASELINES: (lab values, vitals, o2 amount/delivery, etc.)  RA     PERTINENT UPDATES:  Pt continues to need supplemental O2 via HFNC  Cont isolation  Cont nebulizers, iv solumedrol      VITALS:  98.1 58 16 109/57 92% 10 L HFNC     ABNL/PERTINENT LABS/RADIOLOGY/DIAGNOSTIC STUDIES:  None for this care day     Per IM (NP)  PHYSICAL EXAM:  Lungs: Rhonchi auscultated to bilateral bases,fine rales note throughout, decreased air exchange.   Normal effort at rest but does get dyspneic with exertion      MD CONSULTS/ASSESSMENT AND PLAN:     Plan:         Completed full course of Paxlovid on 1/18/23  Continue aerosols and bronchodilators  S/p lasix x 1 dose on 1/18/23 with little change  Monitor labs, replace electrolytes as needed  IV steroids resumed per pulmonology, appreciate recommendations  Continue DVT prophylaxis  Continue supplemental oxygen, wean as able  Incentive spirometer every hour while awake, Acapella and MetaNeb  Continue Mucinex DM twice daily  Continue PPI  Monitor and control blood pressure-fairly well controlled on current meds  Continue PT/OT  Continue droplet precautions through 1/22/23  Discharge planning to LTAC due to increased oxygen requirements, patient understands and is agreeable and plan has been discussed with patient's daughter.   Await pre-CERT  =====================================================================================================================     Per Pulmo  Impression:  Acute hypoxic respiratory failure  Acute exacerbation of COPD/emphysema  Active smoking, 30-pack-year history  COVID-19  Right basilar atelectasis versus pneumonia  Indeterminate 9 mm lingular nodule     Recommendations:  Oxygen via high flow nasal cannula, keep SPO2 90% or greater   Incentive spirometry every hour while awake   Acapella  Budesonide and Brovana via nebulizer every 12 hours   IV solu-medrol 40 mg every 6 hours  Albuterol and Ipratropium Q 4 hours and prn  Mucinex DM  Status post course of paxlovid  DVT prophylaxis with low molecular weight heparin  GI prophylaxis with protonix  PT/OT as tolerated  PFTs as an outpatient  PET scan as an outpatient  LTAC discharge planning once pre-CERT obtained  Will follow with you     MEDICATIONS:  cont current meds plus     ORDERS:  No new orders  =======================================================================================================================     DATE: 1/23/2023        RELEVANT BASELINES: (lab values, vitals, o2 amount/delivery, etc.)  RA     PERTINENT UPDATES:  Pt continues to need supplemental O2 via HFNC     VITALS:  97.5 22 51 128/66 94% 8 L HFNC     ABNL/PERTINENT LABS/RADIOLOGY/DIAGNOSTIC STUDIES:  None done for this care day     Per IM(NP)  PHYSICAL EXAM:  Lungs: Rhonchi auscultated to bilateral bases improved from 3 days ago,fine rales note throughout again today, decreased air exchange. Normal effort at rest but does get dyspneic with exertion and this is unchanged      MD CONSULTS/ASSESSMENT AND PLAN:     Acute respiratory failure secondary to COVID-19 with a known history of COPD  Continue IV steroids at pulmonology recommendation   DuoNeb/bronchodilators  Completed full course of Paxlovid on 1/18/23  S/p lasix x 1 dose on 1/18/23  Continue supplemental oxygen with nasal cannula, heated high flow, BiPAP at night  Discharge planning now to [place] [id number] with increased oxygen requirements, patient understands and is agreeable and plan has been discussed with patient's daughter.   Await pre-CERT   ======================================================================================================================     Per Pulmo (NP)  Impression:  Acute hypoxic respiratory failure  Acute exacerbation of COPD/emphysema  Active smoking, 30-pack-year history  COVID-19  Right basilar atelectasis versus pneumonia  Indeterminate 9 mm lingular nodule     Recommendations:  Oxygen via high flow nasal cannula, keep SPO2 90% or greater   Incentive spirometry every hour while awake   Acapella  Budesonide and Brovana via nebulizer every 12 hours   IV solu-medrol 40 mg every 6 hours  Albuterol and Ipratropium Q 4 hours and prn  Mucinex DM  Status post course of paxlovid  DVT prophylaxis with low molecular weight heparin  GI prophylaxis with protonix  PT/OT as tolerated  PFTs as an outpatient  PET scan as an outpatient  LTAC discharge planning once pre-CERT obtained  Patient is being seen in collaboration with  Gloria Villaseñor. Final/further recommendations and plan to follow once evaluated by collaborating physician. MEDICATIONS:  cont current meds      ORDERS:  No new orders     PT/OT/SLP/CM ASSESSMENT OR NOTES:  Per   Discharge planning      COVID +. . Patient maintains at 8 L HF. Pulmonary is following. Has had full course of Paxlovid. Fred Kerry from [place] following. .pre cert pending  =======================================================================================================================     DATE: 1/24/2022     RELEVANT BASELINES: (lab values, vitals, o2 amount/delivery, etc.)  RA     PERTINENT UPDATES:  Pt continues to need supplemental O2 to maintain SPO@ > 92%  Cont nebulizers, iv solumedrol     @ 2353  O2 increased to 15 L HFNC  91% 15 L HFNC     VITALS:   98.1 18 42 102/55 92% 7 L HFNC     ABNL/PERTINENT LABS/RADIOLOGY/DIAGNOSTIC STUDIES:  None for this care day     Per IM (NP)  PHYSICAL EXAM:  Lungs: Rhonchi auscultated to bilateral bases improved from 3 days ago,fine rales note throughout again today, decreased air exchange. Normal effort at rest but does get dyspneic with exertion and this is unchanged      MD CONSULTS/ASSESSMENT AND PLAN:     Plan:         Acute respiratory failure secondary to COVID-19 with a known history of COPD  Continue IV steroids at pulmonology recommendation   DuoNeb/bronchodilators  Completed full course of Paxlovid on 1/18/23  S/p lasix x 1 dose on 1/18/23  Continue supplemental oxygen with nasal cannula, heated high flow, BiPAP at night  Discharge planning now to [place] [id number] with increased oxygen requirements, patient understands and is agreeable and plan has been discussed with patient's daughter.   Await pre-CERT  ========================================================================================================================     Per pulmo (NP)     Impression:  Acute hypoxic respiratory failure  Acute exacerbation of COPD/emphysema  Active smoking, 30-pack-year history  COVID-19  Right basilar atelectasis versus pneumonia  Indeterminate 9 mm lingular nodule     Recommendations:  Oxygen via nasal cannula, keep SPO2 90% or greater   Incentive spirometry every hour while awake   Acapella  Budesonide and Brovana via nebulizer every 12 hours   IV solu-medrol 40 mg every 6 hours decrease dose and frequency  Albuterol and Ipratropium Q 4 hours and prn  Mucinex DM  Status post course of paxlovid  DVT prophylaxis with low molecular weight heparin  GI prophylaxis with protonix  PT/OT as tolerated  PFTs as an outpatient  PET scan as an outpatient  Discharge planning once pre-CERT obtained. No objection from pulmonary standpoint with outpatient follow-up in 1 to 2 weeks. Patient is being seen in collaboration with Dr. Millie Terry. Final/further recommendations and plan to follow once evaluated by collaborating physician. MEDICATIONS:  Iv solumedrol changed to 30 mg q8 from 40 mg q6     ORDERS:  No new orders           Viral Illness, Acute - Care Day 13 (1/25/2023) by Joe Stevenson RN       Review Entered Review Status   1/26/2023 1357 Completed      Criteria Review      Care Day: 13 Care Date: 1/25/2023 Level of Care: Intermediate Care    Guideline Day 2    Level Of Care    (X) Floor    1/26/2023 1:57 PM EST by Anel Day      Intermediate - PROGRESSIVE CARE    Clinical Status    (X) * Hypotension absent    1/26/2023 1:57 PM EST by Anel Day      BP: 90/46    (X) * No requirement for mechanical ventilation    1/26/2023 1:57 PM EST by Anel Day      not indicated    ( ) * Oxygenation at baseline or improved    1/26/2023 1:57 PM EST by Anel Day      SpO2: 91% 15L/m HFNC, 90% 4L/m NC, 96% BiPAP  FiO2: 35%    (X) * Mental status at baseline    1/26/2023 1:57 PM EST by Anel Day      alert and cooperative with exam    Activity    ( ) Advance activity as tolerated    1/26/2023 1:57 PM EST by Kirstin [address], [address] 18/24, OT 16/24.  limited by decreased oxygenation    Routes    (X) * Oral hydration    1/26/2023 1:57 PM EST by Abigail Farfan      per diet    (X) Oral or IV medications    1/26/2023 1:57 PM EST by Abigail Farfan      LASIX 40 mg Freq: ONCE Route: IV  MUCINEX  MG 2 tablet Freq: 2 TIMES DAILY Route: PO  PROTONIX 40 mg Freq: DAILY BEFORE BREAKFAST Route: PO    (X) Usual diet    1/26/2023 1:57 PM EST by Tera Mendoza;  Regular    Interventions    (X) Possible Isolation    1/26/2023 1:57 PM EST by Abigail Farfan      Droplet Plus Isolation: COVID-19    (X) Pulse oximetry    (X) Possible oxygen    Medications    (X) Possible corticosteroid    1/26/2023 1:57 PM EST by Abigail Farfan      SOLU-MEDROL 30 mg Freq: EVERY 8 HOURS Route: IV    (X) Possible DVT prophylaxis    1/26/2023 1:57 PM EST by Abigail Farfan      LOVENOX 40 mg Freq: DAILY Route: SC       Definitions for Care Day 13    Hypotension absent    (X) Hypotension absent, as indicated by  1 or more  of the following  (1) (2) (3) (4):       (X) SBP greater than or equal to 90 mm Hg and without recent decrease greater than 40 mm Hg from       baseline in adult or child 10 years or older       Isolation    (X) Isolation, as indicated by  1 or more  of the following  [A] [B] (2):       (X) Droplet precautions (known or suspected infection with pathogen transmittable by large particle       droplets from respiratory secretions), as indicated by  1 or more  of the following  [D]:          (X) Other pathogenic agent transmittable by droplets       * Milestone   Additional Notes   DATE: 01/25/2023         Pertinent Updates:   - Droplet Plus Isolation: COVID-19   - desats to 87- 88% when sleeping, currently on BiPAP   - back on high intensity steroids   - on IV Lasix and Sol-Medrol         Vitals:    BP: 90/46   AK: 75   RR: 19 21 20   T: 98.2F   SpO2: 91% 15L/m HFNC, 90% 4L/m NC, 96% BiPAP   FiO2: 35%   Pain Scale:          Abnl/Pertinent Labs/Radiology/Diagnostic Studies:   Pro-BNP: 930 (H)   XR CHEST (SINGLE VIEW FRONTAL): Mild increased hazy density at the lung bases, which may be related to atelectasis, edema, or atypical/viral pneumonia. Physical Exam:   Lungs: Positive for cough, dyspnea on exertion, shortness of breath. Rhonchi auscultated to bilateral bases improved from 3 days ago,fine rales note throughout again today, decreased air exchange. Normal effort at rest but does get dyspneic with exertion and this is unchanged   Heart: regular rate and rhythm, S1, S2 normal, no gallop   Neuro/Psych: alert and cooperative with exam         MD Consults/Assessments & Plans:   IM:   **ASSESSMENT and PLAN:**   1. Acute respiratory failure secondary to COVID-19 with a known history of COPD    -Continue IV steroids at pulmonology recommendation    -DuoNeb/bronchodilators    -Completed full course of Paxlovid on 1/18/23    -S/p lasix x 1 dose on 1/18/23    -Continue supplemental oxygen with nasal cannula, heated high flow, BiPAP at night   2. Discharge planning now to LTAC with increased oxygen requirements, patient understands and is agreeable and plan has been discussed with patient's daughter. Await pre-CERT      PULMONOLOGY:   **IMPRESSION:**   1. Acute hypoxic respiratory failure   2. Acute exacerbation of COPD/emphysema   3. Active smoking, 30-pack-year history   4. COVID-19   5. Right basilar atelectasis versus pneumonia   6. Indeterminate 9 mm lingular nodule   7.  Possible obstructive sleep apnea   **RECOMMENDATION:**   - Oxygen via nasal cannula, keep SPO2 90% or greater    - BIPAP support while asleep   - Incentive spirometry every hour while awake    - Acapella   - Budesonide and Brovana via nebulizer every 12 hours    - IV solu-medrol 30 mg Q 8 hours    - Albuterol and Ipratropium Q 4 hours and prn   - Mucinex DM   - Status post course of paxlovid   - DVT prophylaxis with low molecular weight heparin   - GI prophylaxis with protonix   - PT/OT as tolerated   - PFTs as an outpatient   - PET scan as an outpatient   - Discharge planning once pre-CERT obtained. No objection from pulmonary standpoint with outpatient follow-up in 1 to 2 weeks. - Patient is being seen in collaboration with Dr. Anjelcia Ortega. Final/further recommendations and plan to follow once evaluated by collaborating physician. Medications:   BROVANA 15 mcg Freq: 2 TIMES DAILY Route: NEBULIZATION   PULMICORT 500 mcg Freq: 2 TIMES DAILY Route: NEBULIZATION   DUONEB 1 ampule Freq: EVERY 4 HOURS WHILE AWAKE Route: IN x 4         Orders:   - Maintain HOB at the lowest elevation    - Turn or assist with turn approximately every 2 hours, Assess skin          PT/OT/SLP/CM Assessments or Notes:   RT:   History Pulmonary Disease: Chronic pulmonary disease   Respiratory Pattern: Dyspnea on exertion or RR 21-25 bpm   Breath Sounds: Severe inspiratory and expiratory wheezing or severely diminished   Cough: Strong, spontaneous, non-productive   Indication for Bronchodilator Therapy: Decreased or absent breath sounds   Bronchodilator Assessment Score: 12      PT: AM-PAC Score 18. Patient is significantly limited by decreased aerobic capacity and requried 6L of [place] NC. Patient easily desats with mobility and exercises with cue for pured lip breathing. OT: AM-PAC Inpatient Daily Activity Raw Score 16. Skilled OT warranted to promote I/safety to return pt to prior living arrangment with assist as needed. DIETITIAN Nutrition Recommendations/Plan:    1. Continue Regular diet   2.  Monitor p.o intakes        Viral Illness, Acute - Care Day 8 (1/20/2023) by Pamela Abreu RN       Review Entered Review Status   1/20/2023 1559 Completed      Criteria Review      Care Day: 8 Care Date: 1/20/2023 Level of Care: Inpatient Floor    Guideline Day 2    Level Of Care    (X) Floor    1/20/2023 3:59 PM EST by Frida Joshi      intermediate    Clinical Status    (X) * Hypotension absent    1/20/2023 3:59 PM EST by Kristy Rios /78, 150/76, 100/54    (X) * No requirement for mechanical ventilation    1/20/2023 3:59 PM EST by Raphael Joshi      not required    ( ) * Oxygenation at baseline or improved    1/20/2023 3:59 PM EST by Frida Joshi      91% 10L hfnc    (X) * Mental status at baseline    1/20/2023 3:59 PM EST by Frida Joshi      aao    Activity    ( ) Advance activity as tolerated    1/20/2023 3:59 PM EST by Frida Joshi      Patient limited by endurance; Patient limited by fatigue    Routes    (X) * Oral hydration    1/20/2023 3:59 PM EST by Raphael Joshi      as per diet    (X) Oral or IV medications    1/20/2023 3:59 PM EST by Frida Joshi      iv solumedrol    (X) Usual diet    1/20/2023 3:59 PM EST by Raphael Joshi      reg    Interventions    (X) Possible Isolation    1/20/2023 3:59 PM EST by Frida Joshi      droplet plus iso    (X) Pulse oximetry    1/20/2023 3:59 PM EST by Frida Joshi      cont    (X) Possible oxygen    1/20/2023 3:59 PM EST by Raphael Joshi      hiflow    Medications    (X) Possible corticosteroid    1/20/2023 3:59 PM EST by Frida Joshi      Solumedrol 40 mg q6h iv    (X) Possible DVT prophylaxis    1/20/2023 3:59 PM EST by Frida Joshi      Lovenox 40 mg daily sc       Definitions for Care Day 8    Hypotension absent    (X) Hypotension absent, as indicated by  1 or more  of the following  (1) (2) (3) (4):       (X) SBP greater than or equal to 90 mm Hg and without recent decrease greater than 40 mm Hg from       baseline in adult or child 10 years or older       * Milestone   Additional Notes   DATE: 1/20   ---------------------------------------------------------------------------   PERTINENT UPDATES:   Covid 19 +   He feels shortness of breath is about the same. He remains on high flow nasal cannula at 10 L. He is tolerating orals.    Bronchodilator Assessment Score: 12 ---------------------------------------------------------------------------   VITALS:   P 68   T 97.8 F   R 18   ---------------------------------------------------------------------------   ABNL/PERTINENT LABS/RADIOLOGY/DIAGNOSTIC STUDIES:   BUN,BUNPL 8 - 23 mg/dL 35 (H)   Bun/Cre Ratio 9 - 20 36 (H)   Glucose, Random 70 - 99 mg/dL 324 (H)      XR CHEST PORTABLE    New linear opacity in the right perihilar region favored to represent   atelectasis. Elsewhere, there is no confluent airspace consolidation. COPD.   ---------------------------------------------------------------------------   PHYSICAL EXAM:   Lungs: Moderate to decreased air exchange, no wheezing or crackles at this time   ---------------------------------------------------------------------------   MD CONSULTS/ASSESSMENT AND PLAN:   Pulmonology   Impression:   Acute hypoxic respiratory failure   Acute exacerbation of COPD/emphysema   Active smoking, 30-pack-year history   COVID-19   Right basilar atelectasis versus pneumonia   Indeterminate 9 mm lingular nodule       Recommendations:   Oxygen via high flow nasal cannula, keep SPO2 90% or greater    Incentive spirometry every hour while awake    Acapella   Budesonide and Brovana via nebulizer every 12 hours    IV solu-medrol 40 mg every 8 hours   Albuterol and Ipratropium Q 4 hours and prn   Status post course of paxlovid   Status post one-time dose of IV Lasix   DVT prophylaxis with low molecular weight heparin   X-ray chest and labs today   PT/OT as tolerated   PFTs as an outpatient   PET scan as an outpatient   LTAC discharge planning once pre-CERT obtained   Above assessment and plan will be reviewed with Dr. Gaurav Richards. Patient plan will be finalized following review by Dr. Heron Worrell.   Will follow with you         Internal Medicine   Assessment:    Covid      Plan: 1. Acute respiratory failure secondary to COVID-19 with a known history of COPD   1. Continue steroids, DuoNeb/bronchodilators   2. Completed full course of Paxlovid on 1/18/23   3. S/p lasix x 1 dose on 1/18/23   4. Continue supplemental oxygen with nasal cannula, heated high flow, BiPAP at night   2. Dc planning now to LTAC with increased oxygen requirements, patient understands and is agreeable and plan has been discussed with patient's daughter. Await pre-CERT   ---------------------------------------------------------------------------   MEDICATIONS:   Brovana 15 mcg bid neb   Pulmicort 0.5 mg bid neb   Duoneb 1 amp q4h wa in   ---------------------------------------------------------------------------   ORDERS:   BMP [place] AM   ---------------------------------------------------------------------------   PT/OT/SLP/CM ASSESSMENT OR NOTES:   Case Management   Jessica Murry from [facility] following. .message to f/u on pre cert . Patient agreeable to plan of LTAC per NP notes         Physical Therapy   Patient with decreased aerobic capacity and requries frequent rest breaks to maximize endurance throughout treatment this date. Patient requries CGA for sit to stand and in stance to promote safety and tolerance to position. Patient would benefit from continued skilled PT treatment to maximzie return to PLOF. Patient limited by endurance; Patient limited by fatigue   Recommendations: Patient would benefit from continued therapy after dc   General Plan: 5-7 times per week

## 2023-02-03 ENCOUNTER — HOSPITAL ENCOUNTER (OUTPATIENT)
Age: 78
Setting detail: SPECIMEN
Discharge: HOME | End: 2023-02-03

## 2023-02-03 LAB
ANION GAP SERPL CALCULATED.3IONS-SCNC: 9 MMOL/L (ref 9–17)
BUN SERPL-MCNC: 24 MG/DL (ref 8–23)
CALCIUM SERPL-MCNC: 8.5 MG/DL (ref 8.6–10.4)
CHLORIDE SERPL-SCNC: 104 MMOL/L (ref 98–107)
CO2 SERPL-SCNC: 26 MMOL/L (ref 20–31)
CREAT SERPL-MCNC: 0.65 MG/DL (ref 0.7–1.2)
GFR SERPL CREATININE-BSD FRML MDRD: >60 ML/MIN/1.73M2
GLUCOSE SERPL-MCNC: 99 MG/DL (ref 70–99)
POTASSIUM SERPL-SCNC: 4.7 MMOL/L (ref 3.7–5.3)
SODIUM SERPL-SCNC: 139 MMOL/L (ref 135–144)

## 2023-02-03 PROCEDURE — 80048 BASIC METABOLIC PNL TOTAL CA: CPT

## 2023-02-03 PROCEDURE — 36415 COLL VENOUS BLD VENIPUNCTURE: CPT

## 2023-02-03 PROCEDURE — P9603 ONE-WAY ALLOW PRORATED MILES: HCPCS

## 2023-02-12 PROBLEM — R79.89 ELEVATED TROPONIN: Status: RESOLVED | Noted: 2023-01-13 | Resolved: 2023-02-12

## 2023-02-12 PROBLEM — R77.8 ELEVATED TROPONIN: Status: RESOLVED | Noted: 2023-01-13 | Resolved: 2023-02-12

## 2023-02-15 ENCOUNTER — APPOINTMENT (OUTPATIENT)
Dept: GENERAL RADIOLOGY | Age: 78
End: 2023-02-15
Payer: MEDICARE

## 2023-02-15 ENCOUNTER — HOSPITAL ENCOUNTER (INPATIENT)
Age: 78
LOS: 9 days | Discharge: NURSING, REHAB OR LONG-TERM CARE | End: 2023-02-24
Attending: EMERGENCY MEDICINE | Admitting: INTERNAL MEDICINE
Payer: MEDICARE

## 2023-02-15 ENCOUNTER — APPOINTMENT (OUTPATIENT)
Dept: CT IMAGING | Age: 78
End: 2023-02-15
Payer: MEDICARE

## 2023-02-15 DIAGNOSIS — J18.9 PNEUMONIA DUE TO INFECTIOUS ORGANISM, UNSPECIFIED LATERALITY, UNSPECIFIED PART OF LUNG: Primary | ICD-10-CM

## 2023-02-15 DIAGNOSIS — I26.99 OTHER ACUTE PULMONARY EMBOLISM WITHOUT ACUTE COR PULMONALE (HCC): ICD-10-CM

## 2023-02-15 DIAGNOSIS — J44.1 COPD EXACERBATION (HCC): ICD-10-CM

## 2023-02-15 PROBLEM — J69.0 ACUTE ASPIRATION PNEUMONIA (HCC): Status: ACTIVE | Noted: 2023-02-15

## 2023-02-15 PROBLEM — I10 ESSENTIAL (PRIMARY) HYPERTENSION: Status: ACTIVE | Noted: 2023-01-27

## 2023-02-15 PROBLEM — J96.21 ACUTE ON CHRONIC RESPIRATORY FAILURE WITH HYPOXIA (HCC): Status: ACTIVE | Noted: 2023-01-13

## 2023-02-15 PROBLEM — F31.9 BIPOLAR 1 DISORDER (HCC): Status: ACTIVE | Noted: 2019-06-21

## 2023-02-15 PROBLEM — E87.6 HYPOKALEMIA: Status: ACTIVE | Noted: 2023-02-15

## 2023-02-15 PROBLEM — N17.9 AKI (ACUTE KIDNEY INJURY) (HCC): Status: ACTIVE | Noted: 2023-02-15

## 2023-02-15 LAB
ABSOLUTE EOS #: <0.03 K/UL (ref 0–0.44)
ABSOLUTE IMMATURE GRANULOCYTE: 0.05 K/UL (ref 0–0.3)
ABSOLUTE LYMPH #: 0.87 K/UL (ref 1.1–3.7)
ABSOLUTE MONO #: 0.91 K/UL (ref 0.1–1.2)
ANION GAP SERPL CALCULATED.3IONS-SCNC: 9 MMOL/L (ref 9–17)
ANTI-XA UNFRAC HEPARIN: 1.1 IU/L (ref 0.3–0.7)
BASOPHILS # BLD: 0 % (ref 0–2)
BASOPHILS ABSOLUTE: <0.03 K/UL (ref 0–0.2)
BNP SERPL-MCNC: 1683 PG/ML
BUN SERPL-MCNC: 10 MG/DL (ref 8–23)
BUN/CREAT BLD: 8 (ref 9–20)
CALCIUM SERPL-MCNC: 8.4 MG/DL (ref 8.6–10.4)
CHLORIDE SERPL-SCNC: 91 MMOL/L (ref 98–107)
CO2 SERPL-SCNC: 45 MMOL/L (ref 20–31)
CREAT SERPL-MCNC: 1.32 MG/DL (ref 0.7–1.2)
EKG ATRIAL RATE: 78 BPM
EKG P AXIS: 39 DEGREES
EKG P-R INTERVAL: 106 MS
EKG Q-T INTERVAL: 430 MS
EKG QRS DURATION: 132 MS
EKG QTC CALCULATION (BAZETT): 490 MS
EKG R AXIS: 9 DEGREES
EKG T AXIS: 41 DEGREES
EKG VENTRICULAR RATE: 78 BPM
EOSINOPHILS RELATIVE PERCENT: 0 % (ref 1–4)
GFR SERPL CREATININE-BSD FRML MDRD: 56 ML/MIN/1.73M2
GLUCOSE SERPL-MCNC: 121 MG/DL (ref 70–99)
HCT VFR BLD AUTO: 38.4 % (ref 40.7–50.3)
HGB BLD-MCNC: 11.9 G/DL (ref 13–17)
IMMATURE GRANULOCYTES: 1 %
INR PPP: 1.1
LACTIC ACID, SEPSIS: 2 MMOL/L (ref 0.5–1.9)
LACTIC ACID, SEPSIS: 2.8 MMOL/L (ref 0.5–1.9)
LYMPHOCYTES # BLD: 16 % (ref 24–43)
MCH RBC QN AUTO: 30.9 PG (ref 25.2–33.5)
MCHC RBC AUTO-ENTMCNC: 31 G/DL (ref 28.4–34.8)
MCV RBC AUTO: 99.7 FL (ref 82.6–102.9)
MONOCYTES # BLD: 17 % (ref 3–12)
MYOGLOBIN SERPL-MCNC: 51 NG/ML (ref 28–72)
MYOGLOBIN SERPL-MCNC: 55 NG/ML (ref 28–72)
MYOGLOBIN SERPL-MCNC: 59 NG/ML (ref 28–72)
NRBC AUTOMATED: 1.1 PER 100 WBC
PARTIAL THROMBOPLASTIN TIME: 27 SEC (ref 23.9–33.8)
PDW BLD-RTO: 15.5 % (ref 11.8–14.4)
PLATELET # BLD AUTO: 297 K/UL (ref 138–453)
PMV BLD AUTO: 8.4 FL (ref 8.1–13.5)
POTASSIUM SERPL-SCNC: 3.2 MMOL/L (ref 3.7–5.3)
PROCALCITONIN SERPL-MCNC: 0.07 NG/ML
PROTHROMBIN TIME: 13.7 SEC (ref 11.5–14.2)
RBC # BLD: 3.85 M/UL (ref 4.21–5.77)
RBC # BLD: ABNORMAL 10*6/UL
REASON FOR REJECTION: NORMAL
SARS-COV-2 RDRP RESP QL NAA+PROBE: NOT DETECTED
SEG NEUTROPHILS: 66 % (ref 36–65)
SEGMENTED NEUTROPHILS ABSOLUTE COUNT: 3.54 K/UL (ref 1.5–8.1)
SODIUM SERPL-SCNC: 145 MMOL/L (ref 135–144)
SPECIMEN DESCRIPTION: NORMAL
TROPONIN I SERPL DL<=0.01 NG/ML-MCNC: 44 NG/L (ref 0–22)
TROPONIN I SERPL DL<=0.01 NG/ML-MCNC: 45 NG/L (ref 0–22)
TROPONIN I SERPL DL<=0.01 NG/ML-MCNC: 55 NG/L (ref 0–22)
WBC # BLD AUTO: 5.4 K/UL (ref 3.5–11.3)
ZZ NTE CLEAN UP: ORDERED TEST: NORMAL
ZZ NTE WITH NAME CLEAN UP: SPECIMEN SOURCE: NORMAL

## 2023-02-15 PROCEDURE — 80048 BASIC METABOLIC PNL TOTAL CA: CPT

## 2023-02-15 PROCEDURE — 84484 ASSAY OF TROPONIN QUANT: CPT

## 2023-02-15 PROCEDURE — 93010 ELECTROCARDIOGRAM REPORT: CPT | Performed by: INTERNAL MEDICINE

## 2023-02-15 PROCEDURE — 99223 1ST HOSP IP/OBS HIGH 75: CPT | Performed by: NURSE PRACTITIONER

## 2023-02-15 PROCEDURE — 85025 COMPLETE CBC W/AUTO DIFF WBC: CPT

## 2023-02-15 PROCEDURE — A4216 STERILE WATER/SALINE, 10 ML: HCPCS | Performed by: PHYSICIAN ASSISTANT

## 2023-02-15 PROCEDURE — 94761 N-INVAS EAR/PLS OXIMETRY MLT: CPT

## 2023-02-15 PROCEDURE — 87635 SARS-COV-2 COVID-19 AMP PRB: CPT

## 2023-02-15 PROCEDURE — 6370000000 HC RX 637 (ALT 250 FOR IP): Performed by: PHYSICIAN ASSISTANT

## 2023-02-15 PROCEDURE — 6360000004 HC RX CONTRAST MEDICATION: Performed by: EMERGENCY MEDICINE

## 2023-02-15 PROCEDURE — 2500000003 HC RX 250 WO HCPCS: Performed by: PHYSICIAN ASSISTANT

## 2023-02-15 PROCEDURE — 96374 THER/PROPH/DIAG INJ IV PUSH: CPT

## 2023-02-15 PROCEDURE — 6360000002 HC RX W HCPCS: Performed by: PHYSICIAN ASSISTANT

## 2023-02-15 PROCEDURE — 93005 ELECTROCARDIOGRAM TRACING: CPT | Performed by: PHYSICIAN ASSISTANT

## 2023-02-15 PROCEDURE — 36415 COLL VENOUS BLD VENIPUNCTURE: CPT

## 2023-02-15 PROCEDURE — 2580000003 HC RX 258: Performed by: NURSE PRACTITIONER

## 2023-02-15 PROCEDURE — 93970 EXTREMITY STUDY: CPT

## 2023-02-15 PROCEDURE — 6370000000 HC RX 637 (ALT 250 FOR IP): Performed by: NURSE PRACTITIONER

## 2023-02-15 PROCEDURE — 83880 ASSAY OF NATRIURETIC PEPTIDE: CPT

## 2023-02-15 PROCEDURE — 2580000003 HC RX 258: Performed by: EMERGENCY MEDICINE

## 2023-02-15 PROCEDURE — 84145 PROCALCITONIN (PCT): CPT

## 2023-02-15 PROCEDURE — 94640 AIRWAY INHALATION TREATMENT: CPT

## 2023-02-15 PROCEDURE — 85520 HEPARIN ASSAY: CPT

## 2023-02-15 PROCEDURE — 2060000000 HC ICU INTERMEDIATE R&B

## 2023-02-15 PROCEDURE — 6360000002 HC RX W HCPCS: Performed by: NURSE PRACTITIONER

## 2023-02-15 PROCEDURE — 83605 ASSAY OF LACTIC ACID: CPT

## 2023-02-15 PROCEDURE — 71045 X-RAY EXAM CHEST 1 VIEW: CPT

## 2023-02-15 PROCEDURE — 87040 BLOOD CULTURE FOR BACTERIA: CPT

## 2023-02-15 PROCEDURE — 85610 PROTHROMBIN TIME: CPT

## 2023-02-15 PROCEDURE — 71260 CT THORAX DX C+: CPT | Performed by: PHYSICIAN ASSISTANT

## 2023-02-15 PROCEDURE — 2580000003 HC RX 258: Performed by: PHYSICIAN ASSISTANT

## 2023-02-15 PROCEDURE — 2700000000 HC OXYGEN THERAPY PER DAY

## 2023-02-15 PROCEDURE — 85730 THROMBOPLASTIN TIME PARTIAL: CPT

## 2023-02-15 PROCEDURE — 83874 ASSAY OF MYOGLOBIN: CPT

## 2023-02-15 PROCEDURE — 99285 EMERGENCY DEPT VISIT HI MDM: CPT

## 2023-02-15 RX ORDER — METHYLPREDNISOLONE SODIUM SUCCINATE 125 MG/2ML
125 INJECTION, POWDER, LYOPHILIZED, FOR SOLUTION INTRAMUSCULAR; INTRAVENOUS ONCE
Status: COMPLETED | OUTPATIENT
Start: 2023-02-15 | End: 2023-02-15

## 2023-02-15 RX ORDER — POTASSIUM CHLORIDE 7.45 MG/ML
10 INJECTION INTRAVENOUS PRN
Status: DISCONTINUED | OUTPATIENT
Start: 2023-02-15 | End: 2023-02-24 | Stop reason: HOSPADM

## 2023-02-15 RX ORDER — ERGOCALCIFEROL 1.25 MG/1
50000 CAPSULE ORAL WEEKLY
Status: DISCONTINUED | OUTPATIENT
Start: 2023-02-19 | End: 2023-02-24 | Stop reason: HOSPADM

## 2023-02-15 RX ORDER — HEPARIN SODIUM 1000 [USP'U]/ML
80 INJECTION, SOLUTION INTRAVENOUS; SUBCUTANEOUS PRN
Status: DISCONTINUED | OUTPATIENT
Start: 2023-02-15 | End: 2023-02-17 | Stop reason: ALTCHOICE

## 2023-02-15 RX ORDER — SODIUM CHLORIDE 0.9 % (FLUSH) 0.9 %
10 SYRINGE (ML) INJECTION PRN
Status: DISCONTINUED | OUTPATIENT
Start: 2023-02-15 | End: 2023-02-24 | Stop reason: HOSPADM

## 2023-02-15 RX ORDER — ARFORMOTEROL TARTRATE 15 UG/2ML
15 SOLUTION RESPIRATORY (INHALATION) 2 TIMES DAILY
Status: DISCONTINUED | OUTPATIENT
Start: 2023-02-15 | End: 2023-02-24 | Stop reason: HOSPADM

## 2023-02-15 RX ORDER — ASPIRIN 81 MG/1
324 TABLET, CHEWABLE ORAL ONCE
Status: COMPLETED | OUTPATIENT
Start: 2023-02-15 | End: 2023-02-15

## 2023-02-15 RX ORDER — POTASSIUM CHLORIDE 7.45 MG/ML
10 INJECTION INTRAVENOUS
Status: DISPENSED | OUTPATIENT
Start: 2023-02-15 | End: 2023-02-15

## 2023-02-15 RX ORDER — HEPARIN SODIUM 10000 [USP'U]/100ML
5-30 INJECTION, SOLUTION INTRAVENOUS CONTINUOUS
Status: DISCONTINUED | OUTPATIENT
Start: 2023-02-15 | End: 2023-02-17

## 2023-02-15 RX ORDER — NICOTINE 21 MG/24HR
1 PATCH, TRANSDERMAL 24 HOURS TRANSDERMAL DAILY
Status: DISCONTINUED | OUTPATIENT
Start: 2023-02-15 | End: 2023-02-24 | Stop reason: HOSPADM

## 2023-02-15 RX ORDER — ACETAMINOPHEN 500 MG
1000 TABLET ORAL ONCE
Status: COMPLETED | OUTPATIENT
Start: 2023-02-15 | End: 2023-02-15

## 2023-02-15 RX ORDER — HEPARIN SODIUM 1000 [USP'U]/ML
40 INJECTION, SOLUTION INTRAVENOUS; SUBCUTANEOUS PRN
Status: DISCONTINUED | OUTPATIENT
Start: 2023-02-15 | End: 2023-02-17 | Stop reason: ALTCHOICE

## 2023-02-15 RX ORDER — LAMOTRIGINE 100 MG/1
100 TABLET ORAL DAILY
Status: DISCONTINUED | OUTPATIENT
Start: 2023-02-16 | End: 2023-02-24 | Stop reason: HOSPADM

## 2023-02-15 RX ORDER — POTASSIUM CHLORIDE 20 MEQ/1
40 TABLET, EXTENDED RELEASE ORAL PRN
Status: DISCONTINUED | OUTPATIENT
Start: 2023-02-15 | End: 2023-02-24 | Stop reason: HOSPADM

## 2023-02-15 RX ORDER — SODIUM CHLORIDE 9 MG/ML
INJECTION, SOLUTION INTRAVENOUS CONTINUOUS
Status: DISCONTINUED | OUTPATIENT
Start: 2023-02-15 | End: 2023-02-17

## 2023-02-15 RX ORDER — SODIUM CHLORIDE 9 MG/ML
INJECTION, SOLUTION INTRAVENOUS PRN
Status: DISCONTINUED | OUTPATIENT
Start: 2023-02-15 | End: 2023-02-24 | Stop reason: HOSPADM

## 2023-02-15 RX ORDER — IPRATROPIUM BROMIDE AND ALBUTEROL SULFATE 2.5; .5 MG/3ML; MG/3ML
1 SOLUTION RESPIRATORY (INHALATION) 3 TIMES DAILY
Status: DISCONTINUED | OUTPATIENT
Start: 2023-02-16 | End: 2023-02-20

## 2023-02-15 RX ORDER — METHYLPREDNISOLONE SODIUM SUCCINATE 40 MG/ML
40 INJECTION, POWDER, LYOPHILIZED, FOR SOLUTION INTRAMUSCULAR; INTRAVENOUS EVERY 6 HOURS
Status: COMPLETED | OUTPATIENT
Start: 2023-02-15 | End: 2023-02-17

## 2023-02-15 RX ORDER — ONDANSETRON 2 MG/ML
4 INJECTION INTRAMUSCULAR; INTRAVENOUS EVERY 6 HOURS PRN
Status: DISCONTINUED | OUTPATIENT
Start: 2023-02-15 | End: 2023-02-24 | Stop reason: HOSPADM

## 2023-02-15 RX ORDER — HEPARIN SODIUM 1000 [USP'U]/ML
80 INJECTION, SOLUTION INTRAVENOUS; SUBCUTANEOUS ONCE
Status: COMPLETED | OUTPATIENT
Start: 2023-02-15 | End: 2023-02-15

## 2023-02-15 RX ORDER — BUMETANIDE 1 MG/1
1 TABLET ORAL DAILY
Status: CANCELLED | OUTPATIENT
Start: 2023-02-15

## 2023-02-15 RX ORDER — ASPIRIN 81 MG/1
81 TABLET, CHEWABLE ORAL DAILY
Status: DISCONTINUED | OUTPATIENT
Start: 2023-02-16 | End: 2023-02-24 | Stop reason: HOSPADM

## 2023-02-15 RX ORDER — BUDESONIDE 0.5 MG/2ML
0.5 INHALANT ORAL 2 TIMES DAILY
Status: DISCONTINUED | OUTPATIENT
Start: 2023-02-15 | End: 2023-02-24 | Stop reason: HOSPADM

## 2023-02-15 RX ORDER — ALBUTEROL SULFATE 2.5 MG/3ML
2.5 SOLUTION RESPIRATORY (INHALATION) EVERY 4 HOURS PRN
Status: DISCONTINUED | OUTPATIENT
Start: 2023-02-15 | End: 2023-02-24 | Stop reason: HOSPADM

## 2023-02-15 RX ORDER — POLYETHYLENE GLYCOL 3350 17 G/17G
17 POWDER, FOR SOLUTION ORAL DAILY PRN
Status: DISCONTINUED | OUTPATIENT
Start: 2023-02-15 | End: 2023-02-24 | Stop reason: HOSPADM

## 2023-02-15 RX ORDER — PANTOPRAZOLE SODIUM 40 MG/1
40 TABLET, DELAYED RELEASE ORAL
Status: DISCONTINUED | OUTPATIENT
Start: 2023-02-16 | End: 2023-02-24 | Stop reason: HOSPADM

## 2023-02-15 RX ORDER — DIVALPROEX SODIUM 500 MG/1
1000 TABLET, EXTENDED RELEASE ORAL NIGHTLY
Status: DISCONTINUED | OUTPATIENT
Start: 2023-02-15 | End: 2023-02-24 | Stop reason: HOSPADM

## 2023-02-15 RX ORDER — SODIUM CHLORIDE 0.9 % (FLUSH) 0.9 %
5-40 SYRINGE (ML) INJECTION EVERY 12 HOURS SCHEDULED
Status: DISCONTINUED | OUTPATIENT
Start: 2023-02-15 | End: 2023-02-24 | Stop reason: HOSPADM

## 2023-02-15 RX ORDER — ACETAMINOPHEN 650 MG/1
650 SUPPOSITORY RECTAL EVERY 6 HOURS PRN
Status: DISCONTINUED | OUTPATIENT
Start: 2023-02-15 | End: 2023-02-24 | Stop reason: HOSPADM

## 2023-02-15 RX ORDER — SODIUM CHLORIDE 0.9 % (FLUSH) 0.9 %
5-40 SYRINGE (ML) INJECTION PRN
Status: DISCONTINUED | OUTPATIENT
Start: 2023-02-15 | End: 2023-02-24 | Stop reason: HOSPADM

## 2023-02-15 RX ORDER — METOPROLOL SUCCINATE 25 MG/1
25 TABLET, EXTENDED RELEASE ORAL DAILY
Status: DISCONTINUED | OUTPATIENT
Start: 2023-02-16 | End: 2023-02-24 | Stop reason: HOSPADM

## 2023-02-15 RX ORDER — ACETAMINOPHEN 325 MG/1
650 TABLET ORAL EVERY 6 HOURS PRN
Status: DISCONTINUED | OUTPATIENT
Start: 2023-02-15 | End: 2023-02-24 | Stop reason: HOSPADM

## 2023-02-15 RX ORDER — MAGNESIUM SULFATE 1 G/100ML
1000 INJECTION INTRAVENOUS PRN
Status: DISCONTINUED | OUTPATIENT
Start: 2023-02-15 | End: 2023-02-24 | Stop reason: HOSPADM

## 2023-02-15 RX ORDER — PREDNISONE 20 MG/1
40 TABLET ORAL DAILY
Status: DISCONTINUED | OUTPATIENT
Start: 2023-02-18 | End: 2023-02-22

## 2023-02-15 RX ORDER — 0.9 % SODIUM CHLORIDE 0.9 %
80 INTRAVENOUS SOLUTION INTRAVENOUS ONCE
Status: COMPLETED | OUTPATIENT
Start: 2023-02-15 | End: 2023-02-15

## 2023-02-15 RX ORDER — ONDANSETRON 4 MG/1
4 TABLET, ORALLY DISINTEGRATING ORAL EVERY 8 HOURS PRN
Status: DISCONTINUED | OUTPATIENT
Start: 2023-02-15 | End: 2023-02-24 | Stop reason: HOSPADM

## 2023-02-15 RX ORDER — QUETIAPINE FUMARATE 200 MG/1
400 TABLET, FILM COATED ORAL NIGHTLY
Status: DISCONTINUED | OUTPATIENT
Start: 2023-02-15 | End: 2023-02-24 | Stop reason: HOSPADM

## 2023-02-15 RX ORDER — IPRATROPIUM BROMIDE AND ALBUTEROL SULFATE 2.5; .5 MG/3ML; MG/3ML
1 SOLUTION RESPIRATORY (INHALATION)
Status: DISCONTINUED | OUTPATIENT
Start: 2023-02-15 | End: 2023-02-15

## 2023-02-15 RX ADMIN — SODIUM CHLORIDE: 9 INJECTION, SOLUTION INTRAVENOUS at 16:37

## 2023-02-15 RX ADMIN — SODIUM CHLORIDE 80 ML: 9 INJECTION, SOLUTION INTRAVENOUS at 12:15

## 2023-02-15 RX ADMIN — SODIUM CHLORIDE, PRESERVATIVE FREE 10 ML: 5 INJECTION INTRAVENOUS at 21:18

## 2023-02-15 RX ADMIN — ALBUTEROL SULFATE 2.5 MG: 2.5 SOLUTION RESPIRATORY (INHALATION) at 11:29

## 2023-02-15 RX ADMIN — ACETAMINOPHEN 1000 MG: 500 TABLET ORAL at 12:26

## 2023-02-15 RX ADMIN — POTASSIUM CHLORIDE 10 MEQ: 7.46 INJECTION, SOLUTION INTRAVENOUS at 23:16

## 2023-02-15 RX ADMIN — AZITHROMYCIN MONOHYDRATE 500 MG: 500 INJECTION, POWDER, LYOPHILIZED, FOR SOLUTION INTRAVENOUS at 13:59

## 2023-02-15 RX ADMIN — HEPARIN SODIUM 18 UNITS/KG/HR: 10000 INJECTION, SOLUTION INTRAVENOUS at 14:18

## 2023-02-15 RX ADMIN — CEFTRIAXONE SODIUM 1000 MG: 1 INJECTION, POWDER, FOR SOLUTION INTRAMUSCULAR; INTRAVENOUS at 13:54

## 2023-02-15 RX ADMIN — ASPIRIN 81 MG CHEWABLE TABLET 324 MG: 81 TABLET CHEWABLE at 12:25

## 2023-02-15 RX ADMIN — SODIUM CHLORIDE 10 ML/HR: 9 INJECTION, SOLUTION INTRAVENOUS at 21:03

## 2023-02-15 RX ADMIN — HEPARIN SODIUM 7260 UNITS: 1000 INJECTION INTRAVENOUS; SUBCUTANEOUS at 14:18

## 2023-02-15 RX ADMIN — AMPICILLIN SODIUM AND SULBACTAM SODIUM 3000 MG: 2; 1 INJECTION, POWDER, FOR SOLUTION INTRAMUSCULAR; INTRAVENOUS at 21:07

## 2023-02-15 RX ADMIN — IPRATROPIUM BROMIDE AND ALBUTEROL SULFATE 1 AMPULE: .5; 2.5 SOLUTION RESPIRATORY (INHALATION) at 20:15

## 2023-02-15 RX ADMIN — DIVALPROEX SODIUM 1000 MG: 500 TABLET, EXTENDED RELEASE ORAL at 21:16

## 2023-02-15 RX ADMIN — BUDESONIDE 500 MCG: 0.5 SUSPENSION RESPIRATORY (INHALATION) at 20:15

## 2023-02-15 RX ADMIN — IOPAMIDOL 75 ML: 755 INJECTION, SOLUTION INTRAVENOUS at 12:14

## 2023-02-15 RX ADMIN — FAMOTIDINE 20 MG: 10 INJECTION, SOLUTION INTRAVENOUS at 14:49

## 2023-02-15 RX ADMIN — SODIUM CHLORIDE, PRESERVATIVE FREE 10 ML: 5 INJECTION INTRAVENOUS at 12:14

## 2023-02-15 RX ADMIN — METHYLPREDNISOLONE SODIUM SUCCINATE 40 MG: 40 INJECTION, POWDER, FOR SOLUTION INTRAMUSCULAR; INTRAVENOUS at 21:17

## 2023-02-15 RX ADMIN — METHYLPREDNISOLONE SODIUM SUCCINATE 125 MG: 125 INJECTION, POWDER, FOR SOLUTION INTRAMUSCULAR; INTRAVENOUS at 10:47

## 2023-02-15 RX ADMIN — QUETIAPINE FUMARATE 400 MG: 200 TABLET ORAL at 21:16

## 2023-02-15 RX ADMIN — POTASSIUM CHLORIDE 10 MEQ: 7.46 INJECTION, SOLUTION INTRAVENOUS at 21:11

## 2023-02-15 RX ADMIN — ARFORMOTEROL TARTRATE 15 MCG: 15 SOLUTION RESPIRATORY (INHALATION) at 20:16

## 2023-02-15 ASSESSMENT — LIFESTYLE VARIABLES
HOW MANY STANDARD DRINKS CONTAINING ALCOHOL DO YOU HAVE ON A TYPICAL DAY: PATIENT DOES NOT DRINK
HOW OFTEN DO YOU HAVE A DRINK CONTAINING ALCOHOL: NEVER

## 2023-02-15 ASSESSMENT — ENCOUNTER SYMPTOMS
CHOKING: 1
WHEEZING: 1
COUGH: 1
SHORTNESS OF BREATH: 1

## 2023-02-15 ASSESSMENT — PAIN SCALES - GENERAL
PAINLEVEL_OUTOF10: 3
PAINLEVEL_OUTOF10: 3

## 2023-02-15 ASSESSMENT — PAIN DESCRIPTION - LOCATION: LOCATION: BUTTOCKS

## 2023-02-15 NOTE — FLOWSHEET NOTE
Pt arrived to room 1008 from ER. VSS. Orders reviewed and released. Cardiac monitor placed on pt. Heparin gtt and NS going. 6L salter and on continous pulse ox. Call light in reach. Bed locked and lowest position. Bed alarm on for safety.       02/15/23 1857   Vital Signs   Temp 98.2 °F (36.8 °C)   Temp Source Oral   Heart Rate 66   Heart Rate Source Monitor   Resp 17   /67   MAP (Calculated) 85   MAP (mmHg) 85   BP Location Right upper arm   Patient Position Supine   Level of Consciousness 0   MEWS Score 1

## 2023-02-15 NOTE — ED PROVIDER NOTES
[address] [place] [address] ED  [facility]      Pt Name: Irving Montano  MRN: [id number]  [place] 1945  Date ofevaluation: 2/15/2023  Provider: Norma Felder PA-C    CHIEF COMPLAINT       Chief Complaint   Patient presents with    Shortness of Breath     Pt was brought in via EMS for SOB that started this morning, Hx of COPD, Not on home O2         HISTORY OF PRESENT ILLNESS  (Location/Symptom, Timing/Onset, Context/Setting, Quality, Duration, Modifying Factors, Severity.)   Irving Montano is a 78 y.o. male who presents to the emergency department with shortness of breath started this morning. Patient stays at [facility]. Arrives with a fever via EMS. Apparently he was having a bowel movement had a hard time getting up off the toilet and had to sit down on the ground and could not get back up. Nursing Notes were reviewed. ALLERGIES     Patient has no known allergies. CURRENT MEDICATIONS       Previous Medications    ARFORMOTEROL TARTRATE (BROVANA) 15 MCG/2ML NEBU    Take 2 mLs by nebulization in the morning and 2 mLs in the evening. ASPIRIN 81 MG CHEWABLE TABLET    Take 1 tablet by mouth daily    BUDESONIDE (PULMICORT) 0.5 MG/2ML NEBULIZER SUSPENSION    Take 2 mLs by nebulization in the morning and 2 mLs in the evening.     BUMETANIDE (BUMEX) 1 MG TABLET    Take 1 tablet by mouth daily    DIVALPROEX (DEPAKOTE ER) 500 MG EXTENDED RELEASE TABLET    Take 2 tablets by mouth daily    ERGOCALCIFEROL (VITAMIN D) 60021 UNITS CAPS    Take 50,000 Units by mouth once a week    LAMOTRIGINE (LAMICTAL) 100 MG TABLET    Take 100 mg by mouth daily    METOPROLOL SUCCINATE (TOPROL XL) 25 MG EXTENDED RELEASE TABLET    Take 1 tablet by mouth daily    NICOTINE (NICODERM CQ) 14 MG/24HR    Place 1 patch onto the skin daily    PANTOPRAZOLE (PROTONIX) 40 MG TABLET    Take 1 tablet by mouth every morning (before breakfast)    POLYETHYLENE GLYCOL (GLYCOLAX) 17 G PACKET    Take 17 g by mouth daily as needed for Constipation    PREDNISONE (DELTASONE) 10 MG TABLET    Take 20 mg twice a day for 3 days, take 20 mg in the morning and 10 mg at night for 3 days, take 10 mg twice a day for 3 days, take 10 mg only in the morning for 3 days, then stop    QUETIAPINE (SEROQUEL XR) 150 MG TB24 EXTENDED RELEASE TABLET    Take 1 tablet by mouth daily    QUETIAPINE (SEROQUEL) 400 MG TABLET    Take 400 mg by mouth at bedtime       PAST MEDICAL HISTORY         Diagnosis Date    Back pain     Bipolar 1 disorder (HCC)     Cancer (HCC)     bowel    COPD (chronic obstructive pulmonary disease) (HCC)     GERD (gastroesophageal reflux disease)     Heart attack ([facility] [address]        SURGICAL HISTORY           Procedure Laterality Date    ANUS SURGERY      bowel surgery r/t cancer approx 4 years ago    COLONOSCOPY N/A 7/30/2018    COLONOSCOPY POLYPECTOMY HOT BIOPSY performed by Colin Manrique DO at [address]      umbilical         HISTORY           Problem Relation Age of Onset    Cancer Father         Lung    Stroke Father     Cancer Brother         Lymphoma    Mental Illness Other         Aunt     Family Status   Relation Name Status    Father  (Not Specified)    Brother  (Not Specified)    Other  (Not Specified)        SOCIAL HISTORY      reports that he has been smoking cigarettes and pipe. He has never used smokeless tobacco. He reports that he does not drink alcohol and does not use drugs. REVIEW OFSYSTEMS    (2-9 systems for level 4, 10 or more for level 5)   Review of Systems    Except as noted above the remainder of the review of systems was reviewed and negative. PHYSICAL EXAM    (up to 7 for level 4, 8 or more for level 5)     ED Triage Vitals [02/15/23 1001]   BP Temp Temp Source Heart Rate Resp SpO2 Height Weight   113/66 100 °F (37.8 °C) Oral 90 20 93 % 5' 10\" (1.778 m) 200 lb (90.7 kg)     Physical Exam  Constitutional:       Appearance: He is well-developed. HENT:      Head: Normocephalic and atraumatic. Cardiovascular:      Rate and Rhythm: Normal rate and regular rhythm. Pulmonary:      Effort: Pulmonary effort is normal.      Breath sounds: Decreased breath sounds present. Abdominal:      Palpations: Abdomen is soft. Musculoskeletal:         General: Normal range of motion. Cervical back: Normal range of motion and neck supple. Skin:     General: Skin is warm. Neurological:      Mental Status: He is alert and oriented to person, place, and time.    Psychiatric:         Behavior: Behavior normal.               DIAGNOSTIC RESULTS     EKG: All EKG's are interpreted by the Emergency Department Physician who either signs or Co-signs this chart in the absence of a cardiologist.        RADIOLOGY:   Non-plain film images such as CT, Ultrasound and MRI are read by the radiologist. Plain radiographic images arevisualized and preliminarily interpreted by the emergency physician with the below findings:        Interpretation per the Radiologist below, if available at thetime of this note:          ED BEDSIDE ULTRASOUND:   Performed by ED Physician - none    LABS:  Labs Reviewed   CBC WITH AUTO DIFFERENTIAL - Abnormal; Notable for the following components:       Result Value    RBC 3.85 (*)     Hemoglobin 11.9 (*)     Hematocrit 38.4 (*)     RDW 15.5 (*)     NRBC Automated 1.1 (*)     Seg Neutrophils 66 (*)     Lymphocytes 16 (*)     Monocytes 17 (*)     Eosinophils % 0 (*)     Immature Granulocytes 1 (*)     Absolute Lymph # 0.87 (*)     All other components within normal limits   BASIC METABOLIC PANEL - Abnormal; Notable for the following components:    Glucose 121 (*)     Creatinine 1.32 (*)     Est, Glom Filt Rate 56 (*)     Bun/Cre Ratio 8 (*)     Calcium 8.4 (*)     Sodium 145 (*)     Potassium 3.2 (*)     Chloride 91 (*)     CO2 45 (*)     All other components within normal limits   TROP/MYOGLOBIN - Abnormal; Notable for the following components:    Troponin, High Sensitivity 55 (*)     All other components within normal limits   TROP/MYOGLOBIN - Abnormal; Notable for the following components:    Troponin, High Sensitivity 44 (*)     All other components within normal limits   TROP/MYOGLOBIN - Abnormal; Notable for the following components:    Troponin, High Sensitivity 45 (*)     All other components within normal limits   BRAIN NATRIURETIC PEPTIDE - Abnormal; Notable for the following components:    Pro-BNP 1,683 (*)     All other components within normal limits   LACTATE, SEPSIS - Abnormal; Notable for the following components:    Lactic Acid, Sepsis 2.0 (*)     All other components within normal limits   LACTATE, SEPSIS - Abnormal; Notable for the following components:    Lactic Acid, Sepsis 2.8 (*)     All other components within normal limits   COVID-19, RAPID   CULTURE, BLOOD 1   CULTURE, BLOOD 2   SPECIMEN REJECTION   PROTIME-INR   APTT   ANTI-XA, UNFRACTIONATED HEPARIN   ANTI-XA, UNFRACTIONATED HEPARIN   PROCALCITONIN       All other labs were within normal range or not returned as of this dictation. EMERGENCY DEPARTMENT COURSE and DIFFERENTIAL DIAGNOSIS/MDM:   Vitals:    Vitals:    02/15/23 1631 02/15/23 1634 02/15/23 1638 02/15/23 1700   BP: (!) 117/59   125/60   Pulse: 67 70 70 71   Resp: 26 18 24 25   Temp:       TempSrc:       SpO2: 90% 94% 92% (!) 86%   Weight:       Height:         HEARTSCORE:no CP    81-year-old male here for shortness of breath. Also presents to weakness. Patient's pulse ox 89% on room air upon arrival.  Patient not the best historian but it appears that patient should be on oxygen at home but states it was never set up he never received any oxygen to actually to be using at this point time. Patient CO2 is elevated. He did have COVID roughly a month ago. His chest x-ray did not reveal any acute process. However due to his elevated CO2 he was given a breathing treatment Solu-Medrol.   Given recent COVID diagnosis hypoxia also underwent a CT scan which shows a tiny PE.  Unclear how much of the PE is contributing to her shortness of breath in comparison to his COPD. However patient will be heparinized and admitted for COPD treatment along with PE treatment. Evaluation and treatment course in the ED, and plan of care upon discharge was discussed in length with the patient. Patient had no further questions prior to being discharged and was instructed to return to the ED for new or worsening symptoms. DDx include PE pneumonia copd exacerbation. The patient was involved in his/her plan of care. The testing that was ordered was discussed with the patient. Any medications that may have been ordered were discussed with the patient. I have reviewed the patient's previous medical records using the electronic health record that we have available. Labs and imaging were reviewed. I have discusssed recommendation for admission with the patient and/or family. We have discussed benefits of admission and the risks of discharge home. The patient agrees with the plan of care and admission. The patient will be admitted for further evaluation and treatment. I have consulted TRANG From [facility] and admission was accepted. . The patient will be admitted to [facility], he/she agrees to accept the patient for further evaluation and treatment. Care was provided during an unprecedented national emergency due to the novel coronavirus, Covid-19. CRITICAL CARE TIME     Due to the high probability of sudden and clinically significant deterioration in the patient's condition he required highest level of my preparedness to intervene urgently. I provided critical care time including documentation time, medication orders and management, reevaluation, vital sign assessment, ordering and reviewing of of lab tests ordering and reviewing of x-ray studies, and admission orders.  Aggregate critical care time is between 35  minutes including only time during which I was engaged in work directly related to his care and did not include time spent treating other patients simultaneously. CONSULTS:  IP CONSULT TO HOSPITALIST  IP CONSULT TO PULMONOLOGY    PROCEDURES:  Procedures        FINAL IMPRESSION      1. Pneumonia due to infectious organism, unspecified laterality, unspecified part of lung    2. COPD exacerbation ([facility] [address]    3. Other acute pulmonary embolism without acute cor pulmonale ([facility] [address]          DISPOSITION/PLAN   DISPOSITION Admitted 02/15/2023 01:15:40 PM      PATIENTREFERRED TO:   No follow-up provider specified.     DISCHARGE MEDICATIONS:     New Prescriptions    No medications on file           (Please note that portions of this note were completed with a voice recognition program.  Efforts were made to edit thedictations but occasionally words are mis-transcribed.)    STEVE Pacheco PA-C  02/15/23 [id number]

## 2023-02-15 NOTE — ED NOTES
ED to inpatient nurses report     Chief Complaint   Patient presents with    Shortness of Breath     Pt was brought in via EMS for SOB that started this morning, Hx of COPD, Not on home O2      Present to ED from [facility]  LOC: alert and orientated to name, place, date  Vital signs   Vitals:    02/15/23 1052 02/15/23 1053 02/15/23 1130 02/15/23 1230   BP:   (!) 113/55 114/65   Pulse: 75 79 74 78   Resp: (!) 34 (!) 34 20 (!) 31   Temp:    98.3 °F (36.8 °C)   TempSrc:    Oral   SpO2: (!) 89% 92% 97% 93%   Weight:       Height:          Oxygen Baseline RA    Current needs required 3-4L   SEPSIS: y  [y] Lactate X 2 ordered (Yes or No)  [pending cultures to be drawn by phleb] Antibiotics given (Yes or No)  [N] IV Fluids ordered (Yes or No)             [y] IV completed (Yes or No)  [Y] Hourly Vital Signs (Validated)  [] Outstanding Orders:     LDAs:   Peripheral IV 02/15/23 Right Antecubital (Active)   Site Assessment Clean, dry & intact 02/15/23 1025   Line Status Blood return noted; Flushed 02/15/23 1025   Dressing Status New dressing applied;Clean, dry & intact 02/15/23 1025     Mobility: Requires assistance * 1  Fall Risk: [place] 1 Fall Risk  Presents to emergency department  because of falls (Syncope, seizure, or loss of consciousness): Yes (02/15/23 1140)  Age > 79: Yes (02/15/23 1140)  Altered Mental Status, Intoxication with alcohol or substance confusion (Disorientation, impaired judgment, poor safety awaremess, or inability to follow instructions): No (02/15/23 1140)  Impaired Mobility: Ambulates or transfers with assistive devices or assistance;  Unable to ambulate or transer.: Yes (02/15/23 1140)  Pending ED orders: NA  Present condition: stable  Code Status: full  Consults: IP CONSULT TO HOSPITALIST  []  Hospitalist  Completed  [] yes [] no Who:   []  Medicine  Completed  [] yes [] No Who:   []  Cardiology  Completed  [] yes [] No Who:   []  GI   Completed  [] yes [] No Who:   []  Neurology  Completed  [] yes [] No Who:   []  Nephrology Completed  [] yes [] No Who:    []  Vascular  Completed  [] yes [] No Who:   []  Ortho  Completed  [] yes [] No Who:     []  Surgery  Completed  [] yes [] No Who:    []  Urology  Completed  [] yes [] No Who:    []  CT Surgery Completed  [] yes [] No Who:   []  Podiatry  Completed  [] yes [] No Who:    []  Other    Completed  [] yes [] No Who:  Interventions: Oxygen therapy, nebulizer treatments, IV heparin and IV abx  Important Events: increased WOB necessitating neb treatment.        Electronically signed by Penelope Gonsales RN on 2/15/2023 at 1:26 PM       Penelope Gonsales RN  02/15/23 9321

## 2023-02-15 NOTE — H&P
[facility]  Office: [address], DO, Ayanakaren Edmondson, DO, Tiffany Delvalle, DO, Reynaldo Penajoey Blood, DO, Robinson Parikh MD, Sakshi Ambriz MD, Mita Kevin MD, Emi Hernandez MD,  Koffi Littlejohn MD, Iwona Martines MD, Shawna Haines, DO, Tamika Gentile MD,  Katherine Paredes MD, Etta Hernandez MD, Martin Ellis DO, Radha Hu MD, Polina Dominguez MD, Darvin Machado DO, Donnie Flowers MD, Gabriel Smith MD, Rosaline Arredondo MD, Alie Chao MD, Darrian Bass DO, John Geiger MD, Dameon Anton MD, Raciel Marie, [facility],  Jose Alberto Walker, CNP, Mayi Garcia, CNP, Perley Gilford, CNP,  Herminia Pinedo, [facility], Emiliana Kelsey, CNP, Vera Duong, CNP, Annalisa Rockwell, CNP, Cony Toscano, CNP, Lawyer Leslie, CNP, Makayla Santana PA-C, Baldemar Devlin, CNS, Cletis Hashimoto, CNP, Karey Ortiz, [facility]    HISTORY AND PHYSICAL EXAMINATION            Date:   2/15/2023  Patient name:  Amberly Blue  Date of admission:  2/15/2023 10:00 AM  MRN:   [id number]  Account:  [de-identified]  YOB: 1945  PCP:    Herman Kim MD  Room:   [place] [address]  Code Status:    Prior    Chief Complaint:     Chief Complaint   Patient presents with    Shortness of Breath     Pt was brought in via EMS for SOB that started this morning, Hx of COPD, Not on home O2       History Obtained From:     patient, electronic medical record    History of Present Illness:     Amberly Blue is a 78 y.o. Non- / non  male who presents with Shortness of Breath (Pt was brought in via EMS for SOB that started this morning, Hx of COPD, Not on home O2)   and is admitted to the hospital for the management of Pulmonary embolism on left [facility]). Patient presented to the ER today with complaints of shortness of breath and wheezing. He has this is a coughs a lot when he is eating and notices burning in his chest sometimes after he eats.   Per vitals he was noted to be hypoxic and oxygen per nasal cannula was applied. CT chest was positive for tiny PE but no heart strain. There is also concern about possible aspiration. When I spoke to the patient he says he does have a history of coughing with meals and often has pain in his chest after eating. Patient was hospitalized 1/13 through 1/27/2023. At that time he was treated for COPD exacerbation and was positive for COVID. He received a full dose of Paxlovid while inpatient. According to the notes his oxygenation was labile but improved with diuresis. At the time of discharge she was given prednisone. Patient was encouraged to have a sleep study as an outpatient. Initial lactic 2.8 repeat 2.0      [id number] Score for Atrial Fibrillation Stroke Risk   Risk   Factors  Component Value   C CHF No 0   H HTN Yes 1   A2 Age >= 76 Yes,  (71 y.o.) 2   D DM No 0   S2 Prior Stroke/TIA No 0   V Vascular Disease No 0   A Age 74-69 No,  (71 y.o.) 0   Sc Sex male 0    [id number]  Score  3   Score last updated 2/15/23 2:37 PM EST    The ER he was initiated on a heparin per PE protocol, full dose aspirin, antibiotics for possible aspiration pneumonia and Solu-Medrol    Past Medical History:     Past Medical History:   Diagnosis Date    Back pain     Bipolar 1 disorder ([facility]     Cancer ([facility]     bowel    COPD (chronic obstructive pulmonary disease) ([facility]     GERD (gastroesophageal reflux disease)     Heart attack [facility])         Past Surgical History:     Past Surgical History:   Procedure Laterality Date    ANUS SURGERY      bowel surgery r/t cancer approx 4 years ago    COLONOSCOPY N/A 7/30/2018    COLONOSCOPY POLYPECTOMY HOT BIOPSY performed by Velvet Nj DO at [address] [facility]        Medications Prior to Admission:     Prior to Admission medications    Medication Sig Start Date End Date Taking?  Authorizing Provider   pantoprazole (PROTONIX) 40 MG tablet Take 1 tablet by mouth every morning (before breakfast) 1/28/23   Michelle Hernandez DO   aspirin 81 MG chewable tablet Take 1 tablet by mouth daily 1/26/23   [employer], APRN - NP   arformoterol tartrate (BROVANA) 15 MCG/2ML NEBU Take 2 mLs by nebulization in the morning and 2 mLs in the evening. 1/26/23   [facility], APRN - NP   budesonide (PULMICORT) 0.5 MG/2ML nebulizer suspension Take 2 mLs by nebulization in the morning and 2 mLs in the evening. 1/26/23   [employer], APRN - NP   metoprolol succinate (TOPROL XL) 25 MG extended release tablet Take 1 tablet by mouth daily 1/26/23   [facility], APRN - NP   polyethylene glycol (GLYCOLAX) 17 g packet Take 17 g by mouth daily as needed for Constipation 1/26/23 2/25/23  Lajune [employer], APRN - NP   nicotine (NICODERM CQ) 14 MG/24HR Place 1 patch onto the skin daily 1/26/23   Lajune [employer], APRN - NP   Ergocalciferol (VITAMIN D) 03880 units CAPS Take 50,000 Units by mouth once a week 1/26/23   [employer], APRN - NP   predniSONE (DELTASONE) 10 MG tablet Take 20 mg twice a day for 3 days, take 20 mg in the morning and 10 mg at night for 3 days, take 10 mg twice a day for 3 days, take 10 mg only in the morning for 3 days, then stop 1/26/23   [employer], APRN - NP   bumetanide (BUMEX) 1 MG tablet Take 1 tablet by mouth daily 1/26/23   [employer], APRN - NP   QUEtiapine (SEROQUEL) 400 MG tablet Take 400 mg by mouth at bedtime    Historical Provider, MD   lamoTRIgine (LAMICTAL) 100 MG tablet Take 100 mg by mouth daily    Historical Provider, MD   divalproex (DEPAKOTE ER) 500 MG extended release tablet Take 2 tablets by mouth daily  Patient taking differently: Take 1,000 mg by mouth nightly 7/31/18   Ramirez Gillis MD   QUEtiapine (SEROQUEL XR) 150 MG TB24 extended release tablet Take 1 tablet by mouth daily  Patient taking differently: Take 150 mg by mouth at bedtime 7/31/18   Ramirez Gillis MD        Allergies:     Patient has no known allergies.     Social History:     Tobacco:    reports that he has been smoking cigarettes and pipe. He has never used smokeless tobacco.  Alcohol:      reports no history of alcohol use. Drug Use:  reports no history of drug use. Family History:     Family History   Problem Relation Age of Onset    Cancer Father         Lung    Stroke Father     Cancer Brother         Lymphoma    Mental Illness Other         Aunt       Review of Systems:     Positive and Negative as described in HPI. Review of Systems   Respiratory:  Positive for cough, choking, shortness of breath and wheezing. Cardiovascular:  Positive for chest pain (After coughing/choking with meals occasionally he has burning in his chest). All other systems reviewed and are negative. Physical Exam:   BP (!) 115/45   Pulse 76   Temp 98.3 °F (36.8 °C) (Oral)   Resp 27   Ht 5' 10\" (1.778 m)   Wt 200 lb (90.7 kg)   SpO2 91%   BMI 28.70 kg/m²   Temp (24hrs), Av.2 °F (37.3 °C), Min:98.3 °F (36.8 °C), Max:100 °F (37.8 °C)    No results for input(s): POCGLU in the last 72 hours.   No intake or output data in the 24 hours ending 02/15/23 1436    Physical Exam    Investigations:      Laboratory Testing:  Recent Results (from the past 24 hour(s))   CBC with Auto Differential    Collection Time: 02/15/23 10:15 AM   Result Value Ref Range    WBC 5.4 3.5 - 11.3 k/uL    RBC 3.85 (L) 4.21 - 5.77 m/uL    Hemoglobin 11.9 (L) 13.0 - 17.0 g/dL    Hematocrit 38.4 (L) 40.7 - 50.3 %    MCV 99.7 82.6 - 102.9 fL    MCH 30.9 25.2 - 33.5 pg    MCHC 31.0 28.4 - 34.8 g/dL    RDW 15.5 (H) 11.8 - 14.4 %    Platelets 707 920 - 638 k/uL    MPV 8.4 8.1 - 13.5 fL    NRBC Automated 1.1 (H) 0.0 per 100 WBC    Seg Neutrophils 66 (H) 36 - 65 %    Lymphocytes 16 (L) 24 - 43 %    Monocytes 17 (H) 3 - 12 %    Eosinophils % 0 (L) 1 - 4 %    Basophils 0 0 - 2 %    Immature Granulocytes 1 (H) 0 %    Segs Absolute 3.54 1.50 - 8.10 k/uL    Absolute Lymph # 0.87 (L) 1.10 - 3.70 k/uL    Absolute Mono # 0. 91 0.10 - 1.20 k/uL    Absolute Eos # <0.03 0.00 - 0.44 k/uL    Basophils Absolute <0.03 0.00 - 0.20 k/uL    Absolute Immature Granulocyte 0.05 0.00 - 0.30 k/uL    RBC Morphology ANISOCYTOSIS PRESENT    Basic Metabolic Panel    Collection Time: 02/15/23 10:15 AM   Result Value Ref Range    Glucose 121 (H) 70 - 99 mg/dL    BUN 10 8 - 23 mg/dL    Creatinine 1.32 (H) 0.70 - 1.20 mg/dL    Est, Glom Filt Rate 56 (L) >60 mL/min/1.73m2    Bun/Cre Ratio 8 (L) 9 - 20    Calcium 8.4 (L) 8.6 - 10.4 mg/dL    Sodium 145 (H) 135 - 144 mmol/L    Potassium 3.2 (L) 3.7 - 5.3 mmol/L    Chloride 91 (L) 98 - 107 mmol/L    CO2 45 (HH) 20 - 31 mmol/L    Anion Gap 9 9 - 17 mmol/L   TROP/MYOGLOBIN    Collection Time: 02/15/23 10:15 AM   Result Value Ref Range    Troponin, High Sensitivity 55 (HH) 0 - 22 ng/L    Myoglobin 59 28 - 72 ng/mL   Brain Natriuretic Peptide    Collection Time: 02/15/23 10:15 AM   Result Value Ref Range    Pro-BNP 1,683 (H) <300 pg/mL   Lactate, Sepsis    Collection Time: 02/15/23 10:15 AM   Result Value Ref Range    Lactic Acid, Sepsis 2.8 (H) 0.5 - 1.9 mmol/L   EKG 12 Lead    Collection Time: 02/15/23 10:32 AM   Result Value Ref Range    Ventricular Rate 78 BPM    Atrial Rate 78 BPM    P-R Interval 106 ms    QRS Duration 132 ms    Q-T Interval 430 ms    QTc Calculation (Bazett) 490 ms    P Axis 39 degrees    R Axis 9 degrees    T Axis 41 degrees   COVID-19, Rapid    Collection Time: 02/15/23 10:54 AM    Specimen: Nasopharyngeal Swab   Result Value Ref Range    Specimen Description . NASOPHARYNGEAL SWAB     SARS-CoV-2, Rapid Not Detected Not Detected   TROP/MYOGLOBIN    Collection Time: 02/15/23 12:30 PM   Result Value Ref Range    Troponin, High Sensitivity 44 (H) 0 - 22 ng/L    Myoglobin 51 28 - 72 ng/mL   SPECIMEN REJECTION    Collection Time: 02/15/23  1:03 PM   Result Value Ref Range    Specimen Source . BLOOD     Ordered Test PT,PTT     Reason for Rejection       Unable to perform testing: Specimen quantity not sufficient. TROP/MYOGLOBIN    Collection Time: 02/15/23  1:43 PM   Result Value Ref Range    Troponin, High Sensitivity 45 (H) 0 - 22 ng/L    Myoglobin 55 28 - 72 ng/mL   Lactate, Sepsis    Collection Time: 02/15/23  1:43 PM   Result Value Ref Range    Lactic Acid, Sepsis 2.0 (H) 0.5 - 1.9 mmol/L   Protime-INR    Collection Time: 02/15/23  1:43 PM   Result Value Ref Range    Protime 13.7 11.5 - 14.2 sec    INR 1.1    APTT    Collection Time: 02/15/23  1:43 PM   Result Value Ref Range    PTT 27.0 23.9 - 33.8 sec       Imaging/Diagnostics:  XR CHEST PORTABLE    Result Date: 2/15/2023  Chronic pulmonary change without acute cardiopulmonary process. CT CHEST PULMONARY EMBOLISM W CONTRAST    Result Date: 2/15/2023  1. Tiny distal pulmonary emboli in the posterior basal left lower lobe segmental pulmonary artery. 2. Mild dependent atelectasis and respiratory motion. Mild-to-moderate emphysema. Stable known 9 mm lingular pulmonary nodule, unchanged from 1/14/2023. Please see workup recommendations on prior CT chest report of 1/14/2023. 3. Mucoid secretions and/or aspirated material in the trachea and mainstem bronchi. 4. Small pericardial effusion. 5. Small right-sided pleural effusion. 6. Coronary artery disease. 7. Atheromatous plaque and atherosclerotic calcification of the aorta and branch vasculature. 8. Dilated, patulous esophagus with wall thickening of the distal esophagus and GE junction. Small to moderate hiatal hernia. Consider further evaluation with palpation, EGD and/or esophagram/upper GI series. 9. Cholelithiasis. 10. Fatty liver. Critical results were called by Dr. Juanjo Baptiste. Natasha Brand MD to GLORIA Leigh on 2/15/2023 at 12:38 p.m. by telephone.        Assessment :      Hospital Problems             Last Modified POA    * (Principal) Pulmonary embolism on left ([address] 2/15/2023 Yes    Acute on chronic respiratory failure with hypoxia ([address] 2/15/2023 Yes    COPD exacerbation ([address] 2/15/2023 Yes Tobacco abuse 2/15/2023 Yes    Essential (primary) hypertension 2/15/2023 Yes    SUNITHA (acute kidney injury) ([facility] 2/15/2023 Yes    Acute aspiration pneumonia ([facility] 2/15/2023 Yes    Hypokalemia 2/15/2023 Yes       Plan:     Patient status inpatient in the  Progressive Unit/Step down    Left lower lobe pulmonary embolus  Continue heparin drip per PE protocol  Jayce score 3  Bilateral lower extremity Dopplers ordered    Acute on chronic respiratory failure with hypoxia  Nasal cannula at 3 to 4 L  BiPAP at night  Monitor continuous pulse ox    COPD exacerbation  Solu-Medrol   PPI  Duo nebs  Continue home respiratory treatments    Essential primary hypertension  Continue home Toprol  Hold Bumex related to SUNITHA    SUNITHA  Hold diuretic  Gentle IV hydration; patient needed diuresis on previous admission  Monitor I&O closely  Monitor BMP     Acute aspiration pneumonia  Antibiotics  Speech consult for swallow evaluation  Blood cultures pending    Hypokalemia  Replacement ordered  Continue to monitor    Per his CT the radiologist Recommends follow-up related to CT findings concerning for small to moderate hiatal hernia. EGD and/or esophagram/upper GI       Consultations:   IP CONSULT TO HOSPITALIST    Patient is admitted as inpatient status because of co-morbidities listed above, severity of signs and symptoms as outlined, requirement for current medical therapies and most importantly because of direct risk to patient if care not provided in a hospital setting. Expected length of stay > 48 hours.     ALEXSANDRA Pollack - CNP  2/15/2023  2:36 PM    Copy sent to Dr. Herman Kim MD

## 2023-02-15 NOTE — ED NOTES
[address] called to give report, Pt has had multiple falls over the past week and trouble ambulating because of SOB.       Ke Lewis RN  02/15/23 1037

## 2023-02-15 NOTE — ED PROVIDER NOTES
EMERGENCY DEPARTMENT ENCOUNTER   ATTENDING ATTESTATION     Pt Name: Yanci Enrique  MRN: [id number]  [place] 1945  Date of evaluation: 2/15/23   Yanci Enrique is a 78 y.o. male with CC: Shortness of Breath (Pt was brought in via EMS for SOB that started this morning, Hx of COPD, Not on home O2)    MDM:   The patient is a 77-year-old male with a history of COPD who presented to the emergency department secondary to shortness of breath. Recent diagnosis of COVID noted hypoxia therefore CT chest was ordered. Patient was positive for PE no right heart strain. Patient initiated on heparin treated for COPD exacerbation admitted for further evaluation treatment. This visit was performed by both a physician and an APC. I personally evaluated and examined the patient. I performed all aspects of the MDM as documented. CRITICAL CARE:       EKG: All EKG's are interpreted by the Emergency Department Physician who either signs or Co-signs this chart in the absence of a cardiologist.      RADIOLOGY:All plain film, CT, MRI, and formal ultrasound images (except ED bedside ultrasound) are read by the radiologist, see reports below, unless otherwise noted in MDM or here. XR CHEST PORTABLE    (Results Pending)     LABS: All lab results were reviewed by myself, and all abnormals are listed below. Labs Reviewed   COVID-19, RAPID   CBC WITH AUTO DIFFERENTIAL   BASIC METABOLIC PANEL   TROP/MYOGLOBIN   TROP/MYOGLOBIN   TROP/MYOGLOBIN   BRAIN NATRIURETIC PEPTIDE     CONSULTS:  None  FINAL IMPRESSION    No diagnosis found.         PASTMEDICAL HISTORY     Past Medical History:   Diagnosis Date    Back pain     Bipolar 1 disorder ([facility]     Cancer (H[facility])     bowel    COPD (chronic obstructive pulmonary disease) (HCC)     GERD (gastroesophageal reflux disease)     Heart attack ([facility] [address]      SURGICAL HISTORY       Past Surgical History:   Procedure Laterality Date    ANUS SURGERY      bowel surgery r/t cancer approx 4 years ago    COLONOSCOPY N/A 7/30/2018    COLONOSCOPY POLYPECTOMY HOT BIOPSY performed by Kymberly Christy DO at [facility] OR    HERNIA REPAIR      umbilical     CURRENT MEDICATIONS       Previous Medications    ARFORMOTEROL TARTRATE (BROVANA) 15 MCG/2ML NEBU    Take 2 mLs by nebulization in the morning and 2 mLs in the evening.    ASPIRIN 81 MG CHEWABLE TABLET    Take 1 tablet by mouth daily    BUDESONIDE (PULMICORT) 0.5 MG/2ML NEBULIZER SUSPENSION    Take 2 mLs by nebulization in the morning and 2 mLs in the evening.    BUMETANIDE (BUMEX) 1 MG TABLET    Take 1 tablet by mouth daily    DIVALPROEX (DEPAKOTE ER) 500 MG EXTENDED RELEASE TABLET    Take 2 tablets by mouth daily    ERGOCALCIFEROL (VITAMIN D) 14156 UNITS CAPS    Take 50,000 Units by mouth once a week    LAMOTRIGINE (LAMICTAL) 100 MG TABLET    Take 100 mg by mouth daily    METOPROLOL SUCCINATE (TOPROL XL) 25 MG EXTENDED RELEASE TABLET    Take 1 tablet by mouth daily    NICOTINE (NICODERM CQ) 14 MG/24HR    Place 1 patch onto the skin daily    PANTOPRAZOLE (PROTONIX) 40 MG TABLET    Take 1 tablet by mouth every morning (before breakfast)    POLYETHYLENE GLYCOL (GLYCOLAX) 17 G PACKET    Take 17 g by mouth daily as needed for Constipation    PREDNISONE (DELTASONE) 10 MG TABLET    Take 20 mg twice a day for 3 days, take 20 mg in the morning and 10 mg at night for 3 days, take 10 mg twice a day for 3 days, take 10 mg only in the morning for 3 days, then stop    QUETIAPINE (SEROQUEL XR) 150 MG TB24 EXTENDED RELEASE TABLET    Take 1 tablet by mouth daily    QUETIAPINE (SEROQUEL) 400 MG TABLET    Take 400 mg by mouth at bedtime     ALLERGIES     has No Known Allergies.  FAMILY HISTORY     He indicated that the status of his father is unknown. He indicated that the status of his brother is unknown. He indicated that the status of his other is unknown.     SOCIAL HISTORY       Social History     Tobacco Use    Smoking status: Some Days     Types: Cigarettes, Pipe    Smokeless  tobacco: Never    Tobacco comments:     unable to identify an amount   Vaping Use    Vaping Use: Some days   Substance Use Topics    Alcohol use: No    Drug use: No          Wenceslao Conn MD  The care is provided during an unprecedented national emergency due to the novel coronavirus, COVID 19.   Attending Emergency Physician          Wenceslao Conn MD  41/43/70 0036

## 2023-02-15 NOTE — ED NOTES
Pt resting in position of comfort, RR Even and unlabored. Monitoring devices replaced  Pt used urinal  Linens changed. PO snack provided  meds infusing per [facility]  Awaiting admission and further orders  PT denies needs at present.       Mike Ardon RN  02/15/23 4378

## 2023-02-16 ENCOUNTER — APPOINTMENT (OUTPATIENT)
Dept: GENERAL RADIOLOGY | Age: 78
End: 2023-02-16
Payer: MEDICARE

## 2023-02-16 LAB
ABSOLUTE EOS #: 0 K/UL (ref 0–0.4)
ABSOLUTE IMMATURE GRANULOCYTE: 0.05 K/UL (ref 0–0.3)
ABSOLUTE LYMPH #: 0.71 K/UL (ref 1–4.8)
ABSOLUTE MONO #: 0.31 K/UL (ref 0.2–0.8)
ANION GAP SERPL CALCULATED.3IONS-SCNC: 5 MMOL/L (ref 9–17)
ANTI-XA UNFRAC HEPARIN: 0.56 IU/L (ref 0.3–0.7)
ANTI-XA UNFRAC HEPARIN: 0.88 IU/L (ref 0.3–0.7)
ANTI-XA UNFRAC HEPARIN: 0.95 IU/L (ref 0.3–0.7)
BASOPHILS # BLD: 0 %
BASOPHILS ABSOLUTE: 0 K/UL (ref 0–0.2)
BUN SERPL-MCNC: 12 MG/DL (ref 8–23)
BUN/CREAT BLD: 11 (ref 9–20)
CALCIUM SERPL-MCNC: 8.2 MG/DL (ref 8.6–10.4)
CHLORIDE SERPL-SCNC: 96 MMOL/L (ref 98–107)
CO2 SERPL-SCNC: 40 MMOL/L (ref 20–31)
CREAT SERPL-MCNC: 1.08 MG/DL (ref 0.7–1.2)
EKG ATRIAL RATE: 67 BPM
EKG P AXIS: 52 DEGREES
EKG P-R INTERVAL: 130 MS
EKG Q-T INTERVAL: 502 MS
EKG QRS DURATION: 136 MS
EKG QTC CALCULATION (BAZETT): 530 MS
EKG R AXIS: -47 DEGREES
EKG T AXIS: 33 DEGREES
EKG VENTRICULAR RATE: 67 BPM
EOSINOPHILS RELATIVE PERCENT: 0 % (ref 1–4)
GFR SERPL CREATININE-BSD FRML MDRD: >60 ML/MIN/1.73M2
GLUCOSE SERPL-MCNC: 188 MG/DL (ref 70–99)
HCT VFR BLD AUTO: 37 % (ref 40.7–50.3)
HGB BLD-MCNC: 11.5 G/DL (ref 13–17)
IMMATURE GRANULOCYTES: 1 %
LV EF: 65 %
LVEF MODALITY: NORMAL
LYMPHOCYTES # BLD: 14 % (ref 24–44)
MAGNESIUM SERPL-MCNC: 2.1 MG/DL (ref 1.6–2.6)
MCH RBC QN AUTO: 31.5 PG (ref 25.2–33.5)
MCHC RBC AUTO-ENTMCNC: 31.1 G/DL (ref 28.4–34.8)
MCV RBC AUTO: 101.4 FL (ref 82.6–102.9)
MONOCYTES # BLD: 6 % (ref 1–7)
NRBC AUTOMATED: 0.6 PER 100 WBC
PDW BLD-RTO: 15.2 % (ref 11.8–14.4)
PLATELET # BLD AUTO: 273 K/UL (ref 138–453)
PMV BLD AUTO: 8.7 FL (ref 8.1–13.5)
POTASSIUM SERPL-SCNC: 3.3 MMOL/L (ref 3.7–5.3)
RBC # BLD: 3.65 M/UL (ref 4.21–5.77)
SEG NEUTROPHILS: 79 % (ref 36–66)
SEGMENTED NEUTROPHILS ABSOLUTE COUNT: 4.03 K/UL (ref 1.8–7.7)
SODIUM SERPL-SCNC: 141 MMOL/L (ref 135–144)
TROPONIN I SERPL DL<=0.01 NG/ML-MCNC: 30 NG/L (ref 0–22)
TROPONIN I SERPL DL<=0.01 NG/ML-MCNC: 33 NG/L (ref 0–22)
WBC # BLD AUTO: 5.1 K/UL (ref 3.5–11.3)

## 2023-02-16 PROCEDURE — 97530 THERAPEUTIC ACTIVITIES: CPT

## 2023-02-16 PROCEDURE — 80048 BASIC METABOLIC PNL TOTAL CA: CPT

## 2023-02-16 PROCEDURE — 92610 EVALUATE SWALLOWING FUNCTION: CPT

## 2023-02-16 PROCEDURE — 6370000000 HC RX 637 (ALT 250 FOR IP): Performed by: INTERNAL MEDICINE

## 2023-02-16 PROCEDURE — 93010 ELECTROCARDIOGRAM REPORT: CPT | Performed by: INTERNAL MEDICINE

## 2023-02-16 PROCEDURE — 97162 PT EVAL MOD COMPLEX 30 MIN: CPT

## 2023-02-16 PROCEDURE — 2580000003 HC RX 258: Performed by: NURSE PRACTITIONER

## 2023-02-16 PROCEDURE — 2060000000 HC ICU INTERMEDIATE R&B

## 2023-02-16 PROCEDURE — 97166 OT EVAL MOD COMPLEX 45 MIN: CPT

## 2023-02-16 PROCEDURE — 6360000002 HC RX W HCPCS: Performed by: NURSE PRACTITIONER

## 2023-02-16 PROCEDURE — 97535 SELF CARE MNGMENT TRAINING: CPT

## 2023-02-16 PROCEDURE — 85025 COMPLETE CBC W/AUTO DIFF WBC: CPT

## 2023-02-16 PROCEDURE — 93306 TTE W/DOPPLER COMPLETE: CPT

## 2023-02-16 PROCEDURE — 99232 SBSQ HOSP IP/OBS MODERATE 35: CPT | Performed by: INTERNAL MEDICINE

## 2023-02-16 PROCEDURE — 6370000000 HC RX 637 (ALT 250 FOR IP): Performed by: NURSE PRACTITIONER

## 2023-02-16 PROCEDURE — 94640 AIRWAY INHALATION TREATMENT: CPT

## 2023-02-16 PROCEDURE — 93005 ELECTROCARDIOGRAM TRACING: CPT | Performed by: STUDENT IN AN ORGANIZED HEALTH CARE EDUCATION/TRAINING PROGRAM

## 2023-02-16 PROCEDURE — 84484 ASSAY OF TROPONIN QUANT: CPT

## 2023-02-16 PROCEDURE — 2700000000 HC OXYGEN THERAPY PER DAY

## 2023-02-16 PROCEDURE — 71045 X-RAY EXAM CHEST 1 VIEW: CPT

## 2023-02-16 PROCEDURE — 36415 COLL VENOUS BLD VENIPUNCTURE: CPT

## 2023-02-16 PROCEDURE — 2580000003 HC RX 258: Performed by: EMERGENCY MEDICINE

## 2023-02-16 PROCEDURE — 85520 HEPARIN ASSAY: CPT

## 2023-02-16 PROCEDURE — 83735 ASSAY OF MAGNESIUM: CPT

## 2023-02-16 PROCEDURE — 2580000003 HC RX 258: Performed by: PHYSICIAN ASSISTANT

## 2023-02-16 PROCEDURE — 94761 N-INVAS EAR/PLS OXIMETRY MLT: CPT

## 2023-02-16 PROCEDURE — 97110 THERAPEUTIC EXERCISES: CPT

## 2023-02-16 RX ORDER — IBUPROFEN 800 MG/1
800 TABLET ORAL ONCE
Status: COMPLETED | OUTPATIENT
Start: 2023-02-16 | End: 2023-02-16

## 2023-02-16 RX ORDER — OXYCODONE HYDROCHLORIDE AND ACETAMINOPHEN 5; 325 MG/1; MG/1
1 TABLET ORAL EVERY 4 HOURS PRN
Status: DISCONTINUED | OUTPATIENT
Start: 2023-02-16 | End: 2023-02-24 | Stop reason: HOSPADM

## 2023-02-16 RX ADMIN — OXYCODONE AND ACETAMINOPHEN 1 TABLET: 5; 325 TABLET ORAL at 07:55

## 2023-02-16 RX ADMIN — ASPIRIN 81 MG CHEWABLE TABLET 81 MG: 81 TABLET CHEWABLE at 08:20

## 2023-02-16 RX ADMIN — BUDESONIDE 500 MCG: 0.5 SUSPENSION RESPIRATORY (INHALATION) at 08:12

## 2023-02-16 RX ADMIN — METHYLPREDNISOLONE SODIUM SUCCINATE 40 MG: 40 INJECTION, POWDER, FOR SOLUTION INTRAMUSCULAR; INTRAVENOUS at 01:56

## 2023-02-16 RX ADMIN — LAMOTRIGINE 100 MG: 100 TABLET ORAL at 08:20

## 2023-02-16 RX ADMIN — METHYLPREDNISOLONE SODIUM SUCCINATE 40 MG: 40 INJECTION, POWDER, FOR SOLUTION INTRAMUSCULAR; INTRAVENOUS at 23:10

## 2023-02-16 RX ADMIN — IPRATROPIUM BROMIDE AND ALBUTEROL SULFATE 1 AMPULE: 2.5; .5 SOLUTION RESPIRATORY (INHALATION) at 20:55

## 2023-02-16 RX ADMIN — AMPICILLIN SODIUM AND SULBACTAM SODIUM 3000 MG: 2; 1 INJECTION, POWDER, FOR SOLUTION INTRAMUSCULAR; INTRAVENOUS at 14:35

## 2023-02-16 RX ADMIN — METHYLPREDNISOLONE SODIUM SUCCINATE 40 MG: 40 INJECTION, POWDER, FOR SOLUTION INTRAMUSCULAR; INTRAVENOUS at 14:32

## 2023-02-16 RX ADMIN — IPRATROPIUM BROMIDE AND ALBUTEROL SULFATE 1 AMPULE: 2.5; .5 SOLUTION RESPIRATORY (INHALATION) at 08:12

## 2023-02-16 RX ADMIN — PANTOPRAZOLE SODIUM 40 MG: 40 TABLET, DELAYED RELEASE ORAL at 05:11

## 2023-02-16 RX ADMIN — DIVALPROEX SODIUM 1000 MG: 500 TABLET, EXTENDED RELEASE ORAL at 23:13

## 2023-02-16 RX ADMIN — ARFORMOTEROL TARTRATE 15 MCG: 15 SOLUTION RESPIRATORY (INHALATION) at 20:55

## 2023-02-16 RX ADMIN — ARFORMOTEROL TARTRATE 15 MCG: 15 SOLUTION RESPIRATORY (INHALATION) at 08:12

## 2023-02-16 RX ADMIN — METHYLPREDNISOLONE SODIUM SUCCINATE 40 MG: 40 INJECTION, POWDER, FOR SOLUTION INTRAMUSCULAR; INTRAVENOUS at 08:20

## 2023-02-16 RX ADMIN — POTASSIUM CHLORIDE 40 MEQ: 1500 TABLET, EXTENDED RELEASE ORAL at 05:11

## 2023-02-16 RX ADMIN — IPRATROPIUM BROMIDE AND ALBUTEROL SULFATE 1 AMPULE: 2.5; .5 SOLUTION RESPIRATORY (INHALATION) at 14:13

## 2023-02-16 RX ADMIN — AMPICILLIN SODIUM AND SULBACTAM SODIUM 3000 MG: 2; 1 INJECTION, POWDER, FOR SOLUTION INTRAMUSCULAR; INTRAVENOUS at 02:53

## 2023-02-16 RX ADMIN — BUDESONIDE 500 MCG: 0.5 SUSPENSION RESPIRATORY (INHALATION) at 20:55

## 2023-02-16 RX ADMIN — IBUPROFEN 800 MG: 800 TABLET, FILM COATED ORAL at 11:34

## 2023-02-16 RX ADMIN — AMPICILLIN SODIUM AND SULBACTAM SODIUM 3000 MG: 2; 1 INJECTION, POWDER, FOR SOLUTION INTRAMUSCULAR; INTRAVENOUS at 23:09

## 2023-02-16 RX ADMIN — SODIUM CHLORIDE, PRESERVATIVE FREE 10 ML: 5 INJECTION INTRAVENOUS at 23:13

## 2023-02-16 RX ADMIN — METOPROLOL SUCCINATE 25 MG: 25 TABLET, EXTENDED RELEASE ORAL at 08:20

## 2023-02-16 RX ADMIN — SODIUM CHLORIDE, PRESERVATIVE FREE 10 ML: 5 INJECTION INTRAVENOUS at 08:26

## 2023-02-16 RX ADMIN — AMPICILLIN SODIUM AND SULBACTAM SODIUM 3000 MG: 2; 1 INJECTION, POWDER, FOR SOLUTION INTRAMUSCULAR; INTRAVENOUS at 08:46

## 2023-02-16 RX ADMIN — SODIUM CHLORIDE, PRESERVATIVE FREE 10 ML: 5 INJECTION INTRAVENOUS at 01:56

## 2023-02-16 RX ADMIN — QUETIAPINE FUMARATE 400 MG: 200 TABLET ORAL at 23:13

## 2023-02-16 RX ADMIN — SODIUM CHLORIDE: 9 INJECTION, SOLUTION INTRAVENOUS at 08:44

## 2023-02-16 ASSESSMENT — PAIN DESCRIPTION - LOCATION
LOCATION: CHEST

## 2023-02-16 ASSESSMENT — PAIN SCALES - GENERAL
PAINLEVEL_OUTOF10: 2
PAINLEVEL_OUTOF10: 8
PAINLEVEL_OUTOF10: 10
PAINLEVEL_OUTOF10: 0

## 2023-02-16 ASSESSMENT — PAIN DESCRIPTION - DESCRIPTORS
DESCRIPTORS: HEAVINESS
DESCRIPTORS: ACHING

## 2023-02-16 ASSESSMENT — PAIN DESCRIPTION - ORIENTATION: ORIENTATION: MID

## 2023-02-16 NOTE — RT PROTOCOL NOTE
RT Inhaler-Nebulizer Bronchodilator Protocol Note    There is a bronchodilator order in the chart from a provider indicating to follow the RT Bronchodilator Protocol and there is an Initiate RT Inhaler-Nebulizer Bronchodilator Protocol order as well (see protocol at bottom of note). CXR Findings:  XR CHEST PORTABLE    Result Date: 2/15/2023  Chronic pulmonary change without acute cardiopulmonary process. The findings from the last RT Protocol Assessment were as follows:   History Pulmonary Disease: Chronic pulmonary disease  Respiratory Pattern: Dyspnea on exertion or RR 21-25 bpm  Breath Sounds: Slightly diminished and/or crackles  Cough: Strong, productive  Indication for Bronchodilator Therapy:    Bronchodilator Assessment Score: 7    Aerosolized bronchodilator medication orders have been revised according to the RT Inhaler-Nebulizer Bronchodilator Protocol below. Respiratory Therapist to perform RT Therapy Protocol Assessment initially then follow the protocol. Repeat RT Therapy Protocol Assessment PRN for score 0-3 or on second treatment, BID, and PRN for scores above 3. No Indications - adjust the frequency to every 6 hours PRN wheezing or bronchospasm, if no treatments needed after 48 hours then discontinue using Per Protocol order mode. If indication present, adjust the RT bronchodilator orders based on the Bronchodilator Assessment Score as indicated below. Use Inhaler orders unless patient has one or more of the following: on home nebulizer, not able to hold breath for 10 seconds, is not alert and oriented, cannot activate and use MDI correctly, or respiratory rate 25 breaths per minute or more, then use the equivalent nebulizer order(s) with same Frequency and PRN reasons based on the score. If a patient is on this medication at home then do not decrease Frequency below that used at home.     0-3 - enter or revise RT bronchodilator order(s) to equivalent RT Bronchodilator order with Frequency of every 4 hours PRN for wheezing or increased work of breathing using Per Protocol order mode. 4-6 - enter or revise RT Bronchodilator order(s) to two equivalent RT bronchodilator orders with one order with BID Frequency and one order with Frequency of every 4 hours PRN wheezing or increased work of breathing using Per Protocol order mode. 7-10 - enter or revise RT Bronchodilator order(s) to two equivalent RT bronchodilator orders with one order with TID Frequency and one order with Frequency of every 4 hours PRN wheezing or increased work of breathing using Per Protocol order mode. 11-13 - enter or revise RT Bronchodilator order(s) to one equivalent RT bronchodilator order with QID Frequency and an Albuterol order with Frequency of every 4 hours PRN wheezing or increased work of breathing using Per Protocol order mode. Greater than 13 - enter or revise RT Bronchodilator order(s) to one equivalent RT bronchodilator order with every 4 hours Frequency and an Albuterol order with Frequency of every 2 hours PRN wheezing or increased work of breathing using Per Protocol order mode. RT to enter RT Home Evaluation for COPD & MDI Assessment order using Per Protocol order mode.     Electronically signed by Ino Curry RCP on 2/15/2023 at 8:36 PM

## 2023-02-16 NOTE — PROGRESS NOTES
[facility]  Office: [address], DO, Filipe Shafer, DO, Dariela Ramsey, DO, Daisy Thurman Blood, DO, Jose De Jesus Izaguirre MD, Vida Olsen MD, August Whitmore MD, Heike Bunch MD,  Monica Ortiz MD, Edison Villarreal MD, Darcy Terry, DO, Abigail Mitchell MD,  Pricila Lam MD, Jose Mack MD, Diego Rose DO, Doyle Richardson MD, Mendez Thurman MD, Ruthann Horvath DO, Leonardo Rivera MD, Chelsea Pradhan MD, Sal Galan MD, Mirella Newman MD, Sri Sin, DO, Kourtney Magana MD, Joe Canales MD, Raul Downs, [facility],  Tess Waldron, CNP, Johny Saha, CNP, Ayaz Sanchez, CNP,  Franki Lundborg, [facility], Robin Hunt, CNP, David Major, CNP, Kaylie Parker, CNP, Fina Lozano, CNP, Caroline Moran, CNP, JONNY KowalskiC, Esteban Simmons, CNS, Valerie Astudillo, [facility], Toni Dunbar, [facility]    Progress Note    2/16/2023    1:31 PM    Name:   Huber Kelly  MRN:     [id number]     Acct:      [de-identified]   Room:   [facility]/1008-01   Day:  1  Admit Date:  2/15/2023 10:00 AM    PCP:   Jing Ring MD  Code Status:  Full Code    Subjective:     C/C:   Chief Complaint   Patient presents with    Shortness of Breath     Pt was brought in via EMS for SOB that started this morning, Hx of COPD, Not on home O2     Interval History Status: not changed. Still feels some sob and also having left sided cp    Denies n/v    Does not use o2 at home    Brief History:     Per my BRONSON:  Mauricio Abebe is a 78 y.o. Non- / non  male who presents with Shortness of Breath (Pt was brought in via EMS for SOB that started this morning, Hx of COPD, Not on home O2)   and is admitted to the hospital for the management of Pulmonary embolism on left [facility]). Patient presented to the ER today with complaints of shortness of breath and wheezing.   He has this is a coughs a lot when he is eating and notices burning in his chest sometimes after he eats. Per vitals he was noted to be hypoxic and oxygen per nasal cannula was applied. CT chest was positive for tiny PE but no heart strain. There is also concern about possible aspiration. When I spoke to the patient he says he does have a history of coughing with meals and often has pain in his chest after eating. Patient was hospitalized 1/13 through 1/27/2023. At that time he was treated for COPD exacerbation and was positive for COVID. He received a full dose of Paxlovid while inpatient. According to the notes his oxygenation was labile but improved with diuresis. At the time of discharge he was given prednisone. Patient was encouraged to have a sleep study as an outpatient. \"    Review of Systems:     Constitutional:  negative for chills, fevers, sweats  Respiratory:  negative for cough, wheezing  Cardiovascular:  negative for lower extremity edema, palpitations  Gastrointestinal:  negative for abdominal pain, constipation, diarrhea, nausea, vomiting  Neurological:  negative for dizziness, headache    Medications:      Allergies:  No Known Allergies    Current Meds:   Scheduled Meds:    sodium chloride flush  5-40 mL IntraVENous 2 times per day    arformoterol tartrate  15 mcg Nebulization BID    aspirin  81 mg Oral Daily    budesonide  0.5 mg Nebulization BID    divalproex  1,000 mg Oral Nightly    [START ON 2/19/2023] vitamin D  50,000 Units Oral Weekly    lamoTRIgine  100 mg Oral Daily    metoprolol succinate  25 mg Oral Daily    nicotine  1 patch TransDERmal Daily    pantoprazole  40 mg Oral QAM AC    QUEtiapine  400 mg Oral Nightly    methylPREDNISolone  40 mg IntraVENous Q6H    Followed by    Erickson Nicolas ON 2/18/2023] predniSONE  40 mg Oral Daily    ampicillin-sulbactam  3,000 mg IntraVENous Q6H    ipratropium-albuterol  1 ampule Inhalation TID     Continuous Infusions:    heparin (PORCINE) Infusion 11 Units/kg/hr (02/16/23 1466)    sodium chloride 10 mL/hr at 02/16/23 4488 sodium chloride 50 mL/hr at 02/15/23 1637     PRN Meds: oxyCODONE-acetaminophen, perflutren lipid microspheres, albuterol, sodium chloride flush, heparin (porcine), heparin (porcine), sodium chloride flush, sodium chloride, polyethylene glycol, ondansetron **OR** ondansetron, acetaminophen **OR** acetaminophen, magnesium sulfate, potassium chloride **OR** potassium alternative oral replacement **OR** potassium chloride    Data:     Past Medical History:   has a past medical history of Back pain, Bipolar 1 disorder ([facility] Cancer ([facility] COPD (chronic obstructive pulmonary disease) ([facility] GERD (gastroesophageal reflux disease), and Heart attack ([facility] Social History:   reports that he has been smoking cigarettes and pipe. He has never used smokeless tobacco. He reports that he does not drink alcohol and does not use drugs. Family History:   Family History   Problem Relation Age of Onset    Cancer Father         Lung    Stroke Father     Cancer Brother         Lymphoma    Mental Illness Other         Aunt       Vitals:  /82   Pulse 69   Temp 97.3 °F (36.3 °C) (Oral)   Resp 19   Ht 5' 10\" (1.778 m)   Wt 200 lb (90.7 kg)   SpO2 98%   BMI 28.70 kg/m²   Temp (24hrs), Av.1 °F (36.7 °C), Min:97.3 °F (36.3 °C), Max:99 °F (37.2 °C)    No results for input(s): POCGLU in the last 72 hours. I/O (24Hr):     Intake/Output Summary (Last 24 hours) at 2023 1331  Last data filed at 2023 0253  Gross per 24 hour   Intake --   Output 575 ml   Net -575 ml       Labs:  Hematology:  Recent Labs     02/15/23  1015 02/15/23  1343 23  0419   WBC 5.4  --  5.1   RBC 3.85*  --  3.65*   HGB 11.9*  --  11.5*   HCT 38.4*  --  37.0*   MCV 99.7  --  101.4   MCH 30.9  --  31.5   MCHC 31.0  --  31.1   RDW 15.5*  --  15.2*     --  273   MPV 8.4  --  8.7   INR  --  1.1  --      Chemistry:  Recent Labs     02/15/23  1015 02/15/23  1230 02/15/23  1343 23  0419 23  1103 23  1216   * --   --  141  --   --    K 3.2*  --   --  3.3*  --   --    CL 91*  --   --  96*  --   --    CO2 45*  --   --  40*  --   --    GLUCOSE 121*  --   --  188*  --   --    BUN 10  --   --  12  --   --    CREATININE 1.32*  --   --  1.08  --   --    MG  --   --   --  2.1  --   --    ANIONGAP 9  --   --  5*  --   --    LABGLOM 56*  --   --  >60  --   --    CALCIUM 8.4*  --   --  8.2*  --   --    PROBNP 1,683*  --   --   --   --   --    TROPHS 55* 44* 45*  --  33* 30*   MYOGLOBIN 59 51 55  --   --   --    No results for input(s): PROT, LABALBU, LABA1C, U8QMRED, L0XVNGT, FT4, TSH, AST, ALT, LDH, GGT, ALKPHOS, LABGGT, BILITOT, BILIDIR, AMMONIA, AMYLASE, LIPASE, LACTATE, CHOL, HDL, LDLCHOLESTEROL, CHOLHDLRATIO, TRIG, VLDL, ENY85XG, PHENYTOIN, PHENYF, URICACID, POCGLU in the last 72 hours. ABG:No results found for: POCPH, PHART, PH, POCPCO2, KFW2VXH, PCO2, POCPO2, PO2ART, PO2, POCHCO3, LKH3MZZ, HCO3, NBEA, PBEA, BEART, BE, THGBART, THB, XUO3JZV, WKOL8XAR, T4GOLZGR, O2SAT, FIO2  Lab Results   Component Value Date/Time    SPECIAL L ARM 02/15/2023 01:44 PM     Lab Results   Component Value Date/Time    CULTURE NO GROWTH 12 HOURS 02/15/2023 01:44 PM       Radiology:  XR CHEST PORTABLE    Result Date: 2/16/2023  Interval volume loss and airspace opacity at the right base, suggesting atelectasis. There may also be some component of pleural fluid. XR CHEST PORTABLE    Result Date: 2/15/2023  Chronic pulmonary change without acute cardiopulmonary process. CT CHEST PULMONARY EMBOLISM W CONTRAST    Result Date: 2/15/2023  1. Tiny distal pulmonary emboli in the posterior basal left lower lobe segmental pulmonary artery. 2. Mild dependent atelectasis and respiratory motion. Mild-to-moderate emphysema. Stable known 9 mm lingular pulmonary nodule, unchanged from 1/14/2023. Please see workup recommendations on prior CT chest report of 1/14/2023. 3. Mucoid secretions and/or aspirated material in the trachea and mainstem bronchi.  4. Small pericardial effusion. 5. Small right-sided pleural effusion. 6. Coronary artery disease. 7. Atheromatous plaque and atherosclerotic calcification of the aorta and branch vasculature. 8. Dilated, patulous esophagus with wall thickening of the distal esophagus and GE junction. Small to moderate hiatal hernia. Consider further evaluation with palpation, EGD and/or esophagram/upper GI series. 9. Cholelithiasis. 10. Fatty liver. Critical results were called by Dr. Tee Valenzuela. Bk Agustin MD to GLORIA Dumont on 2/15/2023 at 12:38 p.m. by telephone. Ekg rbbb      Physical Examination:        General appearance:  alert, cooperative and no distress  Mental Status:  oriented to person, place and time and normal affect  Lungs:  clear to auscultation bilaterally-no wheezing heard, normal effort  Heart:  regular rate and rhythm, no murmur  Abdomen:  soft, nontender, nondistended, normal bowel sounds, no masses, hepatomegaly, splenomegaly  Extremities:  no edema, redness, tenderness in the calves  Skin:  no gross lesions, rashes, induration    Assessment:        Hospital Problems             Last Modified POA    * (Principal) Pulmonary embolism on left ([address] 2/15/2023 Yes    Acute respiratory failure with hypoxia ([address] 2/16/2023 Yes    COPD exacerbation ([address] 2/15/2023 Yes    Tobacco abuse 2/15/2023 Yes    Essential (primary) hypertension 2/15/2023 Yes    SUNITHA (acute kidney injury) ([address] 2/15/2023 Yes    Acute aspiration pneumonia ([address] 2/15/2023 Yes    Hypokalemia 2/15/2023 Yes       Plan:        Ekg reviewed: rbbb, nothing acute  Doubt this cp is cardiac-therefore ntg no help. Cp is a bit unusual though-the PE is very small. Will check echo to see about pap-he is also on high flow of o2 at 10L for such a small PE.   Perhaps this degree of hypoxia is just his copd  Cont unasyn for the aspiration pneumonia concern  Cont heparin drip fot vte  On steroids for copd exac  Replace k  Smoking cessation    Ryan HO Blood, DO  2/16/2023  1:31 PM

## 2023-02-16 NOTE — ACP (ADVANCE CARE PLANNING)
Advance Care Planning     Advance Care Planning Activator (Inpatient)  Conversation Note      Date of ACP Conversation: 2/16/2023     Conversation Conducted with: Patient with Decision Making Capacity    ACP Activator: Akil Espinal RN          Health Care Decision Maker:     Current Designated Health Care Decision Maker:     Primary Decision Maker: Boby Longo - Randal - [phone]  Click here to complete Healthcare Decision Makers including section of the Healthcare Decision Maker Relationship (ie \"Primary\")  Today we documented Decision Maker(s) consistent with Legal Next of Kin hierarchy. Care Preferences    Ventilation: \"If you were in your present state of health and suddenly became very ill and were unable to breathe on your own, what would your preference be about the use of a ventilator (breathing machine) if it were available to you? \"      Would the patient desire the use of ventilator (breathing machine)?: yes he thinks so    \"If your health worsens and it becomes clear that your chance of recovery is unlikely, what would your preference be about the use of a ventilator (breathing machine) if it were available to you? \"     Would the patient desire the use of ventilator (breathing machine)?: No      Resuscitation  \"CPR works best to restart the heart when there is a sudden event, like a heart attack, in someone who is otherwise healthy. Unfortunately, CPR does not typically restart the heart for people who have serious health conditions or who are very sick. \"    \"In the event your heart stopped as a result of an underlying serious health condition, would you want attempts to be made to restart your heart (answer \"yes\" for attempt to resuscitate) or would you prefer a natural death (answer \"no\" for do not attempt to resuscitate)? \" yes       [x] Yes   [] No   Educated Patient / Gus Chung regarding differences between Advance Directives and portable DNR orders.     Length of ACP Conversation in minutes:      Conversation Outcomes:  [x] ACP discussion completed  [] Existing advance directive reviewed with patient; no changes to patient's previously recorded wishes  [] New Advance Directive completed  [] Portable Do Not Rescitate prepared for Provider review and signature  [] POLST/POST/MOLST/MOST prepared for Provider review and signature      Follow-up plan:    [] Schedule follow-up conversation to continue planning  [] Referred individual to Provider for additional questions/concerns   [] Advised patient/agent/surrogate to review completed ACP document and update if needed with changes in condition, patient preferences or care setting    [] This note routed to one or more involved healthcare providers

## 2023-02-16 NOTE — PROGRESS NOTES
Occupational Therapy  Facility/Department: [facility] PROGRESSIVE CARE  Occupational Therapy Initial Assessment    Name: Marc Figueroa  : 1945  MRN: [id number]  Date of Service: 2023    AMARI Boo/Carleen reports patient is medically stable for therapy treatment this date. Chart reviewed prior to treatment and patient is agreeable for therapy. All lines intact and patient positioned comfortably at end of treatment. All patient needs addressed prior to ending therapy session. Discharge Recommendations:  Patient would benefit from continued therapy after discharge  Pt currently functioning below baseline. Recommend daily inpatient skilled therapy at time of discharge to maximize long term outcomes and prevent re-admission. Please refer to AM-PAC score for current level of function. OT Equipment Recommendations  Equipment Needed: Yes  Mobility Devices: ADL Assistive Devices  ADL Assistive Devices: Long-handled Shoe Horn;Long-handled Sponge;Reacher;Sock-Aid Hard       Patient Diagnosis(es): The primary encounter diagnosis was Pneumonia due to infectious organism, unspecified laterality, unspecified part of lung. Diagnoses of COPD exacerbation ([address] and Other acute pulmonary embolism without acute cor pulmonale (HCC) were also pertinent to this visit. Past Medical History:  has a past medical history of Back pain, Bipolar 1 disorder ([address] Cancer ([address] COPD (chronic obstructive pulmonary disease) ([address] GERD (gastroesophageal reflux disease), and Heart attack ([address] Past Surgical History:  has a past surgical history that includes Anus surgery; hernia repair; and Colonoscopy (N/A, 2018). PER H&P:   Marc Figueroa is a 78 y.o. Non- / non  male who presents with Shortness of Breath (Pt was brought in via EMS for SOB that started this morning, Hx of COPD, Not on home O2)   and is admitted to the hospital for the management of Pulmonary embolism on left [facility]).      Patient presented to the ER today with complaints of shortness of breath and wheezing. He has this is a coughs a lot when he is eating and notices burning in his chest sometimes after he eats. Per vitals he was noted to be hypoxic and oxygen per nasal cannula was applied. CT chest was positive for tiny PE but no heart strain. There is also concern about possible aspiration. When I spoke to the patient he says he does have a history of coughing with meals and often has pain in his chest after eating. Patient was hospitalized 1/13 through 1/27/2023. At that time he was treated for COPD exacerbation and was positive for COVID. He received a full dose of Paxlovid while inpatient. According to the notes his oxygenation was labile but improved with diuresis. At the time of discharge she was given prednisone. Patient was encouraged to have a sleep study as an outpatient. Assessment   Performance deficits / Impairments: Decreased functional mobility ; Decreased ADL status; Decreased safe awareness;Decreased balance;Decreased cognition;Decreased posture;Decreased endurance;Decreased high-level IADLs;Decreased strength;Decreased fine motor control  Assessment: Pt would benefit from continued skilled OT services to increase I and safety during functional tasks to return home at [facility] as able  Prognosis: Good  Decision Making: Medium Complexity  Activity Tolerance  Activity Tolerance: Patient Tolerated treatment well;Patient limited by fatigue  Activity Tolerance Comments: fair +        Plan   Occupational Therapy Plan  Times Per Week: 4-5x/wk 1x/day as ilan  Current Treatment Recommendations: Strengthening, Balance training, Functional mobility training, Endurance training, Safety education & training, Equipment evaluation, education, & procurement, Patient/Caregiver education & training, Self-Care / ADL, Home management training     Restrictions  Restrictions/Precautions  Restrictions/Precautions: General Precautions, Fall Risk  Required Braces or Orthoses?: No  Position Activity Restriction  Other position/activity restrictions: Up w/ assist, 8L O2 NC, telemetry, continuous pulse ox, LUE IV, RUE IV    Subjective   General  Chart Reviewed: Yes  Patient assessed for rehabilitation services?: Yes  Family / Caregiver Present: No  Subjective  Subjective: Pt resting in bed, pleasant and agreeable to OT eval.  General Comment  Comments: Pt reporting signficant chest pain, RN aware. Social/Functional History  Social/Functional History  Lives With: Home care staff (assisted living)  Type of Home: Assisted living ([address] [facility])  Home Layout: One level  Home Access: Level entry  Bathroom Shower/Tub: Walk-in shower  Bathroom Toilet: Standard  Bathroom Equipment: Grab bars in shower, Grab bars around toilet, Built-in shower seat  Home Equipment: Walker, rolling (prescribed 3L O2 but does not have it)  Has the patient had two or more falls in the past year or any fall with injury in the past year?: No (Pt denies any falls within the past year however per chart pt has had multiple falls recently)  Receives Help From: Personal care attendant  ADL Assistance: Independent  Toileting: Needs assistance  Homemaking Assistance: Needs assistance (meals, cleaning and laundry provided)  Homemaking Responsibilities: No  Ambulation Assistance: Independent (RW)  Transfer Assistance: Independent  Active : No  Patient's  Info: cab or   Occupation: Retired  Type of Occupation: sb clinical therapist  Leisure & Hobbies: watching [employer], [employer] and computer       Objective     Observation/Palpation  Posture: Fair  Observation: SpO2 remaining >90% throughout session on 8L O2 NC, mild BLE & BUE tremors noted throughout session (pt reports they are not new)  Safety Devices  Type of Devices: Bed alarm in place;Call light within reach; Left in bed;Gait belt;Nurse notified  Restraints  Restraints Initially in Place: No      Balance  Sitting:  (SBA)  Standing:  (CGA)  Functional Mobility   Overall Level of Assistance: Contact-guard assistance (Pt limited by SALTER line, functional mobility forward and backward 10 ft x3 back to bed, with RW)  Interventions: Verbal cues; Tactile cues (Pt given Min verbal cues for pacing self, upright posture, RW safety, initiation and awareness/assist with lines all to increase safety.)     AROM: Within functional limits  Strength: Generally decreased, functional (B UE ~ 4-/5)  Coordination: Generally decreased, functional  Tone: Normal  Sensation: Intact      ADL  Feeding: Setup  Grooming: Setup;Stand by assistance  UE Bathing: Setup;Stand by assistance  LE Bathing: Setup;Contact guard assistance  UE Dressing: Setup;Minimal assistance (to tie/adjust hosp gown seated on EOB)  LE Dressing: Setup;Contact guard assistance (pt able to doff/don B socks while seated on EOB using figure four technique)  Toileting: Contact guard assistance  Additional Comments: Pt educated on pacing self during ADLs to increase safety. Activity Tolerance  Activity Tolerance: Patient tolerated evaluation without incident;Patient limited by endurance      Bed mobility  Supine to Sit: Stand by assistance  Sit to Supine: Stand by assistance  Scooting: Stand by assistance  Bed Mobility Comments: Pt completed bed mobility without signficant diffiuclites this date. Pt given min verbal cues for pacing, use of bedrail and line mgmt. Pt denied any dizziness once seated on EOB. Transfers  Sit to stand: Contact guard assistance  Stand to sit: Contact guard assistance  Transfer Comments: Pt given Mod verbal cues for pacing self, pursed lip breathing, upright posture, controlled stand to sti, RW safety, and reaching back to surface prior to sitting all to increase safety.       Vision  Vision: Impaired (Pt reports increased blurriness lately)  Vision Exceptions: Wears glasses for reading  Hearing  Hearing: Within functional limits      Cognition  Overall Cognitive Status: Exceptions  Arousal/Alertness: Appropriate responses to stimuli  Following Commands: Follows multistep commands with repitition  Attention Span: Appears intact  Memory: Decreased short term memory;Decreased recall of recent events  Safety Judgement: Decreased awareness of need for safety;Decreased awareness of need for assistance  Problem Solving: Decreased awareness of errors;Assistance required to identify errors made;Assistance required to correct errors made;Assistance required to implement solutions;Assistance required to generate solutions  Insights: Decreased awareness of deficits  Initiation: Requires cues for some  Sequencing: Requires cues for some  Orientation  Overall Orientation Status: Within Functional Limits                  Education Given To: Patient  Education Provided: Role of Therapy;Transfer Training;Plan of Care;Energy Conservation; Fall Prevention Strategies; ADL Adaptive Strategies  Education Provided Comments: fall prevention/call light use, OT POC, role of OT in acute care, safety in function, proper bed mobility tech, recommendations for continued theapy, benefits of being OOB, \"move what moves\"  Education Method: Verbal  Barriers to Learning: None  Education Outcome: Verbalized understanding                          AM-PAC Score        AM-[facility] Inpatient Daily Activity Raw Score: 18 (02/16/23 1323)  AM-PAC Inpatient ADL T-Scale Score : 38.66 (02/16/23 1323)  ADL Inpatient CMS 0-100% Score: 46.65 (02/16/23 1323)  ADL Inpatient CMS G-Code Modifier : CK (02/16/23 1323)         Goals  Short Term Goals  Time Frame for Short Term Goals: By discharge, pt to demo  Short Term Goal 1: ADL transfers and functional mobility to SBA with use of AD as needed. Short Term Goal 2: bed mobility to Mod I with use of bedrails as needed. Short Term Goal 3: UB ADLs to Set up and LB ADLs to SBA with use of AD as needed.   Short Term Goal 4: increased B UE strength by 1/2 grade to assist with functional tasks/I with B UE HEP with use of handouts as needed. Short Term Goal 5: toileting to SBA with use of AD/grab bars as needed. Long Term Goals  Long Term Goal 1: Pt to be I with fall prevention education, EC/WS tech, disease specific education, recommendations for discharge/AE with use of handouts as needed. Patient Goals   Patient goals : To go home!        Therapy Time   Individual Concurrent Group Co-treatment   Time In 0902 (Plus 10 min for bell, chart review, RN communicaiton)         Time Out 0930         Minutes 28+ 10 = 38          Tx time: 23 min       Yanci Terry, OT

## 2023-02-16 NOTE — PLAN OF CARE
Problem: Respiratory - Adult  Goal: Achieves optimal ventilation and oxygenation  2/16/2023 0815 by Randall Landau, RCP  Outcome: Progressing     Problem: Respiratory - Adult  Goal: Able to breathe comfortably  2/16/2023 0815 by Randall Landau, RCP  Outcome: Progressing

## 2023-02-16 NOTE — FLOWSHEET NOTE
02/16/23 0500   Treatment Team Notification   Reason for Communication Critical results   Type of Critical Result Laboratory   Critical Lab Result Type Electrolytes  (potasium of 3.3 and Anti-xa of 0.95)   Team Member Name [facility]   Treatment Team Role Advanced Practice Nurse   Method of Communication Secure Message   Response No new orders   Notification Time 0503     Writer notified [facility] APN of critical labs. Writer explained protocol will be followed for each level.

## 2023-02-16 NOTE — RT PROTOCOL NOTE
RT Inhaler-Nebulizer Bronchodilator Protocol Note    There is a bronchodilator order in the chart from a provider indicating to follow the RT Bronchodilator Protocol and there is an Initiate RT Inhaler-Nebulizer Bronchodilator Protocol order as well (see protocol at bottom of note). CXR Findings:  XR CHEST PORTABLE    Result Date: 2/16/2023  Interval volume loss and airspace opacity at the right base, suggesting atelectasis. There may also be some component of pleural fluid. XR CHEST PORTABLE    Result Date: 2/15/2023  Chronic pulmonary change without acute cardiopulmonary process. The findings from the last RT Protocol Assessment were as follows:   History Pulmonary Disease: Chronic pulmonary disease  Respiratory Pattern: Dyspnea on exertion or RR 21-25 bpm  Breath Sounds: Slightly diminished and/or crackles  Cough: Strong, productive  Indication for Bronchodilator Therapy: Decreased or absent breath sounds, On home bronchodilators  Bronchodilator Assessment Score: 7    Aerosolized bronchodilator medication orders have been revised according to the RT Inhaler-Nebulizer Bronchodilator Protocol below. Respiratory Therapist to perform RT Therapy Protocol Assessment initially then follow the protocol. Repeat RT Therapy Protocol Assessment PRN for score 0-3 or on second treatment, BID, and PRN for scores above 3. No Indications - adjust the frequency to every 6 hours PRN wheezing or bronchospasm, if no treatments needed after 48 hours then discontinue using Per Protocol order mode. If indication present, adjust the RT bronchodilator orders based on the Bronchodilator Assessment Score as indicated below.   Use Inhaler orders unless patient has one or more of the following: on home nebulizer, not able to hold breath for 10 seconds, is not alert and oriented, cannot activate and use MDI correctly, or respiratory rate 25 breaths per minute or more, then use the equivalent nebulizer order(s) with same Frequency and PRN reasons based on the score. If a patient is on this medication at home then do not decrease Frequency below that used at home. 0-3 - enter or revise RT bronchodilator order(s) to equivalent RT Bronchodilator order with Frequency of every 4 hours PRN for wheezing or increased work of breathing using Per Protocol order mode. 4-6 - enter or revise RT Bronchodilator order(s) to two equivalent RT bronchodilator orders with one order with BID Frequency and one order with Frequency of every 4 hours PRN wheezing or increased work of breathing using Per Protocol order mode. 7-10 - enter or revise RT Bronchodilator order(s) to two equivalent RT bronchodilator orders with one order with TID Frequency and one order with Frequency of every 4 hours PRN wheezing or increased work of breathing using Per Protocol order mode. 11-13 - enter or revise RT Bronchodilator order(s) to one equivalent RT bronchodilator order with QID Frequency and an Albuterol order with Frequency of every 4 hours PRN wheezing or increased work of breathing using Per Protocol order mode. Greater than 13 - enter or revise RT Bronchodilator order(s) to one equivalent RT bronchodilator order with every 4 hours Frequency and an Albuterol order with Frequency of every 2 hours PRN wheezing or increased work of breathing using Per Protocol order mode. RT to enter RT Home Evaluation for COPD & MDI Assessment order using Per Protocol order mode.     Electronically signed by Letty Adair RCP on 2/16/2023 at 8:15 AM

## 2023-02-16 NOTE — CARE COORDINATION
Discharge planning    Spoke with daughter Jelani Sutton. Discussed plan of care. She wishes to pursue LTAC again ( [facility]) if appropriate as this is second admission. Patient is currently on HF. Notified francisco of the referral.  Will discuss with attending. If SNF needed prefers return to [facility] that he went to for 24 hour RT.     1400  Discussed LTAC with attending and ok to start precert today. Call to francisco and notified    [phone]  Call back from [address] at [facility]. Patient [place] a [place]  [place] ( [id number] and number is [id number]. Juan Ramon Kauffman stated that patient has active medicaid thru [facility]. Did call HELP to see if can look up. She confirmed that patient has medicaid as part of dual.  Medicaid number is [id number]    Will update ss in regards to placement too.

## 2023-02-16 NOTE — PROGRESS NOTES
Comprehensive Nutrition Assessment    Type and Reason for Visit:  Positive Nutrition Screen (Prefers middle [place] foods)      Obtained food preferences and explained food availbility in the cafeteria. Patient said if he would like to get Humus 1x/day.  Humus added to CBORD everyday with lunch meal.                 Denver Vargas RD, LD  Office phone [phone]

## 2023-02-16 NOTE — PROGRESS NOTES
[] Medication Reconciliation was completed and the patient's home medication list was verified. The Med List Status is \"Complete\". The following sources were used to assist with Medication Reconciliation:    [] Patient had a list of medications which was transcribed into the EHR. [] Patient provided bottles of their medications    [] Home medications reviewed and confirmed with patient    [] Contacted patient's pharmacy to confirm home medications    [] Contacted patient's physician office to confirm home medications    [] Medical Records from another facility and/or Care Everywhere were reviewed      [x] There are one or more home medications that need clarification before Medication Reconciliation can be completed. The Med List Status has been marked as In Progress. To assist with Home Medication Reconciliation the following actions have been taken:    [x] Pharmacy medication reconciliation service requested.  (Note: This can be done by sending a Perfect Serve message to [facility] Pharmacist or by Mark Anthony Huang [phone].)  [] Family requested to bring medications into the hospital  [] Family requested to call hospital with medication list  [] Message left with physician office  [] Request for medical records made to n/a  [] Other n/a

## 2023-02-16 NOTE — PLAN OF CARE
Pt seen at bedside today. Pt admitted for SOB and chest pain. Pt c/o chest pain first thing this AM. Writer obtained vital signs and EKG. MD ordered percocet and ibuprofen for chest pain. Pt has been resting comfortably since being given percocet and ibuprofen. Pt is receiving a heparin gtt for PE in left lung. Anti-xa labs are being monitored Q6 hours. Vital signs stable. Pt on 8L nasal cannula (salter). Pt receiving unasyn for aspiration pneumonia. All questions and concerns addressed. Bed locked in lowest position and call light is in reach. Will continue to address patient needs and enforce safety precautions.       Problem: Discharge Planning  Goal: Discharge to home or other facility with appropriate resources  2/16/2023 1304 by Azalia Longoria RN  Outcome: Progressing     Problem: Pain  Goal: Verbalizes/displays adequate comfort level or baseline comfort level  2/16/2023 1304 by Azalia Longoria RN  Outcome: Progressing     Problem: Safety - Adult  Goal: Free from fall injury  2/16/2023 1304 by Azalia Longoria RN  Outcome: Progressing     Problem: ABCDS Injury Assessment  Goal: Absence of physical injury  2/16/2023 1304 by Azalia Longoria RN  Outcome: Progressing

## 2023-02-16 NOTE — PROGRESS NOTES
Speech Language Pathology  Facility/Department: [address] [place] [address]   CLINICAL BEDSIDE SWALLOW EVALUATION    NAME: Tulio Conrad  : 1945  MRN: [id number]    ADMISSION DATE: 2/15/2023  ADMITTING DIAGNOSIS: has COVID-19; Acute respiratory failure with hypoxia (HCC); COPD exacerbation ([facility] Tobacco abuse; Essential (primary) hypertension; Bipolar 1 disorder ([facility] SUNITHA (acute kidney injury) ([facility] Acute aspiration pneumonia ([facility] Hypokalemia; and Pulmonary embolism on [facility]) on their problem list.      Recent Chest Xray: 2023  Impression   Interval volume loss and airspace opacity at the right base, suggesting   atelectasis. There may also be some component of pleural fluid. Date of Eval: 2023  Evaluating Therapist: KAROLYN Murray    Current Diet level:  Current Diet : Regular  Current Liquids: Thin      Primary Complaint  Tulio Conrad is a 78 y.o. Non- / non  male who presents with Shortness of Breath (Pt was brought in via EMS for SOB that started this morning, Hx of COPD, Not on home O2)   and is admitted to the hospital for the management of Pulmonary embolism on [facility]). Patient presented to the ER today with complaints of shortness of breath and wheezing. He has this is a coughs a lot when he is eating and notices burning in his chest sometimes after he eats. Per vitals he was noted to be hypoxic and oxygen per nasal cannula was applied. CT chest was positive for tiny PE but no heart strain. There is also concern about possible aspiration. When I spoke to the patient he says he does have a history of coughing with meals and often has pain in his chest after eating. Patient was hospitalized  through 2023. At that time he was treated for COPD exacerbation and was positive for COVID. He received a full dose of Paxlovid while inpatient. According to the notes his oxygenation was labile but improved with diuresis.   At the time of discharge she was given prednisone. Patient was encouraged to have a sleep study as an outpatient. Pain:  Pain Assessment  Pain Assessment: None - Denies Pain  Pain Level: 0  Patient's Stated Pain Goal: 0 - No pain  Pain Location: Chest  Pain Orientation: Mid  Pain Descriptors: Aching    Reason for Referral  Amberly Blue was referred for a bedside swallow evaluation to assess the efficiency of his swallow function, identify signs and symptoms of aspiration and make recommendations regarding safe dietary consistencies, effective compensatory strategies, and safe eating environment. Impression  Dysphagia Diagnosis: Swallow function appears WFL  Dysphagia Impression : Pt presents with minimally impaired oral phase characterized by mildly prolonged mastication time and minimal lingual residue which may be attribbuted to absence of dentures (Pt reports they fit well and he wears them when eating at home, does not currently have them while in hospital). Pt exhibited no overt s/s aspiration for regular texture/thin liquids via cup. Pt c/o food feeling \"stuck\" and burning sensation following meals and indicates area of esophagus. Swallow function is within normal limits at time of assessment and regular texture/thin liquid is recommended for least restrictive diet. If overt s/s aspiration occur, discontinue PO and order video swallow study for objective assessment of swallowing function. ST also recommends GI referral to address esophageal symptoms. Dysphagia Outcome Severity Scale: Level 6: Within functional limits/Modified independence     Treatment Plan  Requires SLP Intervention: No     D/C Recommendations: No follow up therapy recommended post discharge  Referral To: GI    Recommended Diet and Intervention  Diet Solids Recommendation Regular   Liquid Consistency Recommendation Thin        Recommended Form of Meds: PO          Compensatory Swallowing Strategies  Compensatory Swallowing Strategies :  Alternate solids and liquids;Upright as possible for all oral intake;Small bites/sips;Eat/Feed slowly; Remain upright for 30-45 minutes after meals    General  Chart Reviewed: Yes  Behavior/Cognition: Alert; Cooperative  Respiratory Status: O2 via nasual cannula  O2 Device: Nasal cannula  Communication Observation: Functional  Follows Directions: Complex  Dentition: Edentulous (Pt wears dentures at home)  Patient Positioning: Upright in bed  Baseline Vocal Quality: Normal  Volitional Cough: Strong;Congested  Prior Dysphagia History: Pt reports coughing/burning in chest after eating. Consistencies Administered: Regular; Thin - cup           Vision/Hearing  Vision  Vision: Impaired  Vision Exceptions: Wears glasses for reading  Hearing  Hearing: Within functional limits    Oral Motor Deficits  Oral Motor      Labial No impairment   Dentition Edentulous   Oral Hygiene Moist; Clean   Lingual No impairment   Mandible No impairment       Oral/Pharyngeal Phase Dysfunction  Oral/Pharyngeal Phase Dysfunction     Oral Phase Exceptions   PO Trials     Bolus Acceptance No impairment   Bolus Formation/Control Impaired   Type of Impairment Mastication (due to absence of dentures)   Propulsion No impairment   Oral Residue Less than 10% of bolus   Initiation of Swallow No impairment   Laryngeal Elevation Functional   Aspiration Signs/Symptoms None   Pharyngeal Phase Characteristics No impairment, issues, or problems       Prognosis  Individuals consulted  Consulted and agree with results and recommendations: Patient    Education  Patient Education: Pt educated re: results of assessment, risks of aspiration, and diet recommendation.   Patient Education Response: Verbalizes understanding             Therapy Time  SLP Individual Minutes  Time In: 1400  Time Out: [address]  Minutes: [address], [place], [address] [facility] [place]   2/16/2023 2:16 PM

## 2023-02-16 NOTE — PROGRESS NOTES
Pt seen this morning and at bedside shift report pt was complaining of mid-sternal chest pain. Dr. Chema Mosquera notified. EKG obtained.

## 2023-02-16 NOTE — CARE COORDINATION
Case Management Assessment  Initial Evaluation    Date/Time of Evaluation: 2/16/2023 12:49 PM  Assessment Completed by: Ana Elizalde RN    If patient is discharged prior to next notation, then this note serves as note for discharge by case management. Patient Name: Alexia Katz                   YOB: 1945  Diagnosis: COPD exacerbation ([address] [J44.1]  Pulmonary embolism on left [facility]) [I26.99]  Other acute pulmonary embolism without acute cor pulmonale (HCC) [I26.99]  Pneumonia due to infectious organism, unspecified laterality, unspecified part of lung [J18.9]                   Date / Time: 2/15/2023 10:00 AM    Patient Admission Status: Inpatient   Readmission Risk (Low < 19, Mod (19-27), High > 27): Readmission Risk Score: 17.4    Current PCP: Stefan Jordan MD  PCP verified by CM? Yes    Chart Reviewed: Yes      History Provided by: Patient, Medical Record, Other (see comment) (krunal at [address] [place] [address])  Patient Orientation: Person, Self    Patient Cognition: Short Term Memory Deficit    Hospitalization in the last 30 days (Readmission):  Yes    If yes, Readmission Assessment in  Navigator will be completed. Advance Directives:      Code Status: Full Code   Patient's Primary Decision Maker is: Legal Next of Kin    Primary Decision MakeNguyen Tee - [phone]    Discharge Planning:    Patient lives with: Other (Comment) (assisted living) Type of Home: Assisted living  Primary Care Giver:  Other (Comment) (AL staff)  Patient Support Systems include: Children, Home Care Staff   Current Financial resources: None  Current community resources: Assisted Living  Current services prior to admission: Durable Medical Equipment            Current DME: Walker            Type of Home Care services:  OT, PT, Nursing Services    ADLS  Prior functional level: Assistance with the following:, Bathing, Dressing, Toileting, Cooking, Housework, Shopping, Mobility  Current functional level: Assistance with the following:, Shopping, Mobility, Housework, Cooking, Toileting, Dressing, Bathing    PT AM-PAC: 18 /24  OT AM-PAC:   /24    Family can provide assistance at DC: No  Would you like Case Management to discuss the discharge plan with any other family members/significant others, and if so, who? Yes (daughter Jose Ervin)  Plans to Return to Present Housing: Unknown at present  Other Identified Issues/Barriers to RETURNING to current housing: assisted living does not have medical staff at night   Potential Assistance needed at discharge: [facility], [address] DME: Oxygen Therapy (Comment)  Patient expects to discharge to: [facility]- for transportation at discharge:   ambulance     Financial    Payor: Kassidy Lockhart / Plan: Cherise Hunter / Product Type: *No Product type* /     Does insurance require precert for SNF: Yes    Potential assistance Purchasing Medications: No  Meds-to-Beds request: Yes      [facility], [address] [phone]  [address] [address]  Phone: [phone] Fax: [phone]      Notes:    Factors facilitating achievement of predicted outcomes: Pleasant    Barriers to discharge: Limited family support, Impulsivity, Limited safety awareness, Limited insight into deficits, Unrealistic expectations, Decreased endurance, and Medication managment    Additional Case Management Notes:   Patient known to writer from prior admission. Patient was here 1/13-1/27 for COVID and sent to [facility] after insurance denied LTAC ( denied peer to peer also )     Patient re admitted due to respiratory failure. Lives at [address] [place] [address] 281 assisted living ( [phone]) and was transferred back there on 2/6. He was sent with no home care nor any home 02. He is currently on 8 L HF. Did call [facility] and they will have to re assess with DON if can accept back.  Patient fell 3 times since being back at their facility. They do not have any medical staff at night and if patient needed 02 he would have to be able to comprehend how to use it and hook up if switching between roll tanks and concentrator. Last admission daughter was agreeable to [facility] that got denied. Did have Tosha Chaney from [facility] take face sheet to follow. Call to daughter Chema Dickson at [phone] and VM left with request to call back. SS also aware of admission. He is also positive for PE and currently on heparin gtt. Notes per pulmonary do state likely transition to eliquis. Will have to follow closely. The Plan for Transition of Care is related to the following treatment goals of COPD exacerbation ([address] [J44.1]  Pulmonary embolism on left ([address] [I26.99]  Other acute pulmonary embolism without acute cor pulmonale ([address] [I26.99]  Pneumonia due to infectious organism, unspecified laterality, unspecified part of lung [S00.5]    IF APPLICABLE: The Patient and/or patient representative Miroslava Brandt and his family were provided with a choice of provider and agrees with the discharge plan. Freedom of choice list with basic dialogue that supports the patient's individualized plan of care/goals and shares the quality data associated with the providers was provided to: Patient, Patient Representative   Patient Representative Name: will need to discuss with lito Hong. The Patient and/or Patient Representative Agree with the Discharge Plan?  Yes    Fauzia Aguiar RN  Case Management Department  Ph: [phone] Fax: [phone]

## 2023-02-16 NOTE — PROGRESS NOTES
Physical Therapy  Facility/Department: [facility] PROGRESSIVE CARE  Physical Therapy Initial Assessment    Name: Romario Yates  : 1945  MRN: [id number]  Date of Service: 2023  RN Zoë Guillen reports patient is medically stable for therapy treatment this date. Chart reviewed prior to treatment and patient is agreeable for therapy. All lines intact and patient positioned comfortably at end of treatment. All patient needs addressed prior to ending therapy session. Discharge Recommendations:  Due to recent hospitalization and medical condition, pt would benefit from additional intermittent skilled therapy at time of discharge. Please refer to the AM-PAC score for current functional status. Patient would benefit from continued therapy after discharge        Per H&P:Jeovanny Armas is a 78 y.o. Non- / non  male who presents with Shortness of Breath (Pt was brought in via EMS for SOB that started this morning, Hx of COPD, Not on home O2) and is admitted to the hospital for the management of Pulmonary embolism on left [facility]). Patient presented to the ER today with complaints of shortness of breath and wheezing. He has this is a coughs a lot when he is eating and notices burning in his chest sometimes after he eats. Per vitals he was noted to be hypoxic and oxygen per nasal cannula was applied. CT chest was positive for tiny PE but no heart strain. There is also concern about possible aspiration. When I spoke to the patient he says he does have a history of coughing with meals and often has pain in his chest after eating. Patient was hospitalized  through 2023. At that time he was treated for COPD exacerbation and was positive for COVID. He received a full dose of Paxlovid while inpatient. According to the notes his oxygenation was labile but improved with diuresis. At the time of discharge she was given prednisone. Patient was encouraged to have a sleep study as an outpatient. Patient Diagnosis(es): The primary encounter diagnosis was Pneumonia due to infectious organism, unspecified laterality, unspecified part of lung. Diagnoses of COPD exacerbation ([facility] and Other acute pulmonary embolism without acute cor pulmonale (HCC) were also pertinent to this visit. Past Medical History:  has a past medical history of Back pain, Bipolar 1 disorder ([facility] COPD (chronic obstructive pulmonary disease) ([facility] GERD (gastroesophageal reflux disease), and Heart attack ([facility] Past Surgical History:  has a past surgical history that includes Anus surgery; hernia repair; and Colonoscopy (N/A, 7/30/2018). Assessment   Body Structures, Functions, Activity Limitations Requiring Skilled Therapeutic Intervention: Decreased functional mobility ; Decreased balance;Decreased endurance;Decreased safe awareness;Decreased strength;Decreased high-level IADLs;Decreased coordination;Decreased posture; Increased pain  Assessment: Pt tolerated PT eval fair. Pt currently presenting w/ deficits in strength, balance, gait, and endurance. At this time, pt is a fall risk given current deficits & decreased safety awareness. Pt would benefit from continued skilled PT to address deficits in order maximize independence w/ functional mobility and return to OF as able.   Therapy Prognosis: Good  Decision Making: Medium Complexity  Requires PT Follow-Up: Yes  Activity Tolerance  Activity Tolerance: Patient tolerated evaluation without incident;Patient limited by endurance     Plan   Physcial Therapy Plan  General Plan: 5-7 times per week  Current Treatment Recommendations: Strengthening, Balance training, Functional mobility training, Transfer training, Neuromuscular re-education, Gait training, Endurance training, Home exercise program, Equipment evaluation, education, & procurement, Patient/Caregiver education & training, Safety education & training, Therapeutic activities, Pain management  Safety Devices  Type of Devices: Bed alarm in place, Call light within reach, Left in bed, Gait belt, Nurse notified  Restraints  Restraints Initially in Place: No     Restrictions  Restrictions/Precautions  Restrictions/Precautions: General Precautions, Fall Risk  Required Braces or Orthoses?: No  Position Activity Restriction  Other position/activity restrictions: Up w/ assist, 8L O2 NC, telemetry, continuous pulse ox, LUE IV, RUE IV     Subjective   General  Patient assessed for rehabilitation services?: Yes  Response To Previous Treatment: Not applicable  Family / Caregiver Present: No  Follows Commands: Within Functional Limits  General Comment  Comments: RN and pt agreeable to therapy. Pt supine in bed upon arrival.  Pt pleasant and cooperative throughout.   Pt w/ acute DVT & PE - pt on heparin  Subjective  Subjective: Pt reporting feeling \"better\" at time of PT eval, states he had received pain meds prior to writer's arrival.         Social/Functional History  Social/Functional History  Lives With: Home care staff (assisted living)  Type of Home: Assisted living ([address] [place] [address])  Home Layout: One level  Home Access: Level entry  Bathroom Shower/Tub: Walk-in shower  Bathroom Toilet: Standard  Bathroom Equipment: Grab bars in shower, Grab bars around toilet, Built-in shower seat  Home Equipment: Walker, rolling (prescribed 3L O2 but does not have it)  Has the patient had two or more falls in the past year or any fall with injury in the past year?: No (Pt denies any falls within the past year however per chart pt has had multiple falls recently)  Receives Help From: Personal care attendant  ADL Assistance: Independent  Toileting: Needs assistance  Homemaking Assistance: Needs assistance (meals, cleaning and laundry provided)  Homemaking Responsibilities: No  Ambulation Assistance: Independent (RW)  Transfer Assistance: Independent  Active : No  Patient's  Info: cab or   Occupation: Retired  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  Leisure & Hobbies: watching [email or web address], the [employer] and computer  [address]: Impaired (Pt reports increased blurriness lately)  Vision Exceptions: Wears glasses for reading  Hearing  Hearing: Within functional limits    Cognition   Orientation  Overall Orientation Status: Within Functional Limits  Cognition  Overall Cognitive Status: Exceptions  Arousal/Alertness: Appropriate responses to stimuli  Following Commands: Follows multistep commands with repitition  Attention Span: Appears intact  Memory: Decreased short term memory;Decreased recall of recent events  Safety Judgement: Decreased awareness of need for safety;Decreased awareness of need for assistance  Problem Solving: Decreased awareness of errors;Assistance required to identify errors made;Assistance required to correct errors made;Assistance required to implement solutions;Assistance required to generate solutions  Insights: Decreased awareness of deficits  Initiation: Requires cues for some  Sequencing: Requires cues for some     Objective   Observation/Palpation  Posture: Fair  Observation: SpO2 remaining >90% throughout session on 8L O2 NC, mild BLE & BUE tremors noted throughout session (pt reports they are not new)        AROM RLE (degrees)  RLE AROM: WFL  AROM LLE (degrees)  LLE AROM : WFL  AROM RUE (degrees)  RUE AROM : WFL  AROM LUE (degrees)  LUE AROM : WFL  Strength RLE  Strength RLE: WFL  Comment: Grossly 4/5  Strength LLE  Strength LLE: WFL  Comment: Grossly 4/5  Strength RUE  Comment: See OT assessment for detail  Strength LUE  Comment: See OT assessment for detail          Bed mobility  Supine to Sit: Stand by assistance  Sit to Supine: Stand by assistance  Scooting: Stand by assistance  Bed Mobility Comments: Pt w/o significant difficulty throughout bed mobility this date. Pt requiring mod verbal cueing for pacing and safety w/ lines throughout w/ fair return demo.   Pt denying any dizziness/lightheadedness throughout position changes. Transfers  Sit to Stand: Contact guard assistance  Stand to Sit: Contact guard assistance  Comment: Pt performed >5 STS transfers throughout session this date demonstrating fair steadiness throughout. Pt requiring mod verbal cueing for proper hand placement throughout transfers w/ RW w/ fair return demo. Pt denying any dizziness/lightheadedness throughout position changes. While standing at EOB, pt performed pre-gait lateral weight shifting and standing marches to assess stability prior to initiating ambulation w/ fair steadiness noted throughout. Pt also demonstrating fair eccentric quad control throughout stand>sit transfers w/ mod verbal cueing. Ambulation  Surface: Level tile  Device: Rolling Walker  Assistance: Contact guard assistance  Quality of Gait: fair steadiness, mild shuffling  Gait Deviations: Slow Laura;Decreased step length;Decreased step height; Increased WENDIE; Decreased head and trunk rotation; Shuffles  Distance: 8ft forward/retro x 3  Comments: Pt demonstrating fair steadiness throughout ambulation short distances this date. Pt requiring mod verbal cueing to maintain WENDIE within RW w/ fair return demo. Pt appearing generally guarded w/ decreased trunk rotation throughout. Assist required for safe line mgmt throughout.   More Ambulation?: No  Stairs/Curb  Stairs?: No       Balance  Posture: Fair  Sitting - Static: Good;-  Sitting - Dynamic: Fair;+  Standing - Static: Good;-  Standing - Dynamic: Fair;+  Single Leg Stance R Le  Single Leg Stance L Le  Comments: Standing balance assessed w/ RW  Exercise Treatment: STS x 5, ankle pumps        AM-PAC Score  AM-PAC Inpatient Mobility Raw Score : 18 (23)  AM-PAC Inpatient T-Scale Score : 43.63 (23)  Mobility Inpatient CMS 0-100% Score: 46.58 (23)  Mobility Inpatient CMS G-Code Modifier : CK (23)          5 Times Sit to Stand: 22.01 seconds w/ use of arms for support (Normal for 70-79 y.o. w/o arms for support is ~14.8 seconds)    Goals  Short Term Goals  Time Frame for Short Term Goals: 12 visits  Short Term Goal 1: Pt to demonstrate bed mobility independently  Short Term Goal 2: Pt to perform STS transfers w/ RW independently  Short Term Goal 3: Pt to ambulate at least 100ft w/ RW independently while maintaining SpO2 >92% throughout  Short Term Goal 4: Pt to actively participate in at least 30 minutes of physical therapy for ther act, ther ex, balance, gait, and endurance training  Patient Goals   Patient Goals : To go home, less pain       Education  Patient Education  Education Given To: Patient  Education Provided: Role of Therapy;Plan of Care;Energy Conservation;Transfer Training;Equipment; Fall Prevention Strategies  Education Provided Comments: Pt educated on: purpose of acute PT eval, importance of continued mobility throughout admission, general safety awareness, fall risk prevention, pursed lip breathing, safe transfers & ambulation w/ RW, and PT POC. Pt w/ fair return demo. Pt would benefit from continued reinforcement of education.   Education Method: Verbal  Education Outcome: Continued education needed;Verbalized understanding      Therapy Time   Individual Concurrent Group Co-treatment   Time In 0910         Time Out 6833         Minutes 38         Treatment time: 23 minutes       Genie Tee, PT

## 2023-02-16 NOTE — PLAN OF CARE
Patient very flat affect. Patient alert x4 and sleepy. Patient originally on [address] in ER. Patient began to desat and was bumped up to10 L NC salter. While patient sleeps he sats in high 90's, but awake he sats in the high 80's. Patient stands at bedside and uses cylinder for urinal.  Patient on heparin drip at 13 units currently. Next Anti-Xa draw is at 1115. Patient potassium replaced with two bags of 10 mEq IV potassium, and 1 40 mEq oral replacement for 3.3 potassium level. Patient cooperative and looking forward to breakfast.  Patient remains free from falls, safety measures applied. Bedside table and call light within reach. Problem: Discharge Planning  Goal: Discharge to home or other facility with appropriate resources  Outcome: Progressing     Problem: Pain  Goal: Verbalizes/displays adequate comfort level or baseline comfort level  Outcome: Progressing     Problem: Respiratory - Adult  Goal: Achieves optimal ventilation and oxygenation  2/16/2023 0633 by Fortunato Walker RN  Outcome: Progressing     Problem: Skin/Tissue Integrity  Goal: Absence of new skin breakdown  Description: 1. Monitor for areas of redness and/or skin breakdown  2. Assess vascular access sites hourly  3. Every 4-6 hours minimum:  Change oxygen saturation probe site  4. Every 4-6 hours:  If on nasal continuous positive airway pressure, respiratory therapy assess nares and determine need for appliance change or resting period.   Outcome: Progressing     Problem: Safety - Adult  Goal: Free from fall injury  Outcome: Progressing     Problem: ABCDS Injury Assessment  Goal: Absence of physical injury  Outcome: Progressing

## 2023-02-17 PROBLEM — G20.C PRIMARY PARKINSONISM: Status: ACTIVE | Noted: 2023-02-17

## 2023-02-17 PROBLEM — I82.401 RIGHT LEG DVT (HCC): Status: ACTIVE | Noted: 2023-02-17

## 2023-02-17 PROBLEM — G20 PRIMARY PARKINSONISM (HCC): Status: ACTIVE | Noted: 2023-02-17

## 2023-02-17 LAB
ANION GAP SERPL CALCULATED.3IONS-SCNC: 9 MMOL/L (ref 9–17)
ANTI-XA UNFRAC HEPARIN: 0.42 IU/L (ref 0.3–0.7)
ANTI-XA UNFRAC HEPARIN: 0.59 IU/L (ref 0.3–0.7)
BUN SERPL-MCNC: 12 MG/DL (ref 8–23)
BUN/CREAT BLD: 13 (ref 9–20)
CALCIUM SERPL-MCNC: 8.5 MG/DL (ref 8.6–10.4)
CHLORIDE SERPL-SCNC: 102 MMOL/L (ref 98–107)
CO2 SERPL-SCNC: 35 MMOL/L (ref 20–31)
CREAT SERPL-MCNC: 0.9 MG/DL (ref 0.7–1.2)
GFR SERPL CREATININE-BSD FRML MDRD: >60 ML/MIN/1.73M2
GLUCOSE SERPL-MCNC: 147 MG/DL (ref 70–99)
HCT VFR BLD AUTO: 38.4 % (ref 40.7–50.3)
HGB BLD-MCNC: 11.7 G/DL (ref 13–17)
MCH RBC QN AUTO: 31.5 PG (ref 25.2–33.5)
MCHC RBC AUTO-ENTMCNC: 30.5 G/DL (ref 28–38)
MCV RBC AUTO: 103.5 FL (ref 82.6–102.9)
PDW BLD-RTO: 15.7 % (ref 11.8–14.4)
PLATELET # BLD AUTO: 349 K/UL (ref 138–453)
PMV BLD AUTO: 8.8 FL (ref 8.1–13.5)
POTASSIUM SERPL-SCNC: 3.9 MMOL/L (ref 3.7–5.3)
RBC # BLD: 3.71 M/UL (ref 4.21–5.77)
SODIUM SERPL-SCNC: 146 MMOL/L (ref 135–144)
WBC # BLD AUTO: 9.9 K/UL (ref 3.5–11.3)

## 2023-02-17 PROCEDURE — 2580000003 HC RX 258: Performed by: EMERGENCY MEDICINE

## 2023-02-17 PROCEDURE — 6370000000 HC RX 637 (ALT 250 FOR IP): Performed by: NURSE PRACTITIONER

## 2023-02-17 PROCEDURE — 85027 COMPLETE CBC AUTOMATED: CPT

## 2023-02-17 PROCEDURE — 94761 N-INVAS EAR/PLS OXIMETRY MLT: CPT

## 2023-02-17 PROCEDURE — 85520 HEPARIN ASSAY: CPT

## 2023-02-17 PROCEDURE — 99231 SBSQ HOSP IP/OBS SF/LOW 25: CPT | Performed by: INTERNAL MEDICINE

## 2023-02-17 PROCEDURE — 2060000000 HC ICU INTERMEDIATE R&B

## 2023-02-17 PROCEDURE — 6360000002 HC RX W HCPCS: Performed by: NURSE PRACTITIONER

## 2023-02-17 PROCEDURE — 2580000003 HC RX 258: Performed by: NURSE PRACTITIONER

## 2023-02-17 PROCEDURE — 2700000000 HC OXYGEN THERAPY PER DAY

## 2023-02-17 PROCEDURE — 6370000000 HC RX 637 (ALT 250 FOR IP): Performed by: INTERNAL MEDICINE

## 2023-02-17 PROCEDURE — 97110 THERAPEUTIC EXERCISES: CPT

## 2023-02-17 PROCEDURE — 36415 COLL VENOUS BLD VENIPUNCTURE: CPT

## 2023-02-17 PROCEDURE — 94640 AIRWAY INHALATION TREATMENT: CPT

## 2023-02-17 PROCEDURE — 80048 BASIC METABOLIC PNL TOTAL CA: CPT

## 2023-02-17 PROCEDURE — 2580000003 HC RX 258: Performed by: PHYSICIAN ASSISTANT

## 2023-02-17 RX ORDER — CALCIUM CARBONATE 200(500)MG
500 TABLET,CHEWABLE ORAL 4 TIMES DAILY PRN
Status: DISCONTINUED | OUTPATIENT
Start: 2023-02-17 | End: 2023-02-24 | Stop reason: HOSPADM

## 2023-02-17 RX ADMIN — AMPICILLIN SODIUM AND SULBACTAM SODIUM 3000 MG: 2; 1 INJECTION, POWDER, FOR SOLUTION INTRAMUSCULAR; INTRAVENOUS at 08:52

## 2023-02-17 RX ADMIN — IPRATROPIUM BROMIDE AND ALBUTEROL SULFATE 1 AMPULE: 2.5; .5 SOLUTION RESPIRATORY (INHALATION) at 08:17

## 2023-02-17 RX ADMIN — ARFORMOTEROL TARTRATE 15 MCG: 15 SOLUTION RESPIRATORY (INHALATION) at 08:17

## 2023-02-17 RX ADMIN — APIXABAN 10 MG: 5 TABLET, FILM COATED ORAL at 11:16

## 2023-02-17 RX ADMIN — BUDESONIDE 500 MCG: 0.5 SUSPENSION RESPIRATORY (INHALATION) at 08:17

## 2023-02-17 RX ADMIN — AMPICILLIN SODIUM AND SULBACTAM SODIUM 3000 MG: 2; 1 INJECTION, POWDER, FOR SOLUTION INTRAMUSCULAR; INTRAVENOUS at 14:34

## 2023-02-17 RX ADMIN — SODIUM CHLORIDE, PRESERVATIVE FREE 10 ML: 5 INJECTION INTRAVENOUS at 11:16

## 2023-02-17 RX ADMIN — DIVALPROEX SODIUM 1000 MG: 500 TABLET, EXTENDED RELEASE ORAL at 20:10

## 2023-02-17 RX ADMIN — APIXABAN 10 MG: 5 TABLET, FILM COATED ORAL at 20:10

## 2023-02-17 RX ADMIN — METHYLPREDNISOLONE SODIUM SUCCINATE 40 MG: 40 INJECTION, POWDER, FOR SOLUTION INTRAMUSCULAR; INTRAVENOUS at 03:43

## 2023-02-17 RX ADMIN — AMPICILLIN SODIUM AND SULBACTAM SODIUM 3000 MG: 2; 1 INJECTION, POWDER, FOR SOLUTION INTRAMUSCULAR; INTRAVENOUS at 20:24

## 2023-02-17 RX ADMIN — AMPICILLIN SODIUM AND SULBACTAM SODIUM 3000 MG: 2; 1 INJECTION, POWDER, FOR SOLUTION INTRAMUSCULAR; INTRAVENOUS at 03:43

## 2023-02-17 RX ADMIN — SODIUM CHLORIDE, PRESERVATIVE FREE 10 ML: 5 INJECTION INTRAVENOUS at 03:43

## 2023-02-17 RX ADMIN — METHYLPREDNISOLONE SODIUM SUCCINATE 40 MG: 40 INJECTION, POWDER, FOR SOLUTION INTRAMUSCULAR; INTRAVENOUS at 14:29

## 2023-02-17 RX ADMIN — LAMOTRIGINE 100 MG: 100 TABLET ORAL at 08:41

## 2023-02-17 RX ADMIN — SODIUM CHLORIDE: 9 INJECTION, SOLUTION INTRAVENOUS at 03:49

## 2023-02-17 RX ADMIN — QUETIAPINE FUMARATE 400 MG: 200 TABLET ORAL at 20:10

## 2023-02-17 RX ADMIN — SODIUM CHLORIDE, PRESERVATIVE FREE 10 ML: 5 INJECTION INTRAVENOUS at 20:14

## 2023-02-17 RX ADMIN — METOPROLOL SUCCINATE 25 MG: 25 TABLET, EXTENDED RELEASE ORAL at 08:41

## 2023-02-17 RX ADMIN — METHYLPREDNISOLONE SODIUM SUCCINATE 40 MG: 40 INJECTION, POWDER, FOR SOLUTION INTRAMUSCULAR; INTRAVENOUS at 08:39

## 2023-02-17 RX ADMIN — IPRATROPIUM BROMIDE AND ALBUTEROL SULFATE 1 AMPULE: 2.5; .5 SOLUTION RESPIRATORY (INHALATION) at 14:20

## 2023-02-17 RX ADMIN — SODIUM CHLORIDE, PRESERVATIVE FREE 10 ML: 5 INJECTION INTRAVENOUS at 14:29

## 2023-02-17 RX ADMIN — PANTOPRAZOLE SODIUM 40 MG: 40 TABLET, DELAYED RELEASE ORAL at 06:01

## 2023-02-17 RX ADMIN — HEPARIN SODIUM 11 UNITS/KG/HR: 10000 INJECTION, SOLUTION INTRAVENOUS at 08:54

## 2023-02-17 RX ADMIN — Medication 500 MG: at 17:25

## 2023-02-17 RX ADMIN — ASPIRIN 81 MG CHEWABLE TABLET 81 MG: 81 TABLET CHEWABLE at 08:41

## 2023-02-17 ASSESSMENT — PAIN DESCRIPTION - LOCATION
LOCATION: CHEST
LOCATION: THROAT

## 2023-02-17 ASSESSMENT — PAIN SCALES - GENERAL
PAINLEVEL_OUTOF10: 3
PAINLEVEL_OUTOF10: 3

## 2023-02-17 NOTE — PROGRESS NOTES
[facility]  Office: [address], DO, Meaghan Morales, DO, Senthil Phlegm, DO, Uchefina Carlson Blood, DO, Jennie Ngo MD, Oralia Roque MD, Kelsey Rossi MD, Mary Johnson MD,  Beatriz Mcdonough MD, Monica Thorne MD, Bridgett Joel, DO, Yina Velásquez MD,  Steph Silva MD, Demetris Rachel MD, Chanel Chinchilla, DO, Jesus Hope MD, Karen Ramirez MD, Ritesh Birmingham, DO, Jhon Mott MD, Martina Reyes MD, Catarina Grissom MD, Anupama Wilder MD, Jad Alvarez, DO, Era Keith MD, Stephenie Moore MD, Nilton Craig, CNP,  Pati Kat, CNP, Kim Christian, CNP, Raphael Zimmer, CNP,  Pj Busby, DNP, Ramiro Willams, CNP, Isidra Cope, CNP, Curly Ron, CNP, Ignacio Caballero, CNP, Antony Libman, CNP, Nubia Bloom, PA-C, Zoya Zheng, CNS, Mac Oscar, CNP, Annabelle Gutierrez, [facility]    Progress Note    2/17/2023    1:57 PM    Name:   Carolyn Bean  MRN:     [id number]     Acct:      [de-identified]   Room:   [address] [place] [address] Day:  2  Admit Date:  2/15/2023 10:00 AM    PCP:   Fitz Forrest MD  Code Status:  Full Code    Subjective:     C/C:   Chief Complaint   Patient presents with    Shortness of Breath     Pt was brought in via EMS for SOB that started this morning, Hx of COPD, Not on home O2     Interval History Status: .   Denies chest pain today  Still has mild shortness of breath  Saturating 93% on 7 L high flow oxygen, does not use oxygen at home  Sodium 146, he is on Unasyn  Venous Doppler positive for DVT right leg  Brief History:   Per my BRONSON:  Shorty Livingston is a 78 y.o. Non- / non  male who presents with Shortness of Breath (Pt was brought in via EMS for SOB that started this morning, Hx of COPD, Not on home O2)   and is admitted to the hospital for the management of Pulmonary embolism on left [facility]).      Patient presented to the ER today with complaints of shortness of breath and wheezing. He has this is a coughs a lot when he is eating and notices burning in his chest sometimes after he eats. Per vitals he was noted to be hypoxic and oxygen per nasal cannula was applied. CT chest was positive for tiny PE but no heart strain. There is also concern about possible aspiration. When I spoke to the patient he says he does have a history of coughing with meals and often has pain in his chest after eating. Patient was hospitalized 1/13 through 1/27/2023. At that time he was treated for COPD exacerbation and was positive for COVID. He received a full dose of Paxlovid while inpatient. According to the notes his oxygenation was labile but improved with diuresis. At the time of discharge he was given prednisone. Patient was encouraged to have a sleep study as an outpatient. Review of Systems:     Constitutional:  negative for chills, fevers, sweats  Respiratory:  negative for cough, +dyspnea on exertion, denies wheezing  Cardiovascular:  negative for chest pain, chest pressure/discomfort, lower extremity edema, palpitations  Gastrointestinal:  negative for abdominal pain, constipation, diarrhea, nausea, vomiting  Neurological:  negative for dizziness, headache    Medications:      Allergies:  No Known Allergies    Current Meds:   Scheduled Meds:    apixaban  10 mg Oral BID    Followed by    Derick Lainez ON 2/24/2023] apixaban  5 mg Oral BID    sodium chloride flush  5-40 mL IntraVENous 2 times per day    arformoterol tartrate  15 mcg Nebulization BID    aspirin  81 mg Oral Daily    budesonide  0.5 mg Nebulization BID    divalproex  1,000 mg Oral Nightly    [START ON 2/19/2023] vitamin D  50,000 Units Oral Weekly    lamoTRIgine  100 mg Oral Daily    metoprolol succinate  25 mg Oral Daily    nicotine  1 patch TransDERmal Daily    pantoprazole  40 mg Oral QAM AC    QUEtiapine  400 mg Oral Nightly    methylPREDNISolone  40 mg IntraVENous Q6H    Followed by    Derick Lainez ON 2/18/2023] predniSONE 40 mg Oral Daily    ampicillin-sulbactam  3,000 mg IntraVENous Q6H    ipratropium-albuterol  1 ampule Inhalation TID     Continuous Infusions:    sodium chloride 10 mL/hr at 23 0844     PRN Meds: oxyCODONE-acetaminophen, perflutren lipid microspheres, albuterol, sodium chloride flush, sodium chloride flush, sodium chloride, polyethylene glycol, ondansetron **OR** ondansetron, acetaminophen **OR** acetaminophen, magnesium sulfate, potassium chloride **OR** potassium alternative oral replacement **OR** potassium chloride    Data:     Past Medical History:   has a past medical history of Back pain, Bipolar 1 disorder ([facility] COPD (chronic obstructive pulmonary disease) ([facility] GERD (gastroesophageal reflux disease), and Heart attack ([facility] Social History:   reports that he has been smoking cigarettes and pipe. He has never used smokeless tobacco. He reports that he does not drink alcohol and does not use drugs. Family History:   Family History   Problem Relation Age of Onset    Cancer Father         Lung    Stroke Father     Cancer Brother         Lymphoma    Mental Illness Other         Aunt       Vitals:  BP (!) 128/97   Pulse 64   Temp 97.8 °F (36.6 °C) (Temporal)   Resp 19   Ht 5' 10\" (1.778 m)   Wt 200 lb (90.7 kg)   SpO2 93%   BMI 28.70 kg/m²   Temp (24hrs), Av.7 °F (36.5 °C), Min:96.9 °F (36.1 °C), Max:98.4 °F (36.9 °C)    No results for input(s): POCGLU in the last 72 hours. I/O (24Hr):     Intake/Output Summary (Last 24 hours) at 2023 1357  Last data filed at 2023 0706  Gross per 24 hour   Intake --   Output 550 ml   Net -550 ml       Labs:  Hematology:  Recent Labs     02/15/23  1015 02/15/23  1343 23  0419 23  0547   WBC 5.4  --  5.1 9.9   RBC 3.85*  --  3.65* 3.71*   HGB 11.9*  --  11.5* 11.7*   HCT 38.4*  --  37.0* 38.4*   MCV 99.7  --  101.4 103.5*   MCH 30.9  --  31.5 31.5   MCHC 31.0  --  31.1 30.5   RDW 15.5*  --  15.2* 15.7*     -- 273 349   MPV 8.4  --  8.7 8.8   INR  --  1.1  --   --      Chemistry:  Recent Labs     02/15/23  1015 02/15/23  1230 02/15/23  1343 02/16/23  0419 02/16/23  1103 02/16/23  1216 02/17/23  0547   *  --   --  141  --   --  146*   K 3.2*  --   --  3.3*  --   --  3.9   CL 91*  --   --  96*  --   --  102   CO2 45*  --   --  40*  --   --  35*   GLUCOSE 121*  --   --  188*  --   --  147*   BUN 10  --   --  12  --   --  12   CREATININE 1.32*  --   --  1.08  --   --  0.90   MG  --   --   --  2.1  --   --   --    ANIONGAP 9  --   --  5*  --   --  9   LABGLOM 56*  --   --  >60  --   --  >60   CALCIUM 8.4*  --   --  8.2*  --   --  8.5*   PROBNP 1,683*  --   --   --   --   --   --    TROPHS 55* 44* 45*  --  33* 30*  --    MYOGLOBIN 59 51 55  --   --   --   --    No results for input(s): PROT, LABALBU, LABA1C, Y6AMCCU, M2LDGPP, FT4, TSH, AST, ALT, LDH, GGT, ALKPHOS, LABGGT, BILITOT, BILIDIR, AMMONIA, AMYLASE, LIPASE, LACTATE, CHOL, HDL, LDLCHOLESTEROL, CHOLHDLRATIO, TRIG, VLDL, DSV35MM, PHENYTOIN, PHENYF, URICACID, POCGLU in the last 72 hours. ABG:No results found for: POCPH, PHART, PH, POCPCO2, UKX2ENN, PCO2, POCPO2, PO2ART, PO2, POCHCO3, SIG0NBO, HCO3, NBEA, PBEA, BEART, BE, THGBART, THB, YIJ7IWG, YPNU5UQW, O2HMMBHQ, O2SAT, FIO2  Lab Results   Component Value Date/Time    SPECIAL L ARM 02/15/2023 01:44 PM     Lab Results   Component Value Date/Time    CULTURE NO GROWTH 1 DAY 02/15/2023 01:44 PM       Radiology:  XR CHEST PORTABLE    Result Date: 2/16/2023  Interval volume loss and airspace opacity at the right base, suggesting atelectasis. There may also be some component of pleural fluid. XR CHEST PORTABLE    Result Date: 2/15/2023  Chronic pulmonary change without acute cardiopulmonary process. CT CHEST PULMONARY EMBOLISM W CONTRAST    Result Date: 2/15/2023  1. Tiny distal pulmonary emboli in the posterior basal left lower lobe segmental pulmonary artery. 2. Mild dependent atelectasis and respiratory motion. Mild-to-moderate emphysema. Stable known 9 mm lingular pulmonary nodule, unchanged from 1/14/2023. Please see workup recommendations on prior CT chest report of 1/14/2023. 3. Mucoid secretions and/or aspirated material in the trachea and mainstem bronchi. 4. Small pericardial effusion. 5. Small right-sided pleural effusion. 6. Coronary artery disease. 7. Atheromatous plaque and atherosclerotic calcification of the aorta and branch vasculature. 8. Dilated, patulous esophagus with wall thickening of the distal esophagus and GE junction. Small to moderate hiatal hernia. Consider further evaluation with palpation, EGD and/or esophagram/upper GI series. 9. Cholelithiasis. 10. Fatty liver. Critical results were called by Dr. Nicole Frey. Alex Nguyen MD to GLORIA Tapia on 2/15/2023 at 12:38 p.m. by telephone.        Physical Examination:        General appearance:  alert, cooperative and no distress  Mental Status:  oriented to person, place and time and normal affect  Lungs:  clear to auscultation bilaterally, normal effort  Heart:  regular rate and rhythm, no murmur  Abdomen:  soft, nontender, nondistended, normal bowel sounds, no masses, hepatomegaly, splenomegaly  Extremities:  no edema, redness, tenderness in the calves  Skin:  no gross lesions, rashes, induration  Neurological:+ Tremors right upper and lower extremity-has history of parkinsonism  Assessment:        Hospital Problems             Last Modified POA    * (Principal) Pulmonary embolism on left ([address] 2/15/2023 Yes    Right leg DVT ([address] 2/17/2023 Yes    Acute respiratory failure with hypoxia (HCC) 2/16/2023 Yes    COPD exacerbation ([address] 2/15/2023 Yes    Tobacco abuse 2/15/2023 Yes    Essential (primary) hypertension 2/15/2023 Yes    SUNITHA (acute kidney injury) ([address] 2/15/2023 Yes    Acute aspiration pneumonia ([address] 2/15/2023 Yes    Hypokalemia 2/15/2023 Yes    Primary parkinsonism ([address] 2/17/2023 Yes       Plan:        Discontinue heparin drip and switch to Eliquis with starter pack dose  Continue Unasyn for aspiration pneumonia, monitor sodium, if it remains high will switch to oral Augmentin  Incentive spirometry  Titrate high flow oxygen to nasal cannula/wean off  Smoking cessation  PT OT  Discussed with , she is working to place him to [facility] at [facility] being on high flow oxygen and multiple admissions recently    Charity Castillo MD  2/17/2023  1:57 PM

## 2023-02-17 NOTE — RT PROTOCOL NOTE
RT Inhaler-Nebulizer Bronchodilator Protocol Note    There is a bronchodilator order in the chart from a provider indicating to follow the RT Bronchodilator Protocol and there is an Initiate RT Inhaler-Nebulizer Bronchodilator Protocol order as well (see protocol at bottom of note). CXR Findings:  XR CHEST PORTABLE    Result Date: 2/16/2023  Interval volume loss and airspace opacity at the right base, suggesting atelectasis. There may also be some component of pleural fluid. XR CHEST PORTABLE    Result Date: 2/15/2023  Chronic pulmonary change without acute cardiopulmonary process. The findings from the last RT Protocol Assessment were as follows:   History Pulmonary Disease: Chronic pulmonary disease  Respiratory Pattern: Dyspnea on exertion or RR 21-25 bpm  Breath Sounds: Slightly diminished and/or crackles  Cough: Strong, productive  Indication for Bronchodilator Therapy: Decreased or absent breath sounds  Bronchodilator Assessment Score: 7    Aerosolized bronchodilator medication orders have been revised according to the RT Inhaler-Nebulizer Bronchodilator Protocol below. Respiratory Therapist to perform RT Therapy Protocol Assessment initially then follow the protocol. Repeat RT Therapy Protocol Assessment PRN for score 0-3 or on second treatment, BID, and PRN for scores above 3. No Indications - adjust the frequency to every 6 hours PRN wheezing or bronchospasm, if no treatments needed after 48 hours then discontinue using Per Protocol order mode. If indication present, adjust the RT bronchodilator orders based on the Bronchodilator Assessment Score as indicated below.   Use Inhaler orders unless patient has one or more of the following: on home nebulizer, not able to hold breath for 10 seconds, is not alert and oriented, cannot activate and use MDI correctly, or respiratory rate 25 breaths per minute or more, then use the equivalent nebulizer order(s) with same Frequency and PRN reasons based on the score. If a patient is on this medication at home then do not decrease Frequency below that used at home. 0-3 - enter or revise RT bronchodilator order(s) to equivalent RT Bronchodilator order with Frequency of every 4 hours PRN for wheezing or increased work of breathing using Per Protocol order mode. 4-6 - enter or revise RT Bronchodilator order(s) to two equivalent RT bronchodilator orders with one order with BID Frequency and one order with Frequency of every 4 hours PRN wheezing or increased work of breathing using Per Protocol order mode. 7-10 - enter or revise RT Bronchodilator order(s) to two equivalent RT bronchodilator orders with one order with TID Frequency and one order with Frequency of every 4 hours PRN wheezing or increased work of breathing using Per Protocol order mode. 11-13 - enter or revise RT Bronchodilator order(s) to one equivalent RT bronchodilator order with QID Frequency and an Albuterol order with Frequency of every 4 hours PRN wheezing or increased work of breathing using Per Protocol order mode. Greater than 13 - enter or revise RT Bronchodilator order(s) to one equivalent RT bronchodilator order with every 4 hours Frequency and an Albuterol order with Frequency of every 2 hours PRN wheezing or increased work of breathing using Per Protocol order mode. RT to enter RT Home Evaluation for COPD & MDI Assessment order using Per Protocol order mode.     Electronically signed by Dominique Castañeda RCP on 2/17/2023 at 9:16 AM

## 2023-02-17 NOTE — PROGRESS NOTES
Writer reached out to pharmacy to verify that it is ok to give eliquis since heparin gtt was DC and eliquis was scheduled to be given within 10 minutes of the heparin gtt being DC.  Pharmacy stated that it was okay to give the eliquis this AM.

## 2023-02-17 NOTE — PLAN OF CARE
Patient therapeutic with Heparin gtt. Patient cooperative all night with no issues. Patient rested comfortably. Patient remains free from falls. Safety measures in place. Call light and bedside table within reach. Will continue to monitor. Problem: Discharge Planning  Goal: Discharge to home or other facility with appropriate resources  Outcome: Progressing     Problem: Pain  Goal: Verbalizes/displays adequate comfort level or baseline comfort level  Outcome: Progressing     Problem: Respiratory - Adult  Goal: Achieves optimal ventilation and oxygenation  2/17/2023 0623 by Aundrea Valdez RN  Outcome: Progressing     Problem: Skin/Tissue Integrity  Goal: Absence of new skin breakdown  Description: 1. Monitor for areas of redness and/or skin breakdown  2. Assess vascular access sites hourly  3. Every 4-6 hours minimum:  Change oxygen saturation probe site  4. Every 4-6 hours:  If on nasal continuous positive airway pressure, respiratory therapy assess nares and determine need for appliance change or resting period.   Outcome: Progressing     Problem: Safety - Adult  Goal: Free from fall injury  Outcome: Progressing     Problem: ABCDS Injury Assessment  Goal: Absence of physical injury  Outcome: Progressing

## 2023-02-17 NOTE — PLAN OF CARE
Problem: Respiratory - Adult  Goal: Achieves optimal ventilation and oxygenation  2/17/2023 0814 by Jodi Moore RCP  Outcome: Progressing     Problem: Respiratory - Adult  Goal: Able to breathe comfortably  2/17/2023 0814 by Jodi Moore RCP  Outcome: Progressing

## 2023-02-17 NOTE — PLAN OF CARE
Problem: Respiratory - Adult  Goal: Achieves optimal ventilation and oxygenation  2/16/2023 2045 by Mayur James RCP  Outcome: Progressing     Problem: Respiratory - Adult  Goal: Able to breathe comfortably  2/16/2023 2045 by Mayur James RCP  Outcome: Progressing   BRONCHOSPASM/BRONCHOCONSTRICTION    IMPROVE  AERATION/BREATHSOUNDS  ADMINISTER BRONCHODILATOR THERAPY AS APPROPRIATE  ASSESS BREATH SOUNDS  PATIENT EDUCATION AS NEEDED

## 2023-02-17 NOTE — PROGRESS NOTES
Physical Therapy  Facility/Department: [facility] PROGRESSIVE CARE  Daily Treatment Note  NAME: Isadora Mercer  : 1945  MRN: [id number]    Date of Service: 2023    Discharge Recommendations:  Due to recent hospitalization and medical condition, pt would benefit from additional intermittent skilled therapy at time of discharge. Please refer to the AM-PAC score for current functional status. Patient would benefit from continued therapy after discharge        Patient Diagnosis(es): The primary encounter diagnosis was Pneumonia due to infectious organism, unspecified laterality, unspecified part of lung. Diagnoses of COPD exacerbation ([address] and Other acute pulmonary embolism without acute cor pulmonale (HCC) were also pertinent to this visit. Assessment   Assessment: Pt requires 6L of [place] NC. Patient performs supine TherEx w/cues for pursed lip breathing; vitals stable throughout. Patient would benefit from continued skilled PT services to improve endurance, independence, and return to PLOF. Activity Tolerance: Patient tolerated evaluation without incident;Patient limited by endurance     Plan    Physcial Therapy Plan  General Plan: 5-7 times per week  Current Treatment Recommendations: Strengthening;Balance training;Functional mobility training;Transfer training;Neuromuscular re-education;Gait training; Endurance training;Home exercise program;Equipment evaluation, education, & procurement;Patient/Caregiver education & training; Safety education & training; Therapeutic activities; Pain management     Restrictions  Restrictions/Precautions  Restrictions/Precautions: General Precautions, Fall Risk  Required Braces or Orthoses?: No  Position Activity Restriction  Other position/activity restrictions: Up w/ assist, 8L O2 NC, telemetry, continuous pulse ox, LUE IV, RUE IV     Subjective    Subjective  Subjective: Pt resting supine in bed, lights off upon entry. Easily arousable and agreeable to supine TherEx.  Pt notes having a busy day performing functional mobility w/OT, testing w/Speech Therapy, and recently returned to bed. AMARI Kat reports pt is appropriate for therapy this date. Orientation  Overall Orientation Status: Within Functional Limits  Cognition  Overall Cognitive Status: Exceptions  Arousal/Alertness: Appropriate responses to stimuli  Following Commands: Follows multistep commands with repitition  Attention Span: Appears intact  Memory: Decreased short term memory;Decreased recall of recent events  Safety Judgement: Decreased awareness of need for safety;Decreased awareness of need for assistance  Problem Solving: Decreased awareness of errors;Assistance required to identify errors made;Assistance required to correct errors made;Assistance required to implement solutions;Assistance required to generate solutions  Insights: Decreased awareness of deficits  Initiation: Requires cues for some  Sequencing: Requires cues for some  Cognition Comment: Pt pleasant and cooperative throughout Tx. Objective      Bed Mobility Training  Bed Mobility Training: No  Transfer Training  Transfer Training: No  Gait Training  Gait Training: No     PT Exercises  Exercise Treatment: Pt demos good tolerance to supine TherEx. Stable vitals, no adverse reactions. SpO2 ~94% throughout. A/AROM Exercises: SLR, SAQs, heel slides x15 reps ea  Circulation/Endurance Exercises: Ankle pumps, glute sets, quad sets x15 reps ea. Breathing Techniques: Pursed lip breathing tech throughout     Safety Devices  Type of Devices: Bed alarm in place;Call light within reach; Left in bed;Patient at risk for falls; All fall risk precautions in place       Goals  Short Term Goals  Time Frame for Short Term Goals: 12 visits  Short Term Goal 1: Pt to demonstrate bed mobility independently  Short Term Goal 2: Pt to perform STS transfers w/ RW independently  Short Term Goal 3: Pt to ambulate at least 100ft w/ RW independently while maintaining SpO2 >92% throughout  Short Term Goal 4: Pt to actively participate in at least 30 minutes of physical therapy for ther act, ther ex, balance, gait, and endurance training  Patient Goals   Patient Goals : To go home, less pain    Education  Patient Education  Education Given To: Patient  Education Provided: Role of Therapy;Plan of Care;Energy Conservation;Home Exercise Program  Education Provided Comments: Pt educated on importance of continued mobility throughout admission, pursed lip breathing, supine TherEx, and PT POC. Pt w/good return demo. Pt would benefit from continued reinforcement of education. Education Method: Verbal;Demonstration  Education Outcome: Continued education needed;Verbalized understanding    AM-PAC Scores  AM-PAC Inpatient Mobility Raw Score: 18  AM-PAC Inpatient T-Scale Score: 43.63  Mobility Inpatient CMS 0-100% Score: 46.58  Mobility Inpatient CMS G-Code Modifier: CK    Therapy Time   Individual Concurrent Group Co-treatment   Time In 1440         Time Out 1457         Minutes 17               Treatment & documentation completed by Chantal Rodrigues Student Physical Therapist Assistant  co-signed by Emma Gonzales PTA   I attest that I was present for the entire session, directed all activities, made all clinical decisions, and was responsible for all assessments. I am the clinical instructor and have supervised the student in provision of therapy services. I have reviewed the clinical documentation and am responsible for the care provided under the therapy plan of care.

## 2023-02-17 NOTE — PLAN OF CARE
Pt seen at bedside today. Pt denies any pain and expresses feeling comfortable. Pt is no longer on heparin gtt. Pt is taking 10mg Eliquis BID. Pt on 7L NC (salter). VSS. Pt receiving Solumedrol for inflammation and Unasyn for aspiration pneumonia. All questions and concerns addressed. Bed locked in lowest position and call light is in reach. Will continue to address pt's needs and concerns. Problem: Pain  Goal: Verbalizes/displays adequate comfort level or baseline comfort level  2/17/2023 1224 by Navin Antoine RN  Outcome: Progressing       Problem: Pain  Goal: Verbalizes/displays adequate comfort level or baseline comfort level  2/17/2023 1224 by Navin Antoine RN  Outcome: Progressing     Problem: Respiratory - Adult  Goal: Achieves optimal ventilation and oxygenation  2/17/2023 1224 by Navin Antoine RN  Outcome: Progressing     Problem: Skin/Tissue Integrity  Goal: Absence of new skin breakdown  Description: 1. Monitor for areas of redness and/or skin breakdown  2. Assess vascular access sites hourly  3. Every 4-6 hours minimum:  Change oxygen saturation probe site  4. Every 4-6 hours:  If on nasal continuous positive airway pressure, respiratory therapy assess nares and determine need for appliance change or resting period.   2/17/2023 1224 by Navin Antoine RN  Outcome: Progressing     Problem: Safety - Adult  Goal: Free from fall injury  2/17/2023 1224 by Navin Antoine RN  Outcome: Progressing     Problem: ABCDS Injury Assessment  Goal: Absence of physical injury  2/17/2023 1224 by Navin Antoine RN  Outcome: Progressing

## 2023-02-17 NOTE — PROGRESS NOTES
Pulmonary Critical Care Progress Note  Gloria Villaseñor MD     Patient seen for the follow up of  Pulmonary embolism on left [facility])     Subjective:  Resting comfortably in bed, no distress. He is on high flow oxygen at 7 L salter nasal cannula. He is sleepy today but cooperative with exam.  Shortness of breath is not much change. No cough or any chest pain. He is on heparin drip. Examination:  Vitals: BP (!) 151/82   Pulse 71   Temp 96.9 °F (36.1 °C) (Oral)   Resp 18   Ht 5' 10\" (1.778 m)   Wt 200 lb (90.7 kg)   SpO2 93%   BMI 28.70 kg/m²   General appearance: alert and cooperative with exam  Neck: No JVD  Lungs: Moderate air exchange, no crackles or wheezing  Heart: regular rate and rhythm, S1, S2 normal, no gallop  Abdomen: Soft, non tender, + BS  Extremities: no cyanosis or clubbing. No significant edema    LABs:  CBC:   Recent Labs     02/15/23  1015 02/16/23  0419 02/17/23  0547   WBC 5.4 5.1 9.9   HGB 11.9* 11.5* 11.7*   HCT 38.4* 37.0* 38.4*    273 349     BMP:   Recent Labs     02/15/23  1015 02/16/23  0419 02/17/23  0547   * 141 146*   K 3.2* 3.3* 3.9   CO2 45* 40* 35*   BUN 10 12 12   CREATININE 1.32* 1.08 0.90   LABGLOM 56* >60 >60   GLUCOSE 121* 188* 147*     PT/INR:   Recent Labs     02/15/23  1343   PROTIME 13.7   INR 1.1     APTT:  Recent Labs     02/15/23  1343   APTT 27.0     Radiology:  X-ray chest 2/16/2023      CTA 2/15/2023      Impression:  Acute hypoxic respiratory failure  Acute PE, tiny distal posterior basilar left lower lobe segmental pulmonary artery  Acute right leg DVT of femoral artery  Acute exacerbation of COPD/emphysema  Small right pleural effusion/atelectasis  Retained secretions in trachea and mainstem bronchi,?   Aspiration  30-pack-year smoking history  Indeterminate 9 mm lingular nodule  Possible obstructive sleep apnea  Recent history of COVID-19  Dilated patulous esophagus with wall thickening/small to moderate hiatal hernia    Recommendations:  Oxygen via high flow nasal cannula, wean as able to keep SPO2 90% or greater  Incentive spirometry every hour while awake   Discontinue IV fluids  Continue Unasyn   IV solu-medrol 40 mg every 6 hours   Continue budesonide and Brovana via nebulizer twice daily  Albuterol and Ipratropium 3 times daily and as needed  Speech therapy evaluation  Labs: CBC and BMP in am  X-ray chest in a.m.   DVT prophylaxis, DC heparin drip, start Eliquis  GI prophylaxis, Protonix  PT/OT  Will follow with you    Electronically signed by     Maurice Mcdonald MD on 2/17/2023 at 2:10 PM  Pulmonary Critical Care and Sleep Medicine,  [facility]  Cell: [phone]  Office: [phone]

## 2023-02-17 NOTE — CARE COORDINATION
Social Work-[facility] is following. Pt would like Alycia Po he does not qualify for [employer]. Sent referral to [employer].  Wilbert Cerrato

## 2023-02-18 ENCOUNTER — APPOINTMENT (OUTPATIENT)
Dept: GENERAL RADIOLOGY | Age: 78
End: 2023-02-18
Payer: MEDICARE

## 2023-02-18 LAB
ANION GAP SERPL CALCULATED.3IONS-SCNC: 5 MMOL/L (ref 9–17)
BUN SERPL-MCNC: 15 MG/DL (ref 8–23)
BUN/CREAT BLD: 19 (ref 9–20)
CALCIUM SERPL-MCNC: 9.1 MG/DL (ref 8.6–10.4)
CHLORIDE SERPL-SCNC: 103 MMOL/L (ref 98–107)
CO2 SERPL-SCNC: 38 MMOL/L (ref 20–31)
CREAT SERPL-MCNC: 0.77 MG/DL (ref 0.7–1.2)
GFR SERPL CREATININE-BSD FRML MDRD: >60 ML/MIN/1.73M2
GLUCOSE SERPL-MCNC: 87 MG/DL (ref 70–99)
POTASSIUM SERPL-SCNC: 4.7 MMOL/L (ref 3.7–5.3)
POTASSIUM SERPL-SCNC: 5.4 MMOL/L (ref 3.7–5.3)
SODIUM SERPL-SCNC: 146 MMOL/L (ref 135–144)

## 2023-02-18 PROCEDURE — 6370000000 HC RX 637 (ALT 250 FOR IP): Performed by: NURSE PRACTITIONER

## 2023-02-18 PROCEDURE — 84132 ASSAY OF SERUM POTASSIUM: CPT

## 2023-02-18 PROCEDURE — 94640 AIRWAY INHALATION TREATMENT: CPT

## 2023-02-18 PROCEDURE — 2060000000 HC ICU INTERMEDIATE R&B

## 2023-02-18 PROCEDURE — 2580000003 HC RX 258: Performed by: PHYSICIAN ASSISTANT

## 2023-02-18 PROCEDURE — 99231 SBSQ HOSP IP/OBS SF/LOW 25: CPT | Performed by: INTERNAL MEDICINE

## 2023-02-18 PROCEDURE — 6360000002 HC RX W HCPCS: Performed by: NURSE PRACTITIONER

## 2023-02-18 PROCEDURE — 2580000003 HC RX 258: Performed by: NURSE PRACTITIONER

## 2023-02-18 PROCEDURE — 97530 THERAPEUTIC ACTIVITIES: CPT

## 2023-02-18 PROCEDURE — 36415 COLL VENOUS BLD VENIPUNCTURE: CPT

## 2023-02-18 PROCEDURE — 2700000000 HC OXYGEN THERAPY PER DAY

## 2023-02-18 PROCEDURE — 97116 GAIT TRAINING THERAPY: CPT

## 2023-02-18 PROCEDURE — 6370000000 HC RX 637 (ALT 250 FOR IP): Performed by: INTERNAL MEDICINE

## 2023-02-18 PROCEDURE — 94761 N-INVAS EAR/PLS OXIMETRY MLT: CPT

## 2023-02-18 PROCEDURE — 71045 X-RAY EXAM CHEST 1 VIEW: CPT

## 2023-02-18 PROCEDURE — 80048 BASIC METABOLIC PNL TOTAL CA: CPT

## 2023-02-18 RX ORDER — AMOXICILLIN AND CLAVULANATE POTASSIUM 875; 125 MG/1; MG/1
1 TABLET, FILM COATED ORAL EVERY 12 HOURS SCHEDULED
Status: DISCONTINUED | OUTPATIENT
Start: 2023-02-18 | End: 2023-02-22

## 2023-02-18 RX ADMIN — AMPICILLIN SODIUM AND SULBACTAM SODIUM 3000 MG: 2; 1 INJECTION, POWDER, FOR SOLUTION INTRAMUSCULAR; INTRAVENOUS at 03:21

## 2023-02-18 RX ADMIN — IPRATROPIUM BROMIDE AND ALBUTEROL SULFATE 1 AMPULE: 2.5; .5 SOLUTION RESPIRATORY (INHALATION) at 13:59

## 2023-02-18 RX ADMIN — AMOXICILLIN AND CLAVULANATE POTASSIUM 1 TABLET: 875; 125 TABLET, FILM COATED ORAL at 08:30

## 2023-02-18 RX ADMIN — BUDESONIDE 500 MCG: 0.5 SUSPENSION RESPIRATORY (INHALATION) at 20:49

## 2023-02-18 RX ADMIN — APIXABAN 10 MG: 5 TABLET, FILM COATED ORAL at 08:30

## 2023-02-18 RX ADMIN — QUETIAPINE FUMARATE 400 MG: 200 TABLET ORAL at 20:20

## 2023-02-18 RX ADMIN — PREDNISONE 40 MG: 20 TABLET ORAL at 08:30

## 2023-02-18 RX ADMIN — IPRATROPIUM BROMIDE AND ALBUTEROL SULFATE 1 AMPULE: 2.5; .5 SOLUTION RESPIRATORY (INHALATION) at 20:49

## 2023-02-18 RX ADMIN — Medication 500 MG: at 03:58

## 2023-02-18 RX ADMIN — DIVALPROEX SODIUM 1000 MG: 500 TABLET, EXTENDED RELEASE ORAL at 20:20

## 2023-02-18 RX ADMIN — APIXABAN 10 MG: 5 TABLET, FILM COATED ORAL at 20:20

## 2023-02-18 RX ADMIN — ASPIRIN 81 MG CHEWABLE TABLET 81 MG: 81 TABLET CHEWABLE at 08:30

## 2023-02-18 RX ADMIN — SODIUM CHLORIDE, PRESERVATIVE FREE 10 ML: 5 INJECTION INTRAVENOUS at 20:21

## 2023-02-18 RX ADMIN — SODIUM CHLORIDE, PRESERVATIVE FREE 10 ML: 5 INJECTION INTRAVENOUS at 08:30

## 2023-02-18 RX ADMIN — IPRATROPIUM BROMIDE AND ALBUTEROL SULFATE 1 AMPULE: 2.5; .5 SOLUTION RESPIRATORY (INHALATION) at 07:58

## 2023-02-18 RX ADMIN — PANTOPRAZOLE SODIUM 40 MG: 40 TABLET, DELAYED RELEASE ORAL at 06:12

## 2023-02-18 RX ADMIN — ARFORMOTEROL TARTRATE 15 MCG: 15 SOLUTION RESPIRATORY (INHALATION) at 07:59

## 2023-02-18 RX ADMIN — BUDESONIDE 500 MCG: 0.5 SUSPENSION RESPIRATORY (INHALATION) at 07:59

## 2023-02-18 RX ADMIN — ARFORMOTEROL TARTRATE 15 MCG: 15 SOLUTION RESPIRATORY (INHALATION) at 20:49

## 2023-02-18 RX ADMIN — AMOXICILLIN AND CLAVULANATE POTASSIUM 1 TABLET: 875; 125 TABLET, FILM COATED ORAL at 20:20

## 2023-02-18 RX ADMIN — LAMOTRIGINE 100 MG: 100 TABLET ORAL at 08:30

## 2023-02-18 ASSESSMENT — PAIN SCALES - GENERAL
PAINLEVEL_OUTOF10: 0

## 2023-02-18 NOTE — PROGRESS NOTES
[facility]  Office: [address], DO, Quinten Mosquera, DO, Susanna Quinn, DO, Cynthia Sorrel Blood, DO, Shawn Ramsey MD, Opal Perez MD, Yvone Phalen, MD, Adrian Cárdenas MD,  Homer Hashimoto, MD, Luisa Tello MD, Herminia Reagan, DO, Tamara Stallings MD,  Kerri Rob MD, Ruthie Prince MD, Devon Claros, DO, Keri Antoine MD, Makayla Wise MD, Marily Tomlinson, DO, Antonella Villalba MD, Caio Veliz MD, Amrik López MD, Lorene Saldana MD, Elidia Whitfield DO, Mirtha Jnoes MD, Mark Johnson MD, Nellie Hankins, CNP,  Kendal Smith, CNP, Tomasz Nash, CNP, Mary Aj, CNP,  Shasha Waktins, [facility], Nuris Yates, CNP, Donta Tobin, CNP, Esperanza Martinez, CNP, Lida Smith, CNP, Carl Shanks, CNP, JONNY DanielC, Antionette Matias, CNS, All Sotelo, CNP, Tushar Lewis, Geiger Lauraside    Progress Note    2/18/2023    10:54 AM    Name:   Milvia Shore  MRN:     [id number]     Acct:      [de-identified]   Room:   [address] [place] [address] Day:  3  Admit Date:  2/15/2023 10:00 AM    PCP:   Felicia Forrest MD  Code Status:  Full Code    Subjective:     C/C:   Chief Complaint   Patient presents with    Shortness of Breath     Pt was brought in via EMS for SOB that started this morning, Hx of COPD, Not on home O2     Interval History Status: .   Denies chest pain today, says he is feeling stronger  Still has mild shortness of breath  Saturating 95% on 6 L high flow oxygen, does not use oxygen at home  Sodium 146, he is on Unasyn  Potassium 5.4-recheck 4.7  Venous Doppler positive for DVT right leg  Brief History:   Per my BRONSON:  Stanislav Abdullahi is a 78 y.o. Non- / non  male who presents with Shortness of Breath (Pt was brought in via EMS for SOB that started this morning, Hx of COPD, Not on home O2)   and is admitted to the hospital for the management of Pulmonary embolism on left [facility]).      Patient presented to the ER today with complaints of shortness of breath and wheezing. He has this is a coughs a lot when he is eating and notices burning in his chest sometimes after he eats. Per vitals he was noted to be hypoxic and oxygen per nasal cannula was applied. CT chest was positive for tiny PE but no heart strain. There is also concern about possible aspiration. When I spoke to the patient he says he does have a history of coughing with meals and often has pain in his chest after eating. Patient was hospitalized 1/13 through 1/27/2023. At that time he was treated for COPD exacerbation and was positive for COVID. He received a full dose of Paxlovid while inpatient. According to the notes his oxygenation was labile but improved with diuresis. At the time of discharge he was given prednisone. Patient was encouraged to have a sleep study as an outpatient. Review of Systems:     Constitutional:  negative for chills, fevers, sweats  Respiratory:  negative for cough, +dyspnea on exertion, denies wheezing  Cardiovascular:  negative for chest pain, chest pressure/discomfort, lower extremity edema, palpitations  Gastrointestinal:  negative for abdominal pain, constipation, diarrhea, nausea, vomiting  Neurological:  negative for dizziness, headache    Medications:      Allergies:  No Known Allergies    Current Meds:   Scheduled Meds:    amoxicillin-clavulanate  1 tablet Oral 2 times per day    apixaban  10 mg Oral BID    Followed by    Delisa Barrios ON 2/24/2023] apixaban  5 mg Oral BID    sodium chloride flush  5-40 mL IntraVENous 2 times per day    arformoterol tartrate  15 mcg Nebulization BID    aspirin  81 mg Oral Daily    budesonide  0.5 mg Nebulization BID    divalproex  1,000 mg Oral Nightly    [START ON 2/19/2023] vitamin D  50,000 Units Oral Weekly    lamoTRIgine  100 mg Oral Daily    metoprolol succinate  25 mg Oral Daily    nicotine  1 patch TransDERmal Daily    pantoprazole  40 mg Oral QAM AC QUEtiapine  400 mg Oral Nightly    predniSONE  40 mg Oral Daily    ipratropium-albuterol  1 ampule Inhalation TID     Continuous Infusions:    sodium chloride 10 mL/hr at 23 0844     PRN Meds: calcium carbonate, oxyCODONE-acetaminophen, perflutren lipid microspheres, albuterol, sodium chloride flush, sodium chloride flush, sodium chloride, polyethylene glycol, ondansetron **OR** ondansetron, acetaminophen **OR** acetaminophen, magnesium sulfate, potassium chloride **OR** potassium alternative oral replacement **OR** potassium chloride    Data:     Past Medical History:   has a past medical history of Back pain, Bipolar 1 disorder ([facility] Cancer ([facility] COPD (chronic obstructive pulmonary disease) ([facility] GERD (gastroesophageal reflux disease), and Heart attack ([facility] Social History:   reports that he has been smoking cigarettes and pipe. He has never used smokeless tobacco. He reports that he does not drink alcohol and does not use drugs. Family History:   Family History   Problem Relation Age of Onset    Cancer Father         Lung    Stroke Father     Cancer Brother         Lymphoma    Mental Illness Other         Aunt       Vitals:  /69   Pulse 58   Temp 97.3 °F (36.3 °C) (Oral)   Resp 18   Ht 5' 10\" (1.778 m)   Wt 200 lb (90.7 kg)   SpO2 93%   BMI 28.70 kg/m²   Temp (24hrs), Av.7 °F (36.5 °C), Min:97.3 °F (36.3 °C), Max:98.5 °F (36.9 °C)    No results for input(s): POCGLU in the last 72 hours. I/O (24Hr):     Intake/Output Summary (Last 24 hours) at 2023 1054  Last data filed at 2023 0834  Gross per 24 hour   Intake --   Output 1625 ml   Net -1625 ml         Labs:  Hematology:  Recent Labs     02/15/23  1343 23  0419 23  0547   WBC  --  5.1 9.9   RBC  --  3.65* 3.71*   HGB  --  11.5* 11.7*   HCT  --  37.0* 38.4*   MCV  --  101.4 103.5*   MCH  --  31.5 31.5   MCHC  --  31.1 30.5   RDW  --  15.2* 15.7*   PLT  --  273 349   MPV  --  8.7 8.8   INR 1.1  --   -- Chemistry:  Recent Labs     02/15/23  1230 02/15/23  1343 02/16/23  0419 02/16/23  0419 02/16/23  1103 02/16/23  1216 02/17/23  0547 02/18/23  0533 02/18/23  0744   NA  --   --  141  --   --   --  146* 146*  --    K  --   --  3.3*   < >  --   --  3.9 5.4* 4.7   CL  --   --  96*  --   --   --  102 103  --    CO2  --   --  40*  --   --   --  35* 38*  --    GLUCOSE  --   --  188*  --   --   --  147* 87  --    BUN  --   --  12  --   --   --  12 15  --    CREATININE  --   --  1.08  --   --   --  0.90 0.77  --    MG  --   --  2.1  --   --   --   --   --   --    ANIONGAP  --   --  5*  --   --   --  9 5*  --    LABGLOM  --   --  >60  --   --   --  >60 >60  --    CALCIUM  --   --  8.2*  --   --   --  8.5* 9.1  --    TROPHS 44* 45*  --   --  33* 30*  --   --   --    MYOGLOBIN 51 55  --   --   --   --   --   --   --     < > = values in this interval not displayed. No results for input(s): PROT, LABALBU, LABA1C, K2HWCGP, D5LTALY, FT4, TSH, AST, ALT, LDH, GGT, ALKPHOS, LABGGT, BILITOT, BILIDIR, AMMONIA, AMYLASE, LIPASE, LACTATE, CHOL, HDL, LDLCHOLESTEROL, CHOLHDLRATIO, TRIG, VLDL, ZRW06BF, PHENYTOIN, PHENYF, URICACID, POCGLU in the last 72 hours. ABG:No results found for: POCPH, PHART, PH, POCPCO2, GOQ5TCP, PCO2, POCPO2, PO2ART, PO2, POCHCO3, LIP0IML, HCO3, NBEA, PBEA, BEART, BE, THGBART, THB, CVR3CKX, TWRA3ISL, X3HATZVR, O2SAT, FIO2  Lab Results   Component Value Date/Time    SPECIAL L ARM 02/15/2023 01:44 PM     Lab Results   Component Value Date/Time    CULTURE NO GROWTH 2 DAYS 02/15/2023 01:44 PM       Radiology:  XR CHEST PORTABLE    Result Date: 2/16/2023  Interval volume loss and airspace opacity at the right base, suggesting atelectasis. There may also be some component of pleural fluid. XR CHEST PORTABLE    Result Date: 2/15/2023  Chronic pulmonary change without acute cardiopulmonary process. CT CHEST PULMONARY EMBOLISM W CONTRAST    Result Date: 2/15/2023  1.  Tiny distal pulmonary emboli in the posterior basal left lower lobe segmental pulmonary artery. 2. Mild dependent atelectasis and respiratory motion. Mild-to-moderate emphysema. Stable known 9 mm lingular pulmonary nodule, unchanged from 1/14/2023. Please see workup recommendations on prior CT chest report of 1/14/2023. 3. Mucoid secretions and/or aspirated material in the trachea and mainstem bronchi. 4. Small pericardial effusion. 5. Small right-sided pleural effusion. 6. Coronary artery disease. 7. Atheromatous plaque and atherosclerotic calcification of the aorta and branch vasculature. 8. Dilated, patulous esophagus with wall thickening of the distal esophagus and GE junction. Small to moderate hiatal hernia. Consider further evaluation with palpation, EGD and/or esophagram/upper GI series. 9. Cholelithiasis. 10. Fatty liver. Critical results were called by Dr. Michael Ramirez. Jesse Santillan MD to GLORIA Acosta on 2/15/2023 at 12:38 p.m. by telephone.        Physical Examination:        General appearance:  alert, cooperative and no distress  Mental Status:  oriented to person, place and time and normal affect  Lungs:  clear to auscultation bilaterally, normal effort  Heart:  regular rate and rhythm, no murmur  Abdomen:  soft, nontender, nondistended, normal bowel sounds, no masses, hepatomegaly, splenomegaly  Extremities:  no edema, redness, tenderness in the calves  Skin:  no gross lesions, rashes, induration  Neurological:+ Tremors right upper and lower extremity-has history of parkinsonism  Assessment:        Hospital Problems             Last Modified POA    * (Principal) Pulmonary embolism on left ([address] 2/15/2023 Yes    Right leg DVT ([address] 2/17/2023 Yes    Acute respiratory failure with hypoxia ([address] 2/16/2023 Yes    COPD exacerbation ([address] 2/15/2023 Yes    Tobacco abuse 2/15/2023 Yes    Essential (primary) hypertension 2/15/2023 Yes    SUNITHA (acute kidney injury) ([address] 2/15/2023 Yes    Acute aspiration pneumonia ([address] 2/15/2023 Yes    Hypokalemia 2/15/2023 Yes    Primary parkinsonism ([address] 2/17/2023 Yes     Plan:        Continue Eliquis with starter pack dose, heparin drip was discontinued yesterday  Switch to oral Augmentin from Unasyn IV  Incentive spirometry  Titrate high flow oxygen to nasal cannula/wean off  Smoking cessation  PT OT  Discussed with , she is working to place him to [facility] [place] being on high flow oxygen and multiple admissions recently    Polina Lockhart MD  2/18/2023  10:54 AM

## 2023-02-18 NOTE — PLAN OF CARE
Pt seen at bedside today. Pt is A&O x4 and calls out appropriately for pt needs. Pt was on 6L NC (salter), but is now on 10L NC (salter). Pt has wheezing in lungs and O2 saturation has been 90-91% since advancing to 10L. Writer got pt up to chair to help pt's breathing and pt has been tolerating it well. Writer has been encouraging incentive spirometer Q2 hours. Pt denies any pain. Bed and chair alarm in place, and safety measures maintained. Will continue to address pt's needs and concerns. Problem: Discharge Planning  Goal: Discharge to home or other facility with appropriate resources  2/18/2023 1325 by Elaina Eduardo RN  Outcome: Progressing     Problem: Pain  Goal: Verbalizes/displays adequate comfort level or baseline comfort level  2/18/2023 1325 by Elaina Eduardo RN  Outcome: Progressing     Problem: Respiratory - Adult  Goal: Achieves optimal ventilation and oxygenation  2/18/2023 1325 by Elaina Eduardo RN  Outcome: Progressing     Problem: Skin/Tissue Integrity  Goal: Absence of new skin breakdown  Description: 1. Monitor for areas of redness and/or skin breakdown  2. Assess vascular access sites hourly  3. Every 4-6 hours minimum:  Change oxygen saturation probe site  4. Every 4-6 hours:  If on nasal continuous positive airway pressure, respiratory therapy assess nares and determine need for appliance change or resting period.   2/18/2023 1325 by Elaina Eduardo RN  Outcome: Progressing     Problem: Safety - Adult  Goal: Free from fall injury  2/18/2023 1325 by Elaina Eduardo RN  Outcome: Progressing     Problem: ABCDS Injury Assessment  Goal: Absence of physical injury  2/18/2023 1325 by Elaina Eduardo RN  Outcome: Progressing

## 2023-02-18 NOTE — PLAN OF CARE
Problem: Discharge Planning  Goal: Discharge to home or other facility with appropriate resources  Outcome: Progressing  Note: Discharge teaching and instructions for diagnosis/procedure explained with patient using teachback method. Patient voiced understanding regarding prescriptions, follow up appointments, and care of self at home. Problem: Pain  Goal: Verbalizes/displays adequate comfort level or baseline comfort level  Outcome: Progressing  Note: Monitoring pain with each assessment and prn. JANIS 0-10 pain scale utilized. Non-pharmacological measures to be encouraged prior to pharmacological measures. Problem: Respiratory - Adult  Goal: Achieves optimal ventilation and oxygenation  Outcome: Progressing  Note: Assess breath sounds every shift and as needed. Assess oxygenation level & respiration rate. Encourage coughing & deep breathing. Encourage use of incentive spirometer. Assess cough & sputum. Administer oxygen as needed. Problem: Skin/Tissue Integrity  Goal: Absence of new skin breakdown  Description: 1. Monitor for areas of redness and/or skin breakdown  2. Assess vascular access sites hourly  3. Every 4-6 hours minimum:  Change oxygen saturation probe site  4. Every 4-6 hours:  If on nasal continuous positive airway pressure, respiratory therapy assess nares and determine need for appliance change or resting period. Outcome: Progressing  Note: Continuing to monitor for skin integrity risks. Patient independent with turning/repositioning. Turning/repositioning encouraged at least once every 2 hrs, and prn basis. Hygiene care being completed independently per patient; assistance provided when deemed necessary. Problem: Safety - Adult  Goal: Free from fall injury  Outcome: Progressing  Note: Pt fall risk, fall band present, falling star, safety alarm activated and in use as needed. Hourly rounding performed. Pt encouraged to use call light. See Paula Self fall risk assessment.      Problem: ABCDS Injury Assessment  Goal: Absence of physical injury  Outcome: Progressing  Note: Non-skid socks in place, up with assistance, bed in lowest position, bed exit & alarm as needed, provide toileting every 2 hours an d as needed.

## 2023-02-18 NOTE — PROGRESS NOTES
Writer assessed pt during hourly rounding and noticed the pt's O2 sat was sitting at 88% and there was wheezing in the lungs. Writer changed pt's finger probe and turned pt's O2 up to 10L NC. Writer called respiratory for further assessment and interventions.

## 2023-02-18 NOTE — PLAN OF CARE
Problem: Respiratory - Adult  Goal: Achieves optimal ventilation and oxygenation  2/18/2023 0804 by Kimberley Baxter RCP  Outcome: Progressing  2/18/2023 0307 by Ibis Kwon RN  Outcome: Progressing  Note: Assess breath sounds every shift and as needed. Assess oxygenation level & respiration rate. Encourage coughing & deep breathing. Encourage use of incentive spirometer. Assess cough & sputum. Administer oxygen as needed.

## 2023-02-18 NOTE — PROGRESS NOTES
Transitions of Care Pharmacy Service   Medication Review    The patient's list of current home medications has been reviewed. Source(s) of information: med list from assisted living ([facility]), Surescripts refill report      Please feel free to call me with any questions about this encounter. Thank you. Jarrett Stapleton, [facility]   Transitions of Care Pharmacy Service  Phone:  [phone]  Fax: [phone]      Electronically signed by Jarrett Stapleton, [facility] on 2/18/2023 at 1:49 PM           Medications Prior to Admission:   pantoprazole (PROTONIX) 40 MG tablet, Take 1 tablet by mouth every morning (before breakfast)  aspirin 81 MG chewable tablet, Take 1 tablet by mouth daily  arformoterol tartrate (BROVANA) 15 MCG/2ML NEBU, Take 2 mLs by nebulization in the morning and 2 mLs in the evening. budesonide (PULMICORT) 0.5 MG/2ML nebulizer suspension, Take 2 mLs by nebulization in the morning and 2 mLs in the evening.   metoprolol succinate (TOPROL XL) 25 MG extended release tablet, Take 1 tablet by mouth daily  polyethylene glycol (GLYCOLAX) 17 g packet, Take 17 g by mouth daily as needed for Constipation  nicotine (NICODERM CQ) 14 MG/24HR, Place 1 patch onto the skin daily  Ergocalciferol (VITAMIN D) 87469 units CAPS, Take 50,000 Units by mouth once a week  bumetanide (BUMEX) 1 MG tablet, Take 1 tablet by mouth daily  QUEtiapine (SEROQUEL) 400 MG tablet, Take 400 mg by mouth at bedtime  lamoTRIgine (LAMICTAL) 100 MG tablet, Take 100 mg by mouth daily  divalproex (DEPAKOTE ER) 500 MG extended release tablet, Take 2 tablets by mouth daily (Patient taking differently: Take 1,000 mg by mouth nightly)  QUEtiapine (SEROQUEL XR) 150 MG TB24 extended release tablet, Take 1 tablet by mouth daily (Patient taking differently: Take 150 mg by mouth at bedtime)

## 2023-02-18 NOTE — PROGRESS NOTES
Physical Therapy  [facility]  Daily Treatment Note  NAME: Milvia Shore  : 1945  MRN: [id number]    Date of Service: 2023    Discharge Recommendations:  Patient would benefit from continued therapy after discharge        Patient Diagnosis(es): The primary encounter diagnosis was Pneumonia due to infectious organism, unspecified laterality, unspecified part of lung. Diagnoses of COPD exacerbation ([address] and Other acute pulmonary embolism without acute cor pulmonale (HCC) were also pertinent to this visit. Assessment   Assessment: Pt requires 6L of [place] NC. Patient performs supine TherEx w/cues for pursed lip breathing; vitals stable throughout. Patient would benefit from continued skilled PT services to improve endurance, independence, and return to PLOF. Activity Tolerance: Patient tolerated evaluation without incident;Patient limited by endurance     Plan    Physcial Therapy Plan  General Plan: 5-7 times per week  Current Treatment Recommendations: Strengthening;Balance training;Functional mobility training;Transfer training;Neuromuscular re-education;Gait training; Endurance training;Home exercise program;Equipment evaluation, education, & procurement;Patient/Caregiver education & training; Safety education & training; Therapeutic activities; Pain management     Restrictions  Restrictions/Precautions  Restrictions/Precautions: General Precautions, Fall Risk  Required Braces or Orthoses?: No  Position Activity Restriction  Other position/activity restrictions: Up w/ assist, 8L O2 NC, telemetry, continuous pulse ox, LUE IV, RUE IV     Subjective    Subjective  Subjective: Pt resting supine in bed, lights off upon entry. Easily arousable and agreeable to supine TherEx. Pt notes having a busy day performing functional mobility w/OT, testing w/Speech Therapy, and recently returned to bed. AMARI Rodríguez reports pt is appropriate for therapy this date.   Cognition  Overall Cognitive Status: Exceptions  Arousal/Alertness: Appropriate responses to stimuli  Following Commands: Follows multistep commands with repitition  Attention Span: Appears intact  Memory: Decreased short term memory;Decreased recall of recent events  Safety Judgement: Decreased awareness of need for safety;Decreased awareness of need for assistance  Problem Solving: Decreased awareness of errors;Assistance required to identify errors made;Assistance required to correct errors made;Assistance required to implement solutions;Assistance required to generate solutions  Insights: Decreased awareness of deficits  Initiation: Requires cues for some  Sequencing: Requires cues for some  Cognition Comment: Pt easy with work with, follows comands well     Objective   Vitals     Gait Training  Gait Training: Yes  Gait  Overall Level of Assistance: Contact-guard assistance  Interventions: Verbal cues; Tactile cues  Base of Support: Narrowed  Speed/Laura: Delayed  Gait Abnormalities: Shuffling gait  Assistive Device: Gait belt;Walker, rolling     PT Exercises  Exercise Treatment: Standing PREs with UE support with supplemental OT for B LE strengthening [id number] ea             Goals  Short Term Goals  Time Frame for Short Term Goals: 12 visits  Short Term Goal 1: Pt to demonstrate bed mobility independently  Short Term Goal 2: Pt to perform STS transfers w/ RW independently  Short Term Goal 3: Pt to ambulate at least 100ft w/ RW independently while maintaining SpO2 >92% throughout  Short Term Goal 4: Pt to actively participate in at least 30 minutes of physical therapy for ther act, ther ex, balance, gait, and endurance training  Patient Goals   Patient Goals :  To go home, less pain    Education  Patient Education  Education Given To: Patient    Therapy Time   Individual Concurrent Group Co-treatment   Time In  0946         Time Out  [place]

## 2023-02-18 NOTE — PROGRESS NOTES
Pulmonary Critical Care Progress Note  Mario Juan MD     Patient seen for the follow up of  Pulmonary embolism on left [facility])     Subjective:  Resting comfortably in bed, no distress. He is on high flow oxygen at 6 L salter nasal cannula. He states his shortness of breath has improved. He denies any chest pain. He is having a non productive cough. He is off heparin drip and now on Eliquis. His appetite is fair. Examination:  Vitals: /69   Pulse 58   Temp 97.3 °F (36.3 °C) (Oral)   Resp 18   Ht 5' 10\" (1.778 m)   Wt 200 lb (90.7 kg)   SpO2 93%   BMI 28.70 kg/m²   General appearance: alert and cooperative with exam  Neck: No JVD  Lungs: Moderate air exchange, no crackles, occasional wheezing  Heart: regular rate and rhythm, S1, S2 normal, no gallop  Abdomen: Soft, non tender, + BS  Extremities: no cyanosis or clubbing. No significant edema    LABs:  CBC:   Recent Labs     02/15/23  1015 02/16/23  0419 02/17/23  0547   WBC 5.4 5.1 9.9   HGB 11.9* 11.5* 11.7*   HCT 38.4* 37.0* 38.4*    273 349     BMP:   Recent Labs     02/15/23  1015 02/16/23  0419 02/17/23  0547 02/18/23  0533   * 141 146* 146*   K 3.2* 3.3* 3.9 5.4*   CO2 45* 40* 35* 38*   BUN 10 12 12 15   CREATININE 1.32* 1.08 0.90 0.77   LABGLOM 56* >60 >60 >60   GLUCOSE 121* 188* 147* 87     PT/INR:   Recent Labs     02/15/23  1343   PROTIME 13.7   INR 1.1     APTT:  Recent Labs     02/15/23  1343   APTT 27.0     Radiology:  X-ray chest 2/18/2023      CTA 2/15/2023      Impression:  Acute hypoxic respiratory failure  Acute PE, tiny distal posterior basilar left lower lobe segmental pulmonary artery  Acute right leg DVT of femoral artery  Acute exacerbation of COPD/emphysema  Small right pleural effusion/atelectasis  Retained secretions in trachea and mainstem bronchi,?   Aspiration  30-pack-year smoking history  Indeterminate 9 mm lingular nodule  Possible obstructive sleep apnea  Recent history of COVID-19  Dilated patulous esophagus with wall thickening/small to moderate hiatal hernia    Recommendations:  Oxygen via high flow nasal cannula, wean as able to keep SPO2 90% or greater  Incentive spirometry every hour while awake   Off Unasyn, continue Augmentin   Completed IV solu-medrol 40 mg every 6 hours  Prednisone 40 mg daily  Continue budesonide and Brovana via nebulizer twice daily  Albuterol and Ipratropium 3 times daily and as needed  Speech therapy evaluation noted  Labs: CBC and BMP in am  X-ray chest in a.m.   DVT prophylaxis, DC heparin drip, start Eliquis  GI prophylaxis, Protonix  PT/OT  Will follow with you    Electronically signed by     Annabelle Burton MD on 2/18/2023 at 12:14 PM  Pulmonary Critical Care and Sleep Medicine,  [facility]  Cell: [phone]  Office: [phone]

## 2023-02-18 NOTE — PROGRESS NOTES
Patient had relatively uneventful evening. Received scheduled Unasyn IVPB. Stands to void in graduated cylinder. Patient c/o heartburn at 0400, which was treated effectively with PRN Tums. Will monitor.

## 2023-02-19 ENCOUNTER — APPOINTMENT (OUTPATIENT)
Dept: GENERAL RADIOLOGY | Age: 78
End: 2023-02-19
Payer: MEDICARE

## 2023-02-19 PROBLEM — J90 PLEURAL EFFUSION: Status: ACTIVE | Noted: 2023-02-19

## 2023-02-19 PROBLEM — I50.31 ACUTE DIASTOLIC CHF (CONGESTIVE HEART FAILURE) (HCC): Status: ACTIVE | Noted: 2023-02-19

## 2023-02-19 LAB
ANION GAP SERPL CALCULATED.3IONS-SCNC: 6 MMOL/L (ref 9–17)
BNP SERPL-MCNC: 2715 PG/ML
BUN SERPL-MCNC: 17 MG/DL (ref 8–23)
BUN/CREAT BLD: 19 (ref 9–20)
CALCIUM SERPL-MCNC: 8.7 MG/DL (ref 8.6–10.4)
CHLORIDE SERPL-SCNC: 104 MMOL/L (ref 98–107)
CO2 SERPL-SCNC: 35 MMOL/L (ref 20–31)
CREAT SERPL-MCNC: 0.88 MG/DL (ref 0.7–1.2)
GFR SERPL CREATININE-BSD FRML MDRD: >60 ML/MIN/1.73M2
GLUCOSE SERPL-MCNC: 124 MG/DL (ref 70–99)
POTASSIUM SERPL-SCNC: 3.8 MMOL/L (ref 3.7–5.3)
SODIUM SERPL-SCNC: 145 MMOL/L (ref 135–144)

## 2023-02-19 PROCEDURE — 6370000000 HC RX 637 (ALT 250 FOR IP): Performed by: NURSE PRACTITIONER

## 2023-02-19 PROCEDURE — 83880 ASSAY OF NATRIURETIC PEPTIDE: CPT

## 2023-02-19 PROCEDURE — 80048 BASIC METABOLIC PNL TOTAL CA: CPT

## 2023-02-19 PROCEDURE — 2060000000 HC ICU INTERMEDIATE R&B

## 2023-02-19 PROCEDURE — 6360000002 HC RX W HCPCS: Performed by: NURSE PRACTITIONER

## 2023-02-19 PROCEDURE — 2700000000 HC OXYGEN THERAPY PER DAY

## 2023-02-19 PROCEDURE — 2500000003 HC RX 250 WO HCPCS: Performed by: INTERNAL MEDICINE

## 2023-02-19 PROCEDURE — 71045 X-RAY EXAM CHEST 1 VIEW: CPT

## 2023-02-19 PROCEDURE — 94761 N-INVAS EAR/PLS OXIMETRY MLT: CPT

## 2023-02-19 PROCEDURE — 6370000000 HC RX 637 (ALT 250 FOR IP): Performed by: INTERNAL MEDICINE

## 2023-02-19 PROCEDURE — 36415 COLL VENOUS BLD VENIPUNCTURE: CPT

## 2023-02-19 PROCEDURE — 99231 SBSQ HOSP IP/OBS SF/LOW 25: CPT | Performed by: INTERNAL MEDICINE

## 2023-02-19 PROCEDURE — 94640 AIRWAY INHALATION TREATMENT: CPT

## 2023-02-19 PROCEDURE — 2580000003 HC RX 258: Performed by: PHYSICIAN ASSISTANT

## 2023-02-19 RX ORDER — BUMETANIDE 1 MG/1
1 TABLET ORAL DAILY
Status: DISCONTINUED | OUTPATIENT
Start: 2023-02-20 | End: 2023-02-24 | Stop reason: HOSPADM

## 2023-02-19 RX ORDER — BUMETANIDE 0.25 MG/ML
1 INJECTION, SOLUTION INTRAMUSCULAR; INTRAVENOUS ONCE
Status: COMPLETED | OUTPATIENT
Start: 2023-02-19 | End: 2023-02-19

## 2023-02-19 RX ADMIN — IPRATROPIUM BROMIDE AND ALBUTEROL SULFATE 1 AMPULE: 2.5; .5 SOLUTION RESPIRATORY (INHALATION) at 15:01

## 2023-02-19 RX ADMIN — SODIUM CHLORIDE, PRESERVATIVE FREE 10 ML: 5 INJECTION INTRAVENOUS at 19:53

## 2023-02-19 RX ADMIN — QUETIAPINE FUMARATE 400 MG: 200 TABLET ORAL at 19:49

## 2023-02-19 RX ADMIN — AMOXICILLIN AND CLAVULANATE POTASSIUM 1 TABLET: 875; 125 TABLET, FILM COATED ORAL at 09:09

## 2023-02-19 RX ADMIN — ARFORMOTEROL TARTRATE 15 MCG: 15 SOLUTION RESPIRATORY (INHALATION) at 09:55

## 2023-02-19 RX ADMIN — AMOXICILLIN AND CLAVULANATE POTASSIUM 1 TABLET: 875; 125 TABLET, FILM COATED ORAL at 19:49

## 2023-02-19 RX ADMIN — BUMETANIDE 1 MG: 0.25 INJECTION INTRAMUSCULAR; INTRAVENOUS at 09:09

## 2023-02-19 RX ADMIN — DIVALPROEX SODIUM 1000 MG: 500 TABLET, EXTENDED RELEASE ORAL at 19:49

## 2023-02-19 RX ADMIN — ASPIRIN 81 MG CHEWABLE TABLET 81 MG: 81 TABLET CHEWABLE at 09:09

## 2023-02-19 RX ADMIN — ERGOCALCIFEROL 50000 UNITS: 1.25 CAPSULE ORAL at 09:09

## 2023-02-19 RX ADMIN — BUDESONIDE 500 MCG: 0.5 SUSPENSION RESPIRATORY (INHALATION) at 20:15

## 2023-02-19 RX ADMIN — APIXABAN 10 MG: 5 TABLET, FILM COATED ORAL at 09:09

## 2023-02-19 RX ADMIN — PREDNISONE 40 MG: 20 TABLET ORAL at 09:09

## 2023-02-19 RX ADMIN — PANTOPRAZOLE SODIUM 40 MG: 40 TABLET, DELAYED RELEASE ORAL at 05:31

## 2023-02-19 RX ADMIN — SODIUM CHLORIDE, PRESERVATIVE FREE 10 ML: 5 INJECTION INTRAVENOUS at 09:11

## 2023-02-19 RX ADMIN — LAMOTRIGINE 100 MG: 100 TABLET ORAL at 09:09

## 2023-02-19 RX ADMIN — ARFORMOTEROL TARTRATE 15 MCG: 15 SOLUTION RESPIRATORY (INHALATION) at 20:15

## 2023-02-19 RX ADMIN — APIXABAN 10 MG: 5 TABLET, FILM COATED ORAL at 19:49

## 2023-02-19 RX ADMIN — BUDESONIDE 500 MCG: 0.5 SUSPENSION RESPIRATORY (INHALATION) at 09:54

## 2023-02-19 RX ADMIN — IPRATROPIUM BROMIDE AND ALBUTEROL SULFATE 1 AMPULE: 2.5; .5 SOLUTION RESPIRATORY (INHALATION) at 20:15

## 2023-02-19 RX ADMIN — IPRATROPIUM BROMIDE AND ALBUTEROL SULFATE 1 AMPULE: 2.5; .5 SOLUTION RESPIRATORY (INHALATION) at 09:55

## 2023-02-19 ASSESSMENT — PAIN SCALES - GENERAL
PAINLEVEL_OUTOF10: 0

## 2023-02-19 NOTE — RT PROTOCOL NOTE
RT Inhaler-Nebulizer Bronchodilator Protocol Note    There is a bronchodilator order in the chart from a provider indicating to follow the RT Bronchodilator Protocol and there is an Initiate RT Inhaler-Nebulizer Bronchodilator Protocol order as well (see protocol at bottom of note). CXR Findings:  XR CHEST PORTABLE    Result Date: 2/18/2023  1. Small right-sided pleural effusion and right basilar airspace disease. 2. Underlying COPD. Left lung remains clear. The findings from the last RT Protocol Assessment were as follows:   History Pulmonary Disease: Chronic pulmonary disease  Respiratory Pattern: Dyspnea on exertion or RR 21-25 bpm  Breath Sounds: Slightly diminished and/or crackles  Cough: Strong, productive  Indication for Bronchodilator Therapy: Decreased or absent breath sounds  Bronchodilator Assessment Score: 7    Aerosolized bronchodilator medication orders have been revised according to the RT Inhaler-Nebulizer Bronchodilator Protocol below. Respiratory Therapist to perform RT Therapy Protocol Assessment initially then follow the protocol. Repeat RT Therapy Protocol Assessment PRN for score 0-3 or on second treatment, BID, and PRN for scores above 3. No Indications - adjust the frequency to every 6 hours PRN wheezing or bronchospasm, if no treatments needed after 48 hours then discontinue using Per Protocol order mode. If indication present, adjust the RT bronchodilator orders based on the Bronchodilator Assessment Score as indicated below. Use Inhaler orders unless patient has one or more of the following: on home nebulizer, not able to hold breath for 10 seconds, is not alert and oriented, cannot activate and use MDI correctly, or respiratory rate 25 breaths per minute or more, then use the equivalent nebulizer order(s) with same Frequency and PRN reasons based on the score. If a patient is on this medication at home then do not decrease Frequency below that used at home.     0-3 - enter or revise RT bronchodilator order(s) to equivalent RT Bronchodilator order with Frequency of every 4 hours PRN for wheezing or increased work of breathing using Per Protocol order mode. 4-6 - enter or revise RT Bronchodilator order(s) to two equivalent RT bronchodilator orders with one order with BID Frequency and one order with Frequency of every 4 hours PRN wheezing or increased work of breathing using Per Protocol order mode. 7-10 - enter or revise RT Bronchodilator order(s) to two equivalent RT bronchodilator orders with one order with TID Frequency and one order with Frequency of every 4 hours PRN wheezing or increased work of breathing using Per Protocol order mode. 11-13 - enter or revise RT Bronchodilator order(s) to one equivalent RT bronchodilator order with QID Frequency and an Albuterol order with Frequency of every 4 hours PRN wheezing or increased work of breathing using Per Protocol order mode. Greater than 13 - enter or revise RT Bronchodilator order(s) to one equivalent RT bronchodilator order with every 4 hours Frequency and an Albuterol order with Frequency of every 2 hours PRN wheezing or increased work of breathing using Per Protocol order mode. RT to enter RT Home Evaluation for COPD & MDI Assessment order using Per Protocol order mode.     Electronically signed by Astrid Dickson RCP on 2/18/2023 at 8:54 PM

## 2023-02-19 NOTE — PROGRESS NOTES
[facility]  PROGRESS NOTE    Room # 1008/1008-01   Name: Melvin Charles              Reason for visit: Routine    I visited the patient. Admit Date & Time: 2/15/2023 10:00 AM    Assessment:  Melvin Charles is a 78 y.o. male. Upon entering the room patient was sleeping. Intervention:   provided a ministry presence and brief prayer. Outcome:  Patient did not respond. Plan:  Chaplains will remain available to offer spiritual and emotional support as needed. Electronically signed by Chaplain Rosette, on 2/19/2023 at 1:56 PM.  Elsy      02/19/23 1355   Encounter Summary   Service Provided For: Patient   Referral/Consult From: [facility]   Support System Unknown   Last Encounter  02/19/23   Complexity of Encounter Low   Begin Time 1220   End Time  1221   Total Time Calculated 1 min   Encounter    Type Initial Screen/Assessment   Assessment/Intervention/Outcome   Assessment Unable to assess   Intervention Prayer (assurance of)/[place];Sustaining Presence/Ministry of presence

## 2023-02-19 NOTE — PLAN OF CARE
Problem: Respiratory - Adult  Goal: Achieves optimal ventilation and oxygenation  2/18/2023 2054 by Velvet Tam, RCP  Outcome: Progressing     Problem: Respiratory - Adult  Goal: Able to breathe comfortably  2/18/2023 2054 by Velvet Tam, RCP  Outcome: Progressing

## 2023-02-19 NOTE — PROGRESS NOTES
[facility]  Office: [address], DO, Eulalia Gonzalez, DO, Chip Watson, DO, Tresa Day Blood, DO, Yari Melara MD, Donell Leonard MD, Mony Daniels MD, Zaire Ambriz MD,  Jadiel Aguero MD, Jamie Cox MD, Natali Rodriguez, DO, Carole Lopez MD,  Chao Chao MD, Michell Keith MD, Radha Arguello, DO, Anastasiya Gupta MD, Jessica Campos MD, Valente Friedman, DO, Warren Jimenez MD, France Loya MD, Mike Benson MD, Darcy Sands MD, Yaima Rodrigues, DO, Doni Rojas MD, Susi Fernandez MD, Louis Ragland, CNP,  Drew Quevedo, CNP, Ebony Douglass, CNP, Hina Romero, [facility],  [facility], [facility], Vika Nava, CNP, Barbara Smith, CNP, Denzel Richard, CNP, Chris Yañez, [facility], [facility], CNP, Christina Oviedo PA-C, Yun Boo, CNS, Christopher Salgado, CNP, Ludwig Mike, [facility]    Progress Note    2/19/2023    12:38 PM    Name:   Joan Hartley  MRN:     [id number]     Acct:      [de-identified]   Room:   [address] [place] [address] Day:  4  Admit Date:  2/15/2023 10:00 AM    PCP:   Rick Forrest MD  Code Status:  Full Code    Subjective:     C/C:   Chief Complaint   Patient presents with    Shortness of Breath     Pt was brought in via EMS for SOB that started this morning, Hx of COPD, Not on home O2     Interval History Status: .   Denies chest pain today, says he is feeling stronger  Denies shortness of breath  Oxygen requirement has increased overnight, saturating 97% on10 L high flow oxygen, does not use oxygen at home  Venous Doppler positive for DVT right leg  proBNP checked came high 2719  Chest x-ray shows right pleural effusion  Brief History:   Per my BRONSON:  Nadya Wood is a 78 y.o.  Non- / non  male who presents with Shortness of Breath (Pt was brought in via EMS for SOB that started this morning, Hx of COPD, Not on home O2)   and is admitted to the hospital for the management of Pulmonary embolism on left [facility]). Patient presented to the ER today with complaints of shortness of breath and wheezing. He has this is a coughs a lot when he is eating and notices burning in his chest sometimes after he eats. Per vitals he was noted to be hypoxic and oxygen per nasal cannula was applied. CT chest was positive for tiny PE but no heart strain. There is also concern about possible aspiration. When I spoke to the patient he says he does have a history of coughing with meals and often has pain in his chest after eating. Patient was hospitalized 1/13 through 1/27/2023. At that time he was treated for COPD exacerbation and was positive for COVID. He received a full dose of Paxlovid while inpatient. According to the notes his oxygenation was labile but improved with diuresis. At the time of discharge he was given prednisone. Patient was encouraged to have a sleep study as an outpatient. Review of Systems:     Constitutional:  negative for chills, fevers, sweats  Respiratory:  negative for cough, +dyspnea on exertion, denies wheezing  Cardiovascular:  negative for chest pain, chest pressure/discomfort, lower extremity edema, palpitations  Gastrointestinal:  negative for abdominal pain, constipation, diarrhea, nausea, vomiting  Neurological:  negative for dizziness, headache    Medications:      Allergies:  No Known Allergies    Current Meds:   Scheduled Meds:    [START ON 2/20/2023] bumetanide  1 mg Oral Daily    amoxicillin-clavulanate  1 tablet Oral 2 times per day    apixaban  10 mg Oral BID    Followed by    Jenelle Bauer ON 2/24/2023] apixaban  5 mg Oral BID    sodium chloride flush  5-40 mL IntraVENous 2 times per day    arformoterol tartrate  15 mcg Nebulization BID    aspirin  81 mg Oral Daily    budesonide  0.5 mg Nebulization BID    divalproex  1,000 mg Oral Nightly    vitamin D  50,000 Units Oral Weekly    lamoTRIgine  100 mg Oral Daily    metoprolol succinate  25 mg Oral Daily nicotine  1 patch TransDERmal Daily    pantoprazole  40 mg Oral QAM AC    QUEtiapine  400 mg Oral Nightly    predniSONE  40 mg Oral Daily    ipratropium-albuterol  1 ampule Inhalation TID     Continuous Infusions:    sodium chloride 10 mL/hr at 23 0844     PRN Meds: calcium carbonate, oxyCODONE-acetaminophen, perflutren lipid microspheres, albuterol, sodium chloride flush, sodium chloride flush, sodium chloride, polyethylene glycol, ondansetron **OR** ondansetron, acetaminophen **OR** acetaminophen, magnesium sulfate, potassium chloride **OR** potassium alternative oral replacement **OR** potassium chloride    Data:     Past Medical History:   has a past medical history of Back pain, Bipolar 1 disorder ([facility] COPD (chronic obstructive pulmonary disease) ([facility] GERD (gastroesophageal reflux disease), and Heart attack ([facility] Social History:   reports that he has been smoking cigarettes and pipe. He has never used smokeless tobacco. He reports that he does not drink alcohol and does not use drugs. Family History:   Family History   Problem Relation Age of Onset    Cancer Father         Lung    Stroke Father     Cancer Brother         Lymphoma    Mental Illness Other         Aunt       Vitals:  /77   Pulse 88   Temp 98.2 °F (36.8 °C) (Oral)   Resp 18   Ht 5' 10\" (1.778 m)   Wt 199 lb 6.4 oz (90.4 kg)   SpO2 91%   BMI 28.61 kg/m²   Temp (24hrs), Av.1 °F (36.7 °C), Min:97.5 °F (36.4 °C), Max:98.6 °F (37 °C)    No results for input(s): POCGLU in the last 72 hours. I/O (24Hr):     Intake/Output Summary (Last 24 hours) at 2023 1238  Last data filed at 2023 1027  Gross per 24 hour   Intake 180 ml   Output 1700 ml   Net -1520 ml       Labs:  Hematology:  Recent Labs     23  0547   WBC 9.9   RBC 3.71*   HGB 11.7*   HCT 38.4*   .5*   MCH 31.5   MCHC 30.5   RDW 15.7*      MPV 8.8     Chemistry:  Recent Labs     23  0547 23  0533 23  0744 02/19/23  0539   * 146*  --  145*   K 3.9 5.4* 4.7 3.8    103  --  104   CO2 35* 38*  --  35*   GLUCOSE 147* 87  --  124*   BUN 12 15  --  17   CREATININE 0.90 0.77  --  0.88   ANIONGAP 9 5*  --  6*   LABGLOM >60 >60  --  >60   CALCIUM 8.5* 9.1  --  8.7   PROBNP  --   --   --  2,715*   No results for input(s): PROT, LABALBU, LABA1C, K5UQZYI, S0EJYSW, FT4, TSH, AST, ALT, LDH, GGT, ALKPHOS, LABGGT, BILITOT, BILIDIR, AMMONIA, AMYLASE, LIPASE, LACTATE, CHOL, HDL, LDLCHOLESTEROL, CHOLHDLRATIO, TRIG, VLDL, PLZ59EL, PHENYTOIN, PHENYF, URICACID, POCGLU in the last 72 hours.  ABG:No results found for: POCPH, PHART, PH, POCPCO2, UNE1WGU, PCO2, POCPO2, PO2ART, PO2, POCHCO3, ZKC4XAW, HCO3, NBEA, PBEA, BEART, BE, THGBART, THB, NWU2GHT, UHWG6AWF, J8RVRKTQ, O2SAT, FIO2  Lab Results   Component Value Date/Time    SPECIAL L ARM 02/15/2023 01:44 PM     Lab Results   Component Value Date/Time    CULTURE NO GROWTH 3 DAYS 02/15/2023 01:44 PM       Radiology:  XR CHEST PORTABLE    Result Date: 2/16/2023  Interval volume loss and airspace opacity at the right base, suggesting atelectasis.  There may also be some component of pleural fluid.     XR CHEST PORTABLE    Result Date: 2/15/2023  Chronic pulmonary change without acute cardiopulmonary process.     CT CHEST PULMONARY EMBOLISM W CONTRAST    Result Date: 2/15/2023  1. Tiny distal pulmonary emboli in the posterior basal left lower lobe segmental pulmonary artery. 2. Mild dependent atelectasis and respiratory motion.  Mild-to-moderate emphysema.  Stable known 9 mm lingular pulmonary nodule, unchanged from 1/14/2023.  Please see workup recommendations on prior CT chest report of 1/14/2023. 3. Mucoid secretions and/or aspirated material in the trachea and mainstem bronchi. 4. Small pericardial effusion. 5. Small right-sided pleural effusion. 6. Coronary artery disease. 7. Atheromatous plaque and atherosclerotic calcification of the aorta and branch vasculature. 8. Dilated,  patulous esophagus with wall thickening of the distal esophagus and GE junction. Small to moderate hiatal hernia. Consider further evaluation with palpation, EGD and/or esophagram/upper GI series. 9. Cholelithiasis. 10. Fatty liver. Critical results were called by Dr. Jimena Sesay. Kimani Poe MD to GLORIA Pastor on 2/15/2023 at 12:38 p.m. by telephone. Echocardiogram  Left ventricle is normal in size  Global left ventricular systolic function is normal  Estimated ejection fraction is 65 % . Aortic leaflet calcification with mild stenosis. Peak instantaneous gradient 30 mmHg and mean gradient 13 mmHg. Trivial tricuspid regurgitation. No pulmonary hypertension. Normal aortic root dimension. Normal right ventricular size and function.      Physical Examination:        General appearance:  alert, cooperative and no distress  Mental Status:  oriented to person, place and time and normal affect  Lungs:  clear to auscultation bilaterally, normal effort  Heart:  regular rate and rhythm, no murmur  Abdomen:  soft, nontender, nondistended, normal bowel sounds, no masses, hepatomegaly, splenomegaly  Extremities:  no edema, redness, tenderness in the calves  Skin:  no gross lesions, rashes, induration  Neurological:+ Tremors right upper and lower extremity-has history of parkinsonism  Assessment:        Hospital Problems             Last Modified POA    * (Principal) Pulmonary embolism on left ([address] 2/15/2023 Yes    Right leg DVT ([address] 2/17/2023 Yes    Acute respiratory failure with hypoxia (HCC) 2/16/2023 Yes    COPD exacerbation ([address] 2/15/2023 Yes    Tobacco abuse 2/15/2023 Yes    Essential (primary) hypertension 2/15/2023 Yes    SUNITHA (acute kidney injury) ([address] 2/15/2023 Yes    Acute aspiration pneumonia ([address] 2/15/2023 Yes    Hypokalemia 2/15/2023 Yes    Primary parkinsonism ([address] 2/17/2023 Yes    Acute diastolic CHF (congestive heart failure) ([address] 2/19/2023 Yes    Pleural effusion right side 2/19/2023 Yes     Plan: Continue Eliquis with starter pack dose, heparin drip was discontinued   Switched to oral Augmentin from Unasyn IV  Bumex 1 mg IV x1, 1 mg oral from tomorrow  Incentive spirometry  Titrate high flow oxygen to nasal cannula/wean off  Smoking cessation  PT OT  Discussed with , she is working to place him to [facility] being on high flow oxygen and multiple admissions recently    Radha Power MD  2/19/2023  12:38 PM

## 2023-02-19 NOTE — RT PROTOCOL NOTE
RT Inhaler-Nebulizer Bronchodilator Protocol Note    There is a bronchodilator order in the chart from a provider indicating to follow the RT Bronchodilator Protocol and there is an Initiate RT Inhaler-Nebulizer Bronchodilator Protocol order as well (see protocol at bottom of note). CXR Findings:  XR CHEST PORTABLE    Result Date: 2/19/2023  1. Stable right pleural effusion and lung base consolidation. This may represent aspiration pneumonitis given the CT identified mild fluid distended esophagus. 2. COPD. XR CHEST PORTABLE    Result Date: 2/18/2023  1. Small right-sided pleural effusion and right basilar airspace disease. 2. Underlying COPD. Left lung remains clear. The findings from the last RT Protocol Assessment were as follows:   History Pulmonary Disease: Chronic pulmonary disease  Respiratory Pattern: Dyspnea on exertion or RR 21-25 bpm  Breath Sounds: Slightly diminished and/or crackles  Cough: Strong, productive  Indication for Bronchodilator Therapy: Decreased or absent breath sounds  Bronchodilator Assessment Score: 7    Aerosolized bronchodilator medication orders have been revised according to the RT Inhaler-Nebulizer Bronchodilator Protocol below. Respiratory Therapist to perform RT Therapy Protocol Assessment initially then follow the protocol. Repeat RT Therapy Protocol Assessment PRN for score 0-3 or on second treatment, BID, and PRN for scores above 3. No Indications - adjust the frequency to every 6 hours PRN wheezing or bronchospasm, if no treatments needed after 48 hours then discontinue using Per Protocol order mode. If indication present, adjust the RT bronchodilator orders based on the Bronchodilator Assessment Score as indicated below.   Use Inhaler orders unless patient has one or more of the following: on home nebulizer, not able to hold breath for 10 seconds, is not alert and oriented, cannot activate and use MDI correctly, or respiratory rate 25 breaths per minute or more, then use the equivalent nebulizer order(s) with same Frequency and PRN reasons based on the score. If a patient is on this medication at home then do not decrease Frequency below that used at home. 0-3 - enter or revise RT bronchodilator order(s) to equivalent RT Bronchodilator order with Frequency of every 4 hours PRN for wheezing or increased work of breathing using Per Protocol order mode. 4-6 - enter or revise RT Bronchodilator order(s) to two equivalent RT bronchodilator orders with one order with BID Frequency and one order with Frequency of every 4 hours PRN wheezing or increased work of breathing using Per Protocol order mode. 7-10 - enter or revise RT Bronchodilator order(s) to two equivalent RT bronchodilator orders with one order with TID Frequency and one order with Frequency of every 4 hours PRN wheezing or increased work of breathing using Per Protocol order mode. 11-13 - enter or revise RT Bronchodilator order(s) to one equivalent RT bronchodilator order with QID Frequency and an Albuterol order with Frequency of every 4 hours PRN wheezing or increased work of breathing using Per Protocol order mode. Greater than 13 - enter or revise RT Bronchodilator order(s) to one equivalent RT bronchodilator order with every 4 hours Frequency and an Albuterol order with Frequency of every 2 hours PRN wheezing or increased work of breathing using Per Protocol order mode. RT to enter RT Home Evaluation for COPD & MDI Assessment order using Per Protocol order mode.     Electronically signed by brian valencia RCP on 2/19/2023 at 9:56 AM

## 2023-02-19 NOTE — PLAN OF CARE
Problem: Discharge Planning  Goal: Discharge to home or other facility with appropriate resources  2/18/2023 2326 by George Levy RN  Outcome: Progressing  Flowsheets (Taken 2/18/2023 2000)  Discharge to home or other facility with appropriate resources: Identify barriers to discharge with patient and caregiver  2/18/2023 1325 by Espinoza Sotelo RN  Outcome: Progressing     Problem: Pain  Goal: Verbalizes/displays adequate comfort level or baseline comfort level  2/18/2023 2326 by George Levy RN  Outcome: Progressing  Flowsheets (Taken 2/18/2023 1926)  Verbalizes/displays adequate comfort level or baseline comfort level: Encourage patient to monitor pain and request assistance  2/18/2023 1325 by Espinoza Sotelo RN  Outcome: Progressing     Problem: Respiratory - Adult  Goal: Achieves optimal ventilation and oxygenation  2/18/2023 2326 by George Levy RN  Outcome: Progressing  2/18/2023 2054 by Olya Lau RCP  Outcome: Progressing  Flowsheets (Taken 2/18/2023 2000 by George Levy RN)  Achieves optimal ventilation and oxygenation:   Assess for changes in respiratory status   Assess for changes in mentation and behavior   Position to facilitate oxygenation and minimize respiratory effort   Oxygen supplementation based on oxygen saturation or arterial blood gases   Initiate smoking cessation protocol as indicated   Encourage broncho-pulmonary hygiene including cough, deep breathe, incentive spirometry   Assess and instruct to report shortness of breath or any respiratory difficulty   Assess the need for suctioning and aspirate as needed   Respiratory therapy support as indicated  2/18/2023 1325 by Espinoza Sotelo RN  Outcome: Progressing  Goal: Able to breathe comfortably  2/18/2023 2054 by Olya Lau RCP  Outcome: Progressing     Problem: Skin/Tissue Integrity  Goal: Absence of new skin breakdown  Description: 1. Monitor for areas of redness and/or skin breakdown  2.   Assess vascular access sites hourly  3. Every 4-6 hours minimum:  Change oxygen saturation probe site  4. Every 4-6 hours:  If on nasal continuous positive airway pressure, respiratory therapy assess nares and determine need for appliance change or resting period.   2/18/2023 2326 by Mark Sadler RN  Outcome: Progressing  2/18/2023 1325 by Jeremías Adorno RN  Outcome: Progressing     Problem: Safety - Adult  Goal: Free from fall injury  2/18/2023 2326 by Mark Sadler RN  Outcome: Progressing  2/18/2023 1325 by Jeremías Adorno RN  Outcome: Progressing     Problem: ABCDS Injury Assessment  Goal: Absence of physical injury  2/18/2023 2326 by Mark Sadler RN  Outcome: Progressing  2/18/2023 1325 by Jeremías Adorno RN  Outcome: Progressing

## 2023-02-19 NOTE — PLAN OF CARE
Pt seen at bedside today. Today has been uneventful. Pt is on 10L NC ([place]) and has had no complaints of pain or SOB. Pt calls out appropriately to use the urinal. Bed alarm in place for pt safety. Pt frequently asks for ice cream. All questions and needs addressed. Bed locked in lowest position and call light is in reach. Will continue to address pt needs and concerns. Problem: Discharge Planning  Goal: Discharge to home or other facility with appropriate resources  Outcome: Progressing     Problem: Pain  Goal: Verbalizes/displays adequate comfort level or baseline comfort level  Outcome: Progressing     Problem: Respiratory - Adult  Goal: Achieves optimal ventilation and oxygenation  2/19/2023 1604 by Vivian Fisher RN  Outcome: Progressing     Problem: Skin/Tissue Integrity  Goal: Absence of new skin breakdown  Description: 1. Monitor for areas of redness and/or skin breakdown  2. Assess vascular access sites hourly  3. Every 4-6 hours minimum:  Change oxygen saturation probe site  4. Every 4-6 hours:  If on nasal continuous positive airway pressure, respiratory therapy assess nares and determine need for appliance change or resting period.   Outcome: Progressing     Problem: Safety - Adult  Goal: Free from fall injury  Outcome: Progressing

## 2023-02-19 NOTE — PLAN OF CARE
Problem: Respiratory - Adult  Goal: Achieves optimal ventilation and oxygenation  2/19/2023 0956 by Pippa Michele RCP  Outcome: Progressing  2/18/2023 2326 by Oziel Cabral RN  Outcome: Progressing  2/18/2023 2054 by Patricia Rodney RCP  Outcome: Progressing  Flowsheets (Taken 2/18/2023 2000 by Oziel Cabral, RN)  Achieves optimal ventilation and oxygenation:   Assess for changes in respiratory status   Assess for changes in mentation and behavior   Position to facilitate oxygenation and minimize respiratory effort   Oxygen supplementation based on oxygen saturation or arterial blood gases   Initiate smoking cessation protocol as indicated   Encourage broncho-pulmonary hygiene including cough, deep breathe, incentive spirometry   Assess and instruct to report shortness of breath or any respiratory difficulty   Assess the need for suctioning and aspirate as needed   Respiratory therapy support as indicated  Goal: Able to breathe comfortably  2/19/2023 0956 by Ashley Kwon RCP  Outcome: Progressing  2/18/2023 2054 by Patricia Rodney RCP  Outcome: Progressing

## 2023-02-19 NOTE — CARE COORDINATION
Social work updated [facility]. Will need cherelle, Rx and discharge order when pt is ready for placement. Will need precert [facility] medicare. Started shamar. Faxed to both.  Henri dial

## 2023-02-19 NOTE — PROGRESS NOTES
Pulmonary Critical Care Progress Note  Suzette Gowers, MD     Patient seen for the follow up of  Pulmonary embolism on left [facility])     Subjective:  Resting comfortably in bed, no distress. His oxygen requirements have increased since yesterday, he is now on 10 L salter. He denies any shortness of breath, chest pain, or cough. He is off heparin drip and now on Eliquis. His appetite is fair. Examination:  Vitals: BP (!) 140/58   Pulse 57   Temp 98.6 °F (37 °C) (Oral)   Resp 18   Ht 5' 10\" (1.778 m)   Wt 199 lb 6.4 oz (90.4 kg)   SpO2 90%   BMI 28.61 kg/m²   General appearance: alert and cooperative with exam  Neck: No JVD  Lungs: Decreased air exchange, no crackles, occasional wheezing  Heart: regular rate and rhythm, S1, S2 normal, no gallop  Abdomen: Soft, non tender, + BS  Extremities: no cyanosis or clubbing. No significant edema    LABs:  CBC:   Recent Labs     02/17/23  0547   WBC 9.9   HGB 11.7*   HCT 38.4*        BMP:   Recent Labs     02/17/23  0547 02/18/23  0533 02/18/23  0744 02/19/23  0539   * 146*  --  145*   K 3.9 5.4* 4.7 3.8   CO2 35* 38*  --  35*   BUN 12 15  --  17   CREATININE 0.90 0.77  --  0.88   LABGLOM >60 >60  --  >60   GLUCOSE 147* 87  --  124*       Radiology:  X-ray chest 2/19/2023      CTA 2/15/2023      Impression:  Acute hypoxic respiratory failure  Acute PE, tiny distal posterior basilar left lower lobe segmental pulmonary artery  Acute right leg DVT of femoral artery  Acute exacerbation of COPD/emphysema  Small right pleural effusion/atelectasis  Retained secretions in trachea and mainstem bronchi,?   Aspiration  30-pack-year smoking history  Indeterminate 9 mm lingular nodule  Possible obstructive sleep apnea  Recent history of COVID-19  Dilated patulous esophagus with wall thickening/small to moderate hiatal hernia    Recommendations:  Oxygen via high flow nasal cannula, wean as able to keep SPO2 90% or greater  Incentive spirometry every hour while awake   Off Unasyn, continue Augmentin   Completed IV solu-medrol 40 mg every 6 hours  Prednisone 40 mg daily  Start diuresis  Continue budesonide and Brovana via nebulizer twice daily  Albuterol and Ipratropium 3 times daily and as needed  Speech therapy evaluation noted  Labs: CBC and BMP in am  X-ray chest in a.m.   DVT prophylaxis, Eliquis  GI prophylaxis, Protonix  PT/OT  Will follow with you    Electronically signed by     Darby Clemons MD on 2/19/2023 at 12:03 PM  Pulmonary Critical Care and Sleep Medicine,  [facility]  Cell: [phone]  Office: [phone]

## 2023-02-20 ENCOUNTER — APPOINTMENT (OUTPATIENT)
Dept: GENERAL RADIOLOGY | Age: 78
End: 2023-02-20
Payer: MEDICARE

## 2023-02-20 LAB
ANION GAP SERPL CALCULATED.3IONS-SCNC: 6 MMOL/L (ref 9–17)
BUN SERPL-MCNC: 16 MG/DL (ref 8–23)
BUN/CREAT BLD: 20 (ref 9–20)
CALCIUM SERPL-MCNC: 8.8 MG/DL (ref 8.6–10.4)
CHLORIDE SERPL-SCNC: 102 MMOL/L (ref 98–107)
CO2 SERPL-SCNC: 33 MMOL/L (ref 20–31)
CREAT SERPL-MCNC: 0.79 MG/DL (ref 0.7–1.2)
GFR SERPL CREATININE-BSD FRML MDRD: >60 ML/MIN/1.73M2
GLUCOSE SERPL-MCNC: 90 MG/DL (ref 70–99)
HCT VFR BLD AUTO: 40.5 % (ref 40.7–50.3)
HGB BLD-MCNC: 12.3 G/DL (ref 13–17)
MCH RBC QN AUTO: 31.2 PG (ref 25.2–33.5)
MCHC RBC AUTO-ENTMCNC: 30.4 G/DL (ref 28.4–34.8)
MCV RBC AUTO: 102.8 FL (ref 82.6–102.9)
MICROORGANISM SPEC CULT: NORMAL
MICROORGANISM SPEC CULT: NORMAL
NRBC AUTOMATED: 0.3 PER 100 WBC
PDW BLD-RTO: 15.4 % (ref 11.8–14.4)
PLATELET # BLD AUTO: 324 K/UL (ref 138–453)
PMV BLD AUTO: 8.9 FL (ref 8.1–13.5)
POTASSIUM SERPL-SCNC: 4.4 MMOL/L (ref 3.7–5.3)
RBC # BLD: 3.94 M/UL (ref 4.21–5.77)
SERVICE CMNT-IMP: NORMAL
SERVICE CMNT-IMP: NORMAL
SODIUM SERPL-SCNC: 141 MMOL/L (ref 135–144)
SPECIMEN DESCRIPTION: NORMAL
SPECIMEN DESCRIPTION: NORMAL
WBC # BLD AUTO: 6.6 K/UL (ref 3.5–11.3)

## 2023-02-20 PROCEDURE — 2580000003 HC RX 258: Performed by: PHYSICIAN ASSISTANT

## 2023-02-20 PROCEDURE — 2580000003 HC RX 258: Performed by: EMERGENCY MEDICINE

## 2023-02-20 PROCEDURE — 80048 BASIC METABOLIC PNL TOTAL CA: CPT

## 2023-02-20 PROCEDURE — 85027 COMPLETE CBC AUTOMATED: CPT

## 2023-02-20 PROCEDURE — 6360000002 HC RX W HCPCS: Performed by: NURSE PRACTITIONER

## 2023-02-20 PROCEDURE — 94761 N-INVAS EAR/PLS OXIMETRY MLT: CPT

## 2023-02-20 PROCEDURE — 6370000000 HC RX 637 (ALT 250 FOR IP): Performed by: NURSE PRACTITIONER

## 2023-02-20 PROCEDURE — 6370000000 HC RX 637 (ALT 250 FOR IP): Performed by: INTERNAL MEDICINE

## 2023-02-20 PROCEDURE — 97535 SELF CARE MNGMENT TRAINING: CPT

## 2023-02-20 PROCEDURE — 2060000000 HC ICU INTERMEDIATE R&B

## 2023-02-20 PROCEDURE — 97530 THERAPEUTIC ACTIVITIES: CPT

## 2023-02-20 PROCEDURE — 97110 THERAPEUTIC EXERCISES: CPT

## 2023-02-20 PROCEDURE — 2700000000 HC OXYGEN THERAPY PER DAY

## 2023-02-20 PROCEDURE — 99231 SBSQ HOSP IP/OBS SF/LOW 25: CPT | Performed by: INTERNAL MEDICINE

## 2023-02-20 PROCEDURE — 36415 COLL VENOUS BLD VENIPUNCTURE: CPT

## 2023-02-20 PROCEDURE — 94640 AIRWAY INHALATION TREATMENT: CPT

## 2023-02-20 PROCEDURE — 71045 X-RAY EXAM CHEST 1 VIEW: CPT

## 2023-02-20 RX ORDER — IPRATROPIUM BROMIDE AND ALBUTEROL SULFATE 2.5; .5 MG/3ML; MG/3ML
1 SOLUTION RESPIRATORY (INHALATION) 2 TIMES DAILY
Status: DISCONTINUED | OUTPATIENT
Start: 2023-02-21 | End: 2023-02-24 | Stop reason: HOSPADM

## 2023-02-20 RX ADMIN — BUMETANIDE 1 MG: 1 TABLET ORAL at 09:14

## 2023-02-20 RX ADMIN — IPRATROPIUM BROMIDE AND ALBUTEROL SULFATE 1 AMPULE: 2.5; .5 SOLUTION RESPIRATORY (INHALATION) at 13:52

## 2023-02-20 RX ADMIN — AMOXICILLIN AND CLAVULANATE POTASSIUM 1 TABLET: 875; 125 TABLET, FILM COATED ORAL at 20:17

## 2023-02-20 RX ADMIN — APIXABAN 10 MG: 5 TABLET, FILM COATED ORAL at 09:11

## 2023-02-20 RX ADMIN — ASPIRIN 81 MG CHEWABLE TABLET 81 MG: 81 TABLET CHEWABLE at 09:13

## 2023-02-20 RX ADMIN — PREDNISONE 40 MG: 20 TABLET ORAL at 09:15

## 2023-02-20 RX ADMIN — BUDESONIDE 500 MCG: 0.5 SUSPENSION RESPIRATORY (INHALATION) at 20:19

## 2023-02-20 RX ADMIN — AMOXICILLIN AND CLAVULANATE POTASSIUM 1 TABLET: 875; 125 TABLET, FILM COATED ORAL at 09:13

## 2023-02-20 RX ADMIN — SODIUM CHLORIDE, PRESERVATIVE FREE 10 ML: 5 INJECTION INTRAVENOUS at 20:21

## 2023-02-20 RX ADMIN — PANTOPRAZOLE SODIUM 40 MG: 40 TABLET, DELAYED RELEASE ORAL at 06:47

## 2023-02-20 RX ADMIN — IPRATROPIUM BROMIDE AND ALBUTEROL SULFATE 1 AMPULE: 2.5; .5 SOLUTION RESPIRATORY (INHALATION) at 20:19

## 2023-02-20 RX ADMIN — APIXABAN 10 MG: 5 TABLET, FILM COATED ORAL at 20:16

## 2023-02-20 RX ADMIN — ARFORMOTEROL TARTRATE 15 MCG: 15 SOLUTION RESPIRATORY (INHALATION) at 08:12

## 2023-02-20 RX ADMIN — DIVALPROEX SODIUM 1000 MG: 500 TABLET, EXTENDED RELEASE ORAL at 20:16

## 2023-02-20 RX ADMIN — BUDESONIDE 500 MCG: 0.5 SUSPENSION RESPIRATORY (INHALATION) at 08:13

## 2023-02-20 RX ADMIN — ARFORMOTEROL TARTRATE 15 MCG: 15 SOLUTION RESPIRATORY (INHALATION) at 20:19

## 2023-02-20 RX ADMIN — QUETIAPINE FUMARATE 400 MG: 200 TABLET ORAL at 20:17

## 2023-02-20 RX ADMIN — METOPROLOL SUCCINATE 25 MG: 25 TABLET, EXTENDED RELEASE ORAL at 09:14

## 2023-02-20 RX ADMIN — SODIUM CHLORIDE, PRESERVATIVE FREE 10 ML: 5 INJECTION INTRAVENOUS at 20:17

## 2023-02-20 RX ADMIN — SODIUM CHLORIDE, PRESERVATIVE FREE 10 ML: 5 INJECTION INTRAVENOUS at 09:15

## 2023-02-20 RX ADMIN — IPRATROPIUM BROMIDE AND ALBUTEROL SULFATE 1 AMPULE: 2.5; .5 SOLUTION RESPIRATORY (INHALATION) at 08:13

## 2023-02-20 RX ADMIN — LAMOTRIGINE 100 MG: 100 TABLET ORAL at 09:43

## 2023-02-20 NOTE — PLAN OF CARE
Problem: Respiratory - Adult  Goal: Achieves optimal ventilation and oxygenation  2/20/2023 0813 by Michelle Retana RCP  Outcome: Progressing     Problem: Respiratory - Adult  Goal: Able to breathe comfortably  2/20/2023 0813 by Michelle Retana RCP  Outcome: Progressing

## 2023-02-20 NOTE — PLAN OF CARE
Problem: Respiratory - Adult  Goal: Achieves optimal ventilation and oxygenation  2/19/2023 2017 by Patricia Borges RCP  Outcome: Progressing     Problem: Respiratory - Adult  Goal: Able to breathe comfortably  2/19/2023 2017 by Patricia Borges RCP  Outcome: Progressing

## 2023-02-20 NOTE — PROGRESS NOTES
[facility]  Office: [address], DO, Allie Duff, DO, Abelino Jenkins, DO, [place] [facility] Blood, DO, Enriqueta Kim MD, Lady Cole MD, Colby Ross MD, Jm Black MD,  Sumanth Varner MD, Ino Ulloa MD, Diane Tobin, DO, Jessica Hawk MD,  Miracle Ricardo MD, Elva Christy MD, Nicolas Tinajero, DO, Luciano Parnell MD, Josiah Greenwood MD, Jose Harkins, DO, Jes Joe MD, Jalil Fowler MD, Odalys Velasquez MD, Dasha Lama MD, Jaqueline Sands, DO, Lacey Thornton MD, Kwasi Drake MD, Roger Harman, CNP,  Deborah Medina, CNP, Ines Nayak, CNP, Jarred Mcwilliams, CNP,  Burnett Osgood, [facility], Geovanna Grider, CNP, Yovany Safe, CNP, Pati Lorenzo, CNP, To Snyder, CNP, SHANIKA JOSUE, CNP, JONNY GarnerC, Izabel Sabillon, CNS, Joslyn Shipman, CNP, Kavitha Pryor, CNP         [facility]    Progress Note    2/20/2023    10:35 AM    Name:   Tulio Conrad  MRN:     [id number]     Acct:      [de-identified]   Room:   [facility] Day:  5  Admit Date:  2/15/2023 10:00 AM    PCP:   Jocelin Forrest MD  Code Status:  Full Code    Subjective:     C/C:   Chief Complaint   Patient presents with    Shortness of Breath     Pt was brought in via EMS for SOB that started this morning, Hx of COPD, Not on home O2     Interval History Status: .   Denies shortness of breath, states he is feeling better  Still on 8 L high flow oxygen, does not use oxygen at home  Venous Doppler positive for DVT right leg  proBNP checked came high 2715  Chest x-ray shows right basilar pleural effusion  Brief History:   Per my BRONSON:  Trae Brandt is a 78 y.o. Non- / non  male who presents with Shortness of Breath (Pt was brought in via EMS for SOB that started this morning, Hx of COPD, Not on home O2)   and is admitted to the hospital for the management of Pulmonary embolism on left [facility]).      Patient presented to the ER today with complaints of shortness of breath and wheezing. He has this is a coughs a lot when he is eating and notices burning in his chest sometimes after he eats. Per vitals he was noted to be hypoxic and oxygen per nasal cannula was applied. CT chest was positive for tiny PE but no heart strain. There is also concern about possible aspiration. When I spoke to the patient he says he does have a history of coughing with meals and often has pain in his chest after eating. Patient was hospitalized 1/13 through 1/27/2023. At that time he was treated for COPD exacerbation and was positive for COVID. He received a full dose of Paxlovid while inpatient. According to the notes his oxygenation was labile but improved with diuresis. At the time of discharge he was given prednisone. Patient was encouraged to have a sleep study as an outpatient. Review of Systems:     Constitutional:  negative for chills, fevers, sweats  Respiratory:  negative for cough, +dyspnea on exertion, denies wheezing  Cardiovascular:  negative for chest pain, chest pressure/discomfort, lower extremity edema, palpitations  Gastrointestinal:  negative for abdominal pain, constipation, diarrhea, nausea, vomiting  Neurological:  negative for dizziness, headache    Medications:      Allergies:  No Known Allergies    Current Meds:   Scheduled Meds:    bumetanide  1 mg Oral Daily    amoxicillin-clavulanate  1 tablet Oral 2 times per day    apixaban  10 mg Oral BID    Followed by    Orin Ansari ON 2/24/2023] apixaban  5 mg Oral BID    sodium chloride flush  5-40 mL IntraVENous 2 times per day    arformoterol tartrate  15 mcg Nebulization BID    aspirin  81 mg Oral Daily    budesonide  0.5 mg Nebulization BID    divalproex  1,000 mg Oral Nightly    vitamin D  50,000 Units Oral Weekly    lamoTRIgine  100 mg Oral Daily    metoprolol succinate  25 mg Oral Daily    nicotine  1 patch TransDERmal Daily    pantoprazole  40 mg Oral QAM AC    QUEtiapine  400 mg Oral Nightly    predniSONE  40 mg Oral Daily    ipratropium-albuterol  1 ampule Inhalation TID     Continuous Infusions:    sodium chloride 10 mL/hr at 23 0844     PRN Meds: calcium carbonate, oxyCODONE-acetaminophen, perflutren lipid microspheres, albuterol, sodium chloride flush, sodium chloride flush, sodium chloride, polyethylene glycol, ondansetron **OR** ondansetron, acetaminophen **OR** acetaminophen, magnesium sulfate, potassium chloride **OR** potassium alternative oral replacement **OR** potassium chloride    Data:     Past Medical History:   has a past medical history of Back pain, Bipolar 1 disorder ([facility] [address] [facility] Cancer ([facility] COPD (chronic obstructive pulmonary disease) ([facility] GERD (gastroesophageal reflux disease), and Heart attack ([facility] Social History:   reports that he has been smoking cigarettes and pipe. He has never used smokeless tobacco. He reports that he does not drink alcohol and does not use drugs. Family History:   Family History   Problem Relation Age of Onset    Cancer Father         Lung    Stroke Father     Cancer Brother         Lymphoma    Mental Illness Other         Aunt       Vitals:  BP (!) 110/53 Comment: RN notified  Pulse 66   Temp 98 °F (36.7 °C) (Oral)   Resp 18   Ht 5' 10\" (1.778 m)   Wt 213 lb (96.6 kg)   SpO2 95%   BMI 30.56 kg/m²   Temp (24hrs), Av °F (36.7 °C), Min:97.5 °F (36.4 °C), Max:98.2 °F (36.8 °C)    No results for input(s): POCGLU in the last 72 hours. I/O (24Hr):     Intake/Output Summary (Last 24 hours) at 2023 1035  Last data filed at 2023 3246  Gross per 24 hour   Intake 360 ml   Output 650 ml   Net -290 ml         Labs:  Hematology:  Recent Labs     23  0438   WBC 6.6   RBC 3.94*   HGB 12.3*   HCT 40.5*   .8   MCH 31.2   MCHC 30.4   RDW 15.4*      MPV 8.9       Chemistry:  Recent Labs     23  0533 23  0744 23  0539 23  0438   *  --  145* 141   K 5.4* 4.7 3.8 4.4     -- 104 102   CO2 38*  --  35* 33*   GLUCOSE 87  --  124* 90   BUN 15  --  17 16   CREATININE 0.77  --  0.88 0.79   ANIONGAP 5*  --  6* 6*   LABGLOM >60  --  >60 >60   CALCIUM 9.1  --  8.7 8.8   PROBNP  --   --  2,715*  --      No results for input(s): PROT, LABALBU, LABA1C, V6PJLYT, C0JZVPO, FT4, TSH, AST, ALT, LDH, GGT, ALKPHOS, LABGGT, BILITOT, BILIDIR, AMMONIA, AMYLASE, LIPASE, LACTATE, CHOL, HDL, LDLCHOLESTEROL, CHOLHDLRATIO, TRIG, VLDL, MFR96JX, PHENYTOIN, PHENYF, URICACID, POCGLU in the last 72 hours. ABG:No results found for: POCPH, PHART, PH, POCPCO2, IWJ2QFD, PCO2, POCPO2, PO2ART, PO2, POCHCO3, JPP9NST, HCO3, NBEA, PBEA, BEART, BE, THGBART, THB, VHO1MLU, FGDB0JHL, D8KFKLRK, O2SAT, FIO2  Lab Results   Component Value Date/Time    SPECIAL L ARM 02/15/2023 01:44 PM     Lab Results   Component Value Date/Time    CULTURE NO GROWTH 4 DAYS 02/15/2023 01:44 PM       Radiology:  XR CHEST PORTABLE    Result Date: 2/16/2023  Interval volume loss and airspace opacity at the right base, suggesting atelectasis. There may also be some component of pleural fluid. XR CHEST PORTABLE    Result Date: 2/15/2023  Chronic pulmonary change without acute cardiopulmonary process. CT CHEST PULMONARY EMBOLISM W CONTRAST    Result Date: 2/15/2023  1. Tiny distal pulmonary emboli in the posterior basal left lower lobe segmental pulmonary artery. 2. Mild dependent atelectasis and respiratory motion. Mild-to-moderate emphysema. Stable known 9 mm lingular pulmonary nodule, unchanged from 1/14/2023. Please see workup recommendations on prior CT chest report of 1/14/2023. 3. Mucoid secretions and/or aspirated material in the trachea and mainstem bronchi. 4. Small pericardial effusion. 5. Small right-sided pleural effusion. 6. Coronary artery disease. 7. Atheromatous plaque and atherosclerotic calcification of the aorta and branch vasculature.  8. Dilated, patulous esophagus with wall thickening of the distal esophagus and GE junction. Small to moderate hiatal hernia. Consider further evaluation with palpation, EGD and/or esophagram/upper GI series. 9. Cholelithiasis. 10. Fatty liver. Critical results were called by Dr. Janet Levine. Joshua Huber MD to GLORIA Styles on 2/15/2023 at 12:38 p.m. by telephone. Echocardiogram  Left ventricle is normal in size  Global left ventricular systolic function is normal  Estimated ejection fraction is 65 % . Aortic leaflet calcification with mild stenosis. Peak instantaneous gradient 30 mmHg and mean gradient 13 mmHg. Trivial tricuspid regurgitation. No pulmonary hypertension. Normal aortic root dimension. Normal right ventricular size and function.      Physical Examination:        General appearance:  alert, cooperative and no distress  Mental Status:  oriented to person, place and time and normal affect  Lungs: Diminished breath sounds at bases, normal effort  Heart:  regular rate and rhythm, no murmur  Abdomen:  soft, nontender, nondistended, normal bowel sounds, no masses, hepatomegaly, splenomegaly  Extremities:  no edema, redness, tenderness in the calves  Skin:  no gross lesions, rashes, induration  Neurological:+ Tremors right upper and lower extremity-has history of parkinsonism  Assessment:        Hospital Problems             Last Modified POA    * (Principal) Pulmonary embolism on left ([address] 2/15/2023 Yes    Right leg DVT ([address] 2/17/2023 Yes    Acute respiratory failure with hypoxia (HCC) 2/16/2023 Yes    COPD exacerbation ([address] 2/15/2023 Yes    Tobacco abuse 2/15/2023 Yes    Essential (primary) hypertension 2/15/2023 Yes    SUNITHA (acute kidney injury) ([address] 2/15/2023 Yes    Acute aspiration pneumonia ([address] 2/15/2023 Yes    Hypokalemia 2/15/2023 Yes    Primary parkinsonism ([address] 2/17/2023 Yes    Acute diastolic CHF (congestive heart failure) ([address] 2/19/2023 Yes    Pleural effusion right side 2/19/2023 Yes     Plan:        Continue Eliquis with starter pack dose, heparin drip was discontinued   Switched to oral Augmentin for aspiration pneumonia due to possibly dilated patulous esophagus with wall thickening/small to moderate dyspnea  Oral steroids-tapering dose at discharge  Bumex  1 mg oral from today, got IV daily yesterday  Incentive spirometry-doing properly  Titrate high flow oxygen to nasal cannula/wean off  Smoking cessation  PT OT  Discussed with , she is working to place him to 1stdibs at [place] being on high flow oxygen and multiple admissions recently      Luz De Dios MD  2/20/2023  10:35 AM

## 2023-02-20 NOTE — CARE COORDINATION
DC Planning    Pt was denied admission to [facility] per insurance-email to Dr. Martin Horton for appeal. She agrees to complete. Per Brenda Morales from [facility]TY VAZQUEZ is offering P2P to be completed by [phone] 2/21/23 by calling [phone] stating LTACH not medically necessary.  ID [id number]  Ref # [id number]

## 2023-02-20 NOTE — PLAN OF CARE
Pt had an uneventful night. Pt remains on 10L salter.    Problem: Respiratory - Adult  Goal: Achieves optimal ventilation and oxygenation  2/20/2023 0605 by Lawanda Ballesteros RN  Outcome: Progressing     Problem: Safety - Adult  Goal: Free from fall injury  Outcome: Progressing     Problem: Discharge Planning  Goal: Discharge to home or other facility with appropriate resources  Outcome: Progressing

## 2023-02-20 NOTE — RT PROTOCOL NOTE
RT Inhaler-Nebulizer Bronchodilator Protocol Note    There is a bronchodilator order in the chart from a provider indicating to follow the RT Bronchodilator Protocol and there is an Initiate RT Inhaler-Nebulizer Bronchodilator Protocol order as well (see protocol at bottom of note). CXR Findings:  XR CHEST PORTABLE    Result Date: 2/19/2023  1. Stable right pleural effusion and lung base consolidation. This may represent aspiration pneumonitis given the CT identified mild fluid distended esophagus. 2. COPD. The findings from the last RT Protocol Assessment were as follows:   History Pulmonary Disease: Chronic pulmonary disease  Respiratory Pattern: Dyspnea on exertion or RR 21-25 bpm  Breath Sounds: Slightly diminished and/or crackles  Cough: Strong, productive  Indication for Bronchodilator Therapy: Decreased or absent breath sounds  Bronchodilator Assessment Score: 7    Aerosolized bronchodilator medication orders have been revised according to the RT Inhaler-Nebulizer Bronchodilator Protocol below. Respiratory Therapist to perform RT Therapy Protocol Assessment initially then follow the protocol. Repeat RT Therapy Protocol Assessment PRN for score 0-3 or on second treatment, BID, and PRN for scores above 3. No Indications - adjust the frequency to every 6 hours PRN wheezing or bronchospasm, if no treatments needed after 48 hours then discontinue using Per Protocol order mode. If indication present, adjust the RT bronchodilator orders based on the Bronchodilator Assessment Score as indicated below. Use Inhaler orders unless patient has one or more of the following: on home nebulizer, not able to hold breath for 10 seconds, is not alert and oriented, cannot activate and use MDI correctly, or respiratory rate 25 breaths per minute or more, then use the equivalent nebulizer order(s) with same Frequency and PRN reasons based on the score.   If a patient is on this medication at home then do not decrease Frequency below that used at home. 0-3 - enter or revise RT bronchodilator order(s) to equivalent RT Bronchodilator order with Frequency of every 4 hours PRN for wheezing or increased work of breathing using Per Protocol order mode. 4-6 - enter or revise RT Bronchodilator order(s) to two equivalent RT bronchodilator orders with one order with BID Frequency and one order with Frequency of every 4 hours PRN wheezing or increased work of breathing using Per Protocol order mode. 7-10 - enter or revise RT Bronchodilator order(s) to two equivalent RT bronchodilator orders with one order with TID Frequency and one order with Frequency of every 4 hours PRN wheezing or increased work of breathing using Per Protocol order mode. 11-13 - enter or revise RT Bronchodilator order(s) to one equivalent RT bronchodilator order with QID Frequency and an Albuterol order with Frequency of every 4 hours PRN wheezing or increased work of breathing using Per Protocol order mode. Greater than 13 - enter or revise RT Bronchodilator order(s) to one equivalent RT bronchodilator order with every 4 hours Frequency and an Albuterol order with Frequency of every 2 hours PRN wheezing or increased work of breathing using Per Protocol order mode. RT to enter RT Home Evaluation for COPD & MDI Assessment order using Per Protocol order mode.     Electronically signed by Nani Zavala RCP on 2/20/2023 at 8:14 AM

## 2023-02-20 NOTE — CARE COORDINATION
Social Work-Cristinan will need a P2P to be completed by Dr Priscilla Champagne. Dr Priscilla Champagne is willing to complete P2P. Provided Dr Priscilla Champagne with insurance spenser.  Suzette Roberts

## 2023-02-20 NOTE — RT PROTOCOL NOTE
RT Inhaler-Nebulizer Bronchodilator Protocol Note    There is a bronchodilator order in the chart from a provider indicating to follow the RT Bronchodilator Protocol and there is an Initiate RT Inhaler-Nebulizer Bronchodilator Protocol order as well (see protocol at bottom of note). CXR Findings:  XR CHEST PORTABLE    Result Date: 2/19/2023  1. Stable right pleural effusion and lung base consolidation. This may represent aspiration pneumonitis given the CT identified mild fluid distended esophagus. 2. COPD. XR CHEST PORTABLE    Result Date: 2/18/2023  1. Small right-sided pleural effusion and right basilar airspace disease. 2. Underlying COPD. Left lung remains clear. The findings from the last RT Protocol Assessment were as follows:   History Pulmonary Disease: Chronic pulmonary disease  Respiratory Pattern: Dyspnea on exertion or RR 21-25 bpm  Breath Sounds: Slightly diminished and/or crackles  Cough: Strong, productive  Indication for Bronchodilator Therapy: Decreased or absent breath sounds  Bronchodilator Assessment Score: 7    Aerosolized bronchodilator medication orders have been revised according to the RT Inhaler-Nebulizer Bronchodilator Protocol below. Respiratory Therapist to perform RT Therapy Protocol Assessment initially then follow the protocol. Repeat RT Therapy Protocol Assessment PRN for score 0-3 or on second treatment, BID, and PRN for scores above 3. No Indications - adjust the frequency to every 6 hours PRN wheezing or bronchospasm, if no treatments needed after 48 hours then discontinue using Per Protocol order mode. If indication present, adjust the RT bronchodilator orders based on the Bronchodilator Assessment Score as indicated below.   Use Inhaler orders unless patient has one or more of the following: on home nebulizer, not able to hold breath for 10 seconds, is not alert and oriented, cannot activate and use MDI correctly, or respiratory rate 25 breaths per minute or more, then use the equivalent nebulizer order(s) with same Frequency and PRN reasons based on the score. If a patient is on this medication at home then do not decrease Frequency below that used at home. 0-3 - enter or revise RT bronchodilator order(s) to equivalent RT Bronchodilator order with Frequency of every 4 hours PRN for wheezing or increased work of breathing using Per Protocol order mode. 4-6 - enter or revise RT Bronchodilator order(s) to two equivalent RT bronchodilator orders with one order with BID Frequency and one order with Frequency of every 4 hours PRN wheezing or increased work of breathing using Per Protocol order mode. 7-10 - enter or revise RT Bronchodilator order(s) to two equivalent RT bronchodilator orders with one order with TID Frequency and one order with Frequency of every 4 hours PRN wheezing or increased work of breathing using Per Protocol order mode. 11-13 - enter or revise RT Bronchodilator order(s) to one equivalent RT bronchodilator order with QID Frequency and an Albuterol order with Frequency of every 4 hours PRN wheezing or increased work of breathing using Per Protocol order mode. Greater than 13 - enter or revise RT Bronchodilator order(s) to one equivalent RT bronchodilator order with every 4 hours Frequency and an Albuterol order with Frequency of every 2 hours PRN wheezing or increased work of breathing using Per Protocol order mode. RT to enter RT Home Evaluation for COPD & MDI Assessment order using Per Protocol order mode.     Electronically signed by Cal Ayala RCP on 2/19/2023 at 8:18 PM

## 2023-02-20 NOTE — PROGRESS NOTES
Pulmonary Critical Care Progress Note  Mario Juan MD     Patient seen for the follow up of  Pulmonary embolism on left [facility])     Subjective:  Patient had uneventful night. He remains on high flow oxygen via salter at 8 L. He is resting comfortably in the bed, no distress. He feels shortness of breath is doing fine. He has occasional dry cough. No chest pain. He is tolerating orals. Examination:  Vitals: BP (!) 110/53 Comment: RN notified  Pulse 66   Temp 98 °F (36.7 °C) (Oral)   Resp 18   Ht 5' 10\" (1.778 m)   Wt 213 lb (96.6 kg)   SpO2 95%   BMI 30.56 kg/m²   General appearance: alert and cooperative with exam  Neck: No JVD  Lungs: Decreased air exchange, no crackles or wheezing  Heart: regular rate and rhythm, S1, S2 normal, no gallop  Abdomen: Soft, non tender, + BS  Extremities: no cyanosis or clubbing. No significant edema    LABs:  CBC:   Recent Labs     02/20/23  0438   WBC 6.6   HGB 12.3*   HCT 40.5*        BMP:   Recent Labs     02/18/23  0533 02/18/23  0744 02/19/23  0539 02/20/23  0438   *  --  145* 141   K 5.4* 4.7 3.8 4.4   CO2 38*  --  35* 33*   BUN 15  --  17 16   CREATININE 0.77  --  0.88 0.79   LABGLOM >60  --  >60 >60   GLUCOSE 87  --  124* 90     Radiology:  X-ray chest 2/20/2023      CTA 2/15/2023      Impression:  Acute hypoxic respiratory failure  Acute PE, tiny distal posterior basilar left lower lobe segmental pulmonary artery  Acute right leg DVT of femoral artery  Acute exacerbation of COPD/emphysema  Small right pleural effusion/atelectasis  Retained secretions in trachea and mainstem bronchi,?   Aspiration  30-pack-year smoking history  Indeterminate 9 mm lingular nodule  Possible obstructive sleep apnea  Recent history of COVID-19  Dilated patulous esophagus with wall thickening/small to moderate hiatal hernia    Recommendations:  Oxygen via high flow nasal cannula, wean as able to keep SPO2 90% or greater  BiPAP support while asleep if necessary  Incentive spirometry every hour while awake   Continue Augmentin, off Unasyn   Prednisone 40 mg daily, taper by 10 mg every 4 days to stop  Diuresis with oral Bumex  Continue budesonide and Brovana via nebulizer twice daily  Albuterol and Ipratropium 3 times daily and as needed  Speech therapy evaluation noted, patient okay for normal diet  DVT prophylaxis, Eliquis  GI prophylaxis, Protonix  PT/OT  Ltach evaluation  Will follow with you    Electronically signed by     Wayne Yusuf MD on 2/20/2023 at 12:33 PM  Pulmonary Critical Care and Sleep Medicine,  [facility]  Cell: [phone]  Office: [phone]

## 2023-02-20 NOTE — PLAN OF CARE
Pt doing well, no change from yesterday. Waiting to hear back from case management with information to where pt will be discharged. Pt is still currently 10L  (salter) . Denies pain/discomfort. All questions and concerns addressed. Safety maintained      Problem: Respiratory - Adult  Goal: Achieves optimal ventilation and oxygenation  2/20/2023 1300 by Lindsey Dominguez RN  Outcome: Progressing     Problem: Skin/Tissue Integrity  Goal: Absence of new skin breakdown  Description: 1. Monitor for areas of redness and/or skin breakdown  2. Assess vascular access sites hourly  3. Every 4-6 hours minimum:  Change oxygen saturation probe site  4. Every 4-6 hours:  If on nasal continuous positive airway pressure, respiratory therapy assess nares and determine need for appliance change or resting period.   Outcome: Progressing     Problem: Safety - Adult  Goal: Free from fall injury  2/20/2023 1300 by Lindsey Dominguez RN  Outcome: Progressing

## 2023-02-20 NOTE — PROGRESS NOTES
Occupational Therapy  Facility/Department: [facility] PROGRESSIVE CARE  Rehabilitation Occupational Therapy Daily Treatment Note    Date: 23  Patient Name: Lauretha Collet       Room: 3203/0737-44  MRN: [id number]  Account: [de-identified]   : 1945  (78 y.o.) Gender: male        Pt currently functioning below baseline. Recommend daily inpatient skilled therapy at time of discharge to maximize long term outcomes and prevent re-admission. Please refer to AM-PAC score for current level of function. Past Medical History:  has a past medical history of Back pain, Bipolar 1 disorder ([facility] Cancer ([facility]chronic obstructive [facility] disease) [facility] GERD (gastroesophageal reflux disease), and Heart attack ([facility] Past Surgical History:   has a past surgical history that includes Anus surgery; hernia repair; and Colonoscopy (N/A, 2018). Restrictions  Restrictions/Precautions: General Precautions; Fall Risk;Up as Tolerated  Other position/activity restrictions: Up w/ assist, 8L O2 NC, telemetry, continuous pulse ox, LUE IV, RUE IV  Required Braces or Orthoses?: No    Subjective  Subjective: Pt in chair upon arrival just finishing up with physical therapy. Pt agreeable to therapy. Restrictions/Precautions: General Precautions; Fall Risk;Up as Tolerated             Objective     Cognition  Overall Cognitive Status: Exceptions  Arousal/Alertness: Appropriate responses to stimuli  Following Commands:  Follows one step commands with repetition  Attention Span: Appears intact  Memory: Decreased short term memory;Decreased recall of recent events  Safety Judgement: Decreased awareness of need for safety;Decreased awareness of need for assistance  Problem Solving: Decreased awareness of errors;Assistance required to identify errors made;Assistance required to correct errors made;Assistance required to implement solutions;Assistance required to generate solutions  Insights: Decreased awareness of deficits  Initiation: Requires cues for all  Sequencing: Requires cues for some  Orientation  Overall Orientation Status: Within Functional Limits         ADL  Grooming/Oral Hygiene  Assistance Level: Set-up; Verbal cues; Increased time to complete; Moderate assistance  Skilled Clinical Factors: Pt sat in chair and washed face/head and writer combed hair for pt as pt stated \"I don't use a comb or a brush\"  Upper Extremity Bathing  Assistance Level: Set-up; Verbal cues; Increased time to complete;Minimal assistance  Skilled Clinical Factors: seated in chair with assist to wash back  Lower Extremity Bathing  Skilled Clinical Factors: declined  Upper Extremity Dressing  Assistance Level: Set-up; Verbal cues; Minimal assistance  Skilled Clinical Factors: to change gowns  Lower Extremity Dressing  Skilled Clinical Factors: declined              OT Exercises  Exercise Treatment: Pt issued HEP and completed 10 reps of each ex with fair tolerance. Access Code: [email or web address]  URL: [email or web address]  Date: 02/20/2023  Prepared by:    Exercises  Wrist AROM Flexion Extension - 1 x daily - 7 x weekly - 3 sets - 10 reps  Seated Forearm Pronation and Supination AROM - 1 x daily - 7 x weekly - 3 sets - 10 reps  Seated Elbow Flexion and Extension AROM - 1 x daily - 7 x weekly - 3 sets - 10 reps  Seated Shoulder Shrug - 1 x daily - 7 x weekly - 3 sets - 10 reps  Seated Shoulder Flexion - 1 x daily - 7 x weekly - 3 sets - 10 reps  Seated Shoulder External Rotation - 1 x daily - 7 x weekly - 3 sets - 10 reps      Assessment  Assessment  Assessment: Pt tolerated session fair but remains limited by cognitive deficits, decreased activity tolerance and global weakness. Pt req'd A LOT of assist with UB care and declined any needs for LB care at this time. Pt completed HEP seated in chair with fair tolerance.  Pt needs continued education on importanceof being up and out of bed as pt put light on immediately after writer left room to get back into bed. Skilled OT warranted to promote I/safety to return pt to prior living arrangment with assist as needed. Activity Tolerance: Patient limited by fatigue;Patient limited by endurance;Treatment limited secondary to decreased cognition  Discharge Recommendations: Patient would benefit from continued therapy after discharge  Safety Devices  Safety Devices in place: Yes  Type of devices: All fall risk precautions in place; Left in chair;Nurse notified;Call light within reach; Chair alarm in place; Patient at risk for falls    Patient Education  Education  Education Given To: Patient  Education Provided: Mobility Training; Fall Prevention Strategies;Transfer Training;Energy Conservation;Home Exercise Program;Safety  Education Method: Demonstration;Verbal;Teach Back;Printed Information/Hand-outs  Barriers to Learning: Cognition  Education Outcome: Verbalized understanding;Demonstrated understanding;Continued education needed  Patient Education  My COPD Action Plan  Understanding Energy Conservation  Energy Conservation During Daily Tasks  How to Prevent Falls    Plan  Occupational Therapy Plan  Times Per Week: 4-5x/wk 1x/day as ilan  Current Treatment Recommendations: Strengthening;Balance training;Functional mobility training; Endurance training; Safety education & training;Equipment evaluation, education, & procurement;Patient/Caregiver education & training;Self-Care / ADL; Home management training    Goals  Patient Goals   Patient goals : To go home! Short Term Goals  Time Frame for Short Term Goals: By discharge, pt to demo  Short Term Goal 1: ADL transfers and functional mobility to SBA with use of AD as needed. Short Term Goal 2: bed mobility to Mod I with use of bedrails as needed. Short Term Goal 3: UB ADLs to Set up and LB ADLs to SBA with use of AD as needed. Short Term Goal 4: increased B UE strength by 1/2 grade to assist with functional tasks/I with B UE HEP with use of handouts as needed.   Short Term Goal 5: toileting to SBA with use of AD/grab bars as needed. Long Term Goals  Long Term Goal 1: Pt to be I with fall prevention education, EC/WS tech, disease specific education, recommendations for discharge/AE with use of handouts as needed.     AM-PAC Score        AM-PAC Inpatient Daily Activity Raw Score: 16 (02/20/23 1333)  AM-PAC Inpatient ADL T-Scale Score : 35.96 (02/20/23 1333)  ADL Inpatient CMS 0-100% Score: 53.32 (02/20/23 1333)  ADL Inpatient CMS G-Code Modifier : CK (02/20/23 1333)      Therapy Time   Individual Concurrent Group Co-treatment   Time In 1205         Time Out 1230         Minutes [address] [place] [address]

## 2023-02-20 NOTE — PROGRESS NOTES
Physical Therapy  Facility/Department: [facility] PROGRESSIVE CARE  Daily Treatment Note  NAME: Anthony Carson  : 1945  MRN: [id number]    Date of Service: 2023    Discharge Recommendations:  Patient would benefit from continued therapy after discharge        Patient Diagnosis(es): The primary encounter diagnosis was Pneumonia due to infectious organism, unspecified laterality, unspecified part of lung. Diagnoses of COPD exacerbation ([address] and Other acute pulmonary embolism without acute cor pulmonale (HCC) were also pertinent to this visit. Assessment   Assessment: Pt on 8L O2 via [place]ter cannula  Activity Tolerance: Patient limited by endurance; Patient limited by fatigue     Plan    Physcial Therapy Plan  General Plan: 5-7 times per week  Current Treatment Recommendations: Strengthening;Balance training;Functional mobility training;Transfer training;Neuromuscular re-education;Gait training; Endurance training;Home exercise program;Equipment evaluation, education, & procurement;Patient/Caregiver education & training; Safety education & training; Therapeutic activities     Restrictions  Restrictions/Precautions  Restrictions/Precautions: General Precautions, Fall Risk  Required Braces or Orthoses?: No  Position Activity Restriction  Other position/activity restrictions: Up w/ assist, 8L O2 NC, telemetry, continuous pulse ox, LUE IV, RUE IV     Subjective    Subjective  Subjective: Pt amenable to PT treatment/ OK for PT per Tia RN  Pain: Denies pain  Orientation  Overall Orientation Status: Within Normal Limits  Orientation Level: Oriented X4  Cognition  Overall Cognitive Status: WNL     Objective   Vitals     Bed Mobility Training  Bed Mobility Training: No (Independent bed mobility)  Balance  Sitting: Intact  Standing: With support  Transfer Training  Transfer Training: Yes  Overall Level of Assistance: Contact-guard assistance  Interventions: Verbal cues; Safety awareness training  Sit to Stand: Contact-guard assistance  Stand to Sit: Contact-guard assistance  Stand Pivot Transfers: Contact-guard assistance  Gait Training  Gait Training: Yes  Right Side Weight Bearing: Full  Left Side Weight Bearing: Full  Gait  Overall Level of Assistance: Contact-guard assistance  Interventions: Verbal cues  Speed/Laura: Shuffled  Step Length: Left shortened;Right shortened  Gait Abnormalities: Shuffling gait  Distance (ft): 5 Feet (To chair)     PT Exercises  A/AROM Exercises: B LE's  2x10 each, ankle pumps,hip flex and ab/ad,LAQ's,  Wand program 2x10 each     Safety Devices  Type of Devices: Left in chair;Chair alarm in place;Nurse notified;Call light within reach;Gait belt  Restraints  Restraints Initially in Place: No       Goals  Short Term Goals  Time Frame for Short Term Goals: 12 visits  Short Term Goal 1: Pt to demonstrate bed mobility independently  Short Term Goal 2: Pt to perform STS transfers w/ RW independently  Short Term Goal 3: Pt to ambulate at least 100ft w/ RW independently while maintaining SpO2 >92% throughout  Short Term Goal 4: Pt to actively participate in at least 30 minutes of physical therapy for ther act, ther ex, balance, gait, and endurance training  Patient Goals   Patient Goals :  To go home, less pain    Education  Patient Education  Education Given To: Patient  Education Provided: Role of Therapy;Plan of Care;Energy Conservation  Education Provided Comments: Pt has vision difficulties w/ reading and does not have glasses, energy conservation instructions as well as ankle pumps instruction all verbal and demo  Education Method: Verbal;Demonstration  Barriers to Learning: Vision  Education Outcome: Verbalized understanding;Demonstrated understanding    Therapy Time   Individual Concurrent Group Co-treatment   Time In 1107         Time Out 1149         Minutes [address] [place], [place]

## 2023-02-21 PROCEDURE — 2580000003 HC RX 258: Performed by: PHYSICIAN ASSISTANT

## 2023-02-21 PROCEDURE — 6370000000 HC RX 637 (ALT 250 FOR IP): Performed by: NURSE PRACTITIONER

## 2023-02-21 PROCEDURE — 2060000000 HC ICU INTERMEDIATE R&B

## 2023-02-21 PROCEDURE — 97535 SELF CARE MNGMENT TRAINING: CPT

## 2023-02-21 PROCEDURE — 6360000002 HC RX W HCPCS: Performed by: NURSE PRACTITIONER

## 2023-02-21 PROCEDURE — 6370000000 HC RX 637 (ALT 250 FOR IP): Performed by: INTERNAL MEDICINE

## 2023-02-21 PROCEDURE — 94761 N-INVAS EAR/PLS OXIMETRY MLT: CPT

## 2023-02-21 PROCEDURE — 99232 SBSQ HOSP IP/OBS MODERATE 35: CPT | Performed by: INTERNAL MEDICINE

## 2023-02-21 PROCEDURE — 2700000000 HC OXYGEN THERAPY PER DAY

## 2023-02-21 PROCEDURE — 94640 AIRWAY INHALATION TREATMENT: CPT

## 2023-02-21 RX ORDER — GUAIFENESIN 600 MG/1
600 TABLET, EXTENDED RELEASE ORAL 2 TIMES DAILY
Status: DISCONTINUED | OUTPATIENT
Start: 2023-02-21 | End: 2023-02-24 | Stop reason: HOSPADM

## 2023-02-21 RX ADMIN — APIXABAN 10 MG: 5 TABLET, FILM COATED ORAL at 08:31

## 2023-02-21 RX ADMIN — ARFORMOTEROL TARTRATE 15 MCG: 15 SOLUTION RESPIRATORY (INHALATION) at 19:42

## 2023-02-21 RX ADMIN — IPRATROPIUM BROMIDE AND ALBUTEROL SULFATE 1 AMPULE: 2.5; .5 SOLUTION RESPIRATORY (INHALATION) at 19:42

## 2023-02-21 RX ADMIN — AMOXICILLIN AND CLAVULANATE POTASSIUM 1 TABLET: 875; 125 TABLET, FILM COATED ORAL at 08:31

## 2023-02-21 RX ADMIN — BUMETANIDE 1 MG: 1 TABLET ORAL at 08:31

## 2023-02-21 RX ADMIN — PANTOPRAZOLE SODIUM 40 MG: 40 TABLET, DELAYED RELEASE ORAL at 06:15

## 2023-02-21 RX ADMIN — BUDESONIDE 500 MCG: 0.5 SUSPENSION RESPIRATORY (INHALATION) at 07:54

## 2023-02-21 RX ADMIN — GUAIFENESIN 600 MG: 600 TABLET ORAL at 12:45

## 2023-02-21 RX ADMIN — METOPROLOL SUCCINATE 25 MG: 25 TABLET, EXTENDED RELEASE ORAL at 08:32

## 2023-02-21 RX ADMIN — APIXABAN 10 MG: 5 TABLET, FILM COATED ORAL at 20:28

## 2023-02-21 RX ADMIN — GUAIFENESIN 600 MG: 600 TABLET ORAL at 20:28

## 2023-02-21 RX ADMIN — ARFORMOTEROL TARTRATE 15 MCG: 15 SOLUTION RESPIRATORY (INHALATION) at 07:54

## 2023-02-21 RX ADMIN — Medication 500 MG: at 10:14

## 2023-02-21 RX ADMIN — AMOXICILLIN AND CLAVULANATE POTASSIUM 1 TABLET: 875; 125 TABLET, FILM COATED ORAL at 20:28

## 2023-02-21 RX ADMIN — SODIUM CHLORIDE, PRESERVATIVE FREE 10 ML: 5 INJECTION INTRAVENOUS at 08:33

## 2023-02-21 RX ADMIN — SODIUM CHLORIDE, PRESERVATIVE FREE 10 ML: 5 INJECTION INTRAVENOUS at 20:29

## 2023-02-21 RX ADMIN — BUDESONIDE 500 MCG: 0.5 SUSPENSION RESPIRATORY (INHALATION) at 19:42

## 2023-02-21 RX ADMIN — ASPIRIN 81 MG CHEWABLE TABLET 81 MG: 81 TABLET CHEWABLE at 08:31

## 2023-02-21 RX ADMIN — PREDNISONE 40 MG: 20 TABLET ORAL at 08:32

## 2023-02-21 RX ADMIN — QUETIAPINE FUMARATE 400 MG: 200 TABLET ORAL at 20:28

## 2023-02-21 RX ADMIN — IPRATROPIUM BROMIDE AND ALBUTEROL SULFATE 1 AMPULE: 2.5; .5 SOLUTION RESPIRATORY (INHALATION) at 07:55

## 2023-02-21 RX ADMIN — LAMOTRIGINE 100 MG: 100 TABLET ORAL at 08:31

## 2023-02-21 RX ADMIN — DIVALPROEX SODIUM 1000 MG: 500 TABLET, EXTENDED RELEASE ORAL at 20:28

## 2023-02-21 NOTE — PLAN OF CARE
Problem: Respiratory - Adult  Goal: Achieves optimal ventilation and oxygenation  2/21/2023 0757 by Liv Michele RCP  Outcome: Progressing  2/21/2023 0554 by Елена Fishman RN  Outcome: Progressing  2/20/2023 1811 by Payam An RCP  Outcome: Progressing  Goal: Able to breathe comfortably  2/21/2023 0757 by Tereso Burton RCP  Outcome: Progressing  2/20/2023 1811 by Payam An RCP  Outcome: Progressing

## 2023-02-21 NOTE — PROGRESS NOTES
Occupational Therapy  Facility/Department: [facility] PROGRESSIVE CARE  Rehabilitation Occupational Therapy Daily Treatment Note    Date: 23  Patient Name: Joan Hartley       Room: 1455/2351-74  MRN: [id number]  Account: [de-identified]   : 1945  (78 y.o.) Gender: male           Pt currently functioning below baseline. Recommend daily inpatient skilled therapy at time of discharge to maximize long term outcomes and prevent re-admission. Please refer to AM-PAC score for current level of function. Past Medical History:  has a past medical history of Back pain, Bipolar 1 disorder ([facility] [address] [facility] COPD (chronic obstructive pulmonary disease) ([facility] GERD (gastroesophageal reflux disease), and Heart attack ([facility] Past Surgical History:   has a past surgical history that includes Anus surgery; hernia repair; and Colonoscopy (N/A, 2018). Restrictions  Restrictions/Precautions: General Precautions; Fall Risk;Up as Tolerated  Other position/activity restrictions: Up w/ assist, 8L O2 NC, telemetry, continuous pulse ox, LUE IV, RUE IV  Required Braces or Orthoses?: No    Subjective  Subjective: Pt in bed upon arrival and agreeable to therapy. Upon pt sitting up on EOB writer noticed pt's bed and brief were soiled with BM and pt stated he was unaware. Restrictions/Precautions: General Precautions; Fall Risk;Up as Tolerated             Objective     Cognition  Overall Cognitive Status: Exceptions  Arousal/Alertness: Appropriate responses to stimuli  Following Commands: Follows one step commands with increased time; Follows one step commands with repetition  Attention Span: Appears intact; Attends with cues to redirect  Memory: Decreased short term memory;Decreased recall of recent events  Safety Judgement: Decreased awareness of need for safety;Decreased awareness of need for assistance  Problem Solving: Decreased awareness of errors;Assistance required to identify errors made;Assistance required to correct errors made;Assistance required to implement solutions;Assistance required to generate solutions  Insights: Decreased awareness of deficits  Initiation: Requires cues for all  Sequencing: Requires cues for some  Orientation  Overall Orientation Status: Within Functional Limits   Perception  Overall Perceptual Status: Impaired  Initiation: Cues to initiate tasks     ADL  Grooming/Oral Hygiene  Assistance Level: Set-up; Verbal cues; Increased time to complete; Moderate assistance  Skilled Clinical Factors: to wash face/hair, dry face/hair and to comb hair  Upper Extremity Bathing  Assistance Level: Set-up; Verbal cues; Increased time to complete; Moderate assistance  Skilled Clinical Factors: seated on shower bench  Lower Extremity Bathing  Assistance Level: Set-up; Verbal cues; Increased time to complete;Maximum assistance  Skilled Clinical Factors: standing in shower  Upper Extremity Dressing  Assistance Level: Set-up; Verbal cues; Minimal assistance  Skilled Clinical Factors: to change gowns  Lower Extremity Dressing  Assistance Level: Moderate assistance;Maximum assistance  Skilled Clinical Factors: to change briefs  Putting On/Taking Off Footwear  Assistance Level: Set-up; Verbal cues; Increased time to complete;Stand by assist  Skilled Clinical Factors: seated to doff/arnaud socks  Toileting  Assistance Level: Set-up; Verbal cues;Contact guard assist;Minimal assistance  Skilled Clinical Factors: standing at bed side chair to use urinal as pt ended up spilling urine on tray table and floor, pt is MAX A with hygiene after BM  Toilet Transfers  Technique: Stand step  Equipment: Standard toilet;Grab bars  Additional Factors: Verbal cues;Cues for hand placement  Assistance Level: Stand by assist;Contact guard assist  Tub/Shower Transfers  Type: Shower  Transfer From: [facility]  Transfer To: Tub transfer bench  Additional Factors: Verbal cues;Cues for hand placement; Increased time to complete  Assistance Level: Stand by assist;Contact guard assist          Functional Mobility  Device: Rolling walker  Activity: To/From bathroom  Assistance Level: Stand by assist;Contact guard assist  Skilled Clinical Factors: VCs for safety with walker and line awareness/assist  Bed Mobility  Overall Assistance Level: Stand By Assist  Additional Factors: Verbal cues; Head of bed flat  Supine to Sit  Assistance Level: Stand by assist  Skilled Clinical Factors: VCs for safety and writer managed lines  Transfers  Surface: From bed;Standard toilet; To chair with arms (to shower bench)  Additional Factors: Verbal cues; Hand placement cues; Increased time to complete  Device: Walker  Sit to Stand  Assistance Level: Stand by assist;Contact guard assist  Stand to Sit  Assistance Level: Stand by assist;Contact guard assist  Skilled Clinical Factors: VCs for hand placement, walker safety/mgmt, backing all the way up and reaching back controlling stand to sit and overall safety. Writer managed all lines. Neuromuscular Education  Neuromuscular education: Yes  NDT Treatment: Gait ;Sitting;Standing     Assessment  Assessment  Assessment: Pt tolerated session well but remains limited by cognitive deficits, decreased activity tolerance and global weakness. Pt req'd A LOT of assist with UB/LB care and SBA/CGA with transfers/mobility using RW. Pt has poor awareness of lines. Pt needs encouragement to complete self care tasks daily as pt has an unkempt appearance and is unaware when brief is soiled. Skilled OT warranted to promote I/safety to return pt to prior living arrangment with assist as needed. Activity Tolerance: Patient tolerated treatment well;Patient limited by endurance;Treatment limited secondary to decreased cognition  Discharge Recommendations: Patient would benefit from continued therapy after discharge  Safety Devices  Safety Devices in place: Yes  Type of devices: All fall risk precautions in place; Left in chair;Nurse notified;Call light within reach;Chair alarm in place; Patient at risk for falls    Patient Education  Education  Education Given To: Patient  Education Provided: Mobility Training; Fall Prevention Strategies;Transfer Training;Energy Conservation;Precautions; Safety;Equipment  Education Method: Verbal  Barriers to Learning: Cognition  Education Outcome: Verbalized understanding;Demonstrated understanding;Continued education needed    Plan  Occupational Therapy Plan  Times Per Week: 4-5x/wk 1x/day as ilan  Current Treatment Recommendations: Strengthening;Balance training;Functional mobility training; Endurance training; Safety education & training;Equipment evaluation, education, & procurement;Patient/Caregiver education & training;Self-Care / ADL; Home management training    Goals  Patient Goals   Patient goals : To go home! Short Term Goals  Time Frame for Short Term Goals: By discharge, pt to demo  Short Term Goal 1: ADL transfers and functional mobility to SBA with use of AD as needed. Short Term Goal 2: bed mobility to Mod I with use of bedrails as needed. Short Term Goal 4: increased B UE strength by 1/2 grade to assist with functional tasks/I with B UE HEP with use of handouts as needed. Short Term Goal 5: toileting to SBA with use of AD/grab bars as needed. Long Term Goals  Long Term Goal 1: Pt to be I with fall prevention education, EC/WS tech, disease specific education, recommendations for discharge/AE with use of handouts as needed.     AM-PAC Score        AM-[facility] Inpatient Daily Activity Raw Score: 14 (02/21/23 1342)  AM-PAC Inpatient ADL T-Scale Score : 33.39 (02/21/23 1342)  ADL Inpatient CMS 0-100% Score: 59.67 (02/21/23 1342)  ADL Inpatient CMS G-Code Modifier : CK (02/21/23 1342)      Therapy Time   Individual Concurrent Group Co-treatment   Time In 1157         Time Out 1235         Minutes [address] [address]

## 2023-02-21 NOTE — PLAN OF CARE
Problem: Safety - Adult  Goal: Free from fall injury  2/21/2023 1811 by Chidi Portillo RN  Outcome: Progressing  Flowsheets (Taken 2/21/2023 1811)  Free From Fall Injury:   Instruct family/caregiver on patient safety   Based on caregiver fall risk screen, instruct family/caregiver to ask for assistance with transferring infant if caregiver noted to have fall risk factors  Note: Siderails up x 2  Hourly rounding. Call light in reach. Instructed to call for assist before attempting out of bed. Remains free from falls and accidental injury at this time. Floor free from obstacles, and bed is locked and in lowest position. Adequate lighting provided. Bed alarm on. Fall sticker on wristband.  Fall Sign posted in doorway   2/21/2023 0554 by Ashley Galaviz RN  Outcome: Progressing     Problem: Respiratory - Adult  Goal: Achieves optimal ventilation and oxygenation  2/21/2023 1811 by Chidi Portillo RN  Outcome: Progressing  Flowsheets (Taken 2/20/2023 0800 by Dov Vance RN)  Achieves optimal ventilation and oxygenation: Assess for changes in respiratory status  2/21/2023 1800 by Joslyn Ramesh RCP  Outcome: Progressing  2/21/2023 0757 by Colin Zayas RCP  Outcome: Progressing  2/21/2023 0554 by Ashley Galaviz RN  Outcome: Progressing  Goal: Able to breathe comfortably  2/21/2023 1800 by Joslyn Ramesh RCP  Outcome: Progressing  2/21/2023 0757 by Colin Zayas RCP  Outcome: Progressing

## 2023-02-21 NOTE — PROGRESS NOTES
Pulmonary Critical Care Progress Note  Christina Lopez MD     Patient seen for the follow up of  Pulmonary embolism on left [facility])     Subjective:  Patient had uneventful night. He remains on high flow oxygen via salter at 8 L. He is currently sitting up in the chair, no distress. His shortness of breath is improving. He has occasional loose cough, difficult to produce sputum. He denies chest pain. He is tolerating orals. Examination:  Vitals: BP (!) 134/99   Pulse 60   Temp 98.6 °F (37 °C) (Oral)   Resp 16   Ht 5' 10\" (1.778 m)   Wt 195 lb (88.5 kg)   SpO2 96%   BMI 27.98 kg/m²   General appearance: alert and cooperative with exam  Neck: No JVD  Lungs: Decreased air exchange, no crackles or wheezing  Heart: regular rate and rhythm, S1, S2 normal, no gallop  Abdomen: Soft, non tender, + BS  Extremities: no cyanosis or clubbing. No significant edema  LTAC discharge planning, awaiting peer to peer  LABs:  CBC:   Recent Labs     02/20/23  0438   WBC 6.6   HGB 12.3*   HCT 40.5*        BMP:   Recent Labs     02/19/23  0539 02/20/23  0438   * 141   K 3.8 4.4   CO2 35* 33*   BUN 17 16   CREATININE 0.88 0.79   LABGLOM >60 >60   GLUCOSE 124* 90     Radiology:  X-ray chest 2/20/2023      CTA 2/15/2023      Impression:  Acute hypoxic respiratory failure  Acute PE, tiny distal posterior basilar left lower lobe segmental pulmonary artery  Acute right leg DVT of femoral artery  Acute exacerbation of COPD/emphysema  Small right pleural effusion/atelectasis  Retained secretions in trachea and mainstem bronchi,?   Aspiration  30-pack-year smoking history  Indeterminate 9 mm lingular nodule  Possible obstructive sleep apnea  Recent history of COVID-19  Dilated patulous esophagus with wall thickening/small to moderate hiatal hernia    Recommendations:  Oxygen via high flow nasal cannula, wean as able to keep SPO2 90% or greater  BiPAP support while asleep if necessary  Incentive spirometry every hour while awake   Add Acapella  Add Mucinex  Continue Augmentin, off Unasyn   Prednisone 40 mg daily, taper by 10 mg every 4 days to stop  Diuresis with oral Bumex  Continue budesonide and Brovana via nebulizer twice daily  Albuterol and Ipratropium twice daily and as needed  Speech therapy evaluation noted, patient okay for normal diet  DVT prophylaxis, Eliquis  GI prophylaxis, Protonix  PT/OT  Discharge planning to LTAC, awaiting peer to peer  Will follow with you    Electronically signed by     Rosario Waters MD on 2/21/2023 at 12:13 PM  Pulmonary Critical Care and Sleep Medicine,  [facility]  Cell: [phone]  Office: [phone]

## 2023-02-21 NOTE — CARE COORDINATION
Social Work-Received message from Dr Yana Akins. Pt was denied [facility]. Discussed with Tim bowers.  He will complete an expedited appeal. Ember Saul

## 2023-02-21 NOTE — RT PROTOCOL NOTE
RT Inhaler-Nebulizer Bronchodilator Protocol Note    There is a bronchodilator order in the chart from a provider indicating to follow the RT Bronchodilator Protocol and there is an Initiate RT Inhaler-Nebulizer Bronchodilator Protocol order as well (see protocol at bottom of note). CXR Findings:  XR CHEST PORTABLE    Result Date: 2/20/2023  Right basilar effusion and left basilar infiltrates. The findings from the last RT Protocol Assessment were as follows:   History Pulmonary Disease: Chronic pulmonary disease  Respiratory Pattern: Dyspnea on exertion or RR 21-25 bpm  Breath Sounds: Slightly diminished and/or crackles  Cough: Strong, spontaneous, non-productive  Indication for Bronchodilator Therapy: Decreased or absent breath sounds  Bronchodilator Assessment Score: 6    Aerosolized bronchodilator medication orders have been revised according to the RT Inhaler-Nebulizer Bronchodilator Protocol below. Respiratory Therapist to perform RT Therapy Protocol Assessment initially then follow the protocol. Repeat RT Therapy Protocol Assessment PRN for score 0-3 or on second treatment, BID, and PRN for scores above 3. No Indications - adjust the frequency to every 6 hours PRN wheezing or bronchospasm, if no treatments needed after 48 hours then discontinue using Per Protocol order mode. If indication present, adjust the RT bronchodilator orders based on the Bronchodilator Assessment Score as indicated below. Use Inhaler orders unless patient has one or more of the following: on home nebulizer, not able to hold breath for 10 seconds, is not alert and oriented, cannot activate and use MDI correctly, or respiratory rate 25 breaths per minute or more, then use the equivalent nebulizer order(s) with same Frequency and PRN reasons based on the score. If a patient is on this medication at home then do not decrease Frequency below that used at home.     0-3 - enter or revise RT bronchodilator order(s) to equivalent RT Bronchodilator order with Frequency of every 4 hours PRN for wheezing or increased work of breathing using Per Protocol order mode. 4-6 - enter or revise RT Bronchodilator order(s) to two equivalent RT bronchodilator orders with one order with BID Frequency and one order with Frequency of every 4 hours PRN wheezing or increased work of breathing using Per Protocol order mode. 7-10 - enter or revise RT Bronchodilator order(s) to two equivalent RT bronchodilator orders with one order with TID Frequency and one order with Frequency of every 4 hours PRN wheezing or increased work of breathing using Per Protocol order mode. 11-13 - enter or revise RT Bronchodilator order(s) to one equivalent RT bronchodilator order with QID Frequency and an Albuterol order with Frequency of every 4 hours PRN wheezing or increased work of breathing using Per Protocol order mode. Greater than 13 - enter or revise RT Bronchodilator order(s) to one equivalent RT bronchodilator order with every 4 hours Frequency and an Albuterol order with Frequency of every 2 hours PRN wheezing or increased work of breathing using Per Protocol order mode. RT to enter RT Home Evaluation for COPD & MDI Assessment order using Per Protocol order mode.     Electronically signed by brian valencia RCP on 2/21/2023 at 7:58 AM

## 2023-02-21 NOTE — PROGRESS NOTES
[facility]  Office: [address], DO, Quinten Mosquera, DO, Susanna Quinn, DO, Cynthia Louise Blood, DO, Shawn Ramsey MD, Opal Perez MD, Yvone Phalen, MD, Adrian Cárdenas MD,  Homer Hashimoto, MD, Luisa Tello MD, Herminia Reagan, DO, Tamara Stallings MD,  Kerri Rob MD, Ruthie Prince MD, Devon Claros, DO, Keri Antoine MD, Makayla Wise MD, Marily Tomlinson, DO, Antonella Villalba MD, Caio Veliz MD, Amrik López MD, Lorene Saldana MD, Elidia Whitfield DO, Mirtha Jones MD, Mark Johnson MD, Nellie Hankins, CNP,  Kendal Smith, CNP, Tomasz Nash, CNP, Mary Aj, CNP,  Shasha Watkins, [facility], Nuris Yates, CNP, Donta Tobin, CNP, Esperanza Martinez, CNP, Lida Smith, CNP, Carl Shanks, CNP, Mallory Mayfield PAEmilyC, Antionette Matias, CNS, All Sotelo, CNP, Tushar Lewis, Geiger Lauraside    Progress Note    2/21/2023    12:12 PM    Name:   Milvia Shore  MRN:     [id number]     Acct:      [de-identified]   Room:   [address] [place] [address] Day:  6  Admit Date:  2/15/2023 10:00 AM    PCP:   Maxwell Carrera MD  Code Status:  Full Code    Subjective:     C/C:   Chief Complaint   Patient presents with    Shortness of Breath     Pt was brought in via EMS for SOB that started this morning, Hx of COPD, Not on home O2     Interval History Status: not changed. Still feels some sob    Denies n/v    Does not use o2 at home    Brief History:     Per my BRONSON:  Sherren Pel Helyn Ray is a 78 y.o. Non- / non  male who presents with Shortness of Breath (Pt was brought in via EMS for SOB that started this morning, Hx of COPD, Not on home O2)   and is admitted to the hospital for the management of Pulmonary embolism on left [facility]). Patient presented to the ER today with complaints of shortness of breath and wheezing. He has this is a coughs a lot when he is eating and notices burning in his chest sometimes after he eats. Per vitals he was noted to be hypoxic and oxygen per nasal cannula was applied. CT chest was positive for tiny PE but no heart strain. There is also concern about possible aspiration. When I spoke to the patient he says he does have a history of coughing with meals and often has pain in his chest after eating. Patient was hospitalized 1/13 through 1/27/2023. At that time he was treated for COPD exacerbation and was positive for COVID. He received a full dose of Paxlovid while inpatient. According to the notes his oxygenation was labile but improved with diuresis. At the time of discharge he was given prednisone. Patient was encouraged to have a sleep study as an outpatient. \"    Review of Systems:     Constitutional:  negative for chills, fevers, sweats  Respiratory:  negative for cough, wheezing  Cardiovascular:  negative for lower extremity edema, palpitations  Gastrointestinal:  negative for abdominal pain, constipation, diarrhea, nausea, vomiting  Neurological:  negative for dizziness, headache    Medications:      Allergies:  No Known Allergies    Current Meds:   Scheduled Meds:    guaiFENesin  600 mg Oral BID    ipratropium-albuterol  1 ampule Inhalation BID    bumetanide  1 mg Oral Daily    amoxicillin-clavulanate  1 tablet Oral 2 times per day    apixaban  10 mg Oral BID    Followed by    Abrahan Conrad ON 2/24/2023] apixaban  5 mg Oral BID    sodium chloride flush  5-40 mL IntraVENous 2 times per day    arformoterol tartrate  15 mcg Nebulization BID    aspirin  81 mg Oral Daily    budesonide  0.5 mg Nebulization BID    divalproex  1,000 mg Oral Nightly    vitamin D  50,000 Units Oral Weekly    lamoTRIgine  100 mg Oral Daily    metoprolol succinate  25 mg Oral Daily    nicotine  1 patch TransDERmal Daily    pantoprazole  40 mg Oral QAM AC    QUEtiapine  400 mg Oral Nightly    predniSONE  40 mg Oral Daily     Continuous Infusions:    sodium chloride 10 mL/hr at 02/16/23 0844     PRN Meds: calcium carbonate, oxyCODONE-acetaminophen, perflutren lipid microspheres, albuterol, sodium chloride flush, sodium chloride flush, sodium chloride, polyethylene glycol, ondansetron **OR** ondansetron, acetaminophen **OR** acetaminophen, magnesium sulfate, potassium chloride **OR** potassium alternative oral replacement **OR** potassium chloride    Data:     Past Medical History:   has a past medical history of Back pain, Bipolar 1 disorder ([facility] Cancer ([facility] COPD (chronic obstructive pulmonary disease) ([facility] GERD (gastroesophageal reflux disease), and Heart attack ([facility] Social History:   reports that he has been smoking cigarettes and pipe. He has never used smokeless tobacco. He reports that he does not drink alcohol and does not use drugs. Family History:   Family History   Problem Relation Age of Onset    Cancer Father         Lung    Stroke Father     Cancer Brother         Lymphoma    Mental Illness Other         Aunt       Vitals:  BP (!) 134/99   Pulse 60   Temp 98.6 °F (37 °C) (Oral)   Resp 16   Ht 5' 10\" (1.778 m)   Wt 195 lb (88.5 kg)   SpO2 96%   BMI 27.98 kg/m²   Temp (24hrs), Av.2 °F (36.8 °C), Min:97.5 °F (36.4 °C), Max:98.6 °F (37 °C)    No results for input(s): POCGLU in the last 72 hours. I/O (24Hr):   No intake or output data in the 24 hours ending 23 1212      Labs:  Hematology:  Recent Labs     23  0438   WBC 6.6   RBC 3.94*   HGB 12.3*   HCT 40.5*   .8   MCH 31.2   MCHC 30.4   RDW 15.4*      MPV 8.9     Chemistry:  Recent Labs     23  0539 23  0438   * 141   K 3.8 4.4    102   CO2 35* 33*   GLUCOSE 124* 90   BUN 17 16   CREATININE 0.88 0.79   ANIONGAP 6* 6*   LABGLOM >60 >60   CALCIUM 8.7 8.8   PROBNP 2,715*  --    No results for input(s): PROT, LABALBU, LABA1C, T9XLQQF, H7EKGLP, FT4, TSH, AST, ALT, LDH, GGT, ALKPHOS, LABGGT, BILITOT, BILIDIR, AMMONIA, AMYLASE, LIPASE, LACTATE, CHOL, HDL, LDLCHOLESTEROL, CHOLHDLRATIO, TRIG, VLDL, BPJ41BA, PHENYTOIN, PHENYF, URICACID, POCGLU in the last 72 hours. ABG:No results found for: POCPH, PHART, PH, POCPCO2, MCZ3UQV, PCO2, POCPO2, PO2ART, PO2, POCHCO3, CMF6JHV, HCO3, NBEA, PBEA, BEART, BE, THGBART, THB, OOT6IYK, XBAC2SMA, I3PQWRKN, O2SAT, FIO2  Lab Results   Component Value Date/Time    SPECIAL L ARM 02/15/2023 01:44 PM     Lab Results   Component Value Date/Time    CULTURE NO GROWTH 5 DAYS 02/15/2023 01:44 PM       Radiology:  XR CHEST PORTABLE    Result Date: 2/16/2023  Interval volume loss and airspace opacity at the right base, suggesting atelectasis. There may also be some component of pleural fluid. XR CHEST PORTABLE    Result Date: 2/15/2023  Chronic pulmonary change without acute cardiopulmonary process. CT CHEST PULMONARY EMBOLISM W CONTRAST    Result Date: 2/15/2023  1. Tiny distal pulmonary emboli in the posterior basal left lower lobe segmental pulmonary artery. 2. Mild dependent atelectasis and respiratory motion. Mild-to-moderate emphysema. Stable known 9 mm lingular pulmonary nodule, unchanged from 1/14/2023. Please see workup recommendations on prior CT chest report of 1/14/2023. 3. Mucoid secretions and/or aspirated material in the trachea and mainstem bronchi. 4. Small pericardial effusion. 5. Small right-sided pleural effusion. 6. Coronary artery disease. 7. Atheromatous plaque and atherosclerotic calcification of the aorta and branch vasculature. 8. Dilated, patulous esophagus with wall thickening of the distal esophagus and GE junction. Small to moderate hiatal hernia. Consider further evaluation with palpation, EGD and/or esophagram/upper GI series. 9. Cholelithiasis. 10. Fatty liver. Critical results were called by Dr. Janet Levine. Joshua Huber MD to GLORIA Styles on 2/15/2023 at 12:38 p.m. by telephone.      Ekg rbbb      Physical Examination:        General appearance:  alert, cooperative and no distress  Mental Status:  oriented to person, place and time and normal affect  Lungs: wheezes bilaterally, normal effort  Heart:  regular rate and rhythm, no murmur  Abdomen:  soft, nontender, nondistended, normal bowel sounds, no masses, hepatomegaly, splenomegaly  Extremities:  no edema, redness, tenderness in the calves  Skin:  no gross lesions, rashes, induration    Assessment:        Hospital Problems             Last Modified POA    * (Principal) Pulmonary embolism on left ([address] 2/15/2023 Yes    Acute respiratory failure with hypoxia ([address] 2/16/2023 Yes    COPD exacerbation ([address] 2/15/2023 Yes    Tobacco abuse 2/15/2023 Yes    Essential (primary) hypertension 2/15/2023 Yes    SUNITHA (acute kidney injury) ([address] 2/15/2023 Yes    Acute aspiration pneumonia ([address] 2/15/2023 Yes    Hypokalemia 2/15/2023 Yes    Primary parkinsonism ([address] 2/17/2023 Yes    Right leg DVT ([address] 2/17/2023 Yes    Acute diastolic CHF (congestive heart failure) ([address] 2/19/2023 Yes    Pleural effusion right side 2/19/2023 Yes       Plan:        Cont augmentin for the aspiration pneumonia concern-just needs 2 more doses to complete the course  Cont eliquis fot vte  On steroids for copd exac  Smoking cessation  Awaiting p2p for gabrielle Richards, DO  2/21/2023  12:12 PM

## 2023-02-21 NOTE — RT PROTOCOL NOTE
RT Nebulizer Bronchodilator Protocol Note    There is a bronchodilator order in the chart from a provider indicating to follow the RT Bronchodilator Protocol and there is an Initiate RT Bronchodilator Protocol order as well (see protocol at bottom of note). CXR Findings:  XR CHEST PORTABLE    Result Date: 2/20/2023  Right basilar effusion and left basilar infiltrates. XR CHEST PORTABLE    Result Date: 2/19/2023  1. Stable right pleural effusion and lung base consolidation. This may represent aspiration pneumonitis given the CT identified mild fluid distended esophagus. 2. COPD. The findings from the last RT Protocol Assessment were as follows:  Smoking: Chronic pulmonary disease  Respiratory Pattern: Dyspnea on exertion or RR 21-25 bpm  Breath Sounds: Slightly diminished and/or crackles  Cough: Strong, spontaneous, non-productive  Indication for Bronchodilator Therapy: Decreased or absent breath sounds  Bronchodilator Assessment Score: 6    Aerosolized bronchodilator medication orders have been revised according to the RT Nebulizer Bronchodilator Protocol below. Respiratory Therapist to perform RT Therapy Protocol Assessment initially then follow the protocol. Repeat RT Therapy Protocol Assessment PRN for score 0-3 or on second treatment, BID, and PRN for scores above 3. No Indications - adjust the frequency to every 6 hours PRN wheezing or bronchospasm, if no treatments needed after 48 hours then discontinue using Per Protocol order mode. If indication present, adjust the RT bronchodilator orders based on the Bronchodilator Assessment Score as indicated below. If a patient is on this medication at home then do not decrease Frequency below that used at home. 0-3 - enter or revise RT bronchodilator order(s) to equivalent RT Bronchodilator order with Frequency of every 4 hours PRN for wheezing or increased work of breathing using Per Protocol order mode.        4-6 - enter or revise RT Bronchodilator order(s) to two equivalent RT bronchodilator orders with one order with BID Frequency and one order with Frequency of every 4 hours PRN wheezing or increased work of breathing using Per Protocol order mode. 7-10 - enter or revise RT Bronchodilator order(s) to two equivalent RT bronchodilator orders with one order with TID Frequency and one order with Frequency of every 4 hours PRN wheezing or increased work of breathing using Per Protocol order mode. 11-13 - enter or revise RT Bronchodilator order(s) to one equivalent RT bronchodilator order with QID Frequency and an Albuterol order with Frequency of every 4 hours PRN wheezing or increased work of breathing using Per Protocol order mode. Greater than 13 - enter or revise RT Bronchodilator order(s) to one equivalent RT bronchodilator order with every 4 hours Frequency and an Albuterol order with Frequency of every 2 hours PRN wheezing or increased work of breathing using Per Protocol order mode. RT to enter RT Home Evaluation for COPD & MDI Assessment order using Per Protocol order mode.     Electronically signed by Natasha Jansen RCP on 2/20/2023 at 8:22 PM

## 2023-02-21 NOTE — PLAN OF CARE
Pt remains on 10L salter. No complaints overnight. Pt waiting on placement.   Problem: Safety - Adult  Goal: Free from fall injury  Outcome: Progressing     Problem: Respiratory - Adult  Goal: Achieves optimal ventilation and oxygenation  2/21/2023 0554 by Robina Cope RN  Outcome: Progressing     Problem: Discharge Planning  Goal: Discharge to home or other facility with appropriate resources  Outcome: Progressing

## 2023-02-21 NOTE — PLAN OF CARE
Problem: Respiratory - Adult  Goal: Achieves optimal ventilation and oxygenation  2/21/2023 1800 by Jony Loja RCP  Outcome: Progressing     Problem: Respiratory - Adult  Goal: Able to breathe comfortably  2/21/2023 1800 by Jony Loja RCP  Outcome: Progressing

## 2023-02-22 LAB
GLUCOSE BLD-MCNC: 126 MG/DL (ref 75–110)
TROPONIN I SERPL DL<=0.01 NG/ML-MCNC: 34 NG/L (ref 0–22)
TROPONIN I SERPL DL<=0.01 NG/ML-MCNC: 38 NG/L (ref 0–22)
TROPONIN I SERPL DL<=0.01 NG/ML-MCNC: 41 NG/L (ref 0–22)

## 2023-02-22 PROCEDURE — 6360000002 HC RX W HCPCS: Performed by: NURSE PRACTITIONER

## 2023-02-22 PROCEDURE — 93005 ELECTROCARDIOGRAM TRACING: CPT | Performed by: INTERNAL MEDICINE

## 2023-02-22 PROCEDURE — 82947 ASSAY GLUCOSE BLOOD QUANT: CPT

## 2023-02-22 PROCEDURE — 2580000003 HC RX 258: Performed by: PHYSICIAN ASSISTANT

## 2023-02-22 PROCEDURE — 2700000000 HC OXYGEN THERAPY PER DAY

## 2023-02-22 PROCEDURE — 6370000000 HC RX 637 (ALT 250 FOR IP): Performed by: NURSE PRACTITIONER

## 2023-02-22 PROCEDURE — 6370000000 HC RX 637 (ALT 250 FOR IP): Performed by: INTERNAL MEDICINE

## 2023-02-22 PROCEDURE — 2060000000 HC ICU INTERMEDIATE R&B

## 2023-02-22 PROCEDURE — 84484 ASSAY OF TROPONIN QUANT: CPT

## 2023-02-22 PROCEDURE — 99232 SBSQ HOSP IP/OBS MODERATE 35: CPT | Performed by: INTERNAL MEDICINE

## 2023-02-22 PROCEDURE — 94640 AIRWAY INHALATION TREATMENT: CPT

## 2023-02-22 PROCEDURE — 94761 N-INVAS EAR/PLS OXIMETRY MLT: CPT

## 2023-02-22 PROCEDURE — 97535 SELF CARE MNGMENT TRAINING: CPT

## 2023-02-22 PROCEDURE — 36415 COLL VENOUS BLD VENIPUNCTURE: CPT

## 2023-02-22 RX ADMIN — IPRATROPIUM BROMIDE AND ALBUTEROL SULFATE 1 AMPULE: 2.5; .5 SOLUTION RESPIRATORY (INHALATION) at 20:51

## 2023-02-22 RX ADMIN — IPRATROPIUM BROMIDE AND ALBUTEROL SULFATE 1 AMPULE: 2.5; .5 SOLUTION RESPIRATORY (INHALATION) at 08:10

## 2023-02-22 RX ADMIN — GUAIFENESIN 600 MG: 600 TABLET ORAL at 08:26

## 2023-02-22 RX ADMIN — ASPIRIN 81 MG CHEWABLE TABLET 81 MG: 81 TABLET CHEWABLE at 08:26

## 2023-02-22 RX ADMIN — ARFORMOTEROL TARTRATE 15 MCG: 15 SOLUTION RESPIRATORY (INHALATION) at 20:51

## 2023-02-22 RX ADMIN — GUAIFENESIN 600 MG: 600 TABLET ORAL at 20:02

## 2023-02-22 RX ADMIN — ARFORMOTEROL TARTRATE 15 MCG: 15 SOLUTION RESPIRATORY (INHALATION) at 08:11

## 2023-02-22 RX ADMIN — SODIUM CHLORIDE, PRESERVATIVE FREE 10 ML: 5 INJECTION INTRAVENOUS at 08:27

## 2023-02-22 RX ADMIN — PREDNISONE 40 MG: 20 TABLET ORAL at 08:26

## 2023-02-22 RX ADMIN — BUDESONIDE 500 MCG: 0.5 SUSPENSION RESPIRATORY (INHALATION) at 08:11

## 2023-02-22 RX ADMIN — AMOXICILLIN AND CLAVULANATE POTASSIUM 1 TABLET: 875; 125 TABLET, FILM COATED ORAL at 08:26

## 2023-02-22 RX ADMIN — APIXABAN 10 MG: 5 TABLET, FILM COATED ORAL at 08:26

## 2023-02-22 RX ADMIN — BUMETANIDE 1 MG: 1 TABLET ORAL at 08:25

## 2023-02-22 RX ADMIN — BUDESONIDE 500 MCG: 0.5 SUSPENSION RESPIRATORY (INHALATION) at 20:51

## 2023-02-22 RX ADMIN — METOPROLOL SUCCINATE 25 MG: 25 TABLET, EXTENDED RELEASE ORAL at 08:25

## 2023-02-22 RX ADMIN — SODIUM CHLORIDE, PRESERVATIVE FREE 10 ML: 5 INJECTION INTRAVENOUS at 20:02

## 2023-02-22 RX ADMIN — QUETIAPINE FUMARATE 400 MG: 200 TABLET ORAL at 20:01

## 2023-02-22 RX ADMIN — DIVALPROEX SODIUM 1000 MG: 500 TABLET, EXTENDED RELEASE ORAL at 20:02

## 2023-02-22 RX ADMIN — APIXABAN 10 MG: 5 TABLET, FILM COATED ORAL at 20:01

## 2023-02-22 RX ADMIN — PANTOPRAZOLE SODIUM 40 MG: 40 TABLET, DELAYED RELEASE ORAL at 06:01

## 2023-02-22 RX ADMIN — LAMOTRIGINE 100 MG: 100 TABLET ORAL at 08:26

## 2023-02-22 NOTE — PROGRESS NOTES
Comprehensive Nutrition Assessment    Type and Reason for Visit:  Reassess    Nutrition Recommendations/Plan:   Continue ADULT DIET; Regular; Low Fat/Low Chol/High Fiber/AWAIS; Low Sodium (2 gm)  Begin Ensure high calorie high protein oral supplement 2x/day  Monitor PO intake and weight     Malnutrition Assessment:  Malnutrition Status: Moderate malnutrition (02/22/23 1109)    Context:  Acute Illness     Findings of the 6 clinical characteristics of malnutrition:  Energy Intake:  75% or less of estimated energy requirements for 7 or more days  Weight Loss:  5% over 1 month     Body Fat Loss:  Unable to assess     Muscle Mass Loss:  Unable to assess    Fluid Accumulation:  No significant fluid accumulation     Strength:  Not Performed    Nutrition Assessment:    Patient presented with COPD exacerbation, pulmonary embolism, and pneumonia with medical history of bowel cancer, GERD, and heart attack. Patient stated he has not had any unplanned weight loss with usual body weight of 195#, but weight history shows a usual body weight of around 205 per bed scale and statement by patient showing potential 5% weight loss in 1 month. Patient has been eating only 50% of meals due to mix of NPO, clear liquid diet, and lack of appetite and diarrhea since admission so weight loss is most likely. He would like to try chocolate Ensure high energy high protein 2x day. Continue current diet and start high energy high protein oral supplement 2x daily. Monitor PO intake and weight. Nutrition Related Findings:    active bowels; no edema Wound Type: None       Current Nutrition Intake & Therapies:    Average Meal Intake: 26-50%  Average Supplements Intake: None Ordered  ADULT DIET; Regular; Low Fat/Low Chol/High Fiber/AWAIS; Low Sodium (2 gm)    Anthropometric Measures:  Height: 5' 10\" (177.8 cm)  Ideal Body Weight (IBW): 166 lbs (75 kg)    Admission Body Weight: 199 lb (90.3 kg)  Current Body Weight: 195 lb (88.5 kg), 117.5 % IBW. Weight Source: Standing Scale  Current BMI (kg/m2): 28  Usual Body Weight: 205 lb (93 kg)  % Weight Change (Calculated): -4.9  Weight Adjustment For: No Adjustment                 BMI Categories: Overweight (BMI 25.0-29. 9)    Estimated Daily Nutrient Needs:  Energy Requirements Based On: Kcal/kg     Energy (kcal/day): 1770 -  2211 kcal/day (20-25kcal/kg)  Weight Used for Protein Requirements: Ideal  Protein (g/day): 105 - 128 g/day (1.4-1.7 g/kg)       Nutrition Diagnosis:   Moderate malnutrition related to inadequate protein-energy intake as evidenced by intake 26-50%, weight loss greater than or equal to 5% in 1 month    Nutrition Interventions:   Food and/or Nutrient Delivery: Continue Current Diet, Start Oral Nutrition Supplement  Nutrition Education/Counseling: No recommendation at this time  Coordination of Nutrition Care: Continue to monitor while inpatient       Goals:     Goals: Meet at least 75% of estimated needs, within 2 days       Nutrition Monitoring and Evaluation:   Behavioral-Environmental Outcomes: None Identified  Food/Nutrient Intake Outcomes: Supplement Intake, Food and Nutrient Intake  Physical Signs/Symptoms Outcomes: Weight, Diarrhea    Discharge Planning:    Continue current diet       Armando Rose, Dietetic Intern      Reviewed and approved by Jigar CLINE, RDN, LDN  Lead Clinical Dietitian  RD Office Phone [phone]

## 2023-02-22 NOTE — RT PROTOCOL NOTE
RT Nebulizer Bronchodilator Protocol Note    There is a bronchodilator order in the chart from a provider indicating to follow the RT Bronchodilator Protocol and there is an Initiate RT Bronchodilator Protocol order as well (see protocol at bottom of note). CXR Findings:  XR CHEST PORTABLE    Result Date: 2/20/2023  Right basilar effusion and left basilar infiltrates. The findings from the last RT Protocol Assessment were as follows:  Smoking: Chronic pulmonary disease  Respiratory Pattern: Dyspnea on exertion or RR 21-25 bpm  Breath Sounds: Slightly diminished and/or crackles  Cough: Strong, spontaneous, non-productive  Indication for Bronchodilator Therapy: Decreased or absent breath sounds  Bronchodilator Assessment Score: 6    Aerosolized bronchodilator medication orders have been revised according to the RT Nebulizer Bronchodilator Protocol below. Respiratory Therapist to perform RT Therapy Protocol Assessment initially then follow the protocol. Repeat RT Therapy Protocol Assessment PRN for score 0-3 or on second treatment, BID, and PRN for scores above 3. No Indications - adjust the frequency to every 6 hours PRN wheezing or bronchospasm, if no treatments needed after 48 hours then discontinue using Per Protocol order mode. If indication present, adjust the RT bronchodilator orders based on the Bronchodilator Assessment Score as indicated below. If a patient is on this medication at home then do not decrease Frequency below that used at home. 0-3 - enter or revise RT bronchodilator order(s) to equivalent RT Bronchodilator order with Frequency of every 4 hours PRN for wheezing or increased work of breathing using Per Protocol order mode. 4-6 - enter or revise RT Bronchodilator order(s) to two equivalent RT bronchodilator orders with one order with BID Frequency and one order with Frequency of every 4 hours PRN wheezing or increased work of breathing using Per Protocol order mode. 7-10 - enter or revise RT Bronchodilator order(s) to two equivalent RT bronchodilator orders with one order with TID Frequency and one order with Frequency of every 4 hours PRN wheezing or increased work of breathing using Per Protocol order mode. 11-13 - enter or revise RT Bronchodilator order(s) to one equivalent RT bronchodilator order with QID Frequency and an Albuterol order with Frequency of every 4 hours PRN wheezing or increased work of breathing using Per Protocol order mode. Greater than 13 - enter or revise RT Bronchodilator order(s) to one equivalent RT bronchodilator order with every 4 hours Frequency and an Albuterol order with Frequency of every 2 hours PRN wheezing or increased work of breathing using Per Protocol order mode. RT to enter RT Home Evaluation for COPD & MDI Assessment order using Per Protocol order mode.     Electronically signed by Lavern Palmer RCP on 2/21/2023 at 7:47 PM

## 2023-02-22 NOTE — PROGRESS NOTES
[facility]  Office: [address], DO, Viky Ana Maria, DO, Mac Lambert, DO, Ford Barboza Blood, DO, Cyril Chaudhary MD, Mack Dancer, MD, Gregg Mcdermott MD, Sina Green MD,  Eugene Brand MD, Frandy Hernandez MD, Ron Larson, DO, Aries Gomez MD,  Yaz Toure MD, Eugene Grande MD, Elliot Jurado, DO, Lillie Payan MD, Daisy Reyes MD, Kurtis Avery, DO, Primitivo Ham MD, Mya Cavazos MD, Blnaca Duff MD, William Vaughn MD, Valencia Khoury, DO, Travis Biswas MD, Zuleyka Lewis MD, Mayte Cortez, CNP,  Taniya Paz, CNP, Jigar Irby, CNP, Razia Bonner, CNP,  Blanche Niño, [facility], Luis Banks, CNP, Dayanna Pressley, CNP, Weston Somers, CNP, Jessica Jeong, CNP, Cachorro Bell, CNP, Osvaldo Collins PA-C, Sedrick Hernandez, CNS, Mari Henriquez, CNP, Unknown Pump, CNP         [facility]    Progress Note    2/22/2023    10:28 AM    Name:   Lashon Ramos  MRN:     [id number]     Acct:      [de-identified]   Room:   [address] [place] [address] Day:  7  Admit Date:  2/15/2023 10:00 AM    PCP:   Tamiko Lion MD  Code Status:  Full Code    Subjective:     C/C:   Chief Complaint   Patient presents with    Shortness of Breath     Pt was brought in via EMS for SOB that started this morning, Hx of COPD, Not on home O2     Interval History Status: not changed. Still feels some sob    Denies n/v    Does not use o2 at home    Had an episode of cp    Having diarrhea    Brief History:     Per my BRONSON:  Antoinette Maria is a 78 y.o. Non- / non  male who presents with Shortness of Breath (Pt was brought in via EMS for SOB that started this morning, Hx of COPD, Not on home O2)   and is admitted to the hospital for the management of Pulmonary embolism on left [facility]). Patient presented to the ER today with complaints of shortness of breath and wheezing.   He has this is a coughs a lot when he is eating and notices burning in his chest sometimes after he eats. Per vitals he was noted to be hypoxic and oxygen per nasal cannula was applied. CT chest was positive for tiny PE but no heart strain. There is also concern about possible aspiration. When I spoke to the patient he says he does have a history of coughing with meals and often has pain in his chest after eating. Patient was hospitalized 1/13 through 1/27/2023. At that time he was treated for COPD exacerbation and was positive for COVID. He received a full dose of Paxlovid while inpatient. According to the notes his oxygenation was labile but improved with diuresis. At the time of discharge he was given prednisone. Patient was encouraged to have a sleep study as an outpatient. \"    Review of Systems:     Constitutional:  negative for chills, fevers, sweats  Respiratory:  negative for cough, wheezing  Cardiovascular:  negative for lower extremity edema, palpitations  Gastrointestinal:  negative for abdominal pain, constipation, nausea, vomiting  Neurological:  negative for dizziness, headache    Medications:      Allergies:  No Known Allergies    Current Meds:   Scheduled Meds:    guaiFENesin  600 mg Oral BID    ipratropium-albuterol  1 ampule Inhalation BID    bumetanide  1 mg Oral Daily    amoxicillin-clavulanate  1 tablet Oral 2 times per day    apixaban  10 mg Oral BID    Followed by    Daria Gacria ON 2/24/2023] apixaban  5 mg Oral BID    sodium chloride flush  5-40 mL IntraVENous 2 times per day    arformoterol tartrate  15 mcg Nebulization BID    aspirin  81 mg Oral Daily    budesonide  0.5 mg Nebulization BID    divalproex  1,000 mg Oral Nightly    vitamin D  50,000 Units Oral Weekly    lamoTRIgine  100 mg Oral Daily    metoprolol succinate  25 mg Oral Daily    nicotine  1 patch TransDERmal Daily    pantoprazole  40 mg Oral QAM AC    QUEtiapine  400 mg Oral Nightly    predniSONE  40 mg Oral Daily     Continuous Infusions:    sodium chloride 10 mL/hr at 02/16/23 0844     PRN Meds: calcium carbonate, oxyCODONE-acetaminophen, perflutren lipid microspheres, albuterol, sodium chloride flush, sodium chloride flush, sodium chloride, polyethylene glycol, ondansetron **OR** ondansetron, acetaminophen **OR** acetaminophen, magnesium sulfate, potassium chloride **OR** potassium alternative oral replacement **OR** potassium chloride    Data:     Past Medical History:   has a past medical history of Back pain, Bipolar 1 disorder ([facility] Cancer ([facility] COPD (chronic obstructive pulmonary disease) ([facility] GERD (gastroesophageal reflux disease), and Heart attack ([facility] Social History:   reports that he has been smoking cigarettes and pipe. He has never used smokeless tobacco. He reports that he does not drink alcohol and does not use drugs. Family History:   Family History   Problem Relation Age of Onset    Cancer Father         Lung    Stroke Father     Cancer Brother         Lymphoma    Mental Illness Other         Aunt       Vitals:  /67   Pulse 74   Temp 97.7 °F (36.5 °C) (Oral)   Resp 20   Ht 5' 10\" (1.778 m)   Wt 195 lb (88.5 kg)   SpO2 97%   BMI 27.98 kg/m²   Temp (24hrs), Av °F (36.7 °C), Min:97.5 °F (36.4 °C), Max:98.6 °F (37 °C)    Recent Labs     23  0912   POCGLU 126*       I/O (24Hr):     Intake/Output Summary (Last 24 hours) at 2023 1028  Last data filed at 2023 0748  Gross per 24 hour   Intake --   Output 200 ml   Net -200 ml         Labs:  Hematology:  Recent Labs     23  0438   WBC 6.6   RBC 3.94*   HGB 12.3*   HCT 40.5*   .8   MCH 31.2   MCHC 30.4   RDW 15.4*      MPV 8.9     Chemistry:  Recent Labs     23  043      K 4.4      CO2 33*   GLUCOSE 90   BUN 16   CREATININE 0.79   ANIONGAP 6*   LABGLOM >60   CALCIUM 8.8     Recent Labs     23  0912   POCGLU 126*     ABG:No results found for: POCPH, PHART, PH, POCPCO2, ZTM5NMT, PCO2, POCPO2, PO2ART, PO2, POCHCO3, ETA5SCN, HCO3, NBEA, PBEA, BEART, BE, THGBART, THB, RFC4LKK, STHH2VKH, B3YTQHCK, O2SAT, FIO2  Lab Results   Component Value Date/Time    SPECIAL L ARM 02/15/2023 01:44 PM     Lab Results   Component Value Date/Time    CULTURE NO GROWTH 5 DAYS 02/15/2023 01:44 PM       Radiology:  XR CHEST PORTABLE    Result Date: 2/16/2023  Interval volume loss and airspace opacity at the right base, suggesting atelectasis. There may also be some component of pleural fluid. XR CHEST PORTABLE    Result Date: 2/15/2023  Chronic pulmonary change without acute cardiopulmonary process. CT CHEST PULMONARY EMBOLISM W CONTRAST    Result Date: 2/15/2023  1. Tiny distal pulmonary emboli in the posterior basal left lower lobe segmental pulmonary artery. 2. Mild dependent atelectasis and respiratory motion. Mild-to-moderate emphysema. Stable known 9 mm lingular pulmonary nodule, unchanged from 1/14/2023. Please see workup recommendations on prior CT chest report of 1/14/2023. 3. Mucoid secretions and/or aspirated material in the trachea and mainstem bronchi. 4. Small pericardial effusion. 5. Small right-sided pleural effusion. 6. Coronary artery disease. 7. Atheromatous plaque and atherosclerotic calcification of the aorta and branch vasculature. 8. Dilated, patulous esophagus with wall thickening of the distal esophagus and GE junction. Small to moderate hiatal hernia. Consider further evaluation with palpation, EGD and/or esophagram/upper GI series. 9. Cholelithiasis. 10. Fatty liver. Critical results were called by Dr. Janet Levine. Joshua Huber MD to GLORIA Styles on 2/15/2023 at 12:38 p.m. by telephone. Echo 2/16:  Summary  Left ventricle is normal in size  Global left ventricular systolic function is normal  Estimated ejection fraction is 65 % . Aortic leaflet calcification with mild stenosis. Peak instantaneous gradient 30 mmHg and mean gradient 13 mmHg. Trivial tricuspid regurgitation. No pulmonary hypertension. Normal aortic root dimension.   Normal right ventricular size and function.       Ekg 2/22/23: nsr with bifascicular block-no change from last ekg      Physical Examination:        General appearance:  alert, cooperative and no distress  Mental Status:  oriented to person, place and time and normal affect  Lungs:  reduced wheezes bilaterally, normal effort  Heart:  regular rate and rhythm, no murmur  Abdomen:  soft, nontender, nondistended, normal bowel sounds, no masses, hepatomegaly, splenomegaly  Extremities:  no edema, redness, tenderness in the calves  Skin:  no gross lesions, rashes, induration    Assessment:        Hospital Problems             Last Modified POA    * (Principal) Pulmonary embolism on left ([address] 2/15/2023 Yes    Acute respiratory failure with hypoxia ([address] 2/16/2023 Yes    COPD exacerbation ([address] 2/15/2023 Yes    Tobacco abuse 2/15/2023 Yes    Essential (primary) hypertension 2/15/2023 Yes    SUNITHA (acute kidney injury) ([address] 2/15/2023 Yes    Acute aspiration pneumonia ([address] 2/15/2023 Yes    Hypokalemia 2/15/2023 Yes    Primary parkinsonism ([address] 2/17/2023 Yes    Right leg DVT ([address] 2/17/2023 Yes    Acute diastolic CHF (congestive heart failure) ([address] 2/19/2023 Yes    Pleural effusion right side 2/19/2023 Yes       Plan:        stop augmentin for the aspiration pneumonia concern-just completed course  Check serial trops to eval this am cp/sob-?vagal event  Cont eliquis fot vte  On steroids for copd exac  Smoking cessation  Awaiting appeal for ltach; p2p was denied  Old echo reviewed    [address] Blood, DO  2/22/2023  10:28 AM

## 2023-02-22 NOTE — PROGRESS NOTES
Pulmonary Critical Care Progress Note  Jaja Marquez MD     Patient seen for the follow up of  Pulmonary embolism on left [facility])     Subjective:  Patient had uneventful night. He remains on high flow oxygen via salter, currently at 6 L. No significant overnight events noted. His shortness of breath is improving. He has occasional loose cough, difficult to produce sputum. He denies chest pain. He is tolerating orals. Examination:  Vitals: /67   Pulse 74   Temp 97.7 °F (36.5 °C) (Oral)   Resp 20   Ht 5' 10\" (1.778 m)   Wt 195 lb (88.5 kg)   SpO2 97%   BMI 27.98 kg/m²   General appearance: alert and cooperative with exam  Neck: No JVD  Lungs: Decreased air exchange, no crackles or wheezing  Heart: regular rate and rhythm, S1, S2 normal, no gallop  Abdomen: Soft, non tender, + BS  Extremities: no cyanosis or clubbing. No significant edema    LABs:  CBC:   Recent Labs     02/20/23  0438   WBC 6.6   HGB 12.3*   HCT 40.5*        BMP:   Recent Labs     02/20/23  0438      K 4.4   CO2 33*   BUN 16   CREATININE 0.79   LABGLOM >60   GLUCOSE 90     Radiology:  X-ray chest 2/20/2023      CTA 2/15/2023      Impression:  Acute hypoxic respiratory failure  Acute PE, tiny distal posterior basilar left lower lobe segmental pulmonary artery  Acute right leg DVT of femoral artery  Acute exacerbation of COPD/emphysema  Small right pleural effusion/atelectasis  Retained secretions in trachea and mainstem bronchi,?   Aspiration  30-pack-year smoking history  Indeterminate 9 mm lingular nodule  Possible obstructive sleep apnea  Recent history of COVID-19  Dilated patulous esophagus with wall thickening/small to moderate hiatal hernia    Recommendations:  Oxygen via high flow nasal cannula, wean as able to keep SPO2 90% or greater  BiPAP support while asleep if necessary  Incentive spirometry every hour while awake   Acapella  Mucinex  Continue Augmentin, off Unasyn   Prednisone 30 mg daily, taper by 10 mg every 4 days to stop  Diuresis with oral Bumex  Continue budesonide and Brovana via nebulizer twice daily  Albuterol and Ipratropium twice daily and as needed  Speech therapy evaluation noted, patient okay for normal diet  DVT prophylaxis, Eliquis  GI prophylaxis, Protonix  PT/OT  Discharge planning to LTAC, awaiting appeal vs rehab  Will follow with you    Electronically signed by     Mayur Doran MD on 2/22/2023 at 3:47 PM  Pulmonary Critical Care and Sleep Medicine,  [facility]  Cell: [phone]  Office: [phone]

## 2023-02-22 NOTE — PLAN OF CARE
Pt down to 7L salter. Expedited appeal pending to [facility].    Problem: Respiratory - Adult  Goal: Achieves optimal ventilation and oxygenation  2/21/2023 2327 by Nehal Spencer RN  Outcome: Progressing     Problem: Safety - Adult  Goal: Free from fall injury  2/21/2023 2327 by Nehal Spencer RN  Outcome: Progressing     Problem: Discharge Planning  Goal: Discharge to home or other facility with appropriate resources  Outcome: Progressing

## 2023-02-22 NOTE — PLAN OF CARE
Problem: Respiratory - Adult  Goal: Achieves optimal ventilation and oxygenation  2/22/2023 0815 by Sarahi Green RCP  Outcome: Progressing     Problem: Respiratory - Adult  Goal: Able to breathe comfortably  Outcome: Progressing

## 2023-02-22 NOTE — PROGRESS NOTES
Occupational Therapy  Facility/Department: [facility] CARE  Rehabilitation Occupational Therapy Daily Treatment Note    Date: 23  Patient Name: Ryan Mejia       Room: 1400/0252-00  MRN: [id number]  Account: [de-identified]   : 1945  (78 y.o.) Gender: male           Pt currently functioning below baseline. Recommend daily inpatient skilled therapy at time of discharge to maximize long term outcomes and prevent re-admission. Please refer to AM-PAC score for current level of function. Past Medical History:  has a past medical history of Back pain, Bipolar 1 disorder ([facility] Cancer ([facility] COPD (chronic obstructive pulmonary disease) ([facility] GERD (gastroesophageal reflux disease), and Heart attack ([facility] Past Surgical History:   has a past surgical history that includes Anus surgery; hernia repair; and Colonoscopy (N/A, 2018). Restrictions  Restrictions/Precautions: General Precautions; Fall Risk;Up as Tolerated  Other position/activity restrictions: Up w/ assist, 8L O2 NC, telemetry, continuous pulse ox, LUE IV, RUE IV  Required Braces or Orthoses?: No    Subjective  Subjective: Pt in bed upon arrival and agreeable to therapy. Upon pt sitting up on EOB writer noticed pt's bed and brief were soiled with BM and pt stated he was unaware. Restrictions/Precautions: General Precautions; Fall Risk;Up as Tolerated             Objective     Cognition  Overall Cognitive Status: Exceptions  Arousal/Alertness: Appropriate responses to stimuli  Following Commands: Follows one step commands with increased time; Follows one step commands with repetition  Attention Span: Appears intact; Attends with cues to redirect  Memory: Decreased short term memory;Decreased recall of recent events  Safety Judgement: Decreased awareness of need for safety;Decreased awareness of need for assistance  Problem Solving: Decreased awareness of errors;Assistance required to identify errors made;Assistance required to correct errors made;Assistance required to implement solutions;Assistance required to generate solutions  Insights: Decreased awareness of deficits  Initiation: Requires cues for all  Sequencing: Requires cues for all  Orientation  Overall Orientation Status: Within Functional Limits   Perception  Overall Perceptual Status: Impaired  Initiation: Cues to initiate tasks     ADL  Upper Extremity Dressing  Assistance Level: Set-up; Verbal cues; Minimal assistance  Skilled Clinical Factors: to change gowns  Toileting  Assistance Level: Dependent  Skilled Clinical Factors: standing with walker to complete hygiene after BM and brief change. pt c/o chest pain while on toilet, RN aware and addressing, pt returned to bed. Toilet Transfers  Technique: Stand step  Equipment: Standard toilet;Grab bars  Additional Factors: Verbal cues;Cues for hand placement  Assistance Level: Stand by assist;Contact guard assist          Functional Mobility  Device: Rolling walker  Activity: To/From bathroom  Assistance Level: Contact guard assist  Skilled Clinical Factors: VCs for safety with walker and line awareness/assist  Bed Mobility  Overall Assistance Level: Stand By Assist  Additional Factors: Verbal cues; Head of bed flat  Sit to Supine  Assistance Level: Stand by assist  Transfers  Surface: From bed;Standard toilet; To bed  Additional Factors: Verbal cues; Hand placement cues; Increased time to complete  Device: Walker  Sit to Stand  Assistance Level: Contact guard assist  Stand to Sit  Assistance Level: Contact guard assist  Skilled Clinical Factors: VCs for hand placement, walker safety/mgmt, backing all the way up and reaching back controlling stand to sit and overall safety. Writer managed all lines.    Neuromuscular Education  Neuromuscular education: Yes  NDT Treatment: Gait ;Sitting;Standing     Assessment  Assessment  Assessment: Pt tolerated session well but remains limited by cognitive deficits, decreased activity tolerance and global weakness. Pt req'd A LOT of assist with UB/LB care and SBA/CGA with bed mobility and transfers/mobility using RW. Pt has poor awareness of lines. Pt needs encouragement to complete self care tasks daily as pt has an unkempt appearance and is unaware when brief is soiled. Pt c/o chest pains during toileting task, RN aware and present at writer's exit. Skilled OT warranted to promote I/safety to return pt to prior living arrangment with assist as needed. Activity Tolerance: Treatment limited secondary to medical complications;Treatment limited secondary to decreased cognition  Discharge Recommendations: Patient would benefit from continued therapy after discharge  Safety Devices  Safety Devices in place: Yes  Type of devices: Left in bed;Nurse notified; Patient at risk for falls (RN and PCT present at writer's exit)    Patient Education  Education  Education Given To: Patient  Education Provided: Mobility Training; Fall Prevention Strategies;Transfer Training;Cognition; Safety;Equipment  Education Method: Verbal  Barriers to Learning: Cognition  Education Outcome: Verbalized understanding;Demonstrated understanding;Continued education needed    Plan  Occupational Therapy Plan  Times Per Week: 4-5x/wk 1x/day as ilan  Current Treatment Recommendations: Strengthening;Balance training;Functional mobility training; Endurance training; Safety education & training;Equipment evaluation, education, & procurement;Patient/Caregiver education & training;Self-Care / ADL; Home management training    Goals  Patient Goals   Patient goals : To go home! Short Term Goals  Time Frame for Short Term Goals: By discharge, pt to demo  Short Term Goal 1: ADL transfers and functional mobility to SBA with use of AD as needed. Short Term Goal 2: bed mobility to Mod I with use of bedrails as needed. Short Term Goal 3: UB ADLs to Set up and LB ADLs to SBA with use of AD as needed.   Short Term Goal 4: increased B UE strength by 1/2 grade to assist with functional tasks/I with B UE HEP with use of handouts as needed. Short Term Goal 5: toileting to SBA with use of AD/grab bars as needed. Long Term Goals  Long Term Goal 1: Pt to be I with fall prevention education, EC/WS tech, disease specific education, recommendations for discharge/AE with use of handouts as needed.     AM-PAC Score        AM-PAC Inpatient Daily Activity Raw Score: 12 (02/22/23 0913)  AM-PAC Inpatient ADL T-Scale Score : 30.6 (02/22/23 0913)  ADL Inpatient CMS 0-100% Score: 66.57 (02/22/23 0913)  ADL Inpatient CMS G-Code Modifier : CL (02/22/23 3363)      Therapy Time   Individual Concurrent Group Co-treatment   Time In 0900         Time Out 0912         Minutes 27 COLIN Griggs

## 2023-02-22 NOTE — PROGRESS NOTES
Pt family is requesting that the attending caring for pt explains to him that traveling would not be in his best interest at this time.

## 2023-02-22 NOTE — PROGRESS NOTES
Physical Therapy  DATE: 2023    NAME: Yonis Yang  MRN: [id number]   : 1945    Patient not seen this date for Physical Therapy due to:      [] Cancel by RN or physician due to:    [] Hemodialysis    [] Critical Lab Value Level     [] Blood transfusion in progress    [] Acute or unstable cardiovascular status   _MAP < 55 or more than >115  _HR < 40 or > 130    [] Acute or unstable pulmonary status   -FiO2 > 60%   _RR < 5 or >40    _O2 sats < 85%    [] Strict Bedrest    [] Off Unit for surgery or procedure    [] Off Unit for testing       [] Pending imaging to R/O fracture    [x] Refusal by Patient: Pt sleeping upon writer entry, however, easily awakened. Pt states he would like to defer PT services this afternoon as he had a busy morning. Will cont to follow. [] Other      [] PT being discontinued at this time. Patient independent. No further needs. [] PT being discontinued at this time as the patient has been transferred to hospice care. No further needs.       Doyle Gomez, PTA

## 2023-02-22 NOTE — RT PROTOCOL NOTE
RT Inhaler-Nebulizer Bronchodilator Protocol Note    There is a bronchodilator order in the chart from a provider indicating to follow the RT Bronchodilator Protocol and there is an Initiate RT Inhaler-Nebulizer Bronchodilator Protocol order as well (see protocol at bottom of note). CXR Findings:  No results found. The findings from the last RT Protocol Assessment were as follows:   History Pulmonary Disease: Chronic pulmonary disease  Respiratory Pattern: Dyspnea on exertion or RR 21-25 bpm  Breath Sounds: Slightly diminished and/or crackles  Cough: Strong, spontaneous, non-productive  Indication for Bronchodilator Therapy: Decreased or absent breath sounds  Bronchodilator Assessment Score: 6    Aerosolized bronchodilator medication orders have been revised according to the RT Inhaler-Nebulizer Bronchodilator Protocol below. Respiratory Therapist to perform RT Therapy Protocol Assessment initially then follow the protocol. Repeat RT Therapy Protocol Assessment PRN for score 0-3 or on second treatment, BID, and PRN for scores above 3. No Indications - adjust the frequency to every 6 hours PRN wheezing or bronchospasm, if no treatments needed after 48 hours then discontinue using Per Protocol order mode. If indication present, adjust the RT bronchodilator orders based on the Bronchodilator Assessment Score as indicated below. Use Inhaler orders unless patient has one or more of the following: on home nebulizer, not able to hold breath for 10 seconds, is not alert and oriented, cannot activate and use MDI correctly, or respiratory rate 25 breaths per minute or more, then use the equivalent nebulizer order(s) with same Frequency and PRN reasons based on the score. If a patient is on this medication at home then do not decrease Frequency below that used at home.     0-3 - enter or revise RT bronchodilator order(s) to equivalent RT Bronchodilator order with Frequency of every 4 hours PRN for wheezing or increased work of breathing using Per Protocol order mode. 4-6 - enter or revise RT Bronchodilator order(s) to two equivalent RT bronchodilator orders with one order with BID Frequency and one order with Frequency of every 4 hours PRN wheezing or increased work of breathing using Per Protocol order mode. 7-10 - enter or revise RT Bronchodilator order(s) to two equivalent RT bronchodilator orders with one order with TID Frequency and one order with Frequency of every 4 hours PRN wheezing or increased work of breathing using Per Protocol order mode. 11-13 - enter or revise RT Bronchodilator order(s) to one equivalent RT bronchodilator order with QID Frequency and an Albuterol order with Frequency of every 4 hours PRN wheezing or increased work of breathing using Per Protocol order mode. Greater than 13 - enter or revise RT Bronchodilator order(s) to one equivalent RT bronchodilator order with every 4 hours Frequency and an Albuterol order with Frequency of every 2 hours PRN wheezing or increased work of breathing using Per Protocol order mode. RT to enter RT Home Evaluation for COPD & MDI Assessment order using Per Protocol order mode.     Electronically signed by Beatriz Saleh RCP on 2/22/2023 at 8:15 AM

## 2023-02-22 NOTE — PLAN OF CARE
PT  was placed on the toilet for a large bowel movement when cardiac monitor started to show some irregularities, when staff entered the room he was diaphoretic and pale and then began to complain of chest pain and pain under his jaw. EKG and trops were ordered. See Chart for results. Pt is A&O X4, no skin issues noted outside of some scattered scabs and ecchymosis. Pt thinks the diarrhea he is experiencing may be related to all of the ice cream he has been eating. After said event the day has been uneventful and pt has been resting comfortably in bed, he denies pain and discomfort. Will continue to monitor pt and provide support and education. Bed is locked and in the lowest position with the call light in reach. Problem: Discharge Planning  Goal: Discharge to home or other facility with appropriate resources  Outcome: Progressing  Flowsheets (Taken 2/22/2023 0800)  Discharge to home or other facility with appropriate resources: Identify barriers to discharge with patient and caregiver     Problem: Pain  Goal: Verbalizes/displays adequate comfort level or baseline comfort level  Outcome: Progressing     Problem: Respiratory - Adult  Goal: Achieves optimal ventilation and oxygenation  2/22/2023 1639 by Dov Vance RN  Outcome: Progressing       Problem: Skin/Tissue Integrity  Goal: Absence of new skin breakdown  Description: 1. Monitor for areas of redness and/or skin breakdown  2. Assess vascular access sites hourly  3. Every 4-6 hours minimum:  Change oxygen saturation probe site  4. Every 4-6 hours:  If on nasal continuous positive airway pressure, respiratory therapy assess nares and determine need for appliance change or resting period.   Outcome: Progressing     Problem: Safety - Adult  Goal: Free from fall injury  Outcome: Progressing     Problem: ABCDS Injury Assessment  Goal: Absence of physical injury  Outcome: Progressing     Problem: Chronic Conditions and Co-morbidities  Goal: Patient's chronic conditions and co-morbidity symptoms are monitored and maintained or improved  Outcome: Progressing     Problem: Nutrition Deficit:  Goal: Optimize nutritional status  Outcome: Progressing

## 2023-02-22 NOTE — PROGRESS NOTES
Physician Progress Note      PATIENT:               Sheldon Coreas  CSN #:                  [id number]  :                       1945  ADMIT DATE:       2023 2:14 AM  DISCH DATE:        2023 6:01 PM  RESPONDING  PROVIDER #:        Reid Rutledge MD          QUERY TEXT:    Patient admitted with acute respiratory failure  to COVID-19 infection. Noted documentation of COVID-19 pneumonia in Cardio PN . In order to   support the diagnosis of COVID-19 pneumonia, please include additional   clinical indicators in your documentation. Or please document if the   diagnosis of COVID-19 pneumonia has been ruled out after further study. The medical record reflects the following:  Risk Factors: Current smoker with history of COPD. Clinical Indicators: ED Provider Note : He is not complaining of chest   pain or shortness of breath, but he was noted to be 88% on room air upon   arrival. The patient tested positive for COVID-19. He does not appear to have   pneumonia. H&P : In the ER patient was noted to be hypoxic, 88%, requiring   2 L oxygen. Cardio PN : COVID-19 pneumonia. Medicine PN : Patient   continues to have breathing difficulty and remains on supplemental oxygen. He   has dry cough. Medicine PN : Patient will remain on breathing treatments,   steroids, antibiotics. Patient does have a p  Treatment: Paxlovid & Zithromax PO. Options provided:  -- COVID-19 pneumonia present as evidenced by, Please document evidence. -- COVID-19 pneumonia was ruled out  -- Other - I will add my own diagnosis  -- Disagree - Not applicable / Not valid  -- Disagree - Clinically unable to determine / Unknown  -- Refer to Clinical Documentation Reviewer    PROVIDER RESPONSE TEXT:    COVID-19 pneumonia was ruled out after study.     Query created by: Tana Giraldo on 2023 7:59 AM      Electronically signed by:  Reid Rutledge MD 2023 12:50 PM

## 2023-02-23 LAB
EKG ATRIAL RATE: 73 BPM
EKG P AXIS: 61 DEGREES
EKG P-R INTERVAL: 116 MS
EKG Q-T INTERVAL: 438 MS
EKG QRS DURATION: 134 MS
EKG QTC CALCULATION (BAZETT): 482 MS
EKG R AXIS: -71 DEGREES
EKG T AXIS: 47 DEGREES
EKG VENTRICULAR RATE: 73 BPM

## 2023-02-23 PROCEDURE — 2060000000 HC ICU INTERMEDIATE R&B

## 2023-02-23 PROCEDURE — 2700000000 HC OXYGEN THERAPY PER DAY

## 2023-02-23 PROCEDURE — 97116 GAIT TRAINING THERAPY: CPT

## 2023-02-23 PROCEDURE — 94761 N-INVAS EAR/PLS OXIMETRY MLT: CPT

## 2023-02-23 PROCEDURE — 6370000000 HC RX 637 (ALT 250 FOR IP): Performed by: INTERNAL MEDICINE

## 2023-02-23 PROCEDURE — 6370000000 HC RX 637 (ALT 250 FOR IP): Performed by: NURSE PRACTITIONER

## 2023-02-23 PROCEDURE — 2580000003 HC RX 258: Performed by: PHYSICIAN ASSISTANT

## 2023-02-23 PROCEDURE — 6360000002 HC RX W HCPCS: Performed by: NURSE PRACTITIONER

## 2023-02-23 PROCEDURE — 97129 THER IVNTJ 1ST 15 MIN: CPT

## 2023-02-23 PROCEDURE — 97535 SELF CARE MNGMENT TRAINING: CPT

## 2023-02-23 PROCEDURE — 99232 SBSQ HOSP IP/OBS MODERATE 35: CPT | Performed by: INTERNAL MEDICINE

## 2023-02-23 PROCEDURE — 94640 AIRWAY INHALATION TREATMENT: CPT

## 2023-02-23 PROCEDURE — 97530 THERAPEUTIC ACTIVITIES: CPT

## 2023-02-23 PROCEDURE — 97110 THERAPEUTIC EXERCISES: CPT

## 2023-02-23 RX ADMIN — PREDNISONE 30 MG: 20 TABLET ORAL at 09:15

## 2023-02-23 RX ADMIN — BUDESONIDE 500 MCG: 0.5 SUSPENSION RESPIRATORY (INHALATION) at 08:20

## 2023-02-23 RX ADMIN — LAMOTRIGINE 100 MG: 100 TABLET ORAL at 09:18

## 2023-02-23 RX ADMIN — APIXABAN 10 MG: 5 TABLET, FILM COATED ORAL at 09:16

## 2023-02-23 RX ADMIN — BUDESONIDE 500 MCG: 0.5 SUSPENSION RESPIRATORY (INHALATION) at 21:09

## 2023-02-23 RX ADMIN — ARFORMOTEROL TARTRATE 15 MCG: 15 SOLUTION RESPIRATORY (INHALATION) at 21:09

## 2023-02-23 RX ADMIN — ARFORMOTEROL TARTRATE 15 MCG: 15 SOLUTION RESPIRATORY (INHALATION) at 08:21

## 2023-02-23 RX ADMIN — APIXABAN 10 MG: 5 TABLET, FILM COATED ORAL at 19:55

## 2023-02-23 RX ADMIN — IPRATROPIUM BROMIDE AND ALBUTEROL SULFATE 1 AMPULE: 2.5; .5 SOLUTION RESPIRATORY (INHALATION) at 08:20

## 2023-02-23 RX ADMIN — GUAIFENESIN 600 MG: 600 TABLET ORAL at 09:18

## 2023-02-23 RX ADMIN — BUMETANIDE 1 MG: 1 TABLET ORAL at 09:14

## 2023-02-23 RX ADMIN — SODIUM CHLORIDE, PRESERVATIVE FREE 10 ML: 5 INJECTION INTRAVENOUS at 09:18

## 2023-02-23 RX ADMIN — ASPIRIN 81 MG CHEWABLE TABLET 81 MG: 81 TABLET CHEWABLE at 09:18

## 2023-02-23 RX ADMIN — PANTOPRAZOLE SODIUM 40 MG: 40 TABLET, DELAYED RELEASE ORAL at 05:52

## 2023-02-23 RX ADMIN — IPRATROPIUM BROMIDE AND ALBUTEROL SULFATE 1 AMPULE: 2.5; .5 SOLUTION RESPIRATORY (INHALATION) at 21:09

## 2023-02-23 RX ADMIN — METOPROLOL SUCCINATE 25 MG: 25 TABLET, EXTENDED RELEASE ORAL at 09:14

## 2023-02-23 RX ADMIN — DIVALPROEX SODIUM 1000 MG: 500 TABLET, EXTENDED RELEASE ORAL at 19:55

## 2023-02-23 RX ADMIN — QUETIAPINE FUMARATE 400 MG: 200 TABLET ORAL at 19:54

## 2023-02-23 RX ADMIN — GUAIFENESIN 600 MG: 600 TABLET ORAL at 19:55

## 2023-02-23 NOTE — PROGRESS NOTES
[facility]  Office: [address], DO, Filipe Shafer, DO, Dariela Ramsey, DO, Daisy Thurman Blood, DO, Jose De Jesus Izaguirre MD, Vida Olsen MD, August Whitmore MD, Heike Bunch MD,  Monica Ortiz MD, Edison Villarreal MD, Darcy Terry, DO, Abigail Mitchell MD,  Pricila Lam MD, Jose Mack MD, Diego Rose DO, Dyole Richardson MD, Mendez Thurman MD, Ruthann Horvath DO, Leonardo Rivera MD, Chelsea Pradhan MD, Sal Galan MD, Mirella Newman MD, Sri Sin, DO, Kourtney Magana MD, Joe Canales MD, Raul Downs, [facility],  Tess Waldron, CNP, Johny Saha, CNP, Ayaz Sanchez, CNP,  Franki Lundborg, [facility], Robin Hunt, CNP, David Major, CNP, Kaylie Parker, CNP, Fina Lozano, CNP, Caroline Moran, CNP, Skyla Owusu PA-C, Esteban Simmons, CNS, Valerie Astudillo, CNP, Toni Dunbar, [facility]    Progress Note    2/23/2023    11:07 AM    Name:   Huber Kelly  MRN:     [id number]     Acct:      [de-identified]   Room:   [address] [place] [address] Day:  8  Admit Date:  2/15/2023 10:00 AM    PCP:   Jing Ring MD  Code Status:  Full Code    Subjective:     C/C:   Chief Complaint   Patient presents with    Shortness of Breath     Pt was brought in via EMS for SOB that started this morning, Hx of COPD, Not on home O2     Interval History Status: improved. Sob better, now only on 3L nc and he wishes to go home    Denies n/v    Does not use o2 at home    No further cp    Diarrhea now gone    Brief History:     Per my BRONSON:  Mauricio Abebe is a 78 y.o. Non- / non  male who presents with Shortness of Breath (Pt was brought in via EMS for SOB that started this morning, Hx of COPD, Not on home O2)   and is admitted to the hospital for the management of Pulmonary embolism on left [facility]). Patient presented to the ER today with complaints of shortness of breath and wheezing.   He has this is a coughs a lot when he is eating and notices burning in his chest sometimes after he eats. Per vitals he was noted to be hypoxic and oxygen per nasal cannula was applied. CT chest was positive for tiny PE but no heart strain. There is also concern about possible aspiration. When I spoke to the patient he says he does have a history of coughing with meals and often has pain in his chest after eating. Patient was hospitalized 1/13 through 1/27/2023. At that time he was treated for COPD exacerbation and was positive for COVID. He received a full dose of Paxlovid while inpatient. According to the notes his oxygenation was labile but improved with diuresis. At the time of discharge he was given prednisone. Patient was encouraged to have a sleep study as an outpatient. \"    Review of Systems:     Constitutional:  negative for chills, fevers, sweats  Respiratory:  negative for cough, wheezing  Cardiovascular:  negative for lower extremity edema, palpitations  Gastrointestinal:  negative for abdominal pain, constipation, nausea, vomiting, diarrhea  Neurological:  negative for dizziness, headache    Medications:      Allergies:  No Known Allergies    Current Meds:   Scheduled Meds:    predniSONE  30 mg Oral Daily    guaiFENesin  600 mg Oral BID    ipratropium-albuterol  1 ampule Inhalation BID    bumetanide  1 mg Oral Daily    apixaban  10 mg Oral BID    Followed by    Evert Dewitt ON 2/24/2023] apixaban  5 mg Oral BID    sodium chloride flush  5-40 mL IntraVENous 2 times per day    arformoterol tartrate  15 mcg Nebulization BID    aspirin  81 mg Oral Daily    budesonide  0.5 mg Nebulization BID    divalproex  1,000 mg Oral Nightly    vitamin D  50,000 Units Oral Weekly    lamoTRIgine  100 mg Oral Daily    metoprolol succinate  25 mg Oral Daily    nicotine  1 patch TransDERmal Daily    pantoprazole  40 mg Oral QAM AC    QUEtiapine  400 mg Oral Nightly     Continuous Infusions:    sodium chloride 10 mL/hr at 02/16/23 0844     PRN Meds: calcium carbonate, oxyCODONE-acetaminophen, perflutren lipid microspheres, albuterol, sodium chloride flush, sodium chloride flush, sodium chloride, polyethylene glycol, ondansetron **OR** ondansetron, acetaminophen **OR** acetaminophen, magnesium sulfate, potassium chloride **OR** potassium alternative oral replacement **OR** potassium chloride    Data:     Past Medical History:   has a past medical history of Back pain, Bipolar 1 disorder ([facility] Cancer ([facility] COPD (chronic obstructive pulmonary disease) ([facility] GERD (gastroesophageal reflux disease), and Heart attack ([facility] Social History:   reports that he has been smoking cigarettes and pipe. He has never used smokeless tobacco. He reports that he does not drink alcohol and does not use drugs. Family History:   Family History   Problem Relation Age of Onset    Cancer Father         Lung    Stroke Father     Cancer Brother         Lymphoma    Mental Illness Other         Aunt       Vitals:  /71   Pulse 72   Temp 97.3 °F (36.3 °C) (Oral)   Resp 17   Ht 5' 10\" (1.778 m)   Wt 195 lb (88.5 kg)   SpO2 96%   BMI 27.98 kg/m²   Temp (24hrs), Av °F (36.7 °C), Min:97.3 °F (36.3 °C), Max:98.6 °F (37 °C)    Recent Labs     23  0912   POCGLU 126*       I/O (24Hr): Intake/Output Summary (Last 24 hours) at 2023 1107  Last data filed at 2023 0653  Gross per 24 hour   Intake --   Output 400 ml   Net -400 ml         Labs:  Hematology:  No results for input(s): WBC, RBC, HGB, HCT, MCV, MCH, MCHC, RDW, PLT, MPV, SEDRATE, CRP, INR, DDIMER, YS3LNTYQ, LABABSO in the last 72 hours.     Invalid input(s): PT    Chemistry:  Recent Labs     23  1043 23  1231 23  1524   TROPHS 41* 38* 34*     Recent Labs     23  0912   POCGLU 126*     ABG:No results found for: POCPH, PHART, PH, POCPCO2, JVK7MCU, PCO2, POCPO2, PO2ART, PO2, POCHCO3, JYR1HIB, HCO3, NBEA, PBEA, BEART, BE, THGBART, THB, LXQ4YJB, PXNC3UFY, B8WVRYRP, O2SAT, FIO2  Lab Results   Component Value Date/Time    SPECIAL L ARM 02/15/2023 01:44 PM     Lab Results   Component Value Date/Time    CULTURE NO GROWTH 5 DAYS 02/15/2023 01:44 PM       Radiology:  XR CHEST PORTABLE    Result Date: 2/16/2023  Interval volume loss and airspace opacity at the right base, suggesting atelectasis. There may also be some component of pleural fluid. XR CHEST PORTABLE    Result Date: 2/15/2023  Chronic pulmonary change without acute cardiopulmonary process. CT CHEST PULMONARY EMBOLISM W CONTRAST    Result Date: 2/15/2023  1. Tiny distal pulmonary emboli in the posterior basal left lower lobe segmental pulmonary artery. 2. Mild dependent atelectasis and respiratory motion. Mild-to-moderate emphysema. Stable known 9 mm lingular pulmonary nodule, unchanged from 1/14/2023. Please see workup recommendations on prior CT chest report of 1/14/2023. 3. Mucoid secretions and/or aspirated material in the trachea and mainstem bronchi. 4. Small pericardial effusion. 5. Small right-sided pleural effusion. 6. Coronary artery disease. 7. Atheromatous plaque and atherosclerotic calcification of the aorta and branch vasculature. 8. Dilated, patulous esophagus with wall thickening of the distal esophagus and GE junction. Small to moderate hiatal hernia. Consider further evaluation with palpation, EGD and/or esophagram/upper GI series. 9. Cholelithiasis. 10. Fatty liver. Critical results were called by Dr. Wayne Colby. Rhina Guy MD to GLORIA Pepper on 2/15/2023 at 12:38 p.m. by telephone. Echo 2/16:  Summary  Left ventricle is normal in size  Global left ventricular systolic function is normal  Estimated ejection fraction is 65 % . Aortic leaflet calcification with mild stenosis. Peak instantaneous gradient 30 mmHg and mean gradient 13 mmHg. Trivial tricuspid regurgitation. No pulmonary hypertension. Normal aortic root dimension. Normal right ventricular size and function.       Ekg 2/22/23: nsr with bifascicular block-no change from last ekg      Physical Examination:        General appearance:  alert, cooperative and no distress  Mental Status:  oriented to person, place and time and normal affect  Lungs:  reduced wheezes bilaterally, normal effort  Heart:  regular rate and rhythm, no murmur  Abdomen:  soft, nontender, nondistended, normal bowel sounds, no masses, hepatomegaly, splenomegaly  Extremities:  no edema, redness, tenderness in the calves  Skin:  no gross lesions, rashes, induration    Assessment:        Hospital Problems             Last Modified POA    * (Principal) Pulmonary embolism on left ([address] 2/15/2023 Yes    Acute respiratory failure with hypoxia ([address] 2/16/2023 Yes    COPD exacerbation ([address] 2/15/2023 Yes    Tobacco abuse 2/15/2023 Yes    Essential (primary) hypertension 2/15/2023 Yes    SUNITHA (acute kidney injury) ([address] 2/15/2023 Yes    Acute aspiration pneumonia ([address] 2/15/2023 Yes    Hypokalemia 2/15/2023 Yes    Primary parkinsonism ([address] 2/17/2023 Yes    Right leg DVT ([address] 2/17/2023 Yes    Acute diastolic CHF (congestive heart failure) ([address] 2/19/2023 Yes    Pleural effusion right side 2/19/2023 Yes       Plan:        Completed course of unasyn then augmentin for aspiration pneumonia  Cont eliquis fot vte  On steroids for copd exac  Smoking cessation  Awaiting appeal for ltach- p2p was denied; however his o2 has been weaned and he can now be discharged home vs snf with pulm rehab (recommended, but he wishes home)  Old echo reviewed  Home o2 eval  Discharge today if [facility] will accept him back    Ryan Richards, DO  2/23/2023  11:07 AM

## 2023-02-23 NOTE — CARE COORDINATION
Social Work-Spoke with Zaynab at [facility] ([phone]). Patient will need to be able to care for self before admission. Spoke with pt. He is agreeable to return to [address] [place] [address]. Pt will need precert.  Ashley Grande

## 2023-02-23 NOTE — PROGRESS NOTES
Physical Therapy  Facility/Department: [facility] PROGRESSIVE CARE  Rehabilitation Physical Therapy Treatment Note    NAME: Joan Hartley  : 1945 (86 y.o.)  MRN: [id number]  CODE STATUS: Full Code    Date of Service: 23  Assessment: Pt tolerated session well. Pt is pleasant and cooperative and requires occassional cues for movement initiation and safety awareness. Tolerated x2 laps around room and standing ex's with frequent rest breaks throughout. [facility] score of 19/24 for current level of functional mobility. Activity Tolerance: Treatment limited secondary to decreased cognition;Patient limited by endurance  Discharge Recommendations: Patient would benefit from continued therapy after discharge    Restrictions:  Restrictions/Precautions: General Precautions; Fall Risk;Up as Tolerated  Position Activity Restriction  Other position/activity restrictions: Up w/ assist, 8L O2 NC, telemetry, continuous pulse ox, LUE IV, RUE IV     SUBJECTIVE  Subjective  Subjective: Pt up in chair upon entry, agreeable with PT. Pain: Denies pain    OBJECTIVE  Cognition  Overall Cognitive Status: Exceptions  Arousal/Alertness: Appropriate responses to stimuli  Following Commands: Follows one step commands with increased time; Follows one step commands with repetition  Attention Span: Appears intact; Attends with cues to redirect  Memory: Decreased short term memory;Decreased recall of recent events  Safety Judgement: Decreased awareness of need for safety;Decreased awareness of need for assistance  Problem Solving: Decreased awareness of errors;Assistance required to identify errors made;Assistance required to correct errors made;Assistance required to implement solutions;Assistance required to generate solutions  Insights: Decreased awareness of deficits  Cognition Comment: Pt easy with work with, follows comands well  Orientation  Orientation Level: Oriented X4    Functional Mobility  Bed Mobility  Overall Assistance Level:  (did not assess, in chair upon entry and exit)  Transfers  Surface: From chair with arms;Standard toilet; To chair with arms  Additional Factors: Set-up; Verbal cues  Device: Walker (RW)  Sit to Stand  Assistance Level: Stand by assist  Stand to Sit  Assistance Level: Stand by assist  Skilled Clinical Factors: Fair eccentric quad control noted. VC's to square self to chair with RW and to reach back for increased safety    Environmental Mobility  Ambulation  Surface: Level surface  Device: Rolling walker  Distance: 30' x2  Activity: Within Room  Activity Comments: mild sob noted s/p amb; SpO2 >92% throughout  Additional Factors: Verbal cues; Set-up  Assistance Level: Contact guard assist  Gait Deviations: Narrow base of support;Decreased step length bilateral;Slow april  Skilled Clinical Factors: demos short, shuffled steps however moves at a steady pace. VC's for safety awareness with fair return demo. Assist with O2 line management. PT Exercises  Exercise Treatment: becomes easily fatigued with standing ex's, requires frequent seated rest breaks. Circulation/Endurance Exercises: ankle pumps  Functional Mobility Circuit Training: x6 STS with focus on safe hand placement  Dynamic Standing Balance Exercises: Standing marches x10 reps with RW for support  Breathing Techniques: Pursed lip breathing tech throughout  Motor Control/Coordination: Standing reaches OOBOS with RW for support 2 minutes    Goals  Patient Goals   Patient Goals :  To go home, less pain  Short Term Goals  Time Frame for Short Term Goals: 12 visits  Short Term Goal 1: Pt to demonstrate bed mobility independently  Short Term Goal 2: Pt to perform STS transfers w/ RW independently  Short Term Goal 3: Pt to ambulate at least 100ft w/ RW independently while maintaining SpO2 >92% throughout  Short Term Goal 4: Pt to actively participate in at least 30 minutes of physical therapy for ther act, ther ex, balance, gait, and endurance training    PLAN OF CARE/SAFETY  Physcial Therapy Plan  General Plan: 5-7 times per week  Current Treatment Recommendations: Strengthening;Balance training;Functional mobility training;Transfer training;Neuromuscular re-education;Gait training; Endurance training;Home exercise program;Equipment evaluation, education, & procurement;Patient/Caregiver education & training; Safety education & training; Therapeutic activities  Safety Devices  Type of Devices: Left in bed;Call light within reach; Chair alarm in place;Gait belt;Nurse notified    EDUCATION  Education  Education Given To: Patient  Education Provided: Safety;Transfer Training;Mobility Training;Role of Therapy  Education Provided Comments: [facility] pt on safety awareness and importance of being mindful of O2 line with gait  Education Method: Verbal;Teach Back  Education Outcome: Verbalized understanding    Therapy Time   Individual Concurrent Group Co-treatment   Time In  1139         Time Out 1217         Minutes [address], 02/23/23 at 12:47 PM

## 2023-02-23 NOTE — RT PROTOCOL NOTE
RT Inhaler-Nebulizer Bronchodilator Protocol Note    There is a bronchodilator order in the chart from a provider indicating to follow the RT Bronchodilator Protocol and there is an Initiate RT Inhaler-Nebulizer Bronchodilator Protocol order as well (see protocol at bottom of note). CXR Findings:  No results found. The findings from the last RT Protocol Assessment were as follows:   History Pulmonary Disease: Chronic pulmonary disease  Respiratory Pattern: Dyspnea on exertion or RR 21-25 bpm  Breath Sounds: Slightly diminished and/or crackles  Cough: Strong, spontaneous, non-productive  Indication for Bronchodilator Therapy: Decreased or absent breath sounds  Bronchodilator Assessment Score: 6    Aerosolized bronchodilator medication orders have been revised according to the RT Inhaler-Nebulizer Bronchodilator Protocol below. Respiratory Therapist to perform RT Therapy Protocol Assessment initially then follow the protocol. Repeat RT Therapy Protocol Assessment PRN for score 0-3 or on second treatment, BID, and PRN for scores above 3. No Indications - adjust the frequency to every 6 hours PRN wheezing or bronchospasm, if no treatments needed after 48 hours then discontinue using Per Protocol order mode. If indication present, adjust the RT bronchodilator orders based on the Bronchodilator Assessment Score as indicated below. Use Inhaler orders unless patient has one or more of the following: on home nebulizer, not able to hold breath for 10 seconds, is not alert and oriented, cannot activate and use MDI correctly, or respiratory rate 25 breaths per minute or more, then use the equivalent nebulizer order(s) with same Frequency and PRN reasons based on the score. If a patient is on this medication at home then do not decrease Frequency below that used at home.     0-3 - enter or revise RT bronchodilator order(s) to equivalent RT Bronchodilator order with Frequency of every 4 hours PRN for wheezing or increased work of breathing using Per Protocol order mode. 4-6 - enter or revise RT Bronchodilator order(s) to two equivalent RT bronchodilator orders with one order with BID Frequency and one order with Frequency of every 4 hours PRN wheezing or increased work of breathing using Per Protocol order mode. 7-10 - enter or revise RT Bronchodilator order(s) to two equivalent RT bronchodilator orders with one order with TID Frequency and one order with Frequency of every 4 hours PRN wheezing or increased work of breathing using Per Protocol order mode. 11-13 - enter or revise RT Bronchodilator order(s) to one equivalent RT bronchodilator order with QID Frequency and an Albuterol order with Frequency of every 4 hours PRN wheezing or increased work of breathing using Per Protocol order mode. Greater than 13 - enter or revise RT Bronchodilator order(s) to one equivalent RT bronchodilator order with every 4 hours Frequency and an Albuterol order with Frequency of every 2 hours PRN wheezing or increased work of breathing using Per Protocol order mode. RT to enter RT Home Evaluation for COPD & MDI Assessment order using Per Protocol order mode.     Electronically signed by Tonia Moreno RCP on 2/22/2023 at 8:58 PM

## 2023-02-23 NOTE — PLAN OF CARE
Problem: Respiratory - Adult  Goal: Achieves optimal ventilation and oxygenation  2/23/2023 0822 by Pippa Michele RCP  Outcome: Progressing  2/23/2023 0529 by Ashley Crenshaw RN  Outcome: Progressing  2/22/2023 2050 by Ino Curry RCP  Outcome: Progressing  Goal: Able to breathe comfortably  2/23/2023 5762 by Ashley Kwon RCP  Outcome: Progressing  2/22/2023 2050 by Ino Curry RCP  Outcome: Progressing

## 2023-02-23 NOTE — PLAN OF CARE
Problem: Respiratory - Adult  Goal: Achieves optimal ventilation and oxygenation  2/22/2023 2050 by Fannie Walls RCP  Outcome: Progressing     Problem: Respiratory - Adult  Goal: Able to breathe comfortably  2/22/2023 2050 by Fannie Walls RCP  Outcome: Progressing

## 2023-02-23 NOTE — DISCHARGE INSTR - COC
Continuity of Care Form    Patient Name: Milvia Shore   :  1945  MRN:  [id number]    Admit date:  2/15/2023  Discharge date:  2023    Code Status Order: Full Code   Advance Directives:     Admitting Physician:  Fito Richards DO  PCP: Maxwell Carrera MD    Discharging Nurse: [facility] Unit/Room#: 1008/1008-01  Discharging Unit Phone Number: [phone]    Emergency Contact:   Extended Emergency Contact Information  Primary Emergency Contact: Marcia Jara of [facility] Phone: [phone]  Relation: Child  Secondary Emergency Contact: [address]  Phone: [phone]  Relation:     Past Surgical History:  Past Surgical History:   Procedure Laterality Date    ANUS SURGERY      bowel surgery r/t cancer approx 4 years ago    COLONOSCOPY N/A 2018    COLONOSCOPY POLYPECTOMY HOT BIOPSY performed by Arnoldo Moreno DO at [address]      umbilical       Immunization History: There is no immunization history on file for this patient.     Active Problems:  Patient Active Problem List   Diagnosis Code    COVID-19 U07.1    Acute respiratory failure with hypoxia ([facility]) J96.01    COPD exacerbation ([facility]) J44.1    Tobacco abuse Z72.0    Essential (primary) hypertension I10    Bipolar 1 disorder ([facility]) F31.9    SUNITHA (acute kidney injury) ([address] N17.9    Acute aspiration pneumonia ([facility]) J69.0    Hypokalemia E87.6    Pulmonary embolism on left ([address] I26.99    Primary parkinsonism ([address] G20    Right leg DVT ([facility]) B56.710    Acute diastolic CHF (congestive heart failure) ([facility]) I50.31    Pleural effusion right side J90       Isolation/Infection:   Isolation            No Isolation          Patient Infection Status       Infection Onset Added Last Indicated Last Indicated By Review Planned Expiration Resolved Resolved By    None active    Resolved    COVID-19 (Rule Out) 02/15/23 02/15/23 02/15/23 COVID-19, Rapid (Ordered)   02/15/23 Rule-Out Test Resulted    COVID-19 23 COVID-19, Rapid   23     COVID-19 (Rule Out) 23 COVID-19, Rapid (Ordered)   23 Rule-Out Test Resulted            Nurse Assessment:  Last Vital Signs: /73   Pulse 86   Temp 97.3 °F (36.3 °C) (Oral)   Resp 18   Ht 5' 10\" (1.778 m)   Wt 195 lb (88.5 kg)   SpO2 96%   BMI 27.98 kg/m²     Last documented pain score (0-10 scale): Pain Level: 0  Last Weight:   Wt Readings from Last 1 Encounters:   23 195 lb (88.5 kg)     Mental Status:  alert and oriented    IV Access:  - None    Nursing Mobility/ADLs:  Walking   Assisted  Transfer  Assisted  Bathing  Assisted  Dressing  Assisted  Toileting  Assisted  Feeding  Independent  Med Admin  Assisted  Med Delivery   whole    Wound Care Documentation and Therapy:        Elimination:  Continence: Bowel: No  Bladder: Yes and incontinent at times  Urinary Catheter: None   Colostomy/Ileostomy/Ileal Conduit: No       Date of Last BM: 2023    Intake/Output Summary (Last 24 hours) at 2023 1318  Last data filed at 2023 8842  Gross per 24 hour   Intake --   Output 400 ml   Net -400 ml     I/O last 3 completed shifts:  In: -   Out: 600 [Urine:600]    Safety Concerns: At Risk for Falls    Impairments/Disabilities:      None    Nutrition Therapy:  Current Nutrition Therapy:   - Oral Diet:  General    Routes of Feeding: Oral  Liquids: No Restrictions  Daily Fluid Restriction: no  Last Modified Barium Swallow with Video (Video Swallowing Test): not done    Treatments at the Time of Hospital Discharge:   Respiratory Treatments: see MAR  Oxygen Therapy:  is not on home oxygen therapy.   Ventilator:    - No ventilator support    Rehab Therapies: Physical Therapy and Occupational Therapy  Weight Bearing Status/Restrictions: No weight bearing restrictions  Other Medical Equipment (for information only, NOT a DME order):  walker  Other Treatments: n/a    Patient's personal belongings (please select all that are sent with patient):  Dentures upper and lower    RN SIGNATURE:  Electronically signed by Niecy Ayala RN on 2/23/23 at 1:22 PM EST    CASE MANAGEMENT/SOCIAL WORK SECTION    Inpatient Status Date: 2/15/2023    Readmission Risk Assessment Score:  Readmission Risk              Risk of Unplanned Readmission:  25           Discharging to Facility/ Agency   Name: [facility]  Address: [place] [address]  GATGO:[phone] report and for admissions [phone]  Fax:[phone]    Dialysis Facility (if applicable)   Name:  Address:  Dialysis Schedule:  Phone:  Fax:    / signature: Electronically signed by Nader Meadows MSW, LSW on 2/24/23 at 10:01 AM EST    PHYSICIAN SECTION    Prognosis: Fair    Condition at Discharge: Stable    Rehab Potential (if transferring to Rehab): Fair    Recommended Labs or Other Treatments After Discharge: pt/ot  Wean o2 as able; home o2 eval prior to discharge    Physician Certification: I certify the above information and transfer of Irving Montano  is necessary for the continuing treatment of the diagnosis listed and that he requires [facility] for less 30 days.      Update Admission H&P: No change in H&P    PHYSICIAN SIGNATURE:  Electronically signed by Remigio Richards DO on 2/24/23 at 11:10 AM EST

## 2023-02-23 NOTE — PLAN OF CARE
Pt alert and oriented. VSS. NSR on tele. SpO2 low 90s on 5L NC. Afebrile. No c/o pain. Pt receiving po steroids. Working on placement; denied expedited appeal to [place]. Bed alarm on, call light within reach. Will continue to monitor.        Problem: Discharge Planning  Goal: Discharge to home or other facility with appropriate resources  2/23/2023 0529 by Lv More RN  Outcome: Progressing  2/22/2023 1639 by James Gonzalez RN  Outcome: Progressing  Flowsheets (Taken 2/22/2023 0800)  Discharge to home or other facility with appropriate resources: Identify barriers to discharge with patient and caregiver     Problem: Pain  Goal: Verbalizes/displays adequate comfort level or baseline comfort level  2/23/2023 0529 by Lv More RN  Outcome: Progressing  2/22/2023 1639 by James Gonzalez RN  Outcome: Progressing     Problem: Respiratory - Adult  Goal: Achieves optimal ventilation and oxygenation  2/23/2023 0529 by Lv More RN  Outcome: Progressing  2/22/2023 2050 by Elizabeth Velasco RCP  Outcome: Progressing  2/22/2023 1639 by James Gonzalez RN  Outcome: Progressing  Goal: Able to breathe comfortably  2/22/2023 2050 by Elizabeth Velasco RCP  Outcome: Progressing     Problem: Safety - Adult  Goal: Free from fall injury  2/23/2023 0529 by Lv More RN  Outcome: Progressing  2/22/2023 1639 by James Gonzalez RN  Outcome: Progressing     Problem: ABCDS Injury Assessment  Goal: Absence of physical injury  2/23/2023 0529 by Lv More RN  Outcome: Progressing  2/22/2023 1639 by James Gonzalez RN  Outcome: Progressing     Problem: Chronic Conditions and Co-morbidities  Goal: Patient's chronic conditions and co-morbidity symptoms are monitored and maintained or improved  2/23/2023 0529 by Lv More RN  Outcome: Progressing  2/22/2023 1639 by James Gonzalez RN  Outcome: Progressing

## 2023-02-23 NOTE — RT PROTOCOL NOTE
RT Inhaler-Nebulizer Bronchodilator Protocol Note    There is a bronchodilator order in the chart from a provider indicating to follow the RT Bronchodilator Protocol and there is an Initiate RT Inhaler-Nebulizer Bronchodilator Protocol order as well (see protocol at bottom of note). CXR Findings:  No results found. The findings from the last RT Protocol Assessment were as follows:   History Pulmonary Disease: Chronic pulmonary disease  Respiratory Pattern: Dyspnea on exertion or RR 21-25 bpm  Breath Sounds: Slightly diminished and/or crackles  Cough: Strong, spontaneous, non-productive  Indication for Bronchodilator Therapy: Decreased or absent breath sounds  Bronchodilator Assessment Score: 6    Aerosolized bronchodilator medication orders have been revised according to the RT Inhaler-Nebulizer Bronchodilator Protocol below. Respiratory Therapist to perform RT Therapy Protocol Assessment initially then follow the protocol. Repeat RT Therapy Protocol Assessment PRN for score 0-3 or on second treatment, BID, and PRN for scores above 3. No Indications - adjust the frequency to every 6 hours PRN wheezing or bronchospasm, if no treatments needed after 48 hours then discontinue using Per Protocol order mode. If indication present, adjust the RT bronchodilator orders based on the Bronchodilator Assessment Score as indicated below. Use Inhaler orders unless patient has one or more of the following: on home nebulizer, not able to hold breath for 10 seconds, is not alert and oriented, cannot activate and use MDI correctly, or respiratory rate 25 breaths per minute or more, then use the equivalent nebulizer order(s) with same Frequency and PRN reasons based on the score. If a patient is on this medication at home then do not decrease Frequency below that used at home.     0-3 - enter or revise RT bronchodilator order(s) to equivalent RT Bronchodilator order with Frequency of every 4 hours PRN for wheezing or increased work of breathing using Per Protocol order mode. 4-6 - enter or revise RT Bronchodilator order(s) to two equivalent RT bronchodilator orders with one order with BID Frequency and one order with Frequency of every 4 hours PRN wheezing or increased work of breathing using Per Protocol order mode. 7-10 - enter or revise RT Bronchodilator order(s) to two equivalent RT bronchodilator orders with one order with TID Frequency and one order with Frequency of every 4 hours PRN wheezing or increased work of breathing using Per Protocol order mode. 11-13 - enter or revise RT Bronchodilator order(s) to one equivalent RT bronchodilator order with QID Frequency and an Albuterol order with Frequency of every 4 hours PRN wheezing or increased work of breathing using Per Protocol order mode. Greater than 13 - enter or revise RT Bronchodilator order(s) to one equivalent RT bronchodilator order with every 4 hours Frequency and an Albuterol order with Frequency of every 2 hours PRN wheezing or increased work of breathing using Per Protocol order mode. RT to enter RT Home Evaluation for COPD & MDI Assessment order using Per Protocol order mode.     Electronically signed by brian valencia RCP on 2/23/2023 at 8:22 AM

## 2023-02-23 NOTE — PLAN OF CARE
Pt seen at bedside today. Pt is A&O x4 and denies any pain or discomfort. Pt is on 3L NC. VSS. Pt calls out appropriately for the urinal and any other needs. Pt's IV infiltrated and MD is aware. MD stated that it is okay to leave IV out as pt may be discharged today. All questions and concerns addressed. Bed locked in lowest position and call light is in reach. Will continue to address pt needs and concerns. Problem: Discharge Planning  Goal: Discharge to home or other facility with appropriate resources  2/23/2023 1341 by Espinoza Sotelo RN  Outcome: Progressing     Problem: Pain  Goal: Verbalizes/displays adequate comfort level or baseline comfort level  2/23/2023 1341 by Espinoza Sotelo RN  Outcome: Progressing     Problem: Respiratory - Adult  Goal: Achieves optimal ventilation and oxygenation  2/23/2023 1341 by Espinoza Sotelo RN  Outcome: Progressing     Problem: Skin/Tissue Integrity  Goal: Absence of new skin breakdown  Description: 1. Monitor for areas of redness and/or skin breakdown  2. Assess vascular access sites hourly  3. Every 4-6 hours minimum:  Change oxygen saturation probe site  4. Every 4-6 hours:  If on nasal continuous positive airway pressure, respiratory therapy assess nares and determine need for appliance change or resting period.   2/23/2023 1341 by Espinoza Sotelo RN  Outcome: Progressing     Problem: Safety - Adult  Goal: Free from fall injury  2/23/2023 1341 by Espinoza Sotelo RN  Outcome: Progressing     Problem: ABCDS Injury Assessment  Goal: Absence of physical injury  2/23/2023 1341 by Espinoza Sotelo RN  Outcome: Progressing

## 2023-02-23 NOTE — PROGRESS NOTES
Home Oxygen Evaluation    Home Oxygen Evaluation completed.    Patient is on 3 liters per minute via nasal cannula.  Resting SpO2 = 96%  Resting SpO2 on room air = 87%    SpO2 on room air with exercise = na%  SpO2 on oxygen as above with exercise = 92%  Test performed at [facility]    brian valencia RCP  11:24 AM

## 2023-02-23 NOTE — PROGRESS NOTES
Occupational Therapy  Facility/Department: [facility] PROGRESSIVE CARE  Rehabilitation Occupational Therapy Daily Treatment Note    Date: 23  Patient Name: Huber Kelly       Room: 9033/0305-06  MRN: [id number]  Account: [de-identified]   : 1945  (78 y.o.) Gender: male           Pt currently functioning below baseline. Recommend daily inpatient skilled therapy at time of discharge to maximize long term outcomes and prevent re-admission. Please refer to AM-PAC score for current level of function. Past Medical History:  has a past medical history of Back pain, Bipolar 1 disorder ([facility] Cancer ([facility] COPD (chronic obstructive pulmonary disease) ([facility] GERD (gastroesophageal reflux disease), and Heart attack ([facility] Past Surgical History:   has a past surgical history that includes Anus surgery; hernia repair; and Colonoscopy (N/A, 2018). Restrictions  Restrictions/Precautions: General Precautions; Fall Risk;Up as Tolerated  Other position/activity restrictions: Up w/ assist, 3L O2 NC, telemetry, continuous pulse ox, LUE IV, RUE IV  Required Braces or Orthoses?: No    Subjective  Subjective: Pt in bed upon arrival. Writer asked pt if his breif was soiled and pt stated \"No, it's fine. \" Writer checked brief and it was soiled with urine and BM and pt was unaware. Pt agreeable to go to bathroom to get cleaned up. Restrictions/Precautions: General Precautions; Fall Risk;Up as Tolerated             Objective     Cognition  Overall Cognitive Status: Exceptions  Arousal/Alertness: Appropriate responses to stimuli  Following Commands: Follows one step commands with increased time; Follows one step commands with repetition  Attention Span: Appears intact; Attends with cues to redirect  Memory: Decreased short term memory;Decreased recall of recent events  Safety Judgement: Decreased awareness of need for safety;Decreased awareness of need for assistance  Problem Solving: Decreased awareness of errors;Assistance required to identify errors made;Assistance required to correct errors made;Assistance required to implement solutions;Assistance required to generate solutions  Insights: Not aware of deficits  Initiation: Requires cues for all  Sequencing: Requires cues for all  Cognition Comment: Writer administered the [facility] cognitive assessment. Pt scored 16/30 which according to scoring guidelines indicates DEMENTIA. Pt with poor insight into deficits and has poor hygiene. Pt will require 24 hour supervision/care to ensure proper hygiene is completed daily and safety is maintained. Orientation  Overall Orientation Status: Within Functional Limits   Perception  Overall Perceptual Status: Impaired  Initiation: Cues to initiate tasks     ADL  Putting On/Taking Off Footwear  Assistance Level: Set-up; Verbal cues; Increased time to complete;Stand by assist  Skilled Clinical Factors: seated to doff/arnaud socks  Toileting  Assistance Level: Maximum assistance;Dependent  Skilled Clinical Factors: Pt sat on toilet to [facility] having BM and began urinating all over the floor and did not attempt to correct or tell writer. Writer cleaned urine off floor and changed pt's socks due to being wet from urine. Pt then stood with walker and was MAX/DEP with hygiene after BM and brief change. Toilet Transfers  Technique: Stand step  Equipment: Standard toilet;Grab bars  Additional Factors: Verbal cues;Cues for hand placement  Assistance Level: Stand by assist;Contact guard assist          Functional Mobility  Device: Rolling walker  Activity: To/From bathroom  Assistance Level: Contact guard assist  Skilled Clinical Factors: VCs for safety with walker and line awareness/assist  Bed Mobility  Overall Assistance Level: Supervision  Additional Factors: Verbal cues; Head of bed flat; With handrails  Supine to Sit  Assistance Level: Supervision  Skilled Clinical Factors: VCs for safety and writer managed lines  Transfers  Surface: From bed;Standard toilet; To chair with arms  Additional Factors: Verbal cues; Hand placement cues; Increased time to complete  Device: Walker  Sit to Stand  Assistance Level: Contact guard assist  Stand to Sit  Assistance Level: Contact guard assist  Skilled Clinical Factors: VCs for hand placement, walker safety/mgmt, backing all the way up and reaching back controlling stand to sit and overall safety. Writer managed all lines. Neuromuscular Education  Neuromuscular education: Yes  NDT Treatment: Gait ;Sitting;Standing     Assessment  Assessment  Assessment: Pt tolerated session well but remains limited by cognitive deficits, decreased activity tolerance and global weakness. Pt req'd A LOT of assist with UB/LB care and SBA/CGA with bed mobility and transfers/mobility using RW. Pt has poor awareness of lines. Pt needs encouragement to complete self care tasks daily as pt has an unkempt appearance and is unaware when brief is soiled. Skilled OT warranted to promote I/safety to return pt to prior living arrangment with assist as needed. Activity Tolerance: Treatment limited secondary to decreased cognition;Patient limited by endurance  Discharge Recommendations: Patient would benefit from continued therapy after discharge  Safety Devices  Safety Devices in place: Yes  Type of devices: All fall risk precautions in place; Left in chair;Nurse notified;Call light within reach; Chair alarm in place;Gait belt;Patient at risk for falls    Patient Education  Education  Education Given To: Patient  Education Provided: Mobility Training; Fall Prevention Strategies;Transfer Training;Energy Conservation;Cognition;Precautions; Safety;Equipment  Education Method: Verbal;Teach Back  Barriers to Learning: Cognition  Education Outcome: Verbalized understanding;Demonstrated understanding;Continued education needed    Plan  Occupational Therapy Plan  Times Per Week: 4-5x/wk 1x/day as ilan  Current Treatment Recommendations: Strengthening;Balance training;Functional mobility training; Endurance training; Safety education & training;Equipment evaluation, education, & procurement;Patient/Caregiver education & training;Self-Care / ADL; Home management training    Goals  Patient Goals   Patient goals : To go home! Short Term Goals  Time Frame for Short Term Goals: By discharge, pt to demo  Short Term Goal 1: ADL transfers and functional mobility to SBA with use of AD as needed. Short Term Goal 2: bed mobility to Mod I with use of bedrails as needed. Short Term Goal 3: UB ADLs to Set up and LB ADLs to SBA with use of AD as needed. Short Term Goal 4: increased B UE strength by 1/2 grade to assist with functional tasks/I with B UE HEP with use of handouts as needed. Short Term Goal 5: toileting to SBA with use of AD/grab bars as needed. Long Term Goals  Long Term Goal 1: Pt to be I with fall prevention education, EC/WS tech, disease specific education, recommendations for discharge/AE with use of handouts as needed.     AM-PAC Score        AM-PAC Inpatient Daily Activity Raw Score: 14 (02/23/23 1246)  AM-PAC Inpatient ADL T-Scale Score : 33.39 (02/23/23 1246)  ADL Inpatient CMS 0-100% Score: 59.67 (02/23/23 1246)  ADL Inpatient CMS G-Code Modifier : CK (02/23/23 1246)      Therapy Time   Individual Concurrent Group Co-treatment   Time In 1051         Time Out 1129         Minutes [id number] [place] [address]

## 2023-02-24 VITALS
HEART RATE: 70 BPM | WEIGHT: 195 LBS | SYSTOLIC BLOOD PRESSURE: 115 MMHG | RESPIRATION RATE: 16 BRPM | HEIGHT: 70 IN | BODY MASS INDEX: 27.92 KG/M2 | OXYGEN SATURATION: 96 % | TEMPERATURE: 98.8 F | DIASTOLIC BLOOD PRESSURE: 64 MMHG

## 2023-02-24 PROCEDURE — 6370000000 HC RX 637 (ALT 250 FOR IP): Performed by: NURSE PRACTITIONER

## 2023-02-24 PROCEDURE — 6360000002 HC RX W HCPCS: Performed by: NURSE PRACTITIONER

## 2023-02-24 PROCEDURE — 2700000000 HC OXYGEN THERAPY PER DAY

## 2023-02-24 PROCEDURE — 94640 AIRWAY INHALATION TREATMENT: CPT

## 2023-02-24 PROCEDURE — 99239 HOSP IP/OBS DSCHRG MGMT >30: CPT | Performed by: INTERNAL MEDICINE

## 2023-02-24 PROCEDURE — 6370000000 HC RX 637 (ALT 250 FOR IP): Performed by: INTERNAL MEDICINE

## 2023-02-24 PROCEDURE — 94761 N-INVAS EAR/PLS OXIMETRY MLT: CPT

## 2023-02-24 RX ORDER — ALBUTEROL SULFATE 2.5 MG/3ML
2.5 SOLUTION RESPIRATORY (INHALATION) EVERY 4 HOURS PRN
Qty: 120 EACH | Refills: 3 | DISCHARGE
Start: 2023-02-24

## 2023-02-24 RX ORDER — IPRATROPIUM BROMIDE AND ALBUTEROL SULFATE 2.5; .5 MG/3ML; MG/3ML
3 SOLUTION RESPIRATORY (INHALATION) 2 TIMES DAILY
Qty: 360 ML | DISCHARGE
Start: 2023-02-24

## 2023-02-24 RX ADMIN — ARFORMOTEROL TARTRATE 15 MCG: 15 SOLUTION RESPIRATORY (INHALATION) at 09:50

## 2023-02-24 RX ADMIN — BUDESONIDE 500 MCG: 0.5 SUSPENSION RESPIRATORY (INHALATION) at 09:50

## 2023-02-24 RX ADMIN — METOPROLOL SUCCINATE 25 MG: 25 TABLET, EXTENDED RELEASE ORAL at 08:06

## 2023-02-24 RX ADMIN — LAMOTRIGINE 100 MG: 100 TABLET ORAL at 08:06

## 2023-02-24 RX ADMIN — APIXABAN 5 MG: 5 TABLET, FILM COATED ORAL at 08:05

## 2023-02-24 RX ADMIN — PREDNISONE 30 MG: 20 TABLET ORAL at 08:06

## 2023-02-24 RX ADMIN — PANTOPRAZOLE SODIUM 40 MG: 40 TABLET, DELAYED RELEASE ORAL at 05:49

## 2023-02-24 RX ADMIN — IPRATROPIUM BROMIDE AND ALBUTEROL SULFATE 1 AMPULE: 2.5; .5 SOLUTION RESPIRATORY (INHALATION) at 09:50

## 2023-02-24 RX ADMIN — ASPIRIN 81 MG CHEWABLE TABLET 81 MG: 81 TABLET CHEWABLE at 08:06

## 2023-02-24 RX ADMIN — BUMETANIDE 1 MG: 1 TABLET ORAL at 08:06

## 2023-02-24 RX ADMIN — GUAIFENESIN 600 MG: 600 TABLET ORAL at 08:06

## 2023-02-24 ASSESSMENT — PAIN SCALES - GENERAL
PAINLEVEL_OUTOF10: 0
PAINLEVEL_OUTOF10: 0

## 2023-02-24 NOTE — PROGRESS NOTES
Pt discharged to [address] [place], [place] [id number], left via [employer]. Belonging gathered and taken with pt, IV removed, discharge instruction given, pt verbalizes understanding. All questions and concerns addressed. Safety maintained.

## 2023-02-24 NOTE — PROGRESS NOTES
Pulmonary Critical Care Progress Note  Surinder Urbina CNP/Elba Tijerina MD     Patient seen for the follow up of  Pulmonary embolism on left [facility])     Subjective:  Patient had uneventful night. Resting comfortably in bed, no distress. He oxygen at 3 L nasal cannula. Shortness of breath is minimal.  He has occasional loose cough, no hemoptysis. He denies chest pain. He is tolerating orals. Examination:  Vitals: /75   Pulse 71   Temp 98.6 °F (37 °C) (Oral)   Resp 16   Ht 5' 10\" (1.778 m)   Wt 195 lb (88.5 kg)   SpO2 95%   BMI 27.98 kg/m²   General appearance: alert and cooperative with exam  Neck: No JVD  Lungs: Decreased air exchange, no crackles or wheezing  Heart: regular rate and rhythm, S1, S2 normal, no gallop  Abdomen: Soft, non tender, + BS  Extremities: no cyanosis or clubbing. No significant edema      Radiology:  X-ray chest 2/20/2023      CTA 2/15/2023      Impression:  Acute hypoxic respiratory failure  Acute PE, tiny distal posterior basilar left lower lobe segmental pulmonary artery  Acute right leg DVT of femoral artery  Acute exacerbation of COPD/emphysema  Small right pleural effusion/atelectasis  Retained secretions in trachea and mainstem bronchi,?   Aspiration  30-pack-year smoking history  Indeterminate 9 mm lingular nodule  Possible obstructive sleep apnea  Recent history of COVID-19  Dilated patulous esophagus with wall thickening/small to moderate hiatal hernia    Recommendations:  Oxygen via nasal cannula, wean as able to keep SPO2 90% or greater  BiPAP support while asleep if necessary  Incentive spirometry every hour while awake   Acapella  Mucinex  Prednisone 30 mg daily, taper by 10 mg every 4 days to stop  Completed course of Augmentin/Unasyn  Diuresis with oral Bumex  Continue budesonide and Brovana via nebulizer twice daily  Albuterol and Ipratropium twice daily and as needed  Speech therapy evaluation noted, patient okay for normal diet  DVT prophylaxis, Eliquis  GI prophylaxis, Protonix  PT/OT  Discharge planning to rehab when pre-CERT obtained  Patient is being seen in collaboration with Dr. Kemar Quispe. Final/further recommendations and plan to follow once evaluated by collaborating physician.   Will follow with you    Electronically signed by     ALEXSANDRA Ballesteros CNP on 2/24/2023 at 9:39 AM  Pulmonary Critical Care and Sleep Medicine,  [facility] AT [place]: [phone]

## 2023-02-24 NOTE — CARE COORDINATION
Social work: spoke to [address] x2 central intake carmelina to advise pt is set up for 2 pm [facility]  today via [facility] per RN suggestion. Faxed insurance auth paperwork, cherelle and hens. Called Rn to advise of time, called Orlando Wyliekarina listed as daughter [phone] and Area Office on AGing Waiver  [place] [place] [phone] to advise of the discharge plan and time.  Virginia roblesw

## 2023-02-24 NOTE — PROGRESS NOTES
[facility]  Office: [address], DO, Ana Males, DO, Arnoldo Loop, DO, Melchor Lilly Blood, DO, Bryant White MD, Asif Ambrocio MD, Carlos Gardiner MD, Ekaterina Gibson MD,  Symone Earl MD, Vero Roy MD, Alana Davidson, DO, Lewis Chao MD,  Randal Lenz MD, Davis Cote MD, Azeb Wood, DO, Gurpreet Cummings MD, Robert Schultz MD, Bette Sterling, DO, Yasmin Canales MD, Héctor Hairston MD, Rima Huynh MD, Sandra Delcid MD, Jenelle Yancey, DO, Seymour Stratton MD, George Gao MD, Tricia Harriosn, CNP,  Nika Sandoval, CNP, Yana Ramos, CNP, Mari Hoskins, CNP,  Akin Dhillon, [facility], Evelina Patterson, CNP, Fernand Cockayne, CNP, Roxanna Juan, CNP, Ming Robledo, CNP, Rafi Warren, CNP, Katrina Selby PAEmilyC, Romy Lundberg, CNS, Chitra Valadez, CNP, Luciano Swan, [facility]    Progress Note    2/24/2023    11:09 AM    Name:   Jessa Busch  MRN:     [id number]     Acct:      [de-identified]   Room:   [address] [place] [address] Day:  9  Admit Date:  2/15/2023 10:00 AM    PCP:   Yuly Kaiser MD  Code Status:  Full Code    Subjective:     C/C:   Chief Complaint   Patient presents with    Shortness of Breath     Pt was brought in via EMS for SOB that started this morning, Hx of COPD, Not on home O2     Interval History Status: improved. Sob better, now only on 2L nc though did drop sat at night and was bumped to 10L, then quickly weaned back down    Denies n/v    Does not use o2 at home    No further cp    Diarrhea now gone    Brief History:     Per my BRONSON:  Laney Espinal is a 78 y.o. Non- / non  male who presents with Shortness of Breath (Pt was brought in via EMS for SOB that started this morning, Hx of COPD, Not on home O2)   and is admitted to the hospital for the management of Pulmonary embolism on left [facility]).      Patient presented to the ER today with complaints of shortness of breath and wheezing. He has this is a coughs a lot when he is eating and notices burning in his chest sometimes after he eats. Per vitals he was noted to be hypoxic and oxygen per nasal cannula was applied. CT chest was positive for tiny PE but no heart strain. There is also concern about possible aspiration. When I spoke to the patient he says he does have a history of coughing with meals and often has pain in his chest after eating. Patient was hospitalized 1/13 through 1/27/2023. At that time he was treated for COPD exacerbation and was positive for COVID. He received a full dose of Paxlovid while inpatient. According to the notes his oxygenation was labile but improved with diuresis. At the time of discharge he was given prednisone. Patient was encouraged to have a sleep study as an outpatient. \"    Review of Systems:     Constitutional:  negative for chills, fevers, sweats  Respiratory:  negative for cough, wheezing  Cardiovascular:  negative for lower extremity edema, palpitations  Gastrointestinal:  negative for abdominal pain, constipation, nausea, vomiting, diarrhea  Neurological:  negative for dizziness, headache    Medications:      Allergies:  No Known Allergies    Current Meds:   Scheduled Meds:    predniSONE  30 mg Oral Daily    guaiFENesin  600 mg Oral BID    ipratropium-albuterol  1 ampule Inhalation BID    bumetanide  1 mg Oral Daily    apixaban  5 mg Oral BID    sodium chloride flush  5-40 mL IntraVENous 2 times per day    arformoterol tartrate  15 mcg Nebulization BID    aspirin  81 mg Oral Daily    budesonide  0.5 mg Nebulization BID    divalproex  1,000 mg Oral Nightly    vitamin D  50,000 Units Oral Weekly    lamoTRIgine  100 mg Oral Daily    metoprolol succinate  25 mg Oral Daily    nicotine  1 patch TransDERmal Daily    pantoprazole  40 mg Oral QAM AC    QUEtiapine  400 mg Oral Nightly     Continuous Infusions:    sodium chloride 10 mL/hr at 02/16/23 0844     PRN Meds: calcium carbonate, oxyCODONE-acetaminophen, perflutren lipid microspheres, albuterol, sodium chloride flush, sodium chloride flush, sodium chloride, polyethylene glycol, ondansetron **OR** ondansetron, acetaminophen **OR** acetaminophen, magnesium sulfate, potassium chloride **OR** potassium alternative oral replacement **OR** potassium chloride    Data:     Past Medical History:   has a past medical history of Back pain, Bipolar 1 disorder ([facility] Cancer ([facility] COPD (chronic obstructive pulmonary disease) ([facility] GERD (gastroesophageal reflux disease), and Heart attack ([facility] Social History:   reports that he has been smoking cigarettes and pipe. He has never used smokeless tobacco. He reports that he does not drink alcohol and does not use drugs. Family History:   Family History   Problem Relation Age of Onset    Cancer Father         Lung    Stroke Father     Cancer Brother         Lymphoma    Mental Illness Other         Aunt       Vitals:  /75   Pulse 71   Temp 98.6 °F (37 °C) (Oral)   Resp 16   Ht 5' 10\" (1.778 m)   Wt 195 lb (88.5 kg)   SpO2 93%   BMI 27.98 kg/m²   Temp (24hrs), Av.9 °F (36.6 °C), Min:97.3 °F (36.3 °C), Max:98.6 °F (37 °C)    Recent Labs     23  0912   POCGLU 126*       I/O (24Hr): Intake/Output Summary (Last 24 hours) at 2023 1109  Last data filed at 2023 0603  Gross per 24 hour   Intake --   Output 625 ml   Net -625 ml         Labs:  Hematology:  No results for input(s): WBC, RBC, HGB, HCT, MCV, MCH, MCHC, RDW, PLT, MPV, SEDRATE, CRP, INR, DDIMER, IN9SUIRS, LABABSO in the last 72 hours.     Invalid input(s): PT    Chemistry:  Recent Labs     23  1043 23  1231 23  1524   TROPHS 41* 38* 34*     Recent Labs     23  0912   POCGLU 126*     ABG:No results found for: POCPH, PHART, PH, POCPCO2, WWI4AGZ, PCO2, POCPO2, PO2ART, PO2, POCHCO3, SLI2KRN, HCO3, NBEA, PBEA, BEART, BE, THGBART, THB, MTZ1OHP, JZXZ2KBZ, F5UAFTFD, O2SAT, FIO2  Lab Results Component Value Date/Time    SPECIAL L ARM 02/15/2023 01:44 PM     Lab Results   Component Value Date/Time    CULTURE NO GROWTH 5 DAYS 02/15/2023 01:44 PM       Radiology:  XR CHEST PORTABLE    Result Date: 2/16/2023  Interval volume loss and airspace opacity at the right base, suggesting atelectasis. There may also be some component of pleural fluid. XR CHEST PORTABLE    Result Date: 2/15/2023  Chronic pulmonary change without acute cardiopulmonary process. CT CHEST PULMONARY EMBOLISM W CONTRAST    Result Date: 2/15/2023  1. Tiny distal pulmonary emboli in the posterior basal left lower lobe segmental pulmonary artery. 2. Mild dependent atelectasis and respiratory motion. Mild-to-moderate emphysema. Stable known 9 mm lingular pulmonary nodule, unchanged from 1/14/2023. Please see workup recommendations on prior CT chest report of 1/14/2023. 3. Mucoid secretions and/or aspirated material in the trachea and mainstem bronchi. 4. Small pericardial effusion. 5. Small right-sided pleural effusion. 6. Coronary artery disease. 7. Atheromatous plaque and atherosclerotic calcification of the aorta and branch vasculature. 8. Dilated, patulous esophagus with wall thickening of the distal esophagus and GE junction. Small to moderate hiatal hernia. Consider further evaluation with palpation, EGD and/or esophagram/upper GI series. 9. Cholelithiasis. 10. Fatty liver. Critical results were called by Dr. Debby Summers. Star Mendenhall MD to GLORIA Galdamez on 2/15/2023 at 12:38 p.m. by telephone. Echo 2/16:  Summary  Left ventricle is normal in size  Global left ventricular systolic function is normal  Estimated ejection fraction is 65 % . Aortic leaflet calcification with mild stenosis. Peak instantaneous gradient 30 mmHg and mean gradient 13 mmHg. Trivial tricuspid regurgitation. No pulmonary hypertension. Normal aortic root dimension. Normal right ventricular size and function.       Ekg 2/22/23: nsr with bifascicular block-no change from last ekg      Physical Examination:        General appearance:  alert, cooperative and no distress  Mental Status:  oriented to person, place and time and normal affect  Lungs:  reduced wheezes bilaterally, normal effort  Heart:  regular rate and rhythm, no murmur  Abdomen:  soft, nontender, nondistended, normal bowel sounds, no masses, hepatomegaly, splenomegaly  Extremities:  no edema, redness, tenderness in the calves  Skin:  no gross lesions, rashes, induration    Assessment:        Hospital Problems             Last Modified POA    * (Principal) Pulmonary embolism on left ([address] 2/15/2023 Yes    Acute respiratory failure with hypoxia ([address] 2/16/2023 Yes    COPD exacerbation ([address] 2/15/2023 Yes    Tobacco abuse 2/15/2023 Yes    Essential (primary) hypertension 2/15/2023 Yes    SUNITHA (acute kidney injury) ([address] 2/15/2023 Yes    Acute aspiration pneumonia ([address] 2/15/2023 Yes    Hypokalemia 2/15/2023 Yes    Primary parkinsonism ([address] 2/17/2023 Yes    Right leg DVT ([address] 2/17/2023 Yes    Acute diastolic CHF (congestive heart failure) ([address] 2/19/2023 Yes    Pleural effusion right side 2/19/2023 Yes       Plan:        Completed course of unasyn then augmentin for aspiration pneumonia  Cont eliquis fot vte  On steroids for copd exac  Smoking cessation  Suspect ike-outpatient sleep study  Dc to snf today; [facility] would not take him back at this time    Lilly Stage P Blood, DO  2/24/2023  11:09 AM

## 2023-02-24 NOTE — PROGRESS NOTES
[facility] NOTE    Room # 1008/1008-01   Name: Rell Guy            Scientology: Baha'i     Reason for visit: Follow up    I visited the patient. Admit Date & Time: 2/15/2023 10:00 AM    Assessment:  Rell Guy is a 78 y.o. male in the hospital because he has pulmonary embolism after having covid. Upon entering the room patient is resting in bed. Intervention:  I introduced myself and my title as spiritual care provider I offered space for patient  to express feelings, needs, and concerns and provided a ministry presence. Patient is very lethargic and wishes to sleep.  offers emotional support and encouragement. Outcome:  Patient calm and at rest.    Plan:  Chaplains will remain available to offer spiritual and emotional support as needed. Electronically signed by Soledad Holt on 2/23/2023 at 8:17 PM.  Elsy     02/23/23 1944   Encounter Summary   Service Provided For: Patient   Referral/Consult From: [facility] Family members   Last Encounter  02/23/23   Complexity of Encounter Low   Begin Time 1830   End Time  1900   Total Time Calculated 30 min   Encounter    Type Follow up   Spiritual/Emotional needs   Type Spiritual Support   Assessment/Intervention/Outcome   Assessment Calm; Compromised coping; Impaired resilience; Impaired social interaction; Interrupted family processes   Intervention Nurtured Hope;Prayer (assurance of)/[place]; Other (Comment); Sustaining Presence/Ministry of presence   Outcome Comfort;Engaged in conversation;Expressed feelings, needs, and concerns;Receptive

## 2023-02-24 NOTE — CARE COORDINATION
Social work: Call from Alejandra Ramos, authorization approved for admission to [facility]. Auth # [id number], approved 2/24-3/2- clinicals due on 3/1. Patient has 72 hours to admit from 2/24.

## 2023-02-24 NOTE — PLAN OF CARE
Problem: Respiratory - Adult  Goal: Achieves optimal ventilation and oxygenation  2/23/2023 2112 by Guru Watters RCP  Outcome: Progressing     Problem: Respiratory - Adult  Goal: Able to breathe comfortably  2/23/2023 2112 by Guru Watters RCP  Outcome: Progressing

## 2023-02-24 NOTE — PLAN OF CARE
Pt alert and oriented. VSS. NSR on tele. Afebrile. No c/o pain. Pt had one instance of desatting into low 80s and O2 increased to 10L [place]; able to wean pt back down to 2L with sats in the mid 90s. Awaiting precert for [facility]. Bed alarm on, call light within reach. Will continue to monitor. Problem: Discharge Planning  Goal: Discharge to home or other facility with appropriate resources  Outcome: Progressing     Problem: Pain  Goal: Verbalizes/displays adequate comfort level or baseline comfort level  Outcome: Progressing     Problem: Respiratory - Adult  Goal: Achieves optimal ventilation and oxygenation  2/24/2023 0520 by Екатерина Lr RN  Outcome: Progressing  2/23/2023 2112 by Glory Hatchet, RCP  Outcome: Progressing  Goal: Able to breathe comfortably  2/23/2023 2112 by Glory Hatchet, RCP  Outcome: Progressing     Problem: Skin/Tissue Integrity  Goal: Absence of new skin breakdown  Description: 1. Monitor for areas of redness and/or skin breakdown  2. Assess vascular access sites hourly  3. Every 4-6 hours minimum:  Change oxygen saturation probe site  4. Every 4-6 hours:  If on nasal continuous positive airway pressure, respiratory therapy assess nares and determine need for appliance change or resting period.   Outcome: Progressing     Problem: Safety - Adult  Goal: Free from fall injury  Outcome: Progressing     Problem: ABCDS Injury Assessment  Goal: Absence of physical injury  Outcome: Progressing     Problem: Chronic Conditions and Co-morbidities  Goal: Patient's chronic conditions and co-morbidity symptoms are monitored and maintained or improved  Outcome: Progressing

## 2023-02-24 NOTE — RT PROTOCOL NOTE
RT Inhaler-Nebulizer Bronchodilator Protocol Note    There is a bronchodilator order in the chart from a provider indicating to follow the RT Bronchodilator Protocol and there is an Initiate RT Inhaler-Nebulizer Bronchodilator Protocol order as well (see protocol at bottom of note). CXR Findings:  No results found. The findings from the last RT Protocol Assessment were as follows:   History Pulmonary Disease: Chronic pulmonary disease  Respiratory Pattern: Dyspnea on exertion or RR 21-25 bpm  Breath Sounds: Slightly diminished and/or crackles  Cough: Strong, spontaneous, non-productive  Indication for Bronchodilator Therapy: Decreased or absent breath sounds  Bronchodilator Assessment Score: 6    Aerosolized bronchodilator medication orders have been revised according to the RT Inhaler-Nebulizer Bronchodilator Protocol below. Respiratory Therapist to perform RT Therapy Protocol Assessment initially then follow the protocol. Repeat RT Therapy Protocol Assessment PRN for score 0-3 or on second treatment, BID, and PRN for scores above 3. No Indications - adjust the frequency to every 6 hours PRN wheezing or bronchospasm, if no treatments needed after 48 hours then discontinue using Per Protocol order mode. If indication present, adjust the RT bronchodilator orders based on the Bronchodilator Assessment Score as indicated below. Use Inhaler orders unless patient has one or more of the following: on home nebulizer, not able to hold breath for 10 seconds, is not alert and oriented, cannot activate and use MDI correctly, or respiratory rate 25 breaths per minute or more, then use the equivalent nebulizer order(s) with same Frequency and PRN reasons based on the score. If a patient is on this medication at home then do not decrease Frequency below that used at home.     0-3 - enter or revise RT bronchodilator order(s) to equivalent RT Bronchodilator order with Frequency of every 4 hours PRN for wheezing or increased work of breathing using Per Protocol order mode. 4-6 - enter or revise RT Bronchodilator order(s) to two equivalent RT bronchodilator orders with one order with BID Frequency and one order with Frequency of every 4 hours PRN wheezing or increased work of breathing using Per Protocol order mode. 7-10 - enter or revise RT Bronchodilator order(s) to two equivalent RT bronchodilator orders with one order with TID Frequency and one order with Frequency of every 4 hours PRN wheezing or increased work of breathing using Per Protocol order mode. 11-13 - enter or revise RT Bronchodilator order(s) to one equivalent RT bronchodilator order with QID Frequency and an Albuterol order with Frequency of every 4 hours PRN wheezing or increased work of breathing using Per Protocol order mode. Greater than 13 - enter or revise RT Bronchodilator order(s) to one equivalent RT bronchodilator order with every 4 hours Frequency and an Albuterol order with Frequency of every 2 hours PRN wheezing or increased work of breathing using Per Protocol order mode. RT to enter RT Home Evaluation for COPD & MDI Assessment order using Per Protocol order mode.     Electronically signed by Leigh Roy RCP on 2/24/2023 at 9:56 AM

## 2023-02-24 NOTE — DISCHARGE SUMMARY
[facility]  Office: [address], DO, Allie Duff DO, Abelino Jenkins, DO, Leta [facility], DO, Enriqueta Kim MD, Lady Cole MD, Colby Ross MD, Jm Black MD,  Sumanth Varner MD, Ino Ulloa MD, Diane Tobin DO, Jessica Hawk MD,  Miracle Ricardo MD, Elva Christy MD, Nicolas Tinajero DO, Luciano Parnell MD, Josiah Greenwood MD, Jose Harkins, DO, Jes Joe MD, Jalil Fowler MD, Odalys Velasquez MD, Dasha Lama MD, Jaqueline Sands DO, Lacey Thornton MD, Kwasi Drake MD, Roger Harman CNP,  Deborah Medina, CNP, Ines Nayak, CNP, Jarred Mcwilliams, CNP,  Burnett Osgood, [facility], [place]), Geovanna Grider, CNP, Yovany Safe, CNP, Pati Lorenzo, CNP, To Snyder, CNP, Kellen Moore, [facility], Richardson Raines PA-C, Izabel Sabillon, [facility], Joslyn Shipman, CNP, [facility], [facility]    Discharge Summary     Patient ID: Tulio Conrad  :  1945   MRN: [id number]     ACCOUNT:  [de-identified]   Patient's PCP: Martell Delgado MD  Admit Date: 2/15/2023   Discharge Date: 2023     Length of Stay: 9  Code Status:  Full Code  Admitting Physician: Donovan Richards DO  Discharge Physician: Donovan Richards DO     Active Discharge Diagnoses:     Hospital Problem Lists:  Principal Problem:    Pulmonary embolism on left [facility])  Active Problems:    Acute respiratory failure with hypoxia (HCC)    COPD exacerbation ([address]    Tobacco abuse    Essential (primary) hypertension    SUNITHA (acute kidney injury) ([address]    Acute aspiration pneumonia ([address]    Hypokalemia    Primary parkinsonism ([facility]    Right leg DVT ([address]    Acute diastolic CHF (congestive heart failure) (HCC)    Pleural effusion right side  Resolved Problems:    * No resolved hospital problems.  *      Admission Condition:  poor     Discharged Condition: stable    Hospital Stay:     Hospital Course:  Tulio Conrad is a 78 y.o. male who was admitted for the management of  Pulmonary embolism on left [facility]) , presented to ER with Shortness of Breath (Pt was brought in via EMS for SOB that started this morning, Hx of COPD, Not on home O2)    Admitted with hypoxia and found to have PE. Degree of hypoxia not explainable by small PE so also given steroids for copd and aerosols and pulm evaluated patient. He required bipap and hfnc and was to go to [facility] but insurance denied. They also denied the p2p for [facility]    His o2 was weaned below 6L so ultimately discharged to snf with pulm rehab.     Pulm concerned about aspiration pneumonia so started on unasyn, they finished a course of augmentin    Placed on heparin drip, transitioned to eliquis for the PE      Significant therapeutic interventions: see above    Significant Diagnostic Studies:   Labs / Micro:  CBC:   Lab Results   Component Value Date/Time    WBC 6.6 02/20/2023 04:38 AM    RBC 3.94 02/20/2023 04:38 AM    HGB 12.3 02/20/2023 04:38 AM    HCT 40.5 02/20/2023 04:38 AM    .8 02/20/2023 04:38 AM    MCH 31.2 02/20/2023 04:38 AM    MCHC 30.4 02/20/2023 04:38 AM    RDW 15.4 02/20/2023 04:38 AM     02/20/2023 04:38 AM     CMP:    Lab Results   Component Value Date/Time    GLUCOSE 90 02/20/2023 04:38 AM     02/20/2023 04:38 AM    K 4.4 02/20/2023 04:38 AM     02/20/2023 04:38 AM    CO2 33 02/20/2023 04:38 AM    BUN 16 02/20/2023 04:38 AM    CREATININE 0.79 02/20/2023 04:38 AM    ANIONGAP 6 02/20/2023 04:38 AM    ALKPHOS 51 01/14/2023 05:31 AM    ALT 16 01/14/2023 05:31 AM    AST 27 01/14/2023 05:31 AM    BILITOT 0.2 01/14/2023 05:31 AM    LABALBU 3.0 01/27/2023 05:29 AM    ALBUMIN NOT REPORTED 07/19/2018 12:03 PM    LABGLOM >60 02/20/2023 04:38 AM    GFRAA >60 07/19/2018 12:03 PM    GFR      07/19/2018 12:03 PM    GFR NOT REPORTED 07/19/2018 12:03 PM    PROT 6.6 01/14/2023 05:31 AM    CALCIUM 8.8 02/20/2023 04:38 AM        Radiology:  XR CHEST PORTABLE    Result Date: 2/20/2023  Right basilar effusion and left basilar infiltrates.     XR CHEST PORTABLE    Result Date: 2/19/2023  1. Stable right pleural effusion and lung base consolidation.  This may represent aspiration pneumonitis given the CT identified mild fluid distended esophagus. 2. COPD.     XR CHEST PORTABLE    Result Date: 2/18/2023  1. Small right-sided pleural effusion and right basilar airspace disease. 2. Underlying COPD.  Left lung remains clear.       Consultations:    Consults:     Final Specialist Recommendations/Findings:   IP CONSULT TO HOSPITALIST  IP CONSULT TO PULMONOLOGY      The patient was seen and examined on day of discharge and this discharge summary is in conjunction with any daily progress note from day of discharge.    Discharge plan:     Disposition: snf    Physician Follow Up:     Dontae Forrest MD  [address] [facility] [address]  [phone]    Follow up in 4 week(s)      Elba Tijerina MD  [address]  [address]  [facility] [id number] [phone]    Follow up in 4 week(s)         Requiring Further Evaluation/Follow Up POST HOSPITALIZATION/Incidental Findings: home o2 eval    Outpatient sleep study    Diet: regular diet    Activity: As tolerated    Instructions to Patient: take medications as prescribed      Discharge Medications:      Medication List        START taking these medications      albuterol (2.5 MG/3ML) 0.083% nebulizer solution  Commonly known as: PROVENTIL  Take 3 mLs by nebulization every 4 hours as needed for Wheezing     apixaban 5 MG Tabs tablet  Commonly known as: ELIQUIS  Take 1 tablet by mouth 2 times daily     ipratropium-albuterol 0.5-2.5 (3) MG/3ML Soln nebulizer solution  Commonly known as: DUONEB  Inhale 3 mLs into the lungs in the morning and 3 mLs in the evening.            CHANGE how you take these medications      divalproex 500 MG extended release tablet  Commonly known as: DEPAKOTE ER  Take 2 tablets by mouth daily  What changed: when to take this     * QUEtiapine 400 MG  tablet  Commonly known as: SEROQUEL  What changed: Another medication with the same name was changed. Make sure you understand how and when to take each. * QUEtiapine 150 MG Tb24 extended release tablet  Commonly known as: SEROQUEL XR  Take 1 tablet by mouth daily  What changed: when to take this           * This list has 2 medication(s) that are the same as other medications prescribed for you. Read the directions carefully, and ask your doctor or other care provider to review them with you. CONTINUE taking these medications      arformoterol tartrate 15 MCG/2ML Nebu  Commonly known as: BROVANA  Take 2 mLs by nebulization in the morning and 2 mLs in the evening. aspirin 81 MG chewable tablet  Take 1 tablet by mouth daily     budesonide 0.5 MG/2ML nebulizer suspension  Commonly known as: PULMICORT  Take 2 mLs by nebulization in the morning and 2 mLs in the evening.      bumetanide 1 MG tablet  Commonly known as: BUMEX  Take 1 tablet by mouth daily     lamoTRIgine 100 MG tablet  Commonly known as: LAMICTAL     metoprolol succinate 25 MG extended release tablet  Commonly known as: TOPROL XL  Take 1 tablet by mouth daily     nicotine 14 MG/24HR  Commonly known as: NICODERM CQ  Place 1 patch onto the skin daily     pantoprazole 40 MG tablet  Commonly known as: PROTONIX  Take 1 tablet by mouth every morning (before breakfast)     polyethylene glycol 17 g packet  Commonly known as: GLYCOLAX  Take 17 g by mouth daily as needed for Constipation     Vitamin D (Ergocalciferol) 10402 units Caps  Take 50,000 Units by mouth once a week            STOP taking these medications      predniSONE 10 MG tablet  Commonly known as: Doyle Ricks               Where to Get Your Medications        Information about where to get these medications is not yet available    Ask your nurse or doctor about these medications  albuterol (2.5 MG/3ML) 0.083% nebulizer solution  apixaban 5 MG Tabs tablet  ipratropium-albuterol 0.5-2.5 (3) MG/3ML Soln nebulizer solution         No discharge procedures on file. Time Spent on discharge is  35 mins in patient examination, evaluation, counseling as well as medication reconciliation, prescriptions for required medications, discharge plan and follow up. Electronically signed by   Zee Richards DO  2/24/2023  11:12 AM      Thank you Dr. Lupe Hill MD for the opportunity to be involved in this patient's care.

## 2023-02-24 NOTE — PLAN OF CARE
Problem: Respiratory - Adult  Goal: Achieves optimal ventilation and oxygenation  2/24/2023 0956 by Jeancarlos Doe RCP  Outcome: Progressing     Problem: Respiratory - Adult  Goal: Able to breathe comfortably  2/24/2023 0956 by Jeancarlos Doe RCP  Outcome: Progressing

## 2023-02-24 NOTE — RT PROTOCOL NOTE
RT Inhaler-Nebulizer Bronchodilator Protocol Note    There is a bronchodilator order in the chart from a provider indicating to follow the RT Bronchodilator Protocol and there is an Initiate RT Inhaler-Nebulizer Bronchodilator Protocol order as well (see protocol at bottom of note). CXR Findings:  No results found. The findings from the last RT Protocol Assessment were as follows:   History Pulmonary Disease: Chronic pulmonary disease  Respiratory Pattern: Dyspnea on exertion or RR 21-25 bpm  Breath Sounds: Slightly diminished and/or crackles  Cough: Strong, spontaneous, non-productive  Indication for Bronchodilator Therapy: Decreased or absent breath sounds  Bronchodilator Assessment Score: 6    Aerosolized bronchodilator medication orders have been revised according to the RT Inhaler-Nebulizer Bronchodilator Protocol below. Respiratory Therapist to perform RT Therapy Protocol Assessment initially then follow the protocol. Repeat RT Therapy Protocol Assessment PRN for score 0-3 or on second treatment, BID, and PRN for scores above 3. No Indications - adjust the frequency to every 6 hours PRN wheezing or bronchospasm, if no treatments needed after 48 hours then discontinue using Per Protocol order mode. If indication present, adjust the RT bronchodilator orders based on the Bronchodilator Assessment Score as indicated below. Use Inhaler orders unless patient has one or more of the following: on home nebulizer, not able to hold breath for 10 seconds, is not alert and oriented, cannot activate and use MDI correctly, or respiratory rate 25 breaths per minute or more, then use the equivalent nebulizer order(s) with same Frequency and PRN reasons based on the score. If a patient is on this medication at home then do not decrease Frequency below that used at home.     0-3 - enter or revise RT bronchodilator order(s) to equivalent RT Bronchodilator order with Frequency of every 4 hours PRN for wheezing or increased work of breathing using Per Protocol order mode. 4-6 - enter or revise RT Bronchodilator order(s) to two equivalent RT bronchodilator orders with one order with BID Frequency and one order with Frequency of every 4 hours PRN wheezing or increased work of breathing using Per Protocol order mode. 7-10 - enter or revise RT Bronchodilator order(s) to two equivalent RT bronchodilator orders with one order with TID Frequency and one order with Frequency of every 4 hours PRN wheezing or increased work of breathing using Per Protocol order mode. 11-13 - enter or revise RT Bronchodilator order(s) to one equivalent RT bronchodilator order with QID Frequency and an Albuterol order with Frequency of every 4 hours PRN wheezing or increased work of breathing using Per Protocol order mode. Greater than 13 - enter or revise RT Bronchodilator order(s) to one equivalent RT bronchodilator order with every 4 hours Frequency and an Albuterol order with Frequency of every 2 hours PRN wheezing or increased work of breathing using Per Protocol order mode. RT to enter RT Home Evaluation for COPD & MDI Assessment order using Per Protocol order mode.     Electronically signed by Jayro Piper RCP on 2/23/2023 at 9:12 PM

## 2023-02-24 NOTE — PLAN OF CARE
Pt seen at bedside today. Pt is resting comfortably and denies any pain or SOB. Pt is on 2L NC and VSS. Incentive spirometer encouraged Q2 hours. Pt tolerating oral liquids and food. Pt will be discharged to [address] [place] [address] [address] today. All questions and concerns addressed. Bed locked in lowest position and call light is in reach. Will continue to address pt needs and concerns. Problem: Discharge Planning  Goal: Discharge to home or other facility with appropriate resources  2/24/2023 1139 by Sumi Glaser RN  Outcome: Progressing     Problem: Pain  Goal: Verbalizes/displays adequate comfort level or baseline comfort level  2/24/2023 1139 by Sumi Glaser RN  Outcome: Progressing     Problem: Respiratory - Adult  Goal: Achieves optimal ventilation and oxygenation  2/24/2023 1139 by Sumi Glaser RN  Outcome: Progressing     Problem: Skin/Tissue Integrity  Goal: Absence of new skin breakdown  Description: 1. Monitor for areas of redness and/or skin breakdown  2. Assess vascular access sites hourly  3. Every 4-6 hours minimum:  Change oxygen saturation probe site  4. Every 4-6 hours:  If on nasal continuous positive airway pressure, respiratory therapy assess nares and determine need for appliance change or resting period.   2/24/2023 1139 by Sumi Glaser RN  Outcome: Progressing     Problem: Safety - Adult  Goal: Free from fall injury  2/24/2023 1139 by Sumi Glaser RN  Outcome: Progressing

## 2023-03-08 ENCOUNTER — HOSPITAL ENCOUNTER (OUTPATIENT)
Age: 78
Setting detail: SPECIMEN
Discharge: HOME | End: 2023-03-08

## 2023-03-08 LAB
ANION GAP SERPL CALCULATED.3IONS-SCNC: 11 MMOL/L (ref 9–17)
BUN SERPL-MCNC: 27 MG/DL (ref 8–23)
CALCIUM SERPL-MCNC: 8.9 MG/DL (ref 8.6–10.4)
CHLORIDE SERPL-SCNC: 98 MMOL/L (ref 98–107)
CO2 SERPL-SCNC: 30 MMOL/L (ref 20–31)
CREAT SERPL-MCNC: 1 MG/DL (ref 0.7–1.2)
GFR SERPL CREATININE-BSD FRML MDRD: >60 ML/MIN/1.73M2
GLUCOSE SERPL-MCNC: 100 MG/DL (ref 70–99)
HCT VFR BLD AUTO: 37.9 % (ref 40.7–50.3)
HGB BLD-MCNC: 12.4 G/DL (ref 13–17)
MCH RBC QN AUTO: 32.3 PG (ref 25.2–33.5)
MCHC RBC AUTO-ENTMCNC: 32.7 G/DL (ref 28.4–34.8)
MCV RBC AUTO: 98.7 FL (ref 82.6–102.9)
NRBC AUTOMATED: 0 PER 100 WBC
PDW BLD-RTO: 15.8 % (ref 11.8–14.4)
PLATELET # BLD AUTO: 365 K/UL (ref 138–453)
PMV BLD AUTO: 9.5 FL (ref 8.1–13.5)
POTASSIUM SERPL-SCNC: 4.3 MMOL/L (ref 3.7–5.3)
RBC # BLD: 3.84 M/UL (ref 4.21–5.77)
SODIUM SERPL-SCNC: 139 MMOL/L (ref 135–144)
WBC # BLD AUTO: 5.8 K/UL (ref 3.5–11.3)

## 2023-03-08 PROCEDURE — 85027 COMPLETE CBC AUTOMATED: CPT

## 2023-03-08 PROCEDURE — 80048 BASIC METABOLIC PNL TOTAL CA: CPT

## 2023-03-08 PROCEDURE — P9603 ONE-WAY ALLOW PRORATED MILES: HCPCS

## 2023-03-08 PROCEDURE — 36415 COLL VENOUS BLD VENIPUNCTURE: CPT

## 2023-05-09 ENCOUNTER — HOSPITAL ENCOUNTER (OUTPATIENT)
Age: 78
Setting detail: SPECIMEN
Discharge: HOME | End: 2023-05-09
Payer: COMMERCIAL

## 2023-05-09 LAB
BILIRUBIN URINE: NEGATIVE
COLOR: YELLOW
EPITHELIAL CELLS UA: NORMAL /HPF (ref 0–5)
GLUCOSE UR STRIP.AUTO-MCNC: NEGATIVE MG/DL
KETONES UR STRIP.AUTO-MCNC: ABNORMAL MG/DL
LEUKOCYTE ESTERASE UR QL STRIP.AUTO: NEGATIVE
NITRITE UR QL STRIP.AUTO: NEGATIVE
PROT UR STRIP.AUTO-MCNC: 7 MG/DL (ref 5–8)
PROT UR STRIP.AUTO-MCNC: ABNORMAL MG/DL
RBC CLUMPS #/AREA URNS AUTO: NORMAL /HPF (ref 0–4)
SPECIFIC GRAVITY UA: 1.02 (ref 1–1.03)
TURBIDITY: CLEAR
URINE HGB: NEGATIVE
UROBILINOGEN, URINE: NORMAL
WBC UA: NORMAL /HPF (ref 0–5)

## 2023-05-09 PROCEDURE — 87086 URINE CULTURE/COLONY COUNT: CPT

## 2023-05-09 PROCEDURE — 81001 URINALYSIS AUTO W/SCOPE: CPT

## 2023-05-10 LAB
MICROORGANISM SPEC CULT: NORMAL
SERVICE CMNT-IMP: NORMAL
SPECIMEN DESCRIPTION: NORMAL

## 2023-06-08 ENCOUNTER — HOSPITAL ENCOUNTER (OUTPATIENT)
Age: 78
Setting detail: SPECIMEN
Discharge: HOME | End: 2023-06-08
Payer: MEDICARE

## 2023-06-08 LAB
ALBUMIN SERPL-MCNC: 3.3 G/DL (ref 3.5–5.2)
ALBUMIN/GLOB SERPL: 1.1 {RATIO} (ref 1–2.5)
ALP SERPL-CCNC: 56 U/L (ref 40–129)
ALT SERPL-CCNC: 6 U/L (ref 5–41)
ANION GAP SERPL CALCULATED.3IONS-SCNC: 14 MMOL/L (ref 9–17)
AST SERPL-CCNC: 11 U/L
BILIRUB SERPL-MCNC: <0.1 MG/DL (ref 0.3–1.2)
BUN SERPL-MCNC: 31 MG/DL (ref 8–23)
CALCIUM SERPL-MCNC: 9.3 MG/DL (ref 8.6–10.4)
CHLORIDE SERPL-SCNC: 102 MMOL/L (ref 98–107)
CO2 SERPL-SCNC: 27 MMOL/L (ref 20–31)
CREAT SERPL-MCNC: 1.68 MG/DL (ref 0.7–1.2)
ERYTHROCYTE [DISTWIDTH] IN BLOOD BY AUTOMATED COUNT: 14.5 % (ref 11.8–14.4)
GFR SERPL CREATININE-BSD FRML MDRD: 42 ML/MIN/1.73M2
GLUCOSE SERPL-MCNC: 85 MG/DL (ref 70–99)
HCT VFR BLD AUTO: 42.1 % (ref 40.7–50.3)
HGB BLD-MCNC: 13.2 G/DL (ref 13–17)
MCH RBC QN AUTO: 30.4 PG (ref 25.2–33.5)
MCHC RBC AUTO-ENTMCNC: 31.4 G/DL (ref 28.4–34.8)
MCV RBC AUTO: 97 FL (ref 82.6–102.9)
NRBC AUTOMATED: 0 PER 100 WBC
PLATELET # BLD AUTO: 243 K/UL (ref 138–453)
PMV BLD AUTO: 9.3 FL (ref 8.1–13.5)
POTASSIUM SERPL-SCNC: 4.8 MMOL/L (ref 3.7–5.3)
PROT SERPL-MCNC: 6.3 G/DL (ref 6.4–8.3)
RBC # BLD AUTO: 4.34 M/UL (ref 4.21–5.77)
SODIUM SERPL-SCNC: 143 MMOL/L (ref 135–144)
VALPROATE SERPL-MCNC: 42 UG/ML (ref 50–125)
WBC OTHER # BLD: 6.9 K/UL (ref 3.5–11.3)

## 2023-06-08 PROCEDURE — 36415 COLL VENOUS BLD VENIPUNCTURE: CPT

## 2023-06-08 PROCEDURE — 80053 COMPREHEN METABOLIC PANEL: CPT

## 2023-06-08 PROCEDURE — P9603 ONE-WAY ALLOW PRORATED MILES: HCPCS

## 2023-06-08 PROCEDURE — 82306 VITAMIN D 25 HYDROXY: CPT

## 2023-06-08 PROCEDURE — 85027 COMPLETE CBC AUTOMATED: CPT

## 2023-06-08 PROCEDURE — 80164 ASSAY DIPROPYLACETIC ACD TOT: CPT

## 2023-06-09 LAB — 25(OH)D3 SERPL-MCNC: 47.5 NG/ML

## 2023-07-25 ENCOUNTER — HOSPITAL ENCOUNTER (OUTPATIENT)
Age: 78
Setting detail: SPECIMEN
Discharge: HOME | End: 2023-07-25
Payer: MEDICARE

## 2023-07-25 LAB
BILIRUB UR QL STRIP: NEGATIVE
CLARITY UR: CLEAR
COLOR UR: YELLOW
COMMENT: NORMAL
GLUCOSE UR STRIP-MCNC: NEGATIVE MG/DL
HGB UR QL STRIP.AUTO: NEGATIVE
KETONES UR STRIP-MCNC: NEGATIVE MG/DL
LEUKOCYTE ESTERASE UR QL STRIP: NEGATIVE
NITRITE UR QL STRIP: NEGATIVE
PH UR STRIP: 5.5 [PH] (ref 5–8)
PROT UR STRIP-MCNC: NEGATIVE MG/DL
SP GR UR STRIP: 1.02 (ref 1–1.03)
UROBILINOGEN UR STRIP-ACNC: NORMAL EU/DL (ref 0–1)

## 2023-07-25 PROCEDURE — 87086 URINE CULTURE/COLONY COUNT: CPT

## 2023-07-25 PROCEDURE — 81003 URINALYSIS AUTO W/O SCOPE: CPT

## 2023-07-26 LAB
MICROORGANISM SPEC CULT: NO GROWTH
SERVICE CMNT-IMP: NORMAL
SPECIMEN DESCRIPTION: NORMAL

## 2023-08-23 ENCOUNTER — HOSPITAL ENCOUNTER (OUTPATIENT)
Age: 78
Setting detail: SPECIMEN
Discharge: HOME | End: 2023-08-23

## 2023-08-23 LAB
ANION GAP SERPL CALCULATED.3IONS-SCNC: 14 MMOL/L (ref 9–17)
BUN SERPL-MCNC: 22 MG/DL (ref 8–23)
CALCIUM SERPL-MCNC: 9.8 MG/DL (ref 8.6–10.4)
CHLORIDE SERPL-SCNC: 103 MMOL/L (ref 98–107)
CO2 SERPL-SCNC: 24 MMOL/L (ref 20–31)
CREAT SERPL-MCNC: 1.4 MG/DL (ref 0.7–1.2)
ERYTHROCYTE [DISTWIDTH] IN BLOOD BY AUTOMATED COUNT: 16.1 % (ref 11.8–14.4)
GFR SERPL CREATININE-BSD FRML MDRD: 52 ML/MIN/1.73M2
GLUCOSE SERPL-MCNC: 133 MG/DL (ref 70–99)
HCT VFR BLD AUTO: 46.6 % (ref 40.7–50.3)
HGB BLD-MCNC: 14.2 G/DL (ref 13–17)
MCH RBC QN AUTO: 29.2 PG (ref 25.2–33.5)
MCHC RBC AUTO-ENTMCNC: 30.5 G/DL (ref 28.4–34.8)
MCV RBC AUTO: 95.9 FL (ref 82.6–102.9)
NRBC BLD-RTO: 0 PER 100 WBC
PLATELET # BLD AUTO: 349 K/UL (ref 138–453)
PMV BLD AUTO: 9.4 FL (ref 8.1–13.5)
POTASSIUM SERPL-SCNC: 5.1 MMOL/L (ref 3.7–5.3)
RBC # BLD AUTO: 4.86 M/UL (ref 4.21–5.77)
SODIUM SERPL-SCNC: 141 MMOL/L (ref 135–144)
WBC OTHER # BLD: 8.9 K/UL (ref 3.5–11.3)

## 2023-08-23 PROCEDURE — P9603 ONE-WAY ALLOW PRORATED MILES: HCPCS

## 2023-08-23 PROCEDURE — 85027 COMPLETE CBC AUTOMATED: CPT

## 2023-08-23 PROCEDURE — 80048 BASIC METABOLIC PNL TOTAL CA: CPT

## 2023-08-23 PROCEDURE — 36415 COLL VENOUS BLD VENIPUNCTURE: CPT

## 2023-08-29 ENCOUNTER — APPOINTMENT (OUTPATIENT)
Dept: GENERAL RADIOLOGY | Age: 78
DRG: 871 | End: 2023-08-29
Payer: MEDICARE

## 2023-08-29 ENCOUNTER — HOSPITAL ENCOUNTER (INPATIENT)
Age: 78
LOS: 1 days | Discharge: NURSING, REHAB OR LONG-TERM CARE | DRG: 871 | End: 2023-08-31
Attending: STUDENT IN AN ORGANIZED HEALTH CARE EDUCATION/TRAINING PROGRAM | Admitting: INTERNAL MEDICINE
Payer: MEDICARE

## 2023-08-29 ENCOUNTER — APPOINTMENT (OUTPATIENT)
Dept: CT IMAGING | Age: 78
DRG: 871 | End: 2023-08-29
Payer: MEDICARE

## 2023-08-29 DIAGNOSIS — A41.9 SEPSIS, DUE TO UNSPECIFIED ORGANISM, UNSPECIFIED WHETHER ACUTE ORGAN DYSFUNCTION PRESENT (HCC): Primary | ICD-10-CM

## 2023-08-29 DIAGNOSIS — K86.89 PANCREATIC MASS: ICD-10-CM

## 2023-08-29 DIAGNOSIS — R09.02 HYPOXIA: ICD-10-CM

## 2023-08-29 LAB
ALBUMIN SERPL-MCNC: 3.3 G/DL (ref 3.5–5.2)
ALP SERPL-CCNC: 63 U/L (ref 40–129)
ALT SERPL-CCNC: 19 U/L (ref 5–41)
ANION GAP SERPL CALCULATED.3IONS-SCNC: 13 MMOL/L (ref 9–17)
AST SERPL-CCNC: 59 U/L
BACTERIA URNS QL MICRO: ABNORMAL
BASOPHILS # BLD: 0 K/UL (ref 0–0.2)
BASOPHILS NFR BLD: 0 % (ref 0–2)
BILIRUB DIRECT SERPL-MCNC: 0.2 MG/DL
BILIRUB INDIRECT SERPL-MCNC: 0.2 MG/DL (ref 0–1)
BILIRUB SERPL-MCNC: 0.4 MG/DL (ref 0.3–1.2)
BILIRUB UR QL STRIP: NEGATIVE
BNP SERPL-MCNC: 1279 PG/ML
BUN SERPL-MCNC: 39 MG/DL (ref 8–23)
CALCIUM SERPL-MCNC: 9.1 MG/DL (ref 8.6–10.4)
CASTS #/AREA URNS LPF: ABNORMAL /LPF
CHLORIDE SERPL-SCNC: 100 MMOL/L (ref 98–107)
CLARITY UR: ABNORMAL
CO2 SERPL-SCNC: 23 MMOL/L (ref 20–31)
COHGB MFR BLD: 3.4 % (ref 0–5)
COLOR UR: YELLOW
CREAT SERPL-MCNC: 1.8 MG/DL (ref 0.7–1.2)
EOSINOPHIL # BLD: 0 K/UL (ref 0–0.4)
EOSINOPHILS RELATIVE PERCENT: 0 % (ref 0–4)
EPI CELLS #/AREA URNS HPF: ABNORMAL /HPF
ERYTHROCYTE [DISTWIDTH] IN BLOOD BY AUTOMATED COUNT: 15.9 % (ref 11.5–14.9)
FLUAV RNA RESP QL NAA+PROBE: NOT DETECTED
FLUBV RNA RESP QL NAA+PROBE: NOT DETECTED
GFR SERPL CREATININE-BSD FRML MDRD: 39 ML/MIN/1.73M2
GLUCOSE SERPL-MCNC: 112 MG/DL (ref 70–99)
GLUCOSE UR STRIP-MCNC: NEGATIVE MG/DL
HCO3 VENOUS: 24.9 MMOL/L (ref 24–30)
HCT VFR BLD AUTO: 37.9 % (ref 41–53)
HGB BLD-MCNC: 12.1 G/DL (ref 13.5–17.5)
HGB UR QL STRIP.AUTO: ABNORMAL
INR PPP: 1.6
KETONES UR STRIP-MCNC: ABNORMAL MG/DL
LACTATE BLDV-SCNC: 1.1 MMOL/L (ref 0.5–1.9)
LEUKOCYTE ESTERASE UR QL STRIP: NEGATIVE
LIPASE SERPL-CCNC: 54 U/L (ref 13–60)
LYMPHOCYTES NFR BLD: 1.8 K/UL (ref 1–4.8)
LYMPHOCYTES RELATIVE PERCENT: 12 % (ref 24–44)
MAGNESIUM SERPL-MCNC: 2.3 MG/DL (ref 1.6–2.6)
MCH RBC QN AUTO: 29.1 PG (ref 26–34)
MCHC RBC AUTO-ENTMCNC: 31.8 G/DL (ref 31–37)
MCV RBC AUTO: 91.5 FL (ref 80–100)
MONOCYTES NFR BLD: 1.6 K/UL (ref 0.1–1.3)
MONOCYTES NFR BLD: 11 % (ref 1–7)
MUCOUS THREADS URNS QL MICRO: ABNORMAL
NEUTROPHILS NFR BLD: 77 % (ref 36–66)
NEUTS SEG NFR BLD: 11.8 K/UL (ref 1.3–9.1)
NITRITE UR QL STRIP: NEGATIVE
O2 SAT, VEN: 95.8 % (ref 60–85)
PCO2, VEN: 37.4 MM HG (ref 39–55)
PH UR STRIP: 5 [PH] (ref 5–8)
PH VENOUS: 7.43 (ref 7.32–7.42)
PLATELET # BLD AUTO: 297 K/UL (ref 150–450)
PMV BLD AUTO: 7.8 FL (ref 6–12)
PO2, VEN: 137 MM HG (ref 30–50)
POSITIVE BASE EXCESS, VEN: 0.6 MMOL/L (ref 0–2)
POTASSIUM SERPL-SCNC: 4.3 MMOL/L (ref 3.7–5.3)
PROCALCITONIN SERPL-MCNC: 0.27 NG/ML
PROT SERPL-MCNC: 7.3 G/DL (ref 6.4–8.3)
PROT UR STRIP-MCNC: ABNORMAL MG/DL
PROTHROMBIN TIME: 19.8 SEC (ref 11.8–14.6)
RBC # BLD AUTO: 4.14 M/UL (ref 4.5–5.9)
RBC #/AREA URNS HPF: ABNORMAL /HPF
SARS-COV-2 RNA RESP QL NAA+PROBE: NOT DETECTED
SODIUM SERPL-SCNC: 136 MMOL/L (ref 135–144)
SOURCE: NORMAL
SP GR UR STRIP: 1.02 (ref 1–1.03)
SPECIMEN DESCRIPTION: NORMAL
TEXT FOR RESPIRATORY: ABNORMAL
TROPONIN I SERPL HS-MCNC: 37 NG/L (ref 0–22)
TROPONIN I SERPL HS-MCNC: 42 NG/L (ref 0–22)
UROBILINOGEN UR STRIP-ACNC: NORMAL EU/DL (ref 0–1)
WBC #/AREA URNS HPF: ABNORMAL /HPF
WBC OTHER # BLD: 15.3 K/UL (ref 3.5–11)

## 2023-08-29 PROCEDURE — 87636 SARSCOV2 & INF A&B AMP PRB: CPT

## 2023-08-29 PROCEDURE — 84484 ASSAY OF TROPONIN QUANT: CPT

## 2023-08-29 PROCEDURE — 71260 CT THORAX DX C+: CPT

## 2023-08-29 PROCEDURE — 96375 TX/PRO/DX INJ NEW DRUG ADDON: CPT

## 2023-08-29 PROCEDURE — 71045 X-RAY EXAM CHEST 1 VIEW: CPT

## 2023-08-29 PROCEDURE — 84145 PROCALCITONIN (PCT): CPT

## 2023-08-29 PROCEDURE — 84300 ASSAY OF URINE SODIUM: CPT

## 2023-08-29 PROCEDURE — 6370000000 HC RX 637 (ALT 250 FOR IP): Performed by: STUDENT IN AN ORGANIZED HEALTH CARE EDUCATION/TRAINING PROGRAM

## 2023-08-29 PROCEDURE — 6360000004 HC RX CONTRAST MEDICATION: Performed by: STUDENT IN AN ORGANIZED HEALTH CARE EDUCATION/TRAINING PROGRAM

## 2023-08-29 PROCEDURE — 96366 THER/PROPH/DIAG IV INF ADDON: CPT

## 2023-08-29 PROCEDURE — 94640 AIRWAY INHALATION TREATMENT: CPT

## 2023-08-29 PROCEDURE — 36415 COLL VENOUS BLD VENIPUNCTURE: CPT

## 2023-08-29 PROCEDURE — 83690 ASSAY OF LIPASE: CPT

## 2023-08-29 PROCEDURE — 83880 ASSAY OF NATRIURETIC PEPTIDE: CPT

## 2023-08-29 PROCEDURE — 96365 THER/PROPH/DIAG IV INF INIT: CPT

## 2023-08-29 PROCEDURE — 81001 URINALYSIS AUTO W/SCOPE: CPT

## 2023-08-29 PROCEDURE — 82805 BLOOD GASES W/O2 SATURATION: CPT

## 2023-08-29 PROCEDURE — 87040 BLOOD CULTURE FOR BACTERIA: CPT

## 2023-08-29 PROCEDURE — 6360000002 HC RX W HCPCS: Performed by: STUDENT IN AN ORGANIZED HEALTH CARE EDUCATION/TRAINING PROGRAM

## 2023-08-29 PROCEDURE — 83605 ASSAY OF LACTIC ACID: CPT

## 2023-08-29 PROCEDURE — 80076 HEPATIC FUNCTION PANEL: CPT

## 2023-08-29 PROCEDURE — 99285 EMERGENCY DEPT VISIT HI MDM: CPT

## 2023-08-29 PROCEDURE — 74176 CT ABD & PELVIS W/O CONTRAST: CPT

## 2023-08-29 PROCEDURE — 93005 ELECTROCARDIOGRAM TRACING: CPT | Performed by: STUDENT IN AN ORGANIZED HEALTH CARE EDUCATION/TRAINING PROGRAM

## 2023-08-29 PROCEDURE — 70450 CT HEAD/BRAIN W/O DYE: CPT

## 2023-08-29 PROCEDURE — 85610 PROTHROMBIN TIME: CPT

## 2023-08-29 PROCEDURE — 80048 BASIC METABOLIC PNL TOTAL CA: CPT

## 2023-08-29 PROCEDURE — 2580000003 HC RX 258: Performed by: STUDENT IN AN ORGANIZED HEALTH CARE EDUCATION/TRAINING PROGRAM

## 2023-08-29 PROCEDURE — 85025 COMPLETE CBC W/AUTO DIFF WBC: CPT

## 2023-08-29 PROCEDURE — 83735 ASSAY OF MAGNESIUM: CPT

## 2023-08-29 RX ORDER — 0.9 % SODIUM CHLORIDE 0.9 %
100 INTRAVENOUS SOLUTION INTRAVENOUS ONCE
Status: COMPLETED | OUTPATIENT
Start: 2023-08-29 | End: 2023-08-29

## 2023-08-29 RX ORDER — IPRATROPIUM BROMIDE AND ALBUTEROL SULFATE 2.5; .5 MG/3ML; MG/3ML
1 SOLUTION RESPIRATORY (INHALATION) ONCE
Status: COMPLETED | OUTPATIENT
Start: 2023-08-29 | End: 2023-08-29

## 2023-08-29 RX ORDER — 0.9 % SODIUM CHLORIDE 0.9 %
1000 INTRAVENOUS SOLUTION INTRAVENOUS ONCE
Status: COMPLETED | OUTPATIENT
Start: 2023-08-29 | End: 2023-08-29

## 2023-08-29 RX ORDER — SODIUM CHLORIDE 0.9 % (FLUSH) 0.9 %
10 SYRINGE (ML) INJECTION PRN
Status: DISCONTINUED | OUTPATIENT
Start: 2023-08-29 | End: 2023-08-31 | Stop reason: HOSPADM

## 2023-08-29 RX ADMIN — SODIUM CHLORIDE, PRESERVATIVE FREE 10 ML: 5 INJECTION INTRAVENOUS at 21:28

## 2023-08-29 RX ADMIN — SODIUM CHLORIDE 1000 ML: 9 INJECTION, SOLUTION INTRAVENOUS at 21:01

## 2023-08-29 RX ADMIN — SODIUM CHLORIDE 100 ML: 9 INJECTION, SOLUTION INTRAVENOUS at 21:28

## 2023-08-29 RX ADMIN — IPRATROPIUM BROMIDE AND ALBUTEROL SULFATE 1 DOSE: 2.5; .5 SOLUTION RESPIRATORY (INHALATION) at 22:09

## 2023-08-29 RX ADMIN — WATER 125 MG: 1 INJECTION INTRAMUSCULAR; INTRAVENOUS; SUBCUTANEOUS at 21:46

## 2023-08-29 RX ADMIN — VANCOMYCIN HYDROCHLORIDE 1750 MG: 1.25 INJECTION, POWDER, LYOPHILIZED, FOR SOLUTION INTRAVENOUS at 22:02

## 2023-08-29 RX ADMIN — CEFEPIME 2000 MG: 2 INJECTION, POWDER, FOR SOLUTION INTRAVENOUS at 21:47

## 2023-08-29 RX ADMIN — IOPAMIDOL 75 ML: 755 INJECTION, SOLUTION INTRAVENOUS at 21:28

## 2023-08-29 ASSESSMENT — ENCOUNTER SYMPTOMS
ABDOMINAL PAIN: 0
VOMITING: 0
RHINORRHEA: 0
CHEST TIGHTNESS: 0
COUGH: 1
EYE DISCHARGE: 0
SHORTNESS OF BREATH: 1
NAUSEA: 0
EYE REDNESS: 0
SORE THROAT: 0
DIARRHEA: 0

## 2023-08-29 ASSESSMENT — PAIN - FUNCTIONAL ASSESSMENT: PAIN_FUNCTIONAL_ASSESSMENT: NONE - DENIES PAIN

## 2023-08-30 PROBLEM — A41.9 SEPSIS (HCC): Status: ACTIVE | Noted: 2023-08-30

## 2023-08-30 PROBLEM — K86.89 PANCREATIC MASS: Status: ACTIVE | Noted: 2023-08-30

## 2023-08-30 PROBLEM — R79.89 ABNORMAL LFTS: Status: ACTIVE | Noted: 2023-08-30

## 2023-08-30 PROBLEM — D64.9 CHRONIC ANEMIA: Status: ACTIVE | Noted: 2023-08-30

## 2023-08-30 LAB
ALBUMIN SERPL-MCNC: 2.7 G/DL (ref 3.5–5.2)
ALBUMIN SERPL-MCNC: 3 G/DL (ref 3.5–5.2)
ALP SERPL-CCNC: 60 U/L (ref 40–129)
ALP SERPL-CCNC: 60 U/L (ref 40–129)
ALT SERPL-CCNC: 17 U/L (ref 5–41)
ALT SERPL-CCNC: 19 U/L (ref 5–41)
ANION GAP SERPL CALCULATED.3IONS-SCNC: 11 MMOL/L (ref 9–17)
AST SERPL-CCNC: 40 U/L
AST SERPL-CCNC: 44 U/L
BASOPHILS # BLD: 0 K/UL (ref 0–0.2)
BASOPHILS NFR BLD: 0 % (ref 0–2)
BILIRUB DIRECT SERPL-MCNC: 0.1 MG/DL
BILIRUB INDIRECT SERPL-MCNC: 0.2 MG/DL (ref 0–1)
BILIRUB SERPL-MCNC: 0.2 MG/DL (ref 0.3–1.2)
BILIRUB SERPL-MCNC: 0.3 MG/DL (ref 0.3–1.2)
BUN SERPL-MCNC: 39 MG/DL (ref 8–23)
CALCIUM SERPL-MCNC: 8.8 MG/DL (ref 8.6–10.4)
CANCER AG19-9 SERPL IA-ACNC: 10 U/ML (ref 0–35)
CEA SERPL-MCNC: 1.6 NG/ML
CHLORIDE SERPL-SCNC: 104 MMOL/L (ref 98–107)
CO2 SERPL-SCNC: 23 MMOL/L (ref 20–31)
CREAT SERPL-MCNC: 1.3 MG/DL (ref 0.7–1.2)
EOSINOPHIL # BLD: 0 K/UL (ref 0–0.4)
EOSINOPHILS RELATIVE PERCENT: 0 % (ref 0–4)
ERYTHROCYTE [DISTWIDTH] IN BLOOD BY AUTOMATED COUNT: 16.6 % (ref 11.5–14.9)
FERRITIN SERPL-MCNC: 258 NG/ML (ref 30–400)
FOLATE SERPL-MCNC: 5.6 NG/ML
FREE KAPPA/LAMBDA RATIO: 1.41 (ref 0.26–1.65)
GFR SERPL CREATININE-BSD FRML MDRD: 57 ML/MIN/1.73M2
GLUCOSE SERPL-MCNC: 186 MG/DL (ref 70–99)
HCT VFR BLD AUTO: 37.6 % (ref 41–53)
HGB BLD-MCNC: 11.7 G/DL (ref 13.5–17.5)
IRON SATN MFR SERPL: 5 % (ref 20–55)
IRON SERPL-MCNC: 13 UG/DL (ref 59–158)
KAPPA LC FREE SER-MCNC: 46.1 MG/L (ref 3.7–19.4)
LAMBDA LC FREE SERPL-MCNC: 32.6 MG/L (ref 5.7–26.3)
LIPASE SERPL-CCNC: 21 U/L (ref 13–60)
LYMPHOCYTES NFR BLD: 0.6 K/UL (ref 1–4.8)
LYMPHOCYTES RELATIVE PERCENT: 6 % (ref 24–44)
MCH RBC QN AUTO: 29.8 PG (ref 26–34)
MCHC RBC AUTO-ENTMCNC: 31 G/DL (ref 31–37)
MCV RBC AUTO: 96.2 FL (ref 80–100)
MONOCYTES NFR BLD: 0.3 K/UL (ref 0.1–1.3)
MONOCYTES NFR BLD: 3 % (ref 1–7)
NEUTROPHILS NFR BLD: 91 % (ref 36–66)
NEUTS SEG NFR BLD: 9.9 K/UL (ref 1.3–9.1)
PLATELET # BLD AUTO: 235 K/UL (ref 150–450)
PMV BLD AUTO: 7.6 FL (ref 6–12)
POTASSIUM SERPL-SCNC: 4.3 MMOL/L (ref 3.7–5.3)
PROT SERPL-MCNC: 6.7 G/DL (ref 6.4–8.3)
PROT SERPL-MCNC: 7 G/DL (ref 6.4–8.3)
RBC # BLD AUTO: 3.91 M/UL (ref 4.5–5.9)
RETICS # AUTO: 0.05 M/UL (ref 0.02–0.1)
RETICS/RBC NFR AUTO: 1.2 % (ref 0.5–2)
SODIUM SERPL-SCNC: 138 MMOL/L (ref 135–144)
SODIUM UR-SCNC: 47 MMOL/L
TIBC SERPL-MCNC: 249 UG/DL (ref 250–450)
UNSATURATED IRON BINDING CAPACITY: 236 UG/DL (ref 112–347)
VIT B12 SERPL-MCNC: 405 PG/ML (ref 232–1245)
WBC OTHER # BLD: 10.8 K/UL (ref 3.5–11)

## 2023-08-30 PROCEDURE — 99223 1ST HOSP IP/OBS HIGH 75: CPT | Performed by: INTERNAL MEDICINE

## 2023-08-30 PROCEDURE — 6360000002 HC RX W HCPCS: Performed by: NURSE PRACTITIONER

## 2023-08-30 PROCEDURE — C9113 INJ PANTOPRAZOLE SODIUM, VIA: HCPCS | Performed by: STUDENT IN AN ORGANIZED HEALTH CARE EDUCATION/TRAINING PROGRAM

## 2023-08-30 PROCEDURE — 6370000000 HC RX 637 (ALT 250 FOR IP): Performed by: STUDENT IN AN ORGANIZED HEALTH CARE EDUCATION/TRAINING PROGRAM

## 2023-08-30 PROCEDURE — 82607 VITAMIN B-12: CPT

## 2023-08-30 PROCEDURE — 2700000000 HC OXYGEN THERAPY PER DAY

## 2023-08-30 PROCEDURE — 82378 CARCINOEMBRYONIC ANTIGEN: CPT

## 2023-08-30 PROCEDURE — 2580000003 HC RX 258: Performed by: NURSE PRACTITIONER

## 2023-08-30 PROCEDURE — 86301 IMMUNOASSAY TUMOR CA 19-9: CPT

## 2023-08-30 PROCEDURE — 82746 ASSAY OF FOLIC ACID SERUM: CPT

## 2023-08-30 PROCEDURE — 84155 ASSAY OF PROTEIN SERUM: CPT

## 2023-08-30 PROCEDURE — 80076 HEPATIC FUNCTION PANEL: CPT

## 2023-08-30 PROCEDURE — 6370000000 HC RX 637 (ALT 250 FOR IP): Performed by: NURSE PRACTITIONER

## 2023-08-30 PROCEDURE — 94640 AIRWAY INHALATION TREATMENT: CPT

## 2023-08-30 PROCEDURE — 82728 ASSAY OF FERRITIN: CPT

## 2023-08-30 PROCEDURE — 84165 PROTEIN E-PHORESIS SERUM: CPT

## 2023-08-30 PROCEDURE — 85045 AUTOMATED RETICULOCYTE COUNT: CPT

## 2023-08-30 PROCEDURE — 80053 COMPREHEN METABOLIC PANEL: CPT

## 2023-08-30 PROCEDURE — 83521 IG LIGHT CHAINS FREE EACH: CPT

## 2023-08-30 PROCEDURE — 2580000003 HC RX 258: Performed by: STUDENT IN AN ORGANIZED HEALTH CARE EDUCATION/TRAINING PROGRAM

## 2023-08-30 PROCEDURE — 83540 ASSAY OF IRON: CPT

## 2023-08-30 PROCEDURE — 36415 COLL VENOUS BLD VENIPUNCTURE: CPT

## 2023-08-30 PROCEDURE — 83690 ASSAY OF LIPASE: CPT

## 2023-08-30 PROCEDURE — 85025 COMPLETE CBC W/AUTO DIFF WBC: CPT

## 2023-08-30 PROCEDURE — 83550 IRON BINDING TEST: CPT

## 2023-08-30 PROCEDURE — 2060000000 HC ICU INTERMEDIATE R&B

## 2023-08-30 PROCEDURE — 6360000002 HC RX W HCPCS: Performed by: STUDENT IN AN ORGANIZED HEALTH CARE EDUCATION/TRAINING PROGRAM

## 2023-08-30 PROCEDURE — 94761 N-INVAS EAR/PLS OXIMETRY MLT: CPT

## 2023-08-30 PROCEDURE — 82784 ASSAY IGA/IGD/IGG/IGM EACH: CPT

## 2023-08-30 RX ORDER — BUDESONIDE AND FORMOTEROL FUMARATE DIHYDRATE 160; 4.5 UG/1; UG/1
2 AEROSOL RESPIRATORY (INHALATION) 2 TIMES DAILY
COMMUNITY

## 2023-08-30 RX ORDER — BUDESONIDE AND FORMOTEROL FUMARATE DIHYDRATE 160; 4.5 UG/1; UG/1
2 AEROSOL RESPIRATORY (INHALATION) 2 TIMES DAILY
Status: DISCONTINUED | OUTPATIENT
Start: 2023-08-30 | End: 2023-08-31 | Stop reason: HOSPADM

## 2023-08-30 RX ORDER — VITAMIN B COMPLEX
4000 TABLET ORAL DAILY
Status: DISCONTINUED | OUTPATIENT
Start: 2023-08-30 | End: 2023-08-31 | Stop reason: HOSPADM

## 2023-08-30 RX ORDER — METOPROLOL SUCCINATE 25 MG/1
25 TABLET, EXTENDED RELEASE ORAL DAILY
Status: DISCONTINUED | OUTPATIENT
Start: 2023-08-30 | End: 2023-08-31 | Stop reason: HOSPADM

## 2023-08-30 RX ORDER — QUETIAPINE 150 MG/1
150 TABLET, FILM COATED, EXTENDED RELEASE ORAL DAILY
Status: DISCONTINUED | OUTPATIENT
Start: 2023-08-30 | End: 2023-08-31 | Stop reason: HOSPADM

## 2023-08-30 RX ORDER — DIVALPROEX SODIUM 500 MG/1
1000 TABLET, EXTENDED RELEASE ORAL DAILY
Status: DISCONTINUED | OUTPATIENT
Start: 2023-08-30 | End: 2023-08-31 | Stop reason: HOSPADM

## 2023-08-30 RX ORDER — SODIUM CHLORIDE 0.9 % (FLUSH) 0.9 %
5-40 SYRINGE (ML) INJECTION EVERY 12 HOURS SCHEDULED
Status: DISCONTINUED | OUTPATIENT
Start: 2023-08-30 | End: 2023-08-31 | Stop reason: HOSPADM

## 2023-08-30 RX ORDER — SODIUM CHLORIDE 9 MG/ML
INJECTION, SOLUTION INTRAVENOUS PRN
Status: DISCONTINUED | OUTPATIENT
Start: 2023-08-30 | End: 2023-08-31 | Stop reason: HOSPADM

## 2023-08-30 RX ORDER — ONDANSETRON 2 MG/ML
4 INJECTION INTRAMUSCULAR; INTRAVENOUS EVERY 6 HOURS PRN
Status: DISCONTINUED | OUTPATIENT
Start: 2023-08-30 | End: 2023-08-31 | Stop reason: HOSPADM

## 2023-08-30 RX ORDER — BUMETANIDE 1 MG/1
1 TABLET ORAL DAILY
Status: DISCONTINUED | OUTPATIENT
Start: 2023-08-30 | End: 2023-08-31 | Stop reason: HOSPADM

## 2023-08-30 RX ORDER — QUETIAPINE FUMARATE 200 MG/1
400 TABLET, FILM COATED ORAL NIGHTLY
Status: DISCONTINUED | OUTPATIENT
Start: 2023-08-30 | End: 2023-08-31 | Stop reason: HOSPADM

## 2023-08-30 RX ORDER — OMEPRAZOLE 20 MG/1
40 CAPSULE, DELAYED RELEASE ORAL DAILY
COMMUNITY

## 2023-08-30 RX ORDER — PANTOPRAZOLE SODIUM 40 MG/1
40 TABLET, DELAYED RELEASE ORAL
Status: DISCONTINUED | OUTPATIENT
Start: 2023-08-30 | End: 2023-08-31 | Stop reason: HOSPADM

## 2023-08-30 RX ORDER — SODIUM CHLORIDE 9 MG/ML
INJECTION, SOLUTION INTRAVENOUS CONTINUOUS
Status: DISCONTINUED | OUTPATIENT
Start: 2023-08-30 | End: 2023-08-31 | Stop reason: HOSPADM

## 2023-08-30 RX ORDER — ACETAMINOPHEN 650 MG/1
650 SUPPOSITORY RECTAL EVERY 6 HOURS PRN
Status: DISCONTINUED | OUTPATIENT
Start: 2023-08-30 | End: 2023-08-31 | Stop reason: HOSPADM

## 2023-08-30 RX ORDER — SODIUM CHLORIDE 0.9 % (FLUSH) 0.9 %
5-40 SYRINGE (ML) INJECTION PRN
Status: DISCONTINUED | OUTPATIENT
Start: 2023-08-30 | End: 2023-08-31 | Stop reason: HOSPADM

## 2023-08-30 RX ORDER — ACETAMINOPHEN 325 MG/1
650 TABLET ORAL EVERY 6 HOURS PRN
Status: DISCONTINUED | OUTPATIENT
Start: 2023-08-30 | End: 2023-08-31 | Stop reason: HOSPADM

## 2023-08-30 RX ORDER — IPRATROPIUM BROMIDE AND ALBUTEROL SULFATE 2.5; .5 MG/3ML; MG/3ML
1 SOLUTION RESPIRATORY (INHALATION)
Status: DISCONTINUED | OUTPATIENT
Start: 2023-08-30 | End: 2023-08-31 | Stop reason: HOSPADM

## 2023-08-30 RX ORDER — IPRATROPIUM BROMIDE AND ALBUTEROL SULFATE 2.5; .5 MG/3ML; MG/3ML
1 SOLUTION RESPIRATORY (INHALATION) ONCE
Status: COMPLETED | OUTPATIENT
Start: 2023-08-30 | End: 2023-08-30

## 2023-08-30 RX ORDER — ONDANSETRON 4 MG/1
4 TABLET, ORALLY DISINTEGRATING ORAL EVERY 8 HOURS PRN
Status: DISCONTINUED | OUTPATIENT
Start: 2023-08-30 | End: 2023-08-31 | Stop reason: HOSPADM

## 2023-08-30 RX ORDER — ASPIRIN 81 MG/1
81 TABLET, CHEWABLE ORAL DAILY
Status: DISCONTINUED | OUTPATIENT
Start: 2023-08-30 | End: 2023-08-31 | Stop reason: HOSPADM

## 2023-08-30 RX ORDER — LAMOTRIGINE 100 MG/1
100 TABLET ORAL DAILY
Status: DISCONTINUED | OUTPATIENT
Start: 2023-08-30 | End: 2023-08-31 | Stop reason: HOSPADM

## 2023-08-30 RX ADMIN — ASPIRIN 81 MG 81 MG: 81 TABLET ORAL at 10:36

## 2023-08-30 RX ADMIN — PANTOPRAZOLE SODIUM 40 MG: 40 TABLET, DELAYED RELEASE ORAL at 16:39

## 2023-08-30 RX ADMIN — DIVALPROEX SODIUM 1000 MG: 500 TABLET, EXTENDED RELEASE ORAL at 10:37

## 2023-08-30 RX ADMIN — QUETIAPINE FUMARATE 150 MG: 150 TABLET, EXTENDED RELEASE ORAL at 10:45

## 2023-08-30 RX ADMIN — IPRATROPIUM BROMIDE AND ALBUTEROL SULFATE 1 DOSE: 2.5; .5 SOLUTION RESPIRATORY (INHALATION) at 03:09

## 2023-08-30 RX ADMIN — Medication 4000 UNITS: at 20:08

## 2023-08-30 RX ADMIN — SODIUM CHLORIDE: 9 INJECTION, SOLUTION INTRAVENOUS at 03:14

## 2023-08-30 RX ADMIN — IPRATROPIUM BROMIDE AND ALBUTEROL SULFATE 1 DOSE: 2.5; .5 SOLUTION RESPIRATORY (INHALATION) at 19:47

## 2023-08-30 RX ADMIN — WATER 40 MG: 1 INJECTION INTRAMUSCULAR; INTRAVENOUS; SUBCUTANEOUS at 20:08

## 2023-08-30 RX ADMIN — IPRATROPIUM BROMIDE AND ALBUTEROL SULFATE 1 DOSE: 2.5; .5 SOLUTION RESPIRATORY (INHALATION) at 08:03

## 2023-08-30 RX ADMIN — CEFEPIME 2000 MG: 2 INJECTION, POWDER, FOR SOLUTION INTRAVENOUS at 21:54

## 2023-08-30 RX ADMIN — WATER 40 MG: 1 INJECTION INTRAMUSCULAR; INTRAVENOUS; SUBCUTANEOUS at 14:11

## 2023-08-30 RX ADMIN — BUDESONIDE AND FORMOTEROL FUMARATE DIHYDRATE 2 PUFF: 160; 4.5 AEROSOL RESPIRATORY (INHALATION) at 19:57

## 2023-08-30 RX ADMIN — CEFEPIME 2000 MG: 2 INJECTION, POWDER, FOR SOLUTION INTRAVENOUS at 10:53

## 2023-08-30 RX ADMIN — WATER 40 MG: 1 INJECTION INTRAMUSCULAR; INTRAVENOUS; SUBCUTANEOUS at 05:03

## 2023-08-30 RX ADMIN — SODIUM CHLORIDE, PRESERVATIVE FREE 10 ML: 5 INJECTION INTRAVENOUS at 14:13

## 2023-08-30 RX ADMIN — BUDESONIDE AND FORMOTEROL FUMARATE DIHYDRATE 2 PUFF: 160; 4.5 AEROSOL RESPIRATORY (INHALATION) at 08:07

## 2023-08-30 RX ADMIN — Medication 4000 UNITS: at 10:45

## 2023-08-30 RX ADMIN — VANCOMYCIN HYDROCHLORIDE 1000 MG: 1 INJECTION, POWDER, LYOPHILIZED, FOR SOLUTION INTRAVENOUS at 20:30

## 2023-08-30 RX ADMIN — QUETIAPINE FUMARATE 400 MG: 200 TABLET ORAL at 20:08

## 2023-08-30 RX ADMIN — METOPROLOL SUCCINATE 25 MG: 25 TABLET, EXTENDED RELEASE ORAL at 10:37

## 2023-08-30 RX ADMIN — APIXABAN 5 MG: 5 TABLET, FILM COATED ORAL at 10:45

## 2023-08-30 RX ADMIN — PANTOPRAZOLE SODIUM 40 MG: 40 TABLET, DELAYED RELEASE ORAL at 10:36

## 2023-08-30 RX ADMIN — PANTOPRAZOLE SODIUM 80 MG: 40 INJECTION, POWDER, FOR SOLUTION INTRAVENOUS at 00:21

## 2023-08-30 RX ADMIN — LAMOTRIGINE 100 MG: 100 TABLET ORAL at 10:45

## 2023-08-30 NOTE — PROGRESS NOTES
Leah Bell is a 70 y.o. Non- / non  male who presents from [address] with Altered Mental Status   and is admitted to the hospital for the management of SUNITHA (acute kidney injury) ([address] According to patient, he was sent to the ED today for weakness and not feeling well. At time of exam, patient was awoken from sleep and is having difficulty staying awake to answer questions. He is noted to be alert and oriented to person and time, but thought he was At [facility].  ED report indicates that patient was sent from [facility] because he was not acting himself and was noted to be slow to answer. Symptoms are associated with fatigue and diffuse abdominal tenderness. Currently denies cough, shortness of breath, and wheezing. Denies fever, chills, chest pain, nausea, vomiting, diarrhea, and urinary symptoms. There are no aggravating or alleviating factors. Symptoms are reported as constant and moderate to severe. Patient status inpatient in the Progressive Unit/Step down    Hospital Problems             Last Modified POA    * (Principal) SUNITHA (acute kidney injury) ([address]) 8/30/2023 Yes     Acute Kidney Injury  -Creatinine 1.8;  Baseline 1.0 or less  --BUN 39  -No history of kidney disease documented in EHR  -NS fluid bolus in ED  -continue IVF on admission  -hold diuretics/nephrotoxic medications  -consider nephrology consult  -monitor BMP    Sepsis of unknown origin  -Code sepsis called in ED  -Fluid bolus administered in ED  -IV antibiotics initiated  -Blood culture x2  -rapid swab negative for Covid and influenza A and B  -UA not indicative of UTI  -Lactic acid 1.1; WBC 15.3   -- Procalcitonin 0.27    COPD Exacerbation  -SPO2 88% on arrival; does not currently use home O2  --SPO2 improved to mid 90s with O2 on 4 L a minute per nasal cannula  -CXR -patchy opacities within the right lung base  --May represent infection  -CT chest -no evidence of pulmonary embolism or acute aortic disease  -- Right lower lobe atelectatic changes  -IV Solumedrol initiated in ED  --Continue on admission  -Duoneb aerosols   -Blood Cultures pending    Pancreatic mass  -Incidental finding on CT chest -extensive inflammatory changes upper abdomen worse between the spleen and the greater curvature of the stomach  --May be related to pancreatitis  --Pancreatic tail measures 2.1 x 2.2 cm  ---Suspicious for possible malignancy  ----This was not evident on February 15, 2023  -CT abdomen pelvis -acute inflammatory changes contiguous with the tail of the pancreas, spleen and greater curvature of the stomach. --Acute pancreatitis is possible  --Severe gastritis with adjacent inflammatory changes is also possible  --No evidence of abscess  --Pancreatic tail mass measures 2 x 2.1 cm; suspicious for neoplasm  --Cholelithiasis but no acute cholecystitis  --Constipation but no evidence of bowel obstruction or perforation  -Lipase 54  -AST mildly elevated at 59  --Recheck in am  -Consult GI  -CA 19-9 with morning labs    Troponin Elevation  -troponin 42, then 37  --Compatible with readings from earlier this year  --Denies chest pain    Abdominal Aortic Aneurysm  -Incidental finding on CTabdomen  -- Abdominal aortic aneurysm measuring 3.2 cm  --- Recommend follow-up every 3 years    Disposition 4 days      Consultations:   None    Patient is admitted as inpatient status because of co-morbidities listed above, severity of signs and symptoms as outlined, requirement for current medical therapies and most importantly because of direct risk to patient if care not provided in a hospital setting. Expected length of stay > 48 hours.     ALEXSANDRA Kendall - CNP  8/30/2023  12:15 AM

## 2023-08-30 NOTE — CARE COORDINATION
Case Management Assessment  Initial Evaluation    Date/Time of Evaluation: 8/30/2023 9:54 AM  Assessment Completed by: Baldo Calvillo RN    If patient is discharged prior to next notation, then this note serves as note for discharge by case management. Patient Name: Walter Mccracken                   YOB: 1945  Diagnosis: Hypoxia [R09.02]  Pancreatic mass [K86.89]  SUNITHA (acute kidney injury) ([address]) [N17.9]  Sepsis, due to unspecified organism, unspecified whether acute organ dysfunction present [facility]) [A41.9]                   Date / Time: 8/29/2023  7:54 PM    Patient Admission Status: Inpatient   Readmission Risk (Low < 19, Mod (19-27), High > 27): Readmission Risk Score: 16.4    Current PCP: Danette Wiggins MD  PCP verified by ? Yes    Chart Reviewed: Yes      History Provided by: Patient  Patient Orientation: Alert and Oriented    Patient Cognition: Alert    Hospitalization in the last 30 days (Readmission):  No    If yes, Readmission Assessment in  Navigator will be completed. Advance Directives:      Code Status: Full Code   Patient's Primary Decision Maker is: Legal Next of Kin    Primary Decision MakerBurtchava Daigle Child - [phone]    Discharge Planning:    Patient lives with: Other (Comment) Type of Home: [address] [facility]  Primary Care Giver: Other (Comment)  Patient Support Systems include: Children   Current Financial resources: Medicare  Current community resources: None  Current services prior to admission: [address]            Current DME:              Type of Home Care services:       ADLS  Prior functional level: Assistance with the following:  Current functional level: Assistance with the following:    PT AM-PAC:   /24  OT AM-PAC:   /24    Family can provide assistance at DC: No  Would you like Case Management to discuss the discharge plan with any other family members/significant others, and if so, who?     Plans to Return to Present Housing:

## 2023-08-30 NOTE — ED NOTES
Pt is more alert at this time, quicker to answer questions and engaging in small talk conversations. Pt was provided with ginger ale. Side rails up, bed side table within reach. Pt denies any other needs at this time.       Maite Piña RN  08/29/23 [id number]

## 2023-08-30 NOTE — H&P
[facility] Internal Medicine  Rain Guevara MD; Ermias Coy MD; Pedro Pablo Jin MD; MD Anatoly Cox MD; MD EBENEZER Guzmán [facility] Internal Medicine   [address] [place] [address] [address]     HISTORY AND PHYSICAL EXAMINATION            Date:   8/30/2023  Patient name:  Leah Bell  Date of admission:  8/29/2023  7:54 PM  MRN:   [id number]  Account:  [de-identified]  YOB: 1945  PCP:    Wendi Castañeda MD  Room:   2098/2098-01  Code Status:    Full Code    Chief Complaint:     Chief Complaint   Patient presents with    Altered Mental Status       History Obtained From:     patient    History of Present Illness:     Leah Bell is a 70 y.o. Non- / non  male who presents with Altered Mental Status   and is admitted to the hospital for the management of SUNITHA (acute kidney injury) ([address] [facility]). According to patient, he was sent to the ED today for weakness and not feeling well. At time of exam, patient was awoken from sleep and is having difficulty staying awake to answer questions. He is noted to be alert and oriented to person and time, but thought he was At [facility].  ED report indicates that patient was sent from [facility] because he was not acting himself and was noted to be slow to answer. Symptoms are associated with fatigue and diffuse abdominal tenderness. Currently denies cough, shortness of breath, and wheezing. Denies fever, chills, chest pain, nausea, vomiting, diarrhea, and urinary symptoms. There are no aggravating or alleviating factors. Symptoms are reported as constant and moderate to severe.        Past Medical History:     Past Medical History:   Diagnosis Date    Back pain     Bipolar 1 disorder ([address] )     Cancer ([facility])     bowel    COPD (chronic obstructive pulmonary disease) ([facility])     GERD (gastroesophageal reflux disease)     Heart attack [facility])         Past Surgical History:     Past Surgical History: lower lobe atelectatic changes. Moderate hiatal hernia. Extensive inflammatory changes in the upper abdomen worse between the spleen in the greater curvature of the stomach. This may be related to pancreatitis. Pancreatic tail mass measures 2.1 x 2.2 cm suspicious for possible malignancy. This was not as evident on February 15, 2023. RECOMMENDATIONS: Abdominal CT to further evaluate the findings of the upper abdomen. XR CHEST PORTABLE    Result Date: 8/29/2023  Patchy opacities within the right lung base which may represent infection in the acute setting. Correlate with clinical parameters. CT CHEST PULMONARY EMBOLISM W CONTRAST    Result Date: 8/30/2023  No acute intracranial abnormality. Suggestion of possible old left cerebellar infarct. Age-appropriate atrophy. No evidence of pulmonary embolism or acute aortic disease. Right lower lobe atelectatic changes. Moderate hiatal hernia. Extensive inflammatory changes in the upper abdomen worse between the spleen in the greater curvature of the stomach. This may be related to pancreatitis. Pancreatic tail mass measures 2.1 x 2.2 cm suspicious for possible malignancy. This was not as evident on February 15, 2023. RECOMMENDATIONS: Abdominal CT to further evaluate the findings of the upper abdomen.        Assessment :      Hospital Problems             Last Modified POA    * (Principal) SUNITHA (acute kidney injury) ([address]) 8/30/2023 Yes       Plan:     Patient status inpatient in the Progressive Unit/Step down    1.  78-year gentleman from nursing home admitted with sepsis of unknown origin, urine looks infected, cultures are pending, blood cultures are pending, patient has been treated with broad-spectrum antibiotics IV fluids with sepsis protocol,  Acute kidney injury on CKD, IV fluids, avoid nephrotoxic agents,  Recent pulmonary embolism on Eliquis, watch for any bleeding,  Extensive psych history on multiple medications  Metabolic encephalopathy possibly

## 2023-08-30 NOTE — DISCHARGE INSTR - COC
Continuity of Care Form    Patient Name: Maryann Vera   :  1945  MRN:  [id number]    Admit date:  2023  Discharge date:  2023    Code Status Order: Full Code   Advance Directives:     Admitting Physician:  Sherlyn Davila MD  PCP: Shlomo Doss MD    Discharging Nurse: [facility] Unit/Room#: 2098/2098-01  Discharging Unit Phone Number: [phone]    Emergency Contact:   Extended Emergency Contact Information  Primary Emergency Contact: Evelyn Oakes [facility] of [address] Phone: [phone]  Relation: Child  Secondary Emergency Contact: [address] Phone: [phone]  Relation:     Past Surgical History:  Past Surgical History:   Procedure Laterality Date    ANUS SURGERY      bowel surgery r/t cancer approx 4 years ago    COLONOSCOPY N/A 2018    COLONOSCOPY POLYPECTOMY HOT BIOPSY performed by Precious Bates DO at [address]      umbilical       Immunization History: There is no immunization history on file for this patient.     Active Problems:  Patient Active Problem List   Diagnosis Code    COVID-19 U07.1    Acute respiratory failure with hypoxia ([facility]) J96.01    COPD exacerbation ([facility]) J44.1    Tobacco abuse Z72.0    Essential (primary) hypertension I10    Bipolar 1 disorder ([facility]) F31.9    SUNTIHA (acute kidney injury) ([address]) N17.9    Acute aspiration pneumonia ([address]) J69.0    Hypokalemia E87.6    Pulmonary embolism on left ([facility]) I26.99    Primary parkinsonism ([address]) G20    Right leg DVT ([address]) Z27.526    Acute diastolic CHF (congestive heart failure) ([facility]) I50.31    Pleural effusion right side J90       Isolation/Infection:   Isolation            No Isolation          Patient Infection Status       Infection Onset Added Last Indicated Last Indicated By Review Planned Expiration Resolved Resolved By    None active    Resolved    COVID-19 (Rule Out) 23 COVID-19 & Influenza Combo (Ordered)   23 Medical Equipment (for information only, NOT a DME order): Other Treatments: Skilled Nursing assessment and monitoring. Medication education and monitoring per protocol. Patient's personal belongings (please select all that are sent with patient):  Zoila pérez RN SIGNATURE:  Electronically signed by Roger Arias RN on 8/31/23 at 1:16 PM EDT    CASE MANAGEMENT/SOCIAL WORK SECTION    Inpatient Status Date: 8/30/23    Readmission Risk Assessment Score:  Readmission Risk              Risk of Unplanned Readmission:  22           Discharging to Facility/ Agency   [facility] and the [address] [place] [address]   Phone [phone]  Fax [phone]     Dialysis Facility (if applicable)   Name:  Address:  Dialysis Schedule:  Phone:  Fax:    / signature: Electronically signed by Jonah Frank RN on 8/30/23 at 9:58 AM EDT    PHYSICIAN SECTION    Prognosis: Good    Condition at Discharge: Stable    Rehab Potential (if transferring to Rehab): Good    Recommended Labs or Other Treatments After Discharge:     Physician Certification: I certify the above information and transfer of Bria Oscar  is necessary for the continuing treatment of the diagnosis listed and that he requires [address] for less 30 days.      Update Admission H&P: No change in H&P    PHYSICIAN SIGNATURE:  Electronically signed by Shawn Martínez MD on 8/31/23 at 10:05 AM EDT

## 2023-08-30 NOTE — ED NOTES
Straight cath performed. Pt bladder emptied of 800 ml of dark yellow urine. Urnie specimen labeled at bedside and set to lab. Pt clothing was saturated in urine. Pt reports he usually is continent. Soiled clothing removed. Bedding changed. Brief placed on pt. Pt repositioned for comfort. Denies any further needs at this time.       Sara Crystal RN  08/29/23 [id number]

## 2023-08-30 NOTE — ED NOTES
Mode of arrival (squad #, walk in, police, etc) : EMS         Chief complaint(s): AMS         Arrival Note (brief scenario, treatment PTA, etc). : pt resides at [facility]. EMS was told by facility that pt was not acting \"himself\". Pt is alert and oriented but does report that he feels \"off\". Pt is slow to answer. C= \"Have you ever felt that you should Cut down on your drinking? \"  No  A= \"Have people Annoyed you by criticizing your drinking? \"  No  G= \"Have you ever felt bad or Guilty about your drinking? \"  No  E= \"Have you ever had a drink as an Eye-opener first thing in the morning to steady your nerves or to help a hangover? \"  No      Deferred []      Reason for deferring: N/A    *If yes to two or more: probable alcohol abuse. Daja Cantor RN  08/29/23 7741

## 2023-08-30 NOTE — CONSULTS
Gastroenterology  Consultation Note     . REASON FOR CONSULTATION:    Pancreatic mass on CT      HISTORY OF PRESENT ILLNESS:       Edilberto Lehman is a 70 y.o. Non- / non  male  w/ PMH of bipolar disorder, COPD, GERD, Hx rectal cancer s/p surgery + radiation, CAD, who presented to ED with lethargy, fatigue and weakness. The patient reported having 16 lb weight loss in 1year. He denied abdominal pain, nausea/vomiting, altered bowel habits, and blood in stool. He denied personal or family history of pancreatitis or pancreatic malignancy. CT A/P in ED. Patient reported smoking cigarette but no significant alcoholism. Denied any recent diagnosis of DM or worsening blood glucose. Blood work notable for Hb 11.7, Creatinine 1.8 and AST 40. Previous GI history:    PAST MEDICAL HISTORY:  Past Medical History:   Diagnosis Date    Back pain     Bipolar 1 disorder ([facility])     Cancer ([address])     bowel    COPD (chronic obstructive pulmonary disease) ([facility])     GERD (gastroesophageal reflux disease)     Heart attack [facility])      Past Surgical History:   Procedure Laterality Date    ANUS SURGERY      bowel surgery r/t cancer approx 4 years ago    COLONOSCOPY N/A 7/30/2018    COLONOSCOPY POLYPECTOMY HOT BIOPSY performed by Marissa Velasco DO at [address]      umbilical       ALLERGIES:  No Known Allergies    HOME MEDICATIONS:  Prior to Admission medications    Medication Sig Start Date End Date Taking?  Authorizing Provider   omeprazole (PRILOSEC) 20 MG delayed release capsule Take 2 capsules by mouth daily   Yes Historical Provider, MD   budesonide-formoterol (SYMBICORT) 160-4.5 MCG/ACT AERO Inhale 2 puffs into the lungs 2 times daily   Yes Historical Provider, MD   ipratropium-albuterol (DUONEB) 0.5-2.5 (3) MG/3ML SOLN nebulizer solution Inhale 3 mLs into the lungs in the morning and 3 mLs in the evening. 2/24/23  Yes Ryan Richards DO   apixaban (ELIQUIS) 5 MG TABS tablet Take 1 tablet by mouth input(s): CEA in the last 72 hours. Ca 125:   No results for input(s):  in the last 72 hours. Ca 19-9:     Invalid input(s):   AFP: No results for input(s): AFP in the last 72 hours. CT A/P 8/30/23: IMPRESSION:  1. Redemonstration of right lower lobe atelectatic changes and moderate  hiatal hernia. Mild pericardial effusion. 2. Acute inflammatory changes contiguous with the tail of the pancreas,  spleen and greater curvature of the stomach. Acute pancreatitis is possible. Severe gastritis with adjacent inflammatory changes is also possible. No  evidence of abscess. 3. Pancreatic tail mass measures 2 x 2.1 cm suspicious for neoplasm. 4. Cholelithiasis but no acute cholecystitis. 5. Constipation but no evidence of bowel obstruction or perforation. ASSESSMENT and PLAN:   Jeff Mcgregor is a 70 y.o. Non- / non  male  w/ PMH of bipolar disorder, COPD, GERD, Hx rectal cancer s/p surgery + radiation, CAD, who presented to ED with lethargy, fatigue and weakness. # Pancreatic mass (in tail measuring 2.1 x 2.0cm)  # Abnormal CT (inflammatory changes spleen and stomach, gastritis? Lipase is normal and patient not endorsing pain)  # Chronic normocytic anemia  # Abnormal LFT (AST 40, other LFTs normal)  # SUNITHA    Plan:  - Discussed the case with Dr. Gabino Zarate, plan for EUS w/ possible FNA on Friday 9/1/23 as patient had eliquis today. Need to be off of 48 hours. - Unable to obtain contrast images due to kidney status  - Hold anticoagulation  - NPO after midnight on 8/31/23  - Repeat LFTs and if abnormal will consider full liver disease workup      Thank you for allowing us to take part in the patients care  Contact GI for any remaining questions, we are available.      Electronically signed by:  Pawel Yancey MD   Gastroenterology  8/30/2023    10:39 AM       This note is created with the assistance of a speech recognition program.  While intending to generate a document that actually

## 2023-08-30 NOTE — ED NOTES
Pt requesting to go back to [facility] stating he does not feel that he needs to be here. Writer did go over pt test results and admission dx. Pt is agreeable to stay at this time. Pt was assisted back into bed. Call light within reach. Denies any further needs at this time.       Alida Em RN  08/30/23 6685

## 2023-08-30 NOTE — PROGRESS NOTES
Vancomycin Dosing by Pharmacy - Initial Note   TODAY'S DATE:  8/30/2023  Patient name, age:  Debra Rign, 70 y.o. Indication: Sepsis of unknown origin, empiric  Additional antimicrobials:  Cefepime    Allergies:  Patient has no known allergies. Actual Weight:    Wt Readings from Last 1 Encounters:   08/29/23 160 lb (72.6 kg)     Labs/Ancillary Data  Estimated Creatinine Clearance: 36 mL/min (A) (based on SCr of 1.8 mg/dL (H)). Recent Labs     08/29/23 2031 08/30/23  1005   CREATININE 1.8*  --    BUN 39*  --    WBC 15.3* 10.8     Procalcitonin   Date Value Ref Range Status   08/29/2023 0.27 (H) <0.09 ng/mL Final     Comment:           Suspected Sepsis:  <0.50 ng/mL     Low likelihood of sepsis. 0.50-2.00 ng/mL     Increased likelihood of sepsis. Antibiotics encouraged. >2.00 ng/mL     High risk of sepsis/shock. Antibiotics strongly encouraged. Suspected Lower Resp Tract Infections:  <0.24 ng/mL     Low likelihood of bacterial infection. >0.24 ng/mL     Increased likelihood of bacterial infection. Antibiotics encouraged. With successful antibiotic therapy, PCT levels should decrease rapidly. (Half-life of 24 to   36 hours.)        Procalcitonin values from samples collected within the first 6 hours of systemic infection   may still be low. Retesting may be indicated. Values from day 1 and day 4 can be entered into the Change in Procalcitonin Calculator   (www.[facility] [place]-pct-calculator. com) to determine the patient's Mortality Risk Prognosis        In healthy neonates, plasma Procalcitonin (PCT) concentrations increase gradually after   birth, reaching peak values at about 24 hours of age then decrease to normal values below   0.5 ng/mL by 48-72 hours of age.          Intake/Output Summary (Last 24 hours) at 8/30/2023 1036  Last data filed at 8/30/2023 0052  Gross per 24 hour   Intake 46.48 ml   Output --   Net 46.48 ml     Temp: 97.8 F    Recent vancomycin administrations

## 2023-08-30 NOTE — ED PROVIDER NOTES
EMERGENCY DEPARTMENT ENCOUNTER    Pt Name: Ayesha Pedro  MRN: [id number]  [address] [facility] [place] 1945  Date of evaluation: 8/29/23  CHIEF COMPLAINT       Chief Complaint   Patient presents with    Altered Mental Status     HISTORY OF PRESENT ILLNESS   This is a 16-year-old male he is got a history of hypertension, COPD, diastolic heart failure, pulmonary embolism on Eliquis presenting with general illness    Patient states he is just not generally feeling well. Feels like he needs to be hospitalized. Does state he is short of breath with some mild coughing    Denies any chest pain or pressure. No abdominal pain nausea vomiting diarrhea. No recent falls. No head injury. No numbness tingling weakness            REVIEW OF SYSTEMS     Review of Systems   Constitutional:  Positive for fatigue. Negative for chills and fever. HENT:  Negative for rhinorrhea and sore throat. Eyes:  Negative for discharge and redness. Respiratory:  Positive for cough and shortness of breath. Negative for chest tightness. Cardiovascular:  Negative for chest pain. Gastrointestinal:  Negative for abdominal pain, diarrhea, nausea and vomiting. Genitourinary:  Negative for dysuria and frequency. Musculoskeletal:  Negative for arthralgias and myalgias. Skin:  Negative for rash. Neurological:  Negative for weakness and numbness. Psychiatric/Behavioral:  Negative for suicidal ideas. All other systems reviewed and are negative.   PASTMEDICAL HISTORY     Past Medical History:   Diagnosis Date    Back pain     Bipolar 1 disorder ([facility])     Cancer ([facility])     bowel    COPD (chronic obstructive pulmonary disease) ([facility])     GERD (gastroesophageal reflux disease)     Heart attack [facility])      Past Problem List  Patient Active Problem List   Diagnosis Code    COVID-19 U07.1    Acute respiratory failure with hypoxia ([facility]) J96.01    COPD exacerbation ([facility]) J44.1    Tobacco abuse Z72.0    Essential (primary) hypertension I10    Bipolar 1 disorder ([address]) F31.9    SUNITHA (acute kidney injury) ([address]) N17.9    Acute aspiration pneumonia ([address]) J69.0    Hypokalemia E87.6    Pulmonary embolism on left ([facility]) I26.99    Primary parkinsonism ([address]) G20    Right leg DVT ([facility]) R86.497    Acute diastolic CHF (congestive heart failure) ([facility]) I50.31    Pleural effusion right side J90     SURGICAL HISTORY       Past Surgical History:   Procedure Laterality Date    ANUS SURGERY      bowel surgery r/t cancer approx 4 years ago    COLONOSCOPY N/A 7/30/2018    COLONOSCOPY POLYPECTOMY HOT BIOPSY performed by Rocky Pittman DO at [address] [facility]     CURRENT MEDICATIONS       Previous Medications    ALBUTEROL (PROVENTIL) (2.5 MG/3ML) 0.083% NEBULIZER SOLUTION    Take 3 mLs by nebulization every 4 hours as needed for Wheezing    APIXABAN (ELIQUIS) 5 MG TABS TABLET    Take 1 tablet by mouth 2 times daily    ARFORMOTEROL TARTRATE (BROVANA) 15 MCG/2ML NEBU    Take 2 mLs by nebulization in the morning and 2 mLs in the evening. ASPIRIN 81 MG CHEWABLE TABLET    Take 1 tablet by mouth daily    BUDESONIDE (PULMICORT) 0.5 MG/2ML NEBULIZER SUSPENSION    Take 2 mLs by nebulization in the morning and 2 mLs in the evening. BUMETANIDE (BUMEX) 1 MG TABLET    Take 1 tablet by mouth daily    DIVALPROEX (DEPAKOTE ER) 500 MG EXTENDED RELEASE TABLET    Take 2 tablets by mouth daily    ERGOCALCIFEROL (VITAMIN D) 21160 UNITS CAPS    Take 50,000 Units by mouth once a week    IPRATROPIUM-ALBUTEROL (DUONEB) 0.5-2.5 (3) MG/3ML SOLN NEBULIZER SOLUTION    Inhale 3 mLs into the lungs in the morning and 3 mLs in the evening.     LAMOTRIGINE (LAMICTAL) 100 MG TABLET    Take 100 mg by mouth daily    METOPROLOL SUCCINATE (TOPROL XL) 25 MG EXTENDED RELEASE TABLET    Take 1 tablet by mouth daily    NICOTINE (NICODERM CQ) 14 MG/24HR    Place 1 patch onto the skin daily    PANTOPRAZOLE (PROTONIX) 40 MG TABLET    Take 1 tablet by mouth every morning (before breakfast)    QUETIAPINE

## 2023-08-30 NOTE — PROGRESS NOTES
Pt to follow up as outpatient at [facility] with either dr Nydia Friedman or dr Ángel Vázquze for EUS of pancreas.  [facility] from a gi standpoint to send home when cleared by other services Gi signing off D/w primary rn and surgery alton Case CNP APRN

## 2023-08-30 NOTE — ACP (ADVANCE CARE PLANNING)
Advance Care Planning     Advance Care Planning Activator (Inpatient)  Conversation Note      Date of ACP Conversation: 8/30/2023     Conversation Conducted with: Patient with Decision Making Capacity    ACP Activator: Renetta Wiley RN        Health Care Decision Maker:     Current Designated Health Care Decision Maker:     Primary Decision Maker: Fito George - Child - [phone]  Click here to complete Healthcare Decision Makers including section of the Healthcare Decision Maker Relationship (ie \"Primary\")      Care Preferences    Ventilation: \"If you were in your present state of health and suddenly became very ill and were unable to breathe on your own, what would your preference be about the use of a ventilator (breathing machine) if it were available to you? \"      Would the patient desire the use of ventilator (breathing machine)?: yes    \"If your health worsens and it becomes clear that your chance of recovery is unlikely, what would your preference be about the use of a ventilator (breathing machine) if it were available to you? \"     Would the patient desire the use of ventilator (breathing machine)?: Yes      Resuscitation  \"CPR works best to restart the heart when there is a sudden event, like a heart attack, in someone who is otherwise healthy. Unfortunately, CPR does not typically restart the heart for people who have serious health conditions or who are very sick. \"    \"In the event your heart stopped as a result of an underlying serious health condition, would you want attempts to be made to restart your heart (answer \"yes\" for attempt to resuscitate) or would you prefer a natural death (answer \"no\" for do not attempt to resuscitate)? \" yes       [] Yes   [] No   Educated Patient / Elray Terry regarding differences between Advance Directives and portable DNR orders.     Length of ACP Conversation in minutes:      Conversation Outcomes:  ACP discussion completed    Follow-up plan:    [] Schedule follow-up conversation to continue planning  [] Referred individual to Provider for additional questions/concerns   [] Advised patient/agent/surrogate to review completed ACP document and update if needed with changes in condition, patient preferences or care setting    [] This note routed to one or more involved healthcare providers

## 2023-08-30 NOTE — ED NOTES
Pt assisted to side of bed. Pt A&Ox4 at this time. [place] provided. Pt requesting to call his family. Phone provided.         Daja Cantor RN  08/30/23 [id number]

## 2023-08-30 NOTE — PROGRESS NOTES
Rn received a call from pt daughter [employer], daughter stated she was POA of pt an would like an update. Rn updated daughter on pt plan of care, all questions were answered to Rn best ability. [employer] also stated that if anyone wanted to update her on pt, she recommend calling her an leaving a voicemail if she is not able to answer.

## 2023-08-31 ENCOUNTER — TELEPHONE (OUTPATIENT)
Dept: GASTROENTEROLOGY | Age: 78
End: 2023-08-31

## 2023-08-31 VITALS
WEIGHT: 160 LBS | BODY MASS INDEX: 22.9 KG/M2 | HEIGHT: 70 IN | RESPIRATION RATE: 18 BRPM | TEMPERATURE: 97.6 F | HEART RATE: 59 BPM | SYSTOLIC BLOOD PRESSURE: 118 MMHG | OXYGEN SATURATION: 90 % | DIASTOLIC BLOOD PRESSURE: 62 MMHG

## 2023-08-31 LAB
ALBUMIN SERPL-MCNC: 2.6 G/DL (ref 3.5–5.2)
ALP SERPL-CCNC: 59 U/L (ref 40–129)
ALT SERPL-CCNC: 13 U/L (ref 5–41)
ANION GAP SERPL CALCULATED.3IONS-SCNC: 9 MMOL/L (ref 9–17)
AST SERPL-CCNC: 24 U/L
BILIRUB SERPL-MCNC: <0.2 MG/DL (ref 0.3–1.2)
BUN SERPL-MCNC: 41 MG/DL (ref 8–23)
CALCIUM SERPL-MCNC: 8.7 MG/DL (ref 8.6–10.4)
CHLORIDE SERPL-SCNC: 111 MMOL/L (ref 98–107)
CO2 SERPL-SCNC: 23 MMOL/L (ref 20–31)
CREAT SERPL-MCNC: 1 MG/DL (ref 0.7–1.2)
DATE LAST DOSE: NORMAL
EKG ATRIAL RATE: 78 BPM
EKG P AXIS: 59 DEGREES
EKG P-R INTERVAL: 132 MS
EKG Q-T INTERVAL: 436 MS
EKG QRS DURATION: 126 MS
EKG QTC CALCULATION (BAZETT): 497 MS
EKG R AXIS: -14 DEGREES
EKG T AXIS: 34 DEGREES
EKG VENTRICULAR RATE: 78 BPM
FERRITIN SERPL-MCNC: 276 NG/ML (ref 30–400)
FOLATE SERPL-MCNC: 4.8 NG/ML
GFR SERPL CREATININE-BSD FRML MDRD: >60 ML/MIN/1.73M2
GLUCOSE SERPL-MCNC: 169 MG/DL (ref 70–99)
IGA SERPL-MCNC: 209 MG/DL (ref 70–400)
IGG SERPL-MCNC: 890 MG/DL (ref 700–1600)
IGM SERPL-MCNC: 122 MG/DL (ref 40–230)
IRON SATN MFR SERPL: 8 % (ref 20–55)
IRON SERPL-MCNC: 19 UG/DL (ref 59–158)
MAGNESIUM SERPL-MCNC: 2.5 MG/DL (ref 1.6–2.6)
POTASSIUM SERPL-SCNC: 4.5 MMOL/L (ref 3.7–5.3)
PROT SERPL-MCNC: 6.2 G/DL (ref 6.4–8.3)
SODIUM SERPL-SCNC: 143 MMOL/L (ref 135–144)
TIBC SERPL-MCNC: 253 UG/DL (ref 250–450)
TME LAST DOSE: 2202 H
UNSATURATED IRON BINDING CAPACITY: 234 UG/DL (ref 112–347)
VANCOMYCIN DOSE: NORMAL MG
VANCOMYCIN SERPL-MCNC: 9.6 UG/ML
VIT B12 SERPL-MCNC: 432 PG/ML (ref 232–1245)

## 2023-08-31 PROCEDURE — 2700000000 HC OXYGEN THERAPY PER DAY

## 2023-08-31 PROCEDURE — 97530 THERAPEUTIC ACTIVITIES: CPT

## 2023-08-31 PROCEDURE — 94761 N-INVAS EAR/PLS OXIMETRY MLT: CPT

## 2023-08-31 PROCEDURE — 6360000002 HC RX W HCPCS: Performed by: NURSE PRACTITIONER

## 2023-08-31 PROCEDURE — 80202 ASSAY OF VANCOMYCIN: CPT

## 2023-08-31 PROCEDURE — 6370000000 HC RX 637 (ALT 250 FOR IP): Performed by: NURSE PRACTITIONER

## 2023-08-31 PROCEDURE — 80053 COMPREHEN METABOLIC PANEL: CPT

## 2023-08-31 PROCEDURE — 97162 PT EVAL MOD COMPLEX 30 MIN: CPT

## 2023-08-31 PROCEDURE — 83735 ASSAY OF MAGNESIUM: CPT

## 2023-08-31 PROCEDURE — 36415 COLL VENOUS BLD VENIPUNCTURE: CPT

## 2023-08-31 PROCEDURE — 93010 ELECTROCARDIOGRAM REPORT: CPT | Performed by: INTERNAL MEDICINE

## 2023-08-31 PROCEDURE — 2580000003 HC RX 258: Performed by: NURSE PRACTITIONER

## 2023-08-31 PROCEDURE — 94640 AIRWAY INHALATION TREATMENT: CPT

## 2023-08-31 PROCEDURE — 97166 OT EVAL MOD COMPLEX 45 MIN: CPT

## 2023-08-31 PROCEDURE — 99232 SBSQ HOSP IP/OBS MODERATE 35: CPT | Performed by: INTERNAL MEDICINE

## 2023-08-31 RX ORDER — CHOLECALCIFEROL (VITAMIN D3) 50 MCG
4000 TABLET ORAL DAILY
Qty: 60 TABLET | Refills: 2 | Status: SHIPPED | OUTPATIENT
Start: 2023-08-31

## 2023-08-31 RX ORDER — AMOXICILLIN AND CLAVULANATE POTASSIUM 875; 125 MG/1; MG/1
1 TABLET, FILM COATED ORAL 2 TIMES DAILY
Qty: 10 TABLET | Refills: 0 | Status: SHIPPED | OUTPATIENT
Start: 2023-08-31 | End: 2023-09-05

## 2023-08-31 RX ORDER — BUMETANIDE 1 MG/1
0.5 TABLET ORAL DAILY
Qty: 30 TABLET | Refills: 2 | Status: SHIPPED | OUTPATIENT
Start: 2023-08-31

## 2023-08-31 RX ADMIN — VANCOMYCIN HYDROCHLORIDE 1500 MG: 1.5 INJECTION, POWDER, LYOPHILIZED, FOR SOLUTION INTRAVENOUS at 10:07

## 2023-08-31 RX ADMIN — DIVALPROEX SODIUM 1000 MG: 500 TABLET, EXTENDED RELEASE ORAL at 10:14

## 2023-08-31 RX ADMIN — IPRATROPIUM BROMIDE AND ALBUTEROL SULFATE 1 DOSE: 2.5; .5 SOLUTION RESPIRATORY (INHALATION) at 07:22

## 2023-08-31 RX ADMIN — PANTOPRAZOLE SODIUM 40 MG: 40 TABLET, DELAYED RELEASE ORAL at 15:55

## 2023-08-31 RX ADMIN — WATER 40 MG: 1 INJECTION INTRAMUSCULAR; INTRAVENOUS; SUBCUTANEOUS at 11:54

## 2023-08-31 RX ADMIN — METOPROLOL SUCCINATE 25 MG: 25 TABLET, EXTENDED RELEASE ORAL at 10:14

## 2023-08-31 RX ADMIN — CEFEPIME 2000 MG: 2 INJECTION, POWDER, FOR SOLUTION INTRAVENOUS at 11:57

## 2023-08-31 RX ADMIN — ACETAMINOPHEN 650 MG: 325 TABLET ORAL at 13:46

## 2023-08-31 RX ADMIN — BUDESONIDE AND FORMOTEROL FUMARATE DIHYDRATE 2 PUFF: 160; 4.5 AEROSOL RESPIRATORY (INHALATION) at 07:22

## 2023-08-31 RX ADMIN — SODIUM CHLORIDE: 9 INJECTION, SOLUTION INTRAVENOUS at 01:27

## 2023-08-31 RX ADMIN — WATER 40 MG: 1 INJECTION INTRAMUSCULAR; INTRAVENOUS; SUBCUTANEOUS at 04:41

## 2023-08-31 RX ADMIN — LAMOTRIGINE 100 MG: 100 TABLET ORAL at 10:14

## 2023-08-31 RX ADMIN — SODIUM CHLORIDE, PRESERVATIVE FREE 10 ML: 5 INJECTION INTRAVENOUS at 10:14

## 2023-08-31 RX ADMIN — ASPIRIN 81 MG 81 MG: 81 TABLET ORAL at 10:14

## 2023-08-31 RX ADMIN — QUETIAPINE FUMARATE 150 MG: 150 TABLET, EXTENDED RELEASE ORAL at 10:14

## 2023-08-31 RX ADMIN — PANTOPRAZOLE SODIUM 40 MG: 40 TABLET, DELAYED RELEASE ORAL at 06:00

## 2023-08-31 ASSESSMENT — PAIN DESCRIPTION - DESCRIPTORS: DESCRIPTORS: SHARP

## 2023-08-31 ASSESSMENT — PAIN DESCRIPTION - LOCATION: LOCATION: ABDOMEN

## 2023-08-31 NOTE — CARE COORDINATION
Writer is following for potential discharge to [facility]. Writer spoke to Kezia Gilbert at [facility], pt does not need authorization to return unless he is coming back on antibiotics or will need therapy.   Electronically signed by JOSE RAUL South on 8/31/2023 at 9:07 AM

## 2023-08-31 NOTE — TELEPHONE ENCOUNTER
----- Message from Aria Oscar, ALEXSANDRA - CNP sent at 8/31/2023  8:15 AM EDT -----  Needs eus at [facility] with dr Lidia Blount in one week . Rocco Oscar, APRTARIQ - CNP

## 2023-08-31 NOTE — PLAN OF CARE
Problem: Discharge Planning  Goal: Discharge to home or other facility with appropriate resources  8/31/2023 0310 by Jaylin Zuniga RN  Outcome: Progressing  8/30/2023 1813 by Chino Caballero RN  Outcome: Progressing     Problem: Chronic Conditions and Co-morbidities  Goal: Patient's chronic conditions and co-morbidity symptoms are monitored and maintained or improved  8/31/2023 0310 by Jaylin Zuniga RN  Outcome: Progressing  8/30/2023 1813 by Chino Caballero RN  Outcome: Progressing     Problem: Safety - Adult  Goal: Free from fall injury  8/31/2023 0310 by Jaylin Zuniga RN  Outcome: Progressing  8/30/2023 1813 by Chino Caballero RN  Outcome: Progressing     Problem: ABCDS Injury Assessment  Goal: Absence of physical injury  8/31/2023 0310 by Jaylin Zuniga RN  Outcome: Progressing  8/30/2023 1813 by Chino Caballero RN  Outcome: Progressing

## 2023-08-31 NOTE — PROGRESS NOTES
Physical Therapy  Facility/Department: [address] [place] [address]  Physical Therapy Initial Assessment    Name: Flip Wheeler  : 1945  MRN: [id number]  Date of Service: 2023  Chief Complaint   Patient presents with    Altered Mental Status      Discharge Recommendations:  Patient would benefit from continued therapy after discharge, Therapy recommended at discharge          Patient Diagnosis(es): The primary encounter diagnosis was Sepsis, due to unspecified organism, unspecified whether acute organ dysfunction present [facility]). Diagnoses of Hypoxia and Pancreatic mass were also pertinent to this visit. Past Medical History:  has a past medical history of Back pain, Bipolar 1 disorder ([address]), Cancer ([address]), COPD (chronic obstructive pulmonary disease) ([address]), GERD (gastroesophageal reflux disease), and Heart attack ([address]). Past Surgical History:  has a past surgical history that includes Anus surgery; hernia repair; and Colonoscopy (N/A, 2018). Assessment   Assessment: pt presents with decreased LE strength, some decreased safety awareness, and is with decreased balance and stability and requires CGA with use of WW For stability. Pt is a fall risk. Recommend cont therapy in acute setting to address deficits. Therapy Prognosis: Good  Decision Making: Medium Complexity  Exam: strength, mobility, ROM, balance, sensation  Requires PT Follow-Up: Yes  Activity Tolerance  Activity Tolerance: Patient limited by endurance; Patient tolerated evaluation without incident     Plan   Physcial Therapy Plan  General Plan: 3-5 times per week  Current Treatment Recommendations: Strengthening, ROM, Balance training, Functional mobility training, Transfer training, Gait training, Endurance training, Home exercise program, Safety education & training, Patient/Caregiver education & training, Therapeutic activities  Safety Devices  Type of Devices: Call light within reach, Chair alarm in place, Gait belt, Left in chair, Nurse notified  Restraints  Restraints Initially in Place: No     Restrictions  Restrictions/Precautions  Restrictions/Precautions: General Precautions, Up as Tolerated, Fall Risk  Required Braces or Orthoses?: No  Implants present? :  (Pt denies)     Subjective   Pain: denies  General  Chart Reviewed: Yes  Patient assessed for rehabilitation services?: Yes  Follows Commands: Within Functional Limits  Subjective  Subjective: pt in bed and is pleasant and agreeable to therapy         Social/Functional History  Social/Functional History  Lives With: Other (comment)  Type of Home: Facility  Home Layout: One level  Bathroom Shower/Tub: Walk-in shower, Shower chair with back, Curtain  Bathroom Toilet: Standard  Bathroom Equipment: Grab bars in shower, Built-in shower seat, Toilet raiser, Hand-held shower, Grab bars around toilet  Bathroom Accessibility: Accessible  Home Equipment: [id number] [employer] Help From: Other (comment)  ADL Assistance: Independent  Homemaking Assistance: Needs assistance  Homemaking Responsibilities: No  Ambulation Assistance: Independent  Transfer Assistance: Independent  Active : No  Patient's  Info: cab or   IADL Comments: Pt sleeps on flat bed  Vision/Hearing  Vision  Vision: Within Functional Limits  Hearing  Hearing: Within functional limits    Cognition   Orientation  Overall Orientation Status: Within Functional Limits  Orientation Level: Oriented X4  Cognition  Overall Cognitive Status:  (appears to have some lapses in memory)     Objective   Pulse: 66  Heart Rate Source: Monitor  BP: 116/65  BP Location: Left upper arm  BP Method: Automatic  Patient Position: Semi fowlers  MAP (Calculated): 82  Respirations: 18  SpO2: 92 %  O2 Device: Nasal cannula  Comment: 4L , pt denies SOB     Observation/Palpation  Posture: Fair  Gross Assessment  AROM: Generally decreased, functional  PROM: Generally decreased, functional  Strength: Grossly decreased, non-functional  Coordination:

## 2023-08-31 NOTE — CARE COORDINATION
Transportation arranged for patient to go to [facility] at 1500 via [address] by wheelchair. Facility informed. Bedside nurse [facility] MANNY informed @ 36.      Number for Report: [phone]  Electronically signed by JOSE RAUL Saavedra on 8/31/2023 at 11:47 AM

## 2023-08-31 NOTE — PROGRESS NOTES
[address] [facility] [email or web address]   Occupational Therapy Evaluation  Date: 23  Patient Name: Ayesha Pedro       Room: 5125/6448-01  MRN: [id number]  Account: [de-identified]   : 1945  (79 y.o.) Gender: male     Discharge Recommendations:  Further Occupational Therapy is recommended upon facility discharge. OT Equipment Recommendations  Other: TBD    Referring Practitioner: ALEXSANDRA Livingston CNP  Diagnosis: SUNITHA (acute kidney injury; Hypoxia; Pancreatic mass; Sepsis due to unspecified organism Additional Pertinent Hx: Ayesha Pedro is a 70 y.o. Non- / non  male who presents with Altered Mental Status   and is admitted to the hospital for the management of SUNITHA (acute kidney injury) ([address]). According to patient, he was sent to the ED today for weakness and not feeling well. At time of exam, patient was awoken from sleep and is having difficulty staying awake to answer questions. He is noted to be alert and oriented to person and time, but thought he was At [facility].  ED report indicates that patient was sent from [facility] because he was not acting himself and was noted to be slow to answer. Symptoms are associated with fatigue and diffuse abdominal tenderness. Currently denies cough, shortness of breath, and wheezing. Denies fever, chills, chest pain, nausea, vomiting, diarrhea, and urinary symptoms. There are no aggravating or alleviating factors. Symptoms are reported as constant and moderate to severe. Treatment Diagnosis: Impaired self-care status    Past Medical History:  has a past medical history of Back pain, Bipolar 1 disorder ([address]), Cancer ([address]), COPD (chronic obstructive pulmonary disease) ([address]), GERD (gastroesophageal reflux disease), and Heart attack ([address]). Past Surgical History:   has a past surgical history that includes Anus surgery; hernia repair; and Colonoscopy (N/A, 2018).     Restrictions  Restrictions/Precautions  Restrictions/Precautions: assistance  Additional Comments: ADL scores are based on skilled observation and clinical reasoning unless otherwise noted. Pt is currently limited by decreased strength, endurance, activity tolerance, and impaired balance which impacts the pt's ability to safely and independently complete self-care. UE Function  LUE AROM (degrees)  LUE AROM : WFL  Left Hand AROM (degrees)  Left Hand AROM: WFL  Tone LUE  LUE Tone: Normotonic  LUE Strength  Gross LUE Strength: WFL  L Hand General: 4/5  LUE Strength Comment: Grossly 4/5    RUE AROM (degrees)  RUE AROM : WFL  Right Hand AROM (degrees)  Right Hand AROM: WFL  Tone RUE  RUE Tone: Normotonic  RUE Strength  Gross RUE Strength: WFL  R Hand General: 4/5  RUE Strength Comment: Grossly 4/5    Fine Motor Skills/Coordination  Coordination  Coordination and Movement Description: Fine motor impairments, Tremors, Decreased speed, Decreased accuracy, Right UE, Left UE     Bed Mobility  Bed mobility  Supine to Sit: Stand by assistance  Sit to Supine: Stand by assistance  Scooting: Stand by assistance  Bed Mobility Comments: SBA for safety , use of bed rails as needed, HOB slightly elevated.  Pt uses hospital bed at the [facility]    Balance  Balance  Sitting Balance: Stand by assistance  Standing Balance: Contact guard assistance  Standing Balance  Time: 2-3 minutes  Activity: functional transfers/mobility  Comment: BUE support on RW    Transfers  Transfers  Sit to stand: Contact guard assistance  Stand to sit: Contact guard assistance  Transfer Comments: CGA for safety, VC for hand placement    Functional Mobility  Functional - Mobility Device: Rolling Walker  Activity: Other (In room)  Assist Level: Contact guard assistance  Functional Mobility Comments: CGA for safety and line mgmt    Assessment  Assessment  Performance deficits / Impairments: Decreased functional mobility , Decreased ADL status, Decreased ROM, Decreased strength, Decreased safe awareness, Decreased endurance,

## 2023-08-31 NOTE — PROGRESS NOTES
Vancomycin Dosing by Pharmacy - Daily Note   Vancomycin Therapy Day:  2  Indication: sepsis    Allergies:  Patient has no known allergies. Actual Weight:    Wt Readings from Last 1 Encounters:   08/29/23 160 lb (72.6 kg)       Labs/Ancillary Data  Estimated Creatinine Clearance: 65 mL/min (based on SCr of 1 mg/dL). Recent Labs     08/29/23 2031 08/30/23  1005 08/31/23  0527   CREATININE 1.8* 1.3* 1.0   BUN 39* 39* 41*   WBC 15.3* 10.8  --      Procalcitonin   Date Value Ref Range Status   08/29/2023 0.27 (H) <0.09 ng/mL Final     Comment:           Suspected Sepsis:  <0.50 ng/mL     Low likelihood of sepsis. 0.50-2.00 ng/mL     Increased likelihood of sepsis. Antibiotics encouraged. >2.00 ng/mL     High risk of sepsis/shock. Antibiotics strongly encouraged. Suspected Lower Resp Tract Infections:  <0.24 ng/mL     Low likelihood of bacterial infection. >0.24 ng/mL     Increased likelihood of bacterial infection. Antibiotics encouraged. With successful antibiotic therapy, PCT levels should decrease rapidly. (Half-life of 24 to   36 hours.)        Procalcitonin values from samples collected within the first 6 hours of systemic infection   may still be low. Retesting may be indicated. Values from day 1 and day 4 can be entered into the Change in Procalcitonin Calculator   (www.Innohats-pct-calculator. I-lighting) to determine the patient's Mortality Risk Prognosis        In healthy neonates, plasma Procalcitonin (PCT) concentrations increase gradually after   birth, reaching peak values at about 24 hours of age then decrease to normal values below   0.5 ng/mL by 48-72 hours of age.          Intake/Output Summary (Last 24 hours) at 8/31/2023 5764  Last data filed at 8/30/2023 2210  Gross per 24 hour   Intake 200 ml   Output --   Net 200 ml     Temp: 97.4    Culture Date / Source  /  Results  See micro reports  Recent vancomycin administrations                     vancomycin (VANCOCIN) 1,000 mg in sodium chloride 0.9 % 250 mL IVPB ([id number]) (mg) 1,000 mg New Bag 08/30/23 2030    vancomycin (VANCOCIN) 1,750 mg in sodium chloride 0.9 % 500 mL IVPB (mg) 1,750 mg New Bag 08/29/23 2202                    Vancomycin Concentrations:   TROUGH:  No results for input(s): VANCOTROUGH in the last 72 hours. RANDOM:    Recent Labs     08/31/23  0527   VANCORANDOM 9.6       MRSA Nasal Swab: N/A. Non-respiratory infection. Nicolas Thorne PLAN     Increase dose to 1500 mg q24h IV  Ensured BUN/sCr ordered at baseline and every 48 hours x at least 3 levels, then at least weekly. Repeat vancomycin concentration ordered for 09/01/23 @ 0600   Pharmacy will continue to monitor patient and adjust therapy as indicated      Vancomycin Target Concentration Parameters  Treatment  Population Target AUC/OZZY Target Trough   Invasive MRSA Infection (bacteremia, pneumonia, meningitis, endocarditis, osteomyelitis)  Sepsis (undifferentiated) 400-600 N/A   Infection due to non-MRSA pathogen  Empiric treatment of non-invasive MRSA infection  (SSTI, UTI) <500 10-15 mg/L   CrCl < 29 mL/min  Rapidly fluctuating serum creatinine   SUNITHA N/A < 15 mg/L     Renal replacement therapy is dosed by levels, per hospital protocol. Abbreviations  * Pauc: probability that AUC is >400 (efficacy); Pconc: probability that Ctrough is above 20 ?g/mL (toxicity); Tox: Probability of nephrotoxicity, based on Estella et al. Clin Infect Dis 2009. Loading dose: N/A  Regimen: 1500 mg IV every 24 hours. Start time: 20:30 on 08/31/2023  Exposure target: AUC24 (range)400-600 mg/L.hr   AUC24,ss: 494 mg/L.hr  Probability of AUC24 > 400: 80 %  Ctrough,ss: 16.5 mg/L  Probability of Ctrough,ss > 20: 27 %  Probability of nephrotoxicity (Estella JOSE 2009): 12 %      Thank you for the consult. Pharmacy will continue to follow.

## 2023-08-31 NOTE — PROGRESS NOTES
Report called to [facility]. All questions answered. 1627: Pt picked up to return to [facility]. IV removed and telemetry taken off.

## 2023-09-01 ENCOUNTER — TELEPHONE (OUTPATIENT)
Dept: ONCOLOGY | Age: 78
End: 2023-09-01

## 2023-09-01 LAB
ALBUMIN PERCENT: 46 % (ref 45–65)
ALBUMIN SERPL-MCNC: 2.9 G/DL (ref 3.2–5.2)
ALPHA 2 PERCENT: 19 % (ref 6–13)
ALPHA1 GLOB SERPL ELPH-MCNC: 0.3 G/DL (ref 0.1–0.4)
ALPHA1 GLOB SERPL ELPH-MCNC: 5 % (ref 3–6)
ALPHA2 GLOB SERPL ELPH-MCNC: 1.2 G/DL (ref 0.5–0.9)
B-GLOBULIN SERPL ELPH-MCNC: 1 G/DL (ref 0.5–1.1)
B-GLOBULIN SERPL ELPH-MCNC: 16 % (ref 11–19)
GAMMA GLOB SERPL ELPH-MCNC: 0.9 G/DL (ref 0.5–1.5)
GAMMA GLOBULIN %: 15 % (ref 9–20)
PATHOLOGIST: ABNORMAL
PROT PATTERN SERPL ELPH-IMP: ABNORMAL
PROT SERPL-MCNC: 6.2 G/DL (ref 6.4–8.3)
TOTAL PROT. SUM,%: 101 % (ref 98–102)
TOTAL PROT. SUM: 6.3 G/DL (ref 6.3–8.2)

## 2023-09-01 NOTE — PROGRESS NOTES
08/31/23 2041   Encounter Summary   Encounter Overview/Reason  Spiritual/Emotional Needs   Service Provided For: Patient   Referral/Consult From: Rounding   Last Encounter  08/31/23   Complexity of Encounter Moderate   Spiritual/Emotional needs   Type Spiritual Support   Assessment/Intervention/Outcome   Assessment Coping   Intervention Active listening;Prayer (assurance of)/[place];Sustaining Presence/Ministry of presence   Outcome Acceptance; Coping;Engaged in conversation;Receptive

## 2023-09-01 NOTE — TELEPHONE ENCOUNTER
Name: Catalino Fowler  : 1945  MRN: [id number]    Oncology Navigation- Initial Note:    Intake-  Contact Type: Telephone    Continuum of Care: Diagnosis/Active Treatment    Notes: Oncology navigation referral received. Attempted to contact pt, no answer, VM left requested contact writer @ [phone]. Will continue to follow.     Electronically signed by Kae Manley RN on 2023 at 4:06 PM

## 2023-09-01 NOTE — CONSULTS
Today's Date: 8/31/2023  Patient Name: Mirtha Gale  Date of admission: 8/29/2023  7:54 PM  Patient's age: 70 y.o., 1945  Admission Dx: Hypoxia [R09.02]  Pancreatic mass [K86.89]  SUNITHA (acute kidney injury) ([address]) [N17.9]  Sepsis, due to unspecified organism, unspecified whether acute organ dysfunction present ([address]) [A41.9]    Reason for Consult: Pancreatic mass  Requesting Physician: Rubens Lloyd MD    CHIEF COMPLAINT: Fatigue and weakness and weight loss    History Obtained From:  patient      Interval history  H&H stable  Patient would like his procedure to be done as an outpatient    HISTORY OF PRESENT ILLNESS:      The patient is a 70 y.o.  male who is admitted to the hospital for fatigue and weakness and lethargy, patient had history of COPD heavy smoker presented with lethargy fatigue weakness and weight loss up to 15 pounds over 1 year he denies abdominal pain nausea vomiting no personal history of pancreatitis neither family history of pancreatic cancer, CT abdomen did show 2.1 x 2.0 cm tail of pancreatic mass suspicious for malignancy  CA 19-9 normal    Past Medical History:   has a past medical history of Back pain, Bipolar 1 disorder ([address]), Cancer ([address]), COPD (chronic obstructive pulmonary disease) ([address]), GERD (gastroesophageal reflux disease), and Heart attack ([address]). Past Surgical History:   has a past surgical history that includes Anus surgery; hernia repair; and Colonoscopy (N/A, 7/30/2018). Medications:    Reviewed in Epic     Allergies:  Patient has no known allergies. Social History:   reports that he has been smoking cigarettes and pipe. He has never used smokeless tobacco. He reports that he does not drink alcohol and does not use drugs. Family History: family history includes Cancer in his brother and father; Mental Illness in an other family member; Stroke in his father.     REVIEW OF SYSTEMS:    14 point review of system has been obtained unremarkable although actually reflects the content of the visit, the document can still have some errors including those of syntax and sound a like substitutions which may escape proof reading. It such instances, actual meaning can be extrapolated by contextual diversion.      Hematologist/Medical Oncologist

## 2023-09-01 NOTE — TELEPHONE ENCOUNTER
Called patient to schedule EUS no answer lft vm to return call to the office to schedule.  Jhon Mauricio

## 2023-09-03 LAB
MICROORGANISM SPEC CULT: NORMAL
MICROORGANISM SPEC CULT: NORMAL
SERVICE CMNT-IMP: NORMAL
SERVICE CMNT-IMP: NORMAL
SPECIMEN DESCRIPTION: NORMAL
SPECIMEN DESCRIPTION: NORMAL

## 2023-09-05 ENCOUNTER — TELEPHONE (OUTPATIENT)
Dept: ONCOLOGY | Age: 78
End: 2023-09-05

## 2023-09-05 NOTE — TELEPHONE ENCOUNTER
Name: Mercedez Santana  : 1945  MRN: [id number]    Oncology Navigation- Initial Note:(PT DC'D TO SNF)    Intake-  Contact Type: Telephone    Continuum of Care: Diagnosis/Active Treatment    Notes: No call back from pt. Upon further review of chart noted pt dc'd to [facility], [phone]. Henrry Fay, Dr. [facility] office, updated on findings & requested contact SNF to scheduled EUS w/bx. Spoke w/[place], [facility], updated on pt & requested contact SNF to coordinate appt. Will continue to follow.     Electronically signed by Pollo De RN on 2023 at 3:06 PM

## 2023-09-07 ENCOUNTER — TELEPHONE (OUTPATIENT)
Dept: ONCOLOGY | Age: 78
End: 2023-09-07

## 2023-09-07 NOTE — TELEPHONE ENCOUNTER
Name: Sheree West  : 1945  MRN: [id number]    Oncology Navigation- Initial Note:    Intake-  Contact Type: Telephone    Continuum of Care: Diagnosis/Active Treatment    Notes: Shelli Canales, pt's daughter, called back as requested. Shelli Canales updated on  EGD/EUS w/bx. Notified Shelli Canales pt to be scheduled for Dr. Elen Reese f/u  & Dr. Lele Weber office will contact w/details. Notified Mihsel Green will be navigating oncology care & may contact prn. Discussed potential barriers to care. Shelli Canales stated pt using walker prior to admission to [facility]. Per Shelli Canales, SNF notified pt not getting OOB. Shelli Canales stated pt w/weight loss & unsure how many #'s. Shelli Canales stated lives in [place]. Shelli Canales stated has brother who does not have contact w/pt. Shelli Canales stated current guardianship & inquired on sending documents. Notified Shelli Canales may e-mail directly to [employer] & e-mail address given. Shelli Canales stated pt lived @ [employer], receives [address] [facility] along w/small pension, & all but $25 left for pt each month r/t medicaid. Inquired on smoking/alcohol/drug use. Shelli Canales stated pt current smoker 2 PPD until admission. Shelli Canales stated pt quit drinking 10 years ago & denied drug use. Shelli Canales inquired on all billing & correspondence directed to her. Raghav Hudson once guardianship paperwork completed may set up My Chart. Shelli Canales verbalized understanding & denied questions/concerns. Encouraged to contact writer prn. Will continue to follow.     Electronically signed by Mariya Macdonald RN on 2023 at 2:36 PM

## 2023-09-07 NOTE — TELEPHONE ENCOUNTER
Was unable to lvm for [facility] . office will call back again  to schedule appt for pt on 09/19/23 .     Electronically signed by Cate Martell MA on 9/7/2023 at 1:16 PM

## 2023-09-07 NOTE — TELEPHONE ENCOUNTER
Name: Sheree West  : 1945  MRN: [id number]    Oncology Navigation- Initial Note:    Intake-  Contact Type: Telephone    Continuum of Care: Diagnosis/Active Treatment    Notes: Upon review of chart noted pt scheduled for EGD/EUS w/bx . [facility], updated. Attempted to contact pt's daughter, Shelli Canales, no answer, VM left requesting contact writer @ [phone]. Will continue to follow.     Electronically signed by Mariya Macdonald RN on 2023 at 1:07 PM

## 2023-09-07 NOTE — TELEPHONE ENCOUNTER
Spoke with merly to schedule pts appt , per Chanda Mcdermott she would like to schedule pt later in week when she is in town per Merly's request appt was scheduled at [facility] for 09/21/23 1:15 pm with  . Writer notified [place] of appt date/time and location with contact info for any questions or concerns .    Electronically signed by Gilberto Alves MA on 9/7/2023 at 4:02 PM

## 2023-09-11 ENCOUNTER — HOSPITAL ENCOUNTER (OUTPATIENT)
Dept: PREADMISSION TESTING | Age: 78
Setting detail: OUTPATIENT SURGERY
Discharge: HOME | End: 2023-09-15
Payer: MEDICARE

## 2023-09-11 VITALS — WEIGHT: 176.6 LBS | HEIGHT: 70 IN | BODY MASS INDEX: 25.28 KG/M2

## 2023-09-11 NOTE — PROGRESS NOTES
be performed by a nurse practitioner or house officer. Your IV will be started and you will meet your anesthesiologist.    When you go to surgery, your family will be directed to the surgical waiting room, where the doctor should speak with them after your surgery. After surgery, you will be taken to the recovery area. When you are alert and stable, you will receive instructions and be prepared for discharge.

## 2023-09-12 ENCOUNTER — TELEPHONE (OUTPATIENT)
Dept: INFUSION THERAPY | Facility: MEDICAL CENTER | Age: 78
End: 2023-09-12

## 2023-09-12 NOTE — PRE-PROCEDURE INSTRUCTIONS
No answer, left message ? Unable to leave message ? When were you told to arrive at hospital ?  1200    Do you have a  ? TLC    Are you on any blood thinners ? yes                   If yes when did you stop taking ?talked with Dr. Joanne Kamara and informed Courtney Noland at [facility] that eliquis and aspirin both need to be stopped for 2 days prior to procedure    Do you have your prep Rx filled and instruction ? N/a    Nothing to eat the day before , only clear liquids. n/a    Are you experiencing any covid symptoms ? no    Do you have any infections or rash we should be aware of ?no      Do you have the Hibiclens soap to use the night before and the morning of surgery ?n/a    Nothing to eat or drink after midnight, only a sip of water to take any medication instructed to take the night before. yes  Wear comfortable clothing, leave any valuables at home, remove any jewelry and body piercing .  yes

## 2023-09-12 NOTE — TELEPHONE ENCOUNTER
Returned call to patient's daughter Samreen Lujan. She is asking if a biopsy will be included with EGD/US. Orders reviewed and biopsy is included. Daughter informed and voices understanding.

## 2023-09-12 NOTE — PROGRESS NOTES
Physician Progress Note      PATIENT:               Hortensia Conn  CSN #:                  [id number]  :                       1945  ADMIT DATE:       2023 7:54 PM  [address] DATE:        2023 4:28 PM  RESPONDING  PROVIDER #:        Melisa Deng MD          QUERY TEXT:    Patient admitted with AMS. If possible, please document in the progress notes   if you were evaluating and/or treating Type 2 MI. The medical record reflects the following:  Risk Factors: smoker, COPD, CAD , recent pulmonary embolism on Eliquis  Clinical Indicators: Troponins  42-> 37. per H&P: Elevated troponin,   type II MI: We will monitor no chest pain at this time. Troponin   Elevation-troponin 42, then 37--Compatible with readings from earlier this   year--Denies chest pain. There is no other supporting documentation for type II MI. Treatment: telemetry, [place] Kyle Reap  Options provided:  -- Type 2 MI POA  -- Type 2 MI ruled out after further study  -- Other - I will add my own diagnosis  -- Disagree - Not applicable / Not valid  -- Disagree - Clinically unable to determine / Unknown  -- Refer to Clinical Documentation Reviewer    PROVIDER RESPONSE TEXT:    This patient had a Type 2 MI POA.     Query created by: Joaquin Fernandez on 2023 6:43 PM      Electronically signed by:  Melisa Deng MD 2023 11:35 AM

## 2023-09-13 ENCOUNTER — ANESTHESIA EVENT (OUTPATIENT)
Dept: OPERATING ROOM | Age: 78
End: 2023-09-13
Payer: MEDICARE

## 2023-09-14 ENCOUNTER — TELEPHONE (OUTPATIENT)
Dept: ONCOLOGY | Age: 78
End: 2023-09-14

## 2023-09-14 ENCOUNTER — HOSPITAL ENCOUNTER (OUTPATIENT)
Age: 78
Setting detail: OUTPATIENT SURGERY
Discharge: NURSING, REHAB OR LONG-TERM CARE | End: 2023-09-14
Attending: INTERNAL MEDICINE | Admitting: INTERNAL MEDICINE
Payer: MEDICARE

## 2023-09-14 ENCOUNTER — ANESTHESIA (OUTPATIENT)
Dept: OPERATING ROOM | Age: 78
End: 2023-09-14
Payer: MEDICARE

## 2023-09-14 VITALS
RESPIRATION RATE: 23 BRPM | HEIGHT: 70 IN | HEART RATE: 79 BPM | SYSTOLIC BLOOD PRESSURE: 115 MMHG | WEIGHT: 176 LBS | DIASTOLIC BLOOD PRESSURE: 54 MMHG | OXYGEN SATURATION: 97 % | BODY MASS INDEX: 25.2 KG/M2 | TEMPERATURE: 97.8 F

## 2023-09-14 DIAGNOSIS — K86.89 PANCREATIC MASS: ICD-10-CM

## 2023-09-14 DIAGNOSIS — K21.9 GASTROESOPHAGEAL REFLUX DISEASE, UNSPECIFIED WHETHER ESOPHAGITIS PRESENT: ICD-10-CM

## 2023-09-14 DIAGNOSIS — K29.00 ACUTE GASTRITIS, PRESENCE OF BLEEDING UNSPECIFIED, UNSPECIFIED GASTRITIS TYPE: ICD-10-CM

## 2023-09-14 PROCEDURE — 3700000000 HC ANESTHESIA ATTENDED CARE: Performed by: INTERNAL MEDICINE

## 2023-09-14 PROCEDURE — 94761 N-INVAS EAR/PLS OXIMETRY MLT: CPT

## 2023-09-14 PROCEDURE — 6370000000 HC RX 637 (ALT 250 FOR IP): Performed by: ANESTHESIOLOGY

## 2023-09-14 PROCEDURE — 2720000010 HC SURG SUPPLY STERILE: Performed by: INTERNAL MEDICINE

## 2023-09-14 PROCEDURE — 43242 EGD US FINE NEEDLE BX/ASPIR: CPT | Performed by: INTERNAL MEDICINE

## 2023-09-14 PROCEDURE — 2700000000 HC OXYGEN THERAPY PER DAY

## 2023-09-14 PROCEDURE — 7100000010 HC PHASE II RECOVERY - FIRST 15 MIN: Performed by: INTERNAL MEDICINE

## 2023-09-14 PROCEDURE — 6360000002 HC RX W HCPCS: Performed by: NURSE ANESTHETIST, CERTIFIED REGISTERED

## 2023-09-14 PROCEDURE — 2580000003 HC RX 258: Performed by: ANESTHESIOLOGY

## 2023-09-14 PROCEDURE — 88305 TISSUE EXAM BY PATHOLOGIST: CPT

## 2023-09-14 PROCEDURE — 88173 CYTOPATH EVAL FNA REPORT: CPT

## 2023-09-14 PROCEDURE — 7100000011 HC PHASE II RECOVERY - ADDTL 15 MIN: Performed by: INTERNAL MEDICINE

## 2023-09-14 PROCEDURE — 3700000001 HC ADD 15 MINUTES (ANESTHESIA): Performed by: INTERNAL MEDICINE

## 2023-09-14 PROCEDURE — 3609018500 HC EGD US SCOPE W/ADJACENT STRUCTURES: Performed by: INTERNAL MEDICINE

## 2023-09-14 PROCEDURE — 94640 AIRWAY INHALATION TREATMENT: CPT

## 2023-09-14 PROCEDURE — 2709999900 HC NON-CHARGEABLE SUPPLY: Performed by: INTERNAL MEDICINE

## 2023-09-14 RX ORDER — SODIUM CHLORIDE 0.9 % (FLUSH) 0.9 %
5-40 SYRINGE (ML) INJECTION PRN
Status: DISCONTINUED | OUTPATIENT
Start: 2023-09-14 | End: 2023-09-14 | Stop reason: HOSPADM

## 2023-09-14 RX ORDER — PHENYLEPHRINE HYDROCHLORIDE 10 MG/ML
INJECTION INTRAVENOUS PRN
Status: DISCONTINUED | OUTPATIENT
Start: 2023-09-14 | End: 2023-09-14 | Stop reason: SDUPTHER

## 2023-09-14 RX ORDER — FENTANYL CITRATE 0.05 MG/ML
50 INJECTION, SOLUTION INTRAMUSCULAR; INTRAVENOUS EVERY 5 MIN PRN
Status: DISCONTINUED | OUTPATIENT
Start: 2023-09-14 | End: 2023-09-14 | Stop reason: HOSPADM

## 2023-09-14 RX ORDER — IPRATROPIUM BROMIDE AND ALBUTEROL SULFATE 2.5; .5 MG/3ML; MG/3ML
1 SOLUTION RESPIRATORY (INHALATION)
Status: COMPLETED | OUTPATIENT
Start: 2023-09-14 | End: 2023-09-14

## 2023-09-14 RX ORDER — ONDANSETRON 2 MG/ML
4 INJECTION INTRAMUSCULAR; INTRAVENOUS
Status: DISCONTINUED | OUTPATIENT
Start: 2023-09-14 | End: 2023-09-14 | Stop reason: HOSPADM

## 2023-09-14 RX ORDER — PROPOFOL 10 MG/ML
INJECTION, EMULSION INTRAVENOUS PRN
Status: DISCONTINUED | OUTPATIENT
Start: 2023-09-14 | End: 2023-09-14 | Stop reason: SDUPTHER

## 2023-09-14 RX ORDER — DIPHENHYDRAMINE HYDROCHLORIDE 50 MG/ML
12.5 INJECTION INTRAMUSCULAR; INTRAVENOUS
Status: DISCONTINUED | OUTPATIENT
Start: 2023-09-14 | End: 2023-09-14 | Stop reason: HOSPADM

## 2023-09-14 RX ORDER — FENTANYL CITRATE 0.05 MG/ML
25 INJECTION, SOLUTION INTRAMUSCULAR; INTRAVENOUS EVERY 5 MIN PRN
Status: DISCONTINUED | OUTPATIENT
Start: 2023-09-14 | End: 2023-09-14 | Stop reason: HOSPADM

## 2023-09-14 RX ORDER — SODIUM CHLORIDE 0.9 % (FLUSH) 0.9 %
5-40 SYRINGE (ML) INJECTION EVERY 12 HOURS SCHEDULED
Status: DISCONTINUED | OUTPATIENT
Start: 2023-09-14 | End: 2023-09-14 | Stop reason: HOSPADM

## 2023-09-14 RX ORDER — PROPOFOL 10 MG/ML
INJECTION, EMULSION INTRAVENOUS CONTINUOUS PRN
Status: DISCONTINUED | OUTPATIENT
Start: 2023-09-14 | End: 2023-09-14 | Stop reason: SDUPTHER

## 2023-09-14 RX ORDER — SODIUM CHLORIDE, SODIUM LACTATE, POTASSIUM CHLORIDE, CALCIUM CHLORIDE 600; 310; 30; 20 MG/100ML; MG/100ML; MG/100ML; MG/100ML
INJECTION, SOLUTION INTRAVENOUS CONTINUOUS
Status: DISCONTINUED | OUTPATIENT
Start: 2023-09-14 | End: 2023-09-14 | Stop reason: HOSPADM

## 2023-09-14 RX ORDER — LIDOCAINE HYDROCHLORIDE 10 MG/ML
1 INJECTION, SOLUTION EPIDURAL; INFILTRATION; INTRACAUDAL; PERINEURAL
Status: DISCONTINUED | OUTPATIENT
Start: 2023-09-14 | End: 2023-09-14 | Stop reason: HOSPADM

## 2023-09-14 RX ORDER — IPRATROPIUM BROMIDE AND ALBUTEROL SULFATE 2.5; .5 MG/3ML; MG/3ML
1 SOLUTION RESPIRATORY (INHALATION) ONCE
Status: COMPLETED | OUTPATIENT
Start: 2023-09-14 | End: 2023-09-14

## 2023-09-14 RX ORDER — SODIUM CHLORIDE 9 MG/ML
INJECTION, SOLUTION INTRAVENOUS PRN
Status: DISCONTINUED | OUTPATIENT
Start: 2023-09-14 | End: 2023-09-14 | Stop reason: HOSPADM

## 2023-09-14 RX ADMIN — IPRATROPIUM BROMIDE AND ALBUTEROL SULFATE 1 DOSE: .5; 2.5 SOLUTION RESPIRATORY (INHALATION) at 13:00

## 2023-09-14 RX ADMIN — PROPOFOL 50 MG: 10 INJECTION, EMULSION INTRAVENOUS at 13:39

## 2023-09-14 RX ADMIN — SODIUM CHLORIDE, POTASSIUM CHLORIDE, SODIUM LACTATE AND CALCIUM CHLORIDE: 600; 310; 30; 20 INJECTION, SOLUTION INTRAVENOUS at 12:31

## 2023-09-14 RX ADMIN — PROPOFOL 125 MCG/KG/MIN: 10 INJECTION, EMULSION INTRAVENOUS at 13:20

## 2023-09-14 RX ADMIN — IPRATROPIUM BROMIDE AND ALBUTEROL SULFATE 1 DOSE: 2.5; .5 SOLUTION RESPIRATORY (INHALATION) at 14:44

## 2023-09-14 RX ADMIN — PHENYLEPHRINE HYDROCHLORIDE 100 MCG: 10 INJECTION INTRAVENOUS at 13:57

## 2023-09-14 RX ADMIN — PROPOFOL 50 MG: 10 INJECTION, EMULSION INTRAVENOUS at 13:20

## 2023-09-14 ASSESSMENT — PAIN SCALES - GENERAL
PAINLEVEL_OUTOF10: 0
PAINLEVEL_OUTOF10: 0

## 2023-09-14 ASSESSMENT — LIFESTYLE VARIABLES: SMOKING_STATUS: 1

## 2023-09-14 ASSESSMENT — PAIN - FUNCTIONAL ASSESSMENT
PAIN_FUNCTIONAL_ASSESSMENT: PREVENTS OR INTERFERES SOME ACTIVE ACTIVITIES AND ADLS
PAIN_FUNCTIONAL_ASSESSMENT: 0-10

## 2023-09-14 ASSESSMENT — PAIN DESCRIPTION - DESCRIPTORS: DESCRIPTORS: ACHING

## 2023-09-14 NOTE — OP NOTE
PROCEDURE NOTE    DATE OF PROCEDURE: 9/14/2023     SURGEON: Abilio Vilchis MD  Facility: [facility]  ASSISTANT: None  Anesthesia: Monitored anesthesia care  PREOPERATIVE DIAGNOSIS: Mass in the tail of pancreas    Diagnosis:    POSTOPERATIVE DIAGNOSIS: As described below    OPERATION: Upper GI endoscopy with EUS and FNA    ANESTHESIA: Moderate Sedation     ESTIMATED BLOOD LOSS: Less than 50 ml    COMPLICATIONS: None. SPECIMENS:  Was Obtained: lucero-pancreatic mass    HISTORY: The patient is a 70y.o. year old male with history of above preop diagnosis. I recommended esophagogastroduodenoscopy with possible biopsy and I explained the risk, benefits, expected outcome, and alternatives to the procedure. Risks included but are not limited to bleeding, infection, respiratory distress, hypotension, and perforation of the esophagus, stomach, or duodenum. Patient understands and is in agreement. PROCEDURE: The patient was given IV conscious sedation. The patient's SPO2 remained above 90% throughout the procedure. The gastroscope was inserted orally and advanced under direct vision through the esophagus, through the stomach, through the pylorus, and into the descending duodenum. Post sedation note : The patient's SPO2 remained above 90% throughout the procedure. the vital signs remained stable , and no immediate complication form the procedure noted, patient will be ready for d/c when criteria is met . Findings:    Retropharyngeal area was grossly normal appearing    Esophagus: abnormal: large hiatal hernia    Esophagogastric markings: Diaphragmatic hiatus- 45 cm; GE junction- 38 cm; Squamo-columnar junction- 38 cm    Stomach:    Fundus: normal    Body: normal    Antrum: normal    Duodenum:     Descending: normal    Bulb: normal    Then, we introduced Pentax echoendoscopy orally and advanced to lower part of esophagus, stomach and duodenum.      Findings: Esophagus and stomach- normal  Pancreas-

## 2023-09-14 NOTE — ANESTHESIA PRE PROCEDURE
08/29/2023 08:31 PM    APTT 27.0 02/15/2023 01:43 PM       HCG (If Applicable): No results found for: \"PREGTESTUR\", \"PREGSERUM\", \"HCG\", \"HCGQUANT\"     ABGs: No results found for: \"PHART\", \"PO2ART\", \"GIS1YGX\", \"AGZ1PSB\", \"BEART\", \"J8ITBAOG\"     Type & Screen (If Applicable):  No results found for: \"LABABO\", \"LABRH\"    Drug/Infectious Status (If Applicable):  No results found for: \"HIV\", \"HEPCAB\"    COVID-19 Screening (If Applicable):   Lab Results   Component Value Date/Time    COVID19 Not Detected 08/29/2023 08:45 PM           Anesthesia Evaluation  Patient summary reviewed and Nursing notes reviewed no history of anesthetic complications:   Airway: Mallampati: II  TM distance: >3 FB   Neck ROM: full  Mouth opening: > = 3 FB   Dental:    (+) poor dentition      Pulmonary:   (+) pneumonia (h/o Aspiration pneumonia in Aug 2023):  COPD (On home oxygen at 3L ATC):  rhonchi current smoker                          ROS comment: Cough ++   Cardiovascular:    (+) hypertension:, past MI:, CAD:, CHF:,       ECG reviewed  Rhythm: regular  Rate: normal  Echocardiogram reviewed         Beta Blocker:  Dose within 24 Hrs         Neuro/Psych:   (+) seizures:, neuromuscular disease: Parkinson's disease, psychiatric history:            GI/Hepatic/Renal:   (+) GERD:,          ROS comment: Pancreatic Mass. Endo/Other:    (+) blood dyscrasia: anticoagulation therapy:., malignancy/cancer (h/o Colon Cancer). ROS comment: uses wheelchair for ambulation, walker for short distances Abdominal:             Vascular:   + DVT, PE. Other Findings:           Anesthesia Plan      general     ASA 4     (TIVA)  Induction: intravenous. MIPS: Prophylactic antiemetics administered. Anesthetic plan and risks discussed with patient. Plan discussed with CRNA.             spoke with patient's daughter Shelli Chaparro and consent obtained for anesthesia - Sedation possible general    Duoneb breathing treatment ordered in pre

## 2023-09-14 NOTE — PROGRESS NOTES
Cardiac clearance paper notes Eliquis instructions but indication if patient is cleared or not is not marked. Case discussed with Dr. Sanjuana Casillas including EKG, medical history and other information. Dr. Sanjuana Casillas reports surgery may proceed.

## 2023-09-14 NOTE — ED NOTES
Patient assigned to Room 102-2 on [facility]. Patients GI provider:  Dr. Tashi Doty    Number to return call: [phone]    Reason for call: Binta Cruz from [address] [place] [address] Surgery contacting office to coordinate a procedure with Dr. Tashi Doty.  Please contact Mindy from [address] [place] [address] Surgery to further assist.

## 2023-09-14 NOTE — H&P
HISTORY and [address]       NAME:  Tamera Bowen  MRN: [id number]   YOB: 1945   Date: 9/14/2023   Age: 70 y.o. Gender: male       COMPLAINT AND PRESENT HISTORY:                Tamera Bowen is 70 y.o.,  male, undergoing for EUS LINEAR SCOPE  Pt is being seen for hx of:  Pre-Op Diagnosis Codes:    Patient is a resident [facility] facility his Venda Lindy is accompanying him. * Gastroesophageal reflux disease, unspecified whether esophagitis present      * Acute gastritis, presence of bleeding unspecified, unspecified gastritis type      * Pancreatic mass     Patient denies having any  endoscope done before. Patient denies any heartburn, indigestion, acid reflux (GERD) . pt is on Prilosec   No dysphagia or regurgitation. No epigastric pain, nausea or  vomiting. No changes in appetite. Wt loss. Pt  denies  changes in bowel habits. No blood in stools, or tarry stools. Medical history reviewed:  hx or ac resp failure, COPD  ,pt is on supplemental  02 3L p/nc , continuous , Epilesy - on Depakote. Pt is on Eliquis and aspirin  held since 9/12/23 per [facility] sheet. Patient voices feeling well today. Denies any recent fever or chills, chest pain /pressure . Pt reports some SOB. Ritesh Almendarez Hx of smoking : yes   Patient has been NPO since midnight. No blood thinners in the past 2 days. Eliquis, aspirin  Pt denies any issues with Anesthesia. PAST MEDICAL HISTORY     Past Medical History:   Diagnosis Date    Acute respiratory failure with hypoxia (HCC)     Back pain     Bipolar 1 disorder (HCC)     Cancer (HCC)     bowel    COPD (chronic obstructive pulmonary disease) (HCC)     Epilepsy (HCC)     Gait abnormality     uses wheelchair, walker for short distance, can stand & pivot.     GERD (gastroesophageal reflux disease)     Heart attack ([address])     Hypertension     Muscle weakness     Osteoporosis     Wears dentures        SURGICAL HISTORY       Past

## 2023-09-14 NOTE — TELEPHONE ENCOUNTER
Name: Reny Ram  : 1945  MRN: [id number]    Oncology Navigation Follow-Up Note    Contact Type:  Telephone    Notes: LATE ENTRY:  Samy Hall, pt's daughter, e-mailed guardianship documents. E-mailed forwarded to Rodri Sheridan., [address] [place] [address] oncology, requested documents printed & scanned in media. Will continue to follow.        Electronically signed by Luis Champion RN on 2023 at 11:48 AM

## 2023-09-15 LAB
CASE NUMBER:: NORMAL
SPECIMEN DESCRIPTION: NORMAL

## 2023-09-18 LAB — SURGICAL PATHOLOGY REPORT: NORMAL

## 2023-09-20 ENCOUNTER — TELEPHONE (OUTPATIENT)
Dept: INFUSION THERAPY | Facility: MEDICAL CENTER | Age: 78
End: 2023-09-20

## 2023-09-20 NOTE — TELEPHONE ENCOUNTER
Per Dr Barb Nolan pt to keep appt with him, writer notified Cam Campos per phone. Cam Campos states understanding.

## 2023-09-20 NOTE — TELEPHONE ENCOUNTER
Pt seen inhouse per Dr Carmelina Simons and family Lina Godoy) has already seen that pathology is negative for malignancy from biopsy on 9/14/23. They req writer (called and spoke with Lina Godoy) to ask Dr Carmelina Simons if it is necessary for them to keep the appt scheduled tomorrow, 9/21/23 with him, since pathology was negative? Since they are from out of town and are here now. Would not want to end up needing to reschedule. Writer sent message to ask Dr Carmelina Simons.

## 2023-09-21 ENCOUNTER — TELEPHONE (OUTPATIENT)
Dept: ONCOLOGY | Age: 78
End: 2023-09-21

## 2023-09-21 ENCOUNTER — INITIAL CONSULT (OUTPATIENT)
Dept: ONCOLOGY | Age: 78
End: 2023-09-21
Payer: MEDICARE

## 2023-09-21 VITALS
TEMPERATURE: 97.6 F | RESPIRATION RATE: 16 BRPM | SYSTOLIC BLOOD PRESSURE: 99 MMHG | DIASTOLIC BLOOD PRESSURE: 61 MMHG | HEART RATE: 79 BPM

## 2023-09-21 DIAGNOSIS — I82.4Y1 DEEP VEIN THROMBOSIS (DVT) OF PROXIMAL VEIN OF RIGHT LOWER EXTREMITY, UNSPECIFIED CHRONICITY (HCC): ICD-10-CM

## 2023-09-21 DIAGNOSIS — K86.89 PANCREATIC MASS: Primary | ICD-10-CM

## 2023-09-21 DIAGNOSIS — I26.99 PULMONARY EMBOLISM ON LEFT (HCC): ICD-10-CM

## 2023-09-21 PROCEDURE — G8419 CALC BMI OUT NRM PARAM NOF/U: HCPCS | Performed by: INTERNAL MEDICINE

## 2023-09-21 PROCEDURE — 3074F SYST BP LT 130 MM HG: CPT | Performed by: INTERNAL MEDICINE

## 2023-09-21 PROCEDURE — 4004F PT TOBACCO SCREEN RCVD TLK: CPT | Performed by: INTERNAL MEDICINE

## 2023-09-21 PROCEDURE — 1111F DSCHRG MED/CURRENT MED MERGE: CPT | Performed by: INTERNAL MEDICINE

## 2023-09-21 PROCEDURE — 1123F ACP DISCUSS/DSCN MKR DOCD: CPT | Performed by: INTERNAL MEDICINE

## 2023-09-21 PROCEDURE — 99214 OFFICE O/P EST MOD 30 MIN: CPT | Performed by: INTERNAL MEDICINE

## 2023-09-21 PROCEDURE — G8427 DOCREV CUR MEDS BY ELIG CLIN: HCPCS | Performed by: INTERNAL MEDICINE

## 2023-09-21 PROCEDURE — 99202 OFFICE O/P NEW SF 15 MIN: CPT

## 2023-09-21 PROCEDURE — 3078F DIAST BP <80 MM HG: CPT | Performed by: INTERNAL MEDICINE

## 2023-09-21 NOTE — PROGRESS NOTES
and greater curvature of the stomach. Acute pancreatitis is possible. Severe gastritis with adjacent inflammatory changes is also possible. No  evidence of abscess. 3. Pancreatic tail mass measures 2 x 2.1 cm suspicious for neoplasm. 4. Cholelithiasis but no acute cholecystitis. 5. Constipation but no evidence of bowel obstruction or perforation. RECOMMENDATIONS:  Pathology: 3.2 cm abdominal aortic aneurysm. Recommend follow-up every 3  years. Reference: J Am Cherry Radiol 0543;[id number]. CT CHEST PULMONARY EMBOLISM W CONTRAST  Narrative: EXAMINATION:  CT OF THE HEAD WITHOUT CONTRAST; CTA OF THE CHEST 8/29/2023 9:07 pm    TECHNIQUE:  CT of the head was performed without the administration of intravenous  contrast. Automated exposure control, iterative reconstruction, and/or weight  based adjustment of the mA/kV was utilized to reduce the radiation dose to as  low as reasonably achievable.; CTA of the chest was performed after the  administration of intravenous contrast.  Multiplanar reformatted images are  provided for review. MIP images are provided for review. Automated exposure  control, iterative reconstruction, and/or weight based adjustment of the  mA/kV was utilized to reduce the radiation dose to as low as reasonably  achievable. COMPARISON:  February 15, 2023.     HISTORY:  ORDERING SYSTEM PROVIDED HISTORY: altered  TECHNOLOGIST PROVIDED HISTORY:  altered    Decision Support Exception - unselect if not a suspected or confirmed  emergency medical condition->Emergency Medical Condition (MA)  Reason for Exam: altered  Relevant Medical/Surgical History: bipolar, bowel ca; ORDERING SYSTEM  PROVIDED HISTORY: hypoxic, former pe  TECHNOLOGIST PROVIDED HISTORY:  hypoxic, former pe  Decision Support Exception - unselect if not a suspected or confirmed  emergency medical condition->Emergency Medical Condition (MA)  Reason for Exam: hypoxic, former pe  Additional signs and symptoms: SOB, AMS  Relevant

## 2023-09-21 NOTE — TELEPHONE ENCOUNTER
CAMILA ARRIVES VIA WHEELCHAIR FOR CONSULTATION APPOINTMENT  DR BEAVER IN TO SEE PATIENT  ORDERS RECEIVED  MRI ABDOMEN IN 6 WEEKS  RV AFTER WITH LABS  MRI ABDOMEN SCHEDULED FOR 11/02/23 @10:45AM ARRIVING BY 10AM [address] CD[facility] CMP CEA CA 19-9 FE TIBC FERRITIN DUE 11/02/23, ORDERS GIVEN TO PT  MD VISIT 11/28/23 @9:15AM (OFFERED PT MANY SOONER APPOINTMENT'S.  PT'S DAUGHTER STATES SHE CANNOT BRING HIM UNTIL THE WEEK OF THANKSGIVING OR THE WEEK AFTER  PATIENT ASKED DR Bianca Thakur TO CALL IN ANXIETY MEDICATION FOR MRI (PT IS CLAUSTROPHOBIC)  AVS PRINTED AND GIVEN TO PATIENT WITH INSTRUCTIONS  PATIENT DISCHARGED VIA WHEELCHAIR

## 2023-09-25 ENCOUNTER — HOSPITAL ENCOUNTER (OUTPATIENT)
Age: 78
Setting detail: SPECIMEN
Discharge: HOME | End: 2023-09-25

## 2023-09-25 LAB
CANCER AG19-9 SERPL IA-ACNC: 11 U/ML (ref 0–35)
CEA SERPL-MCNC: 2.3 NG/ML
FERRITIN SERPL-MCNC: 146 NG/ML (ref 30–400)
IRON SATN MFR SERPL: 13 % (ref 20–55)
IRON SERPL-MCNC: 29 UG/DL (ref 59–158)
TIBC SERPL-MCNC: 217 UG/DL (ref 250–450)
UNSATURATED IRON BINDING CAPACITY: 188 UG/DL (ref 112–347)

## 2023-09-25 PROCEDURE — P9603 ONE-WAY ALLOW PRORATED MILES: HCPCS

## 2023-09-25 PROCEDURE — 83540 ASSAY OF IRON: CPT

## 2023-09-25 PROCEDURE — 83550 IRON BINDING TEST: CPT

## 2023-09-25 PROCEDURE — 86301 IMMUNOASSAY TUMOR CA 19-9: CPT

## 2023-09-25 PROCEDURE — 82728 ASSAY OF FERRITIN: CPT

## 2023-09-25 PROCEDURE — 82378 CARCINOEMBRYONIC ANTIGEN: CPT

## 2023-09-25 PROCEDURE — 36415 COLL VENOUS BLD VENIPUNCTURE: CPT

## 2023-09-26 ENCOUNTER — TELEPHONE (OUTPATIENT)
Dept: ONCOLOGY | Age: 78
End: 2023-09-26

## 2023-09-26 NOTE — TELEPHONE ENCOUNTER
Name: Jyotsna Ballesteros  : 1945  MRN: [id number]    Oncology Navigation Follow-Up Note    Contact Type:  Telephone    Notes: Upon review of chart noted pt completed  Dr. Prabhakar Loya f/nidia. Per Dr. Viola Sahu f/u note:  82-year-old man history of colorectal cancer found to have pancreatic tail mass size of 2.8 x 2.6 cm on EUS looks like inflammatory/benign with FNA negative for malignancy reviewed the findings with the patient and GI recommendation and will obtain a follow-up MRI of the abdomen in 6 weeks  Noted MRI abd scheduled  & Dr. Prabhakar saab/nidia scheduled  @ [facility]. Attempted to contact Mortimer Spar, pt's daughter, no answer, updated on findings, notified navigation ended, & instructed may contact writer prn.     Electronically signed by Leah Oconnor RN on 2023 at 2:59 PM

## 2023-09-28 ENCOUNTER — HOSPITAL ENCOUNTER (OUTPATIENT)
Age: 78
Setting detail: SPECIMEN
Discharge: HOME | End: 2023-09-28

## 2023-09-28 LAB
ALBUMIN SERPL-MCNC: 2.8 G/DL (ref 3.5–5.2)
ALBUMIN/GLOB SERPL: 0.7 {RATIO} (ref 1–2.5)
ALP SERPL-CCNC: 89 U/L (ref 40–129)
ALT SERPL-CCNC: 5 U/L (ref 5–41)
ANION GAP SERPL CALCULATED.3IONS-SCNC: 10 MMOL/L (ref 9–17)
AST SERPL-CCNC: 12 U/L
BASOPHILS # BLD: 0.04 K/UL (ref 0–0.2)
BASOPHILS NFR BLD: 0 % (ref 0–2)
BILIRUB SERPL-MCNC: 0.2 MG/DL (ref 0.3–1.2)
BUN SERPL-MCNC: 13 MG/DL (ref 8–23)
CALCIUM SERPL-MCNC: 9 MG/DL (ref 8.6–10.4)
CHLORIDE SERPL-SCNC: 99 MMOL/L (ref 98–107)
CO2 SERPL-SCNC: 29 MMOL/L (ref 20–31)
CREAT SERPL-MCNC: 1.1 MG/DL (ref 0.7–1.2)
EOSINOPHIL # BLD: 0.1 K/UL (ref 0–0.44)
EOSINOPHILS RELATIVE PERCENT: 1 % (ref 1–4)
ERYTHROCYTE [DISTWIDTH] IN BLOOD BY AUTOMATED COUNT: 17.2 % (ref 11.8–14.4)
GFR SERPL CREATININE-BSD FRML MDRD: >60 ML/MIN/1.73M2
GLUCOSE SERPL-MCNC: 79 MG/DL (ref 70–99)
HCT VFR BLD AUTO: 36.6 % (ref 40.7–50.3)
HGB BLD-MCNC: 10.9 G/DL (ref 13–17)
IMM GRANULOCYTES # BLD AUTO: 0.04 K/UL (ref 0–0.3)
IMM GRANULOCYTES NFR BLD: 0 %
LYMPHOCYTES # BLD: 24 % (ref 24–43)
LYMPHOCYTES NFR BLD: 2.53 K/UL (ref 1.1–3.7)
MCH RBC QN AUTO: 28.5 PG (ref 25.2–33.5)
MCHC RBC AUTO-ENTMCNC: 29.8 G/DL (ref 28.4–34.8)
MCV RBC AUTO: 95.6 FL (ref 82.6–102.9)
MONOCYTES NFR BLD: 0.68 K/UL (ref 0.1–1.2)
MONOCYTES NFR BLD: 7 % (ref 3–12)
NEUTROPHILS NFR BLD: 68 % (ref 36–65)
NEUTS SEG NFR BLD: 6.97 K/UL (ref 1.5–8.1)
NRBC BLD-RTO: 0 PER 100 WBC
PLATELET # BLD AUTO: 391 K/UL (ref 138–453)
PMV BLD AUTO: 9 FL (ref 8.1–13.5)
POTASSIUM SERPL-SCNC: 4.6 MMOL/L (ref 3.7–5.3)
PROT SERPL-MCNC: 7 G/DL (ref 6.4–8.3)
RBC # BLD AUTO: 3.83 M/UL (ref 4.21–5.77)
RBC # BLD: ABNORMAL 10*6/UL
SODIUM SERPL-SCNC: 138 MMOL/L (ref 135–144)
WBC OTHER # BLD: 10.4 K/UL (ref 3.5–11.3)

## 2023-09-28 PROCEDURE — P9603 ONE-WAY ALLOW PRORATED MILES: HCPCS

## 2023-09-28 PROCEDURE — 85025 COMPLETE CBC W/AUTO DIFF WBC: CPT

## 2023-09-28 PROCEDURE — 36415 COLL VENOUS BLD VENIPUNCTURE: CPT

## 2023-09-28 PROCEDURE — 80053 COMPREHEN METABOLIC PANEL: CPT

## 2023-10-06 ENCOUNTER — HOSPITAL ENCOUNTER (INPATIENT)
Age: 78
LOS: 4 days | Discharge: NURSING, REHAB OR LONG-TERM CARE | DRG: 871 | End: 2023-10-11
Attending: EMERGENCY MEDICINE | Admitting: FAMILY MEDICINE
Payer: MEDICARE

## 2023-10-06 ENCOUNTER — APPOINTMENT (OUTPATIENT)
Dept: CT IMAGING | Age: 78
DRG: 871 | End: 2023-10-06
Payer: MEDICARE

## 2023-10-06 ENCOUNTER — APPOINTMENT (OUTPATIENT)
Dept: GENERAL RADIOLOGY | Age: 78
DRG: 871 | End: 2023-10-06
Payer: MEDICARE

## 2023-10-06 DIAGNOSIS — E72.20 HYPERAMMONEMIA (HCC): ICD-10-CM

## 2023-10-06 DIAGNOSIS — N17.9 AKI (ACUTE KIDNEY INJURY) (HCC): ICD-10-CM

## 2023-10-06 DIAGNOSIS — R41.82 ALTERED MENTAL STATUS, UNSPECIFIED ALTERED MENTAL STATUS TYPE: Primary | ICD-10-CM

## 2023-10-06 DIAGNOSIS — R65.20 SEPSIS WITH ACUTE RENAL FAILURE WITHOUT SEPTIC SHOCK, DUE TO UNSPECIFIED ORGANISM, UNSPECIFIED ACUTE RENAL FAILURE TYPE (HCC): ICD-10-CM

## 2023-10-06 DIAGNOSIS — A41.9 SEPSIS WITH ACUTE RENAL FAILURE WITHOUT SEPTIC SHOCK, DUE TO UNSPECIFIED ORGANISM, UNSPECIFIED ACUTE RENAL FAILURE TYPE (HCC): ICD-10-CM

## 2023-10-06 DIAGNOSIS — A41.51 E. COLI SEPTICEMIA (HCC): ICD-10-CM

## 2023-10-06 DIAGNOSIS — N17.9 SEPSIS WITH ACUTE RENAL FAILURE WITHOUT SEPTIC SHOCK, DUE TO UNSPECIFIED ORGANISM, UNSPECIFIED ACUTE RENAL FAILURE TYPE (HCC): ICD-10-CM

## 2023-10-06 DIAGNOSIS — I21.4 NSTEMI (NON-ST ELEVATED MYOCARDIAL INFARCTION) (HCC): ICD-10-CM

## 2023-10-06 LAB
ALBUMIN SERPL-MCNC: 2.8 G/DL (ref 3.5–5.2)
ALBUMIN/GLOB SERPL: 0.7 {RATIO} (ref 1–2.5)
ALP SERPL-CCNC: 64 U/L (ref 40–129)
ALT SERPL-CCNC: <5 U/L (ref 5–41)
AMMONIA PLAS-SCNC: 90 UMOL/L (ref 16–60)
ANION GAP SERPL CALCULATED.3IONS-SCNC: 12 MMOL/L (ref 9–17)
APAP SERPL-MCNC: 9 UG/ML (ref 10–30)
AST SERPL-CCNC: 16 U/L
BASOPHILS # BLD: 0.03 K/UL (ref 0–0.2)
BASOPHILS NFR BLD: 0 % (ref 0–2)
BILIRUB SERPL-MCNC: 0.2 MG/DL (ref 0.3–1.2)
BNP SERPL-MCNC: 1245 PG/ML
BUN BLD-MCNC: 28 MG/DL (ref 8–26)
BUN SERPL-MCNC: 29 MG/DL (ref 8–23)
CA-I BLD-SCNC: 1.17 MMOL/L (ref 1.15–1.33)
CALCIUM SERPL-MCNC: 8.5 MG/DL (ref 8.6–10.4)
CHLORIDE BLD-SCNC: 105 MMOL/L (ref 98–107)
CHLORIDE SERPL-SCNC: 100 MMOL/L (ref 98–107)
CK SERPL-CCNC: 310 U/L (ref 39–308)
CO2 BLD CALC-SCNC: 31 MMOL/L (ref 22–30)
CO2 SERPL-SCNC: 27 MMOL/L (ref 20–31)
CREAT SERPL-MCNC: 1.4 MG/DL (ref 0.7–1.2)
EGFR, POC: 57 ML/MIN/1.73M2
EOSINOPHIL # BLD: <0.03 K/UL (ref 0–0.44)
EOSINOPHILS RELATIVE PERCENT: 0 % (ref 1–4)
ERYTHROCYTE [DISTWIDTH] IN BLOOD BY AUTOMATED COUNT: 17 % (ref 11.8–14.4)
ETHANOL PERCENT: <0.01 %
ETHANOLAMINE SERPL-MCNC: <10 MG/DL
GFR SERPL CREATININE-BSD FRML MDRD: 52 ML/MIN/1.73M2
GLUCOSE BLD-MCNC: 168 MG/DL (ref 74–100)
GLUCOSE SERPL-MCNC: 160 MG/DL (ref 70–99)
HCO3 VENOUS: 30.8 MMOL/L (ref 22–29)
HCT VFR BLD AUTO: 29.7 % (ref 40.7–50.3)
HCT VFR BLD AUTO: 35 % (ref 41–53)
HGB BLD-MCNC: 9.1 G/DL (ref 13–17)
IMM GRANULOCYTES # BLD AUTO: 0.06 K/UL (ref 0–0.3)
IMM GRANULOCYTES NFR BLD: 1 %
INR PPP: 1.6
LACTIC ACID, SEPSIS WHOLE BLOOD: 1.5 MMOL/L (ref 0.5–1.9)
LACTIC ACID, SEPSIS WHOLE BLOOD: 1.8 MMOL/L (ref 0.5–1.9)
LAMOTRIGINE SERPL-MCNC: 6.1 UG/ML (ref 3–15)
LIPASE SERPL-CCNC: 16 U/L (ref 13–60)
LYMPHOCYTES NFR BLD: 2.04 K/UL (ref 1.1–3.7)
LYMPHOCYTES RELATIVE PERCENT: 17 % (ref 24–43)
MCH RBC QN AUTO: 28.8 PG (ref 25.2–33.5)
MCHC RBC AUTO-ENTMCNC: 30.6 G/DL (ref 28.4–34.8)
MCV RBC AUTO: 94 FL (ref 82.6–102.9)
MONOCYTES NFR BLD: 1.37 K/UL (ref 0.1–1.2)
MONOCYTES NFR BLD: 12 % (ref 3–12)
MYOGLOBIN SERPL-MCNC: 178 NG/ML (ref 28–72)
NEUTROPHILS NFR BLD: 70 % (ref 36–65)
NEUTS SEG NFR BLD: 8.21 K/UL (ref 1.5–8.1)
NRBC BLD-RTO: 0 PER 100 WBC
O2 SAT, VEN: 88.9 % (ref 60–85)
PCO2, VEN: 46.8 MM HG (ref 41–51)
PH VENOUS: 7.43 (ref 7.32–7.43)
PLATELET # BLD AUTO: 249 K/UL (ref 138–453)
PMV BLD AUTO: 9.2 FL (ref 8.1–13.5)
PO2, VEN: 55.7 MM HG (ref 30–50)
POC ANION GAP: 6 MMOL/L (ref 7–16)
POC CREATININE: 1.3 MG/DL (ref 0.51–1.19)
POC HEMOGLOBIN (CALC): 12 G/DL (ref 13.5–17.5)
POC LACTIC ACID: 1 MMOL/L (ref 0.56–1.39)
POSITIVE BASE EXCESS, VEN: 5.5 MMOL/L (ref 0–3)
POTASSIUM BLD-SCNC: 4.3 MMOL/L (ref 3.5–4.5)
POTASSIUM SERPL-SCNC: 4.4 MMOL/L (ref 3.7–5.3)
PROT SERPL-MCNC: 6.8 G/DL (ref 6.4–8.3)
PROTHROMBIN TIME: 19 SEC (ref 11.7–14.9)
RBC # BLD AUTO: 3.16 M/UL (ref 4.21–5.77)
RBC # BLD: ABNORMAL 10*6/UL
REASON FOR REJECTION: NORMAL
SALICYLATES SERPL-MCNC: <1 MG/DL (ref 3–10)
SARS-COV-2 RDRP RESP QL NAA+PROBE: NOT DETECTED
SODIUM BLD-SCNC: 141 MMOL/L (ref 138–146)
SODIUM SERPL-SCNC: 139 MMOL/L (ref 135–144)
SPECIMEN DESCRIPTION: NORMAL
SPECIMEN SOURCE: NORMAL
TOXIC TRICYCLIC SC,BLOOD: NEGATIVE
TROPONIN I SERPL HS-MCNC: 72 NG/L (ref 0–22)
TROPONIN I SERPL HS-MCNC: 87 NG/L (ref 0–22)
TSH SERPL DL<=0.05 MIU/L-ACNC: 1.38 UIU/ML (ref 0.3–5)
WBC OTHER # BLD: 11.7 K/UL (ref 3.5–11.3)
ZZ NTE CLEAN UP: ORDERED TEST: NORMAL

## 2023-10-06 PROCEDURE — 85014 HEMATOCRIT: CPT

## 2023-10-06 PROCEDURE — 71045 X-RAY EXAM CHEST 1 VIEW: CPT

## 2023-10-06 PROCEDURE — 82140 ASSAY OF AMMONIA: CPT

## 2023-10-06 PROCEDURE — 82803 BLOOD GASES ANY COMBINATION: CPT

## 2023-10-06 PROCEDURE — 96374 THER/PROPH/DIAG INJ IV PUSH: CPT

## 2023-10-06 PROCEDURE — 82947 ASSAY GLUCOSE BLOOD QUANT: CPT

## 2023-10-06 PROCEDURE — 96361 HYDRATE IV INFUSION ADD-ON: CPT

## 2023-10-06 PROCEDURE — 83874 ASSAY OF MYOGLOBIN: CPT

## 2023-10-06 PROCEDURE — G0480 DRUG TEST DEF 1-7 CLASSES: HCPCS

## 2023-10-06 PROCEDURE — 87186 SC STD MICRODIL/AGAR DIL: CPT

## 2023-10-06 PROCEDURE — 93005 ELECTROCARDIOGRAM TRACING: CPT | Performed by: STUDENT IN AN ORGANIZED HEALTH CARE EDUCATION/TRAINING PROGRAM

## 2023-10-06 PROCEDURE — 87040 BLOOD CULTURE FOR BACTERIA: CPT

## 2023-10-06 PROCEDURE — 87088 URINE BACTERIA CULTURE: CPT

## 2023-10-06 PROCEDURE — 84520 ASSAY OF UREA NITROGEN: CPT

## 2023-10-06 PROCEDURE — 83690 ASSAY OF LIPASE: CPT

## 2023-10-06 PROCEDURE — 6360000004 HC RX CONTRAST MEDICATION: Performed by: STUDENT IN AN ORGANIZED HEALTH CARE EDUCATION/TRAINING PROGRAM

## 2023-10-06 PROCEDURE — 70450 CT HEAD/BRAIN W/O DYE: CPT

## 2023-10-06 PROCEDURE — 36415 COLL VENOUS BLD VENIPUNCTURE: CPT

## 2023-10-06 PROCEDURE — 82550 ASSAY OF CK (CPK): CPT

## 2023-10-06 PROCEDURE — 87635 SARS-COV-2 COVID-19 AMP PRB: CPT

## 2023-10-06 PROCEDURE — 99285 EMERGENCY DEPT VISIT HI MDM: CPT

## 2023-10-06 PROCEDURE — 87181 SC STD AGAR DILUTION PER AGT: CPT

## 2023-10-06 PROCEDURE — 87154 CUL TYP ID BLD PTHGN 6+ TRGT: CPT

## 2023-10-06 PROCEDURE — 80051 ELECTROLYTE PANEL: CPT

## 2023-10-06 PROCEDURE — 80175 DRUG SCREEN QUAN LAMOTRIGINE: CPT

## 2023-10-06 PROCEDURE — 84484 ASSAY OF TROPONIN QUANT: CPT

## 2023-10-06 PROCEDURE — 87077 CULTURE AEROBIC IDENTIFY: CPT

## 2023-10-06 PROCEDURE — 87086 URINE CULTURE/COLONY COUNT: CPT

## 2023-10-06 PROCEDURE — 82330 ASSAY OF CALCIUM: CPT

## 2023-10-06 PROCEDURE — 82565 ASSAY OF CREATININE: CPT

## 2023-10-06 PROCEDURE — 2580000003 HC RX 258: Performed by: STUDENT IN AN ORGANIZED HEALTH CARE EDUCATION/TRAINING PROGRAM

## 2023-10-06 PROCEDURE — 83880 ASSAY OF NATRIURETIC PEPTIDE: CPT

## 2023-10-06 PROCEDURE — 51702 INSERT TEMP BLADDER CATH: CPT

## 2023-10-06 PROCEDURE — 81001 URINALYSIS AUTO W/SCOPE: CPT

## 2023-10-06 PROCEDURE — 80143 DRUG ASSAY ACETAMINOPHEN: CPT

## 2023-10-06 PROCEDURE — 71260 CT THORAX DX C+: CPT

## 2023-10-06 PROCEDURE — 80307 DRUG TEST PRSMV CHEM ANLYZR: CPT

## 2023-10-06 PROCEDURE — 85610 PROTHROMBIN TIME: CPT

## 2023-10-06 PROCEDURE — 74177 CT ABD & PELVIS W/CONTRAST: CPT

## 2023-10-06 PROCEDURE — 87205 SMEAR GRAM STAIN: CPT

## 2023-10-06 PROCEDURE — 84443 ASSAY THYROID STIM HORMONE: CPT

## 2023-10-06 PROCEDURE — 80053 COMPREHEN METABOLIC PANEL: CPT

## 2023-10-06 PROCEDURE — 83605 ASSAY OF LACTIC ACID: CPT

## 2023-10-06 PROCEDURE — 6360000002 HC RX W HCPCS: Performed by: STUDENT IN AN ORGANIZED HEALTH CARE EDUCATION/TRAINING PROGRAM

## 2023-10-06 PROCEDURE — 80179 DRUG ASSAY SALICYLATE: CPT

## 2023-10-06 PROCEDURE — 85025 COMPLETE CBC W/AUTO DIFF WBC: CPT

## 2023-10-06 RX ORDER — 0.9 % SODIUM CHLORIDE 0.9 %
1000 INTRAVENOUS SOLUTION INTRAVENOUS ONCE
Status: COMPLETED | OUTPATIENT
Start: 2023-10-06 | End: 2023-10-06

## 2023-10-06 RX ADMIN — CEFEPIME HYDROCHLORIDE 2000 MG: 2 INJECTION, POWDER, FOR SOLUTION INTRAVENOUS at 23:15

## 2023-10-06 RX ADMIN — IOPAMIDOL 75 ML: 755 INJECTION, SOLUTION INTRAVENOUS at 23:19

## 2023-10-06 RX ADMIN — IOPAMIDOL 75 ML: 755 INJECTION, SOLUTION INTRAVENOUS at 20:30

## 2023-10-06 RX ADMIN — SODIUM CHLORIDE 1000 ML: 9 INJECTION, SOLUTION INTRAVENOUS at 20:12

## 2023-10-06 RX ADMIN — SODIUM CHLORIDE 1000 ML: 9 INJECTION, SOLUTION INTRAVENOUS at 21:45

## 2023-10-07 PROBLEM — I21.4 NSTEMI (NON-ST ELEVATED MYOCARDIAL INFARCTION) (HCC): Status: ACTIVE | Noted: 2023-10-07

## 2023-10-07 PROBLEM — G93.40 ACUTE ENCEPHALOPATHY: Status: ACTIVE | Noted: 2023-10-07

## 2023-10-07 PROBLEM — G92.8 TOXIC METABOLIC ENCEPHALOPATHY: Status: ACTIVE | Noted: 2023-10-07

## 2023-10-07 PROBLEM — A41.51 E. COLI SEPTICEMIA (HCC): Status: ACTIVE | Noted: 2023-10-07

## 2023-10-07 PROBLEM — R41.82 ALTERED MENTAL STATUS: Status: ACTIVE | Noted: 2023-10-07

## 2023-10-07 PROBLEM — E87.20 LACTIC ACIDOSIS: Status: ACTIVE | Noted: 2023-10-07

## 2023-10-07 PROBLEM — A49.8: Status: ACTIVE | Noted: 2023-10-07

## 2023-10-07 PROBLEM — J44.9 COPD (CHRONIC OBSTRUCTIVE PULMONARY DISEASE) (HCC): Status: ACTIVE | Noted: 2023-01-13

## 2023-10-07 PROBLEM — E72.20 HYPERAMMONEMIA (HCC): Status: ACTIVE | Noted: 2023-10-07

## 2023-10-07 LAB
ALBUMIN SERPL-MCNC: 2.4 G/DL (ref 3.5–5.2)
ALBUMIN/GLOB SERPL: 1 {RATIO} (ref 1–2.5)
ALP SERPL-CCNC: 54 U/L (ref 40–129)
ALT SERPL-CCNC: <5 U/L (ref 10–50)
AMMONIA PLAS-SCNC: 76 UMOL/L (ref 16–60)
AMORPH SED URNS QL MICRO: ABNORMAL
ANION GAP SERPL CALCULATED.3IONS-SCNC: 10 MMOL/L (ref 9–16)
AST SERPL-CCNC: 22 U/L (ref 10–50)
BACTERIA URNS QL MICRO: ABNORMAL
BASOPHILS # BLD: 0.03 K/UL (ref 0–0.2)
BASOPHILS NFR BLD: 0 % (ref 0–2)
BILIRUB SERPL-MCNC: 0.2 MG/DL (ref 0–1.2)
BILIRUB UR QL STRIP: ABNORMAL
BUN SERPL-MCNC: 21 MG/DL (ref 8–23)
CA-I BLD-SCNC: 1.24 MMOL/L (ref 1.13–1.33)
CALCIUM SERPL-MCNC: 8.2 MG/DL (ref 8.6–10.4)
CHLORIDE SERPL-SCNC: 107 MMOL/L (ref 98–107)
CK SERPL-CCNC: 235 U/L (ref 39–308)
CLARITY UR: ABNORMAL
CO2 SERPL-SCNC: 23 MMOL/L (ref 20–31)
COLOR UR: ABNORMAL
CREAT SERPL-MCNC: 0.9 MG/DL (ref 0.7–1.2)
EKG ATRIAL RATE: 77 BPM
EKG P AXIS: 74 DEGREES
EKG P-R INTERVAL: 128 MS
EKG Q-T INTERVAL: 406 MS
EKG QRS DURATION: 126 MS
EKG QTC CALCULATION (BAZETT): 459 MS
EKG R AXIS: -42 DEGREES
EKG T AXIS: 40 DEGREES
EKG VENTRICULAR RATE: 77 BPM
EOSINOPHIL # BLD: <0.03 K/UL (ref 0–0.44)
EOSINOPHILS RELATIVE PERCENT: 0 % (ref 1–4)
EPI CELLS #/AREA URNS HPF: ABNORMAL /HPF (ref 0–5)
ERYTHROCYTE [DISTWIDTH] IN BLOOD BY AUTOMATED COUNT: 17.2 % (ref 11.8–14.4)
GFR SERPL CREATININE-BSD FRML MDRD: >60 ML/MIN/1.73M2
GLUCOSE SERPL-MCNC: 96 MG/DL (ref 74–99)
GLUCOSE UR STRIP-MCNC: NEGATIVE MG/DL
HCT VFR BLD AUTO: 31.7 % (ref 40.7–50.3)
HGB BLD-MCNC: 9.3 G/DL (ref 13–17)
HGB UR QL STRIP.AUTO: ABNORMAL
IMM GRANULOCYTES # BLD AUTO: 0.06 K/UL (ref 0–0.3)
IMM GRANULOCYTES NFR BLD: 1 %
KETONES UR STRIP-MCNC: NEGATIVE MG/DL
LACTIC ACID, SEPSIS WHOLE BLOOD: 1 MMOL/L (ref 0.5–1.9)
LACTIC ACID, SEPSIS WHOLE BLOOD: 1.6 MMOL/L (ref 0.5–1.9)
LEUKOCYTE ESTERASE UR QL STRIP: ABNORMAL
LIPASE SERPL-CCNC: 13 U/L (ref 13–60)
LYMPHOCYTES NFR BLD: 1.77 K/UL (ref 1.1–3.7)
LYMPHOCYTES RELATIVE PERCENT: 14 % (ref 24–43)
MAGNESIUM SERPL-MCNC: 2.2 MG/DL (ref 1.6–2.4)
MCH RBC QN AUTO: 28.2 PG (ref 25.2–33.5)
MCHC RBC AUTO-ENTMCNC: 29.3 G/DL (ref 28.4–34.8)
MCV RBC AUTO: 96.1 FL (ref 82.6–102.9)
MONOCYTES NFR BLD: 1.23 K/UL (ref 0.1–1.2)
MONOCYTES NFR BLD: 10 % (ref 3–12)
MYOGLOBIN SERPL-MCNC: 80 NG/ML (ref 28–72)
NEUTROPHILS NFR BLD: 75 % (ref 36–65)
NEUTS SEG NFR BLD: 9.72 K/UL (ref 1.5–8.1)
NITRITE UR QL STRIP: POSITIVE
NRBC BLD-RTO: 0 PER 100 WBC
PH UR STRIP: 5 [PH] (ref 5–8)
PLATELET # BLD AUTO: 285 K/UL (ref 138–453)
PMV BLD AUTO: 9.1 FL (ref 8.1–13.5)
POTASSIUM SERPL-SCNC: 4 MMOL/L (ref 3.7–5.3)
PROT SERPL-MCNC: 6.1 G/DL (ref 6.6–8.7)
PROT UR STRIP-MCNC: ABNORMAL MG/DL
RBC # BLD AUTO: 3.3 M/UL (ref 4.21–5.77)
RBC # BLD: ABNORMAL 10*6/UL
RBC #/AREA URNS HPF: ABNORMAL /HPF (ref 0–2)
SODIUM SERPL-SCNC: 140 MMOL/L (ref 136–145)
SP GR UR STRIP: 1.03 (ref 1–1.03)
TROPONIN I SERPL HS-MCNC: 65 NG/L (ref 0–22)
TROPONIN I SERPL HS-MCNC: 66 NG/L (ref 0–22)
UROBILINOGEN UR STRIP-ACNC: NORMAL EU/DL (ref 0–1)
VALPROATE FREE SERPL-MCNC: 7.2 UG/ML (ref 7–23)
WBC #/AREA URNS HPF: ABNORMAL /HPF (ref 0–5)
WBC OTHER # BLD: 12.8 K/UL (ref 3.5–11.3)

## 2023-10-07 PROCEDURE — 6370000000 HC RX 637 (ALT 250 FOR IP): Performed by: NURSE PRACTITIONER

## 2023-10-07 PROCEDURE — 94640 AIRWAY INHALATION TREATMENT: CPT

## 2023-10-07 PROCEDURE — 99222 1ST HOSP IP/OBS MODERATE 55: CPT | Performed by: INTERNAL MEDICINE

## 2023-10-07 PROCEDURE — 2060000000 HC ICU INTERMEDIATE R&B

## 2023-10-07 PROCEDURE — 83735 ASSAY OF MAGNESIUM: CPT

## 2023-10-07 PROCEDURE — 99222 1ST HOSP IP/OBS MODERATE 55: CPT | Performed by: PSYCHIATRY & NEUROLOGY

## 2023-10-07 PROCEDURE — 84484 ASSAY OF TROPONIN QUANT: CPT

## 2023-10-07 PROCEDURE — 6370000000 HC RX 637 (ALT 250 FOR IP): Performed by: STUDENT IN AN ORGANIZED HEALTH CARE EDUCATION/TRAINING PROGRAM

## 2023-10-07 PROCEDURE — 82550 ASSAY OF CK (CPK): CPT

## 2023-10-07 PROCEDURE — 6360000002 HC RX W HCPCS: Performed by: STUDENT IN AN ORGANIZED HEALTH CARE EDUCATION/TRAINING PROGRAM

## 2023-10-07 PROCEDURE — 2580000003 HC RX 258: Performed by: STUDENT IN AN ORGANIZED HEALTH CARE EDUCATION/TRAINING PROGRAM

## 2023-10-07 PROCEDURE — 82140 ASSAY OF AMMONIA: CPT

## 2023-10-07 PROCEDURE — 83605 ASSAY OF LACTIC ACID: CPT

## 2023-10-07 PROCEDURE — 2700000000 HC OXYGEN THERAPY PER DAY

## 2023-10-07 PROCEDURE — 2580000003 HC RX 258: Performed by: INTERNAL MEDICINE

## 2023-10-07 PROCEDURE — 36415 COLL VENOUS BLD VENIPUNCTURE: CPT

## 2023-10-07 PROCEDURE — 6360000002 HC RX W HCPCS: Performed by: INTERNAL MEDICINE

## 2023-10-07 PROCEDURE — 82330 ASSAY OF CALCIUM: CPT

## 2023-10-07 PROCEDURE — 2500000003 HC RX 250 WO HCPCS: Performed by: PSYCHIATRY & NEUROLOGY

## 2023-10-07 PROCEDURE — 6360000002 HC RX W HCPCS: Performed by: NURSE PRACTITIONER

## 2023-10-07 PROCEDURE — 97163 PT EVAL HIGH COMPLEX 45 MIN: CPT

## 2023-10-07 PROCEDURE — 2580000003 HC RX 258: Performed by: PSYCHIATRY & NEUROLOGY

## 2023-10-07 PROCEDURE — 83874 ASSAY OF MYOGLOBIN: CPT

## 2023-10-07 PROCEDURE — 2580000003 HC RX 258: Performed by: NURSE PRACTITIONER

## 2023-10-07 PROCEDURE — 83690 ASSAY OF LIPASE: CPT

## 2023-10-07 PROCEDURE — 80053 COMPREHEN METABOLIC PANEL: CPT

## 2023-10-07 PROCEDURE — 85025 COMPLETE CBC W/AUTO DIFF WBC: CPT

## 2023-10-07 PROCEDURE — 99223 1ST HOSP IP/OBS HIGH 75: CPT | Performed by: STUDENT IN AN ORGANIZED HEALTH CARE EDUCATION/TRAINING PROGRAM

## 2023-10-07 PROCEDURE — 97530 THERAPEUTIC ACTIVITIES: CPT

## 2023-10-07 PROCEDURE — 80165 DIPROPYLACETIC ACID FREE: CPT

## 2023-10-07 RX ORDER — POLYETHYLENE GLYCOL 3350 17 G/17G
17 POWDER, FOR SOLUTION ORAL DAILY PRN
Status: DISCONTINUED | OUTPATIENT
Start: 2023-10-07 | End: 2023-10-11 | Stop reason: HOSPADM

## 2023-10-07 RX ORDER — ONDANSETRON 4 MG/1
4 TABLET, ORALLY DISINTEGRATING ORAL EVERY 8 HOURS PRN
Status: DISCONTINUED | OUTPATIENT
Start: 2023-10-07 | End: 2023-10-11 | Stop reason: HOSPADM

## 2023-10-07 RX ORDER — BUDESONIDE AND FORMOTEROL FUMARATE DIHYDRATE 160; 4.5 UG/1; UG/1
2 AEROSOL RESPIRATORY (INHALATION) 2 TIMES DAILY
Status: DISCONTINUED | OUTPATIENT
Start: 2023-10-07 | End: 2023-10-11 | Stop reason: HOSPADM

## 2023-10-07 RX ORDER — SODIUM CHLORIDE 0.9 % (FLUSH) 0.9 %
5-40 SYRINGE (ML) INJECTION EVERY 12 HOURS SCHEDULED
Status: DISCONTINUED | OUTPATIENT
Start: 2023-10-07 | End: 2023-10-11 | Stop reason: HOSPADM

## 2023-10-07 RX ORDER — PANTOPRAZOLE SODIUM 40 MG/1
40 TABLET, DELAYED RELEASE ORAL
Status: DISCONTINUED | OUTPATIENT
Start: 2023-10-08 | End: 2023-10-11 | Stop reason: HOSPADM

## 2023-10-07 RX ORDER — IPRATROPIUM BROMIDE AND ALBUTEROL SULFATE 2.5; .5 MG/3ML; MG/3ML
1 SOLUTION RESPIRATORY (INHALATION) EVERY 4 HOURS PRN
Status: DISCONTINUED | OUTPATIENT
Start: 2023-10-07 | End: 2023-10-07

## 2023-10-07 RX ORDER — SODIUM CHLORIDE 9 MG/ML
INJECTION, SOLUTION INTRAVENOUS PRN
Status: DISCONTINUED | OUTPATIENT
Start: 2023-10-07 | End: 2023-10-11 | Stop reason: HOSPADM

## 2023-10-07 RX ORDER — SODIUM CHLORIDE 0.9 % (FLUSH) 0.9 %
5-40 SYRINGE (ML) INJECTION PRN
Status: DISCONTINUED | OUTPATIENT
Start: 2023-10-07 | End: 2023-10-11 | Stop reason: HOSPADM

## 2023-10-07 RX ORDER — LAMOTRIGINE 100 MG/1
100 TABLET ORAL DAILY
Status: DISCONTINUED | OUTPATIENT
Start: 2023-10-07 | End: 2023-10-11 | Stop reason: HOSPADM

## 2023-10-07 RX ORDER — LACTULOSE 10 G/15ML
20 SOLUTION ORAL ONCE
Status: COMPLETED | OUTPATIENT
Start: 2023-10-07 | End: 2023-10-07

## 2023-10-07 RX ORDER — DIVALPROEX SODIUM 500 MG/1
1000 TABLET, EXTENDED RELEASE ORAL DAILY
Status: DISCONTINUED | OUTPATIENT
Start: 2023-10-07 | End: 2023-10-07

## 2023-10-07 RX ORDER — IPRATROPIUM BROMIDE AND ALBUTEROL SULFATE 2.5; .5 MG/3ML; MG/3ML
1 SOLUTION RESPIRATORY (INHALATION)
Status: DISCONTINUED | OUTPATIENT
Start: 2023-10-07 | End: 2023-10-11 | Stop reason: HOSPADM

## 2023-10-07 RX ORDER — ENOXAPARIN SODIUM 100 MG/ML
40 INJECTION SUBCUTANEOUS DAILY
Status: DISCONTINUED | OUTPATIENT
Start: 2023-10-07 | End: 2023-10-07

## 2023-10-07 RX ORDER — SODIUM CHLORIDE 9 MG/ML
INJECTION, SOLUTION INTRAVENOUS CONTINUOUS
Status: DISCONTINUED | OUTPATIENT
Start: 2023-10-07 | End: 2023-10-09

## 2023-10-07 RX ORDER — ALBUTEROL SULFATE 2.5 MG/3ML
2.5 SOLUTION RESPIRATORY (INHALATION)
Status: DISCONTINUED | OUTPATIENT
Start: 2023-10-07 | End: 2023-10-11 | Stop reason: HOSPADM

## 2023-10-07 RX ORDER — LACTULOSE 10 G/15ML
20 SOLUTION ORAL 3 TIMES DAILY
Status: DISCONTINUED | OUTPATIENT
Start: 2023-10-07 | End: 2023-10-09

## 2023-10-07 RX ORDER — ASPIRIN 81 MG/1
81 TABLET, CHEWABLE ORAL DAILY
Status: DISCONTINUED | OUTPATIENT
Start: 2023-10-07 | End: 2023-10-11 | Stop reason: HOSPADM

## 2023-10-07 RX ORDER — GUAIFENESIN/DEXTROMETHORPHAN 100-10MG/5
5 SYRUP ORAL EVERY 4 HOURS PRN
Status: DISCONTINUED | OUTPATIENT
Start: 2023-10-07 | End: 2023-10-11 | Stop reason: HOSPADM

## 2023-10-07 RX ORDER — ONDANSETRON 2 MG/ML
4 INJECTION INTRAMUSCULAR; INTRAVENOUS EVERY 6 HOURS PRN
Status: DISCONTINUED | OUTPATIENT
Start: 2023-10-07 | End: 2023-10-11 | Stop reason: HOSPADM

## 2023-10-07 RX ADMIN — VANCOMYCIN HYDROCHLORIDE 1250 MG: 1.25 INJECTION, POWDER, LYOPHILIZED, FOR SOLUTION INTRAVENOUS at 01:01

## 2023-10-07 RX ADMIN — GUAIFENESIN AND DEXTROMETHORPHAN 5 ML: 100; 10 SYRUP ORAL at 04:27

## 2023-10-07 RX ADMIN — CEFEPIME 2000 MG: 2 INJECTION, POWDER, FOR SOLUTION INTRAVENOUS at 11:05

## 2023-10-07 RX ADMIN — LACTULOSE 20 G: 20 SOLUTION ORAL at 13:14

## 2023-10-07 RX ADMIN — CEFTOLOZANE AND TAZOBACTAM 3000 MG: 1; .5 INJECTION, POWDER, LYOPHILIZED, FOR SOLUTION INTRAVENOUS at 15:18

## 2023-10-07 RX ADMIN — LACTULOSE 20 G: 20 SOLUTION ORAL at 05:26

## 2023-10-07 RX ADMIN — BUDESONIDE AND FORMOTEROL FUMARATE DIHYDRATE 2 PUFF: 160; 4.5 AEROSOL RESPIRATORY (INHALATION) at 19:44

## 2023-10-07 RX ADMIN — SODIUM CHLORIDE: 9 INJECTION, SOLUTION INTRAVENOUS at 13:16

## 2023-10-07 RX ADMIN — IPRATROPIUM BROMIDE AND ALBUTEROL SULFATE 1 DOSE: 2.5; .5 SOLUTION RESPIRATORY (INHALATION) at 19:44

## 2023-10-07 RX ADMIN — ASPIRIN 81 MG: 81 TABLET, CHEWABLE ORAL at 13:15

## 2023-10-07 RX ADMIN — IPRATROPIUM BROMIDE AND ALBUTEROL SULFATE 1 DOSE: 2.5; .5 SOLUTION RESPIRATORY (INHALATION) at 08:03

## 2023-10-07 RX ADMIN — SODIUM CHLORIDE, PRESERVATIVE FREE 5 ML: 5 INJECTION INTRAVENOUS at 08:58

## 2023-10-07 RX ADMIN — ENOXAPARIN SODIUM 40 MG: 100 INJECTION SUBCUTANEOUS at 09:00

## 2023-10-07 RX ADMIN — VALPROATE SODIUM 500 MG: 100 INJECTION, SOLUTION INTRAVENOUS at 18:30

## 2023-10-07 ASSESSMENT — PAIN SCALES - WONG BAKER

## 2023-10-07 ASSESSMENT — ENCOUNTER SYMPTOMS
COLOR CHANGE: 0
EYE DISCHARGE: 0
ABDOMINAL PAIN: 1
APNEA: 0

## 2023-10-07 ASSESSMENT — PAIN SCALES - GENERAL
PAINLEVEL_OUTOF10: 0
PAINLEVEL_OUTOF10: 0

## 2023-10-07 NOTE — ED NOTES
Pt intermittently oriented, reported having a daughter as POA, pt gave name and stated she makes his medical decisions.       [address] [facility], [address]  10/06/23 4488

## 2023-10-07 NOTE — CARE COORDINATION
Transitional planning. Long term bed hold at [facility], can return when dc'd. 2L NC, GI, Neurology, Cardiology and ID consulted. Left HIPAA compliant message for pt's daughter, Tobias Grissom requesting CB to [facility] @ [phone]. Left HIPAA compliant message with Ashley Patricedaniel of [place] [facility] [phone] requesting CB to  @ [phone]. Voice mail did not state it was a confidential line. [address] Pt's daughter Tobias Grissom returned call,  pt is a long term bed and plans are for him to return to [facility] when dc'd. She is pt's legal guardian, she provided verbal signature to CM for IMM, IMM securely emailed to [email or web address] as requested. [phone] to Abdullahi with [facility], she confirmed that pt is a long term bed hold and can return when dc'd.

## 2023-10-07 NOTE — CONSULTS
71 yo male with mental status changes . He is sent from extended care facility with altered mental status in ER being not responsive to stimulation tachypneic . Head CT with bilateral chronic periventricular with bilateral basal ganglia and left cerebellar lacune . UA with positive nitrite , leukocyte esterase . WBC 11.7 felt to have UTI with urosepsis. CT chest abdomen with large pancreatic pseudocyst . He has seizure disorder and bipolar disorder on depakote ER 1000 mg po qd and lamictal 100 mg po qd , NH3 90 . BUN/creatinine 29/1.4 . There is elevated troponin 80 witn suspected acute MI . He has been noted to have some staring with concern of possible seizure . He is on eliquis 5 mg po bid for unclear reason now on hold . Significant medications . depakote ER 1000 mg po qd and lamictal 100 mg po qd , eliquis 5 mg po bid . Testing  Head CT with bilateral chronic periventricular with bilateral basal ganglia and left cerebellar lacune     Past Medical History:   Diagnosis Date    Acute respiratory failure with hypoxia (HCC)     Back pain     Bipolar 1 disorder (HCC)     Cancer (HCC)     bowel    COPD (chronic obstructive pulmonary disease) (HCC)     Epilepsy (HCC)     Gait abnormality     uses wheelchair, walker for short distance, can stand & pivot.     GERD (gastroesophageal reflux disease)     Heart attack ([address])     Hypertension     Muscle weakness     Osteoporosis     Wears dentures        Past Surgical History:   Procedure Laterality Date    ANUS SURGERY      bowel surgery r/t cancer approx 4 years ago    COLONOSCOPY N/A 7/30/2018    COLONOSCOPY POLYPECTOMY HOT BIOPSY performed by Zuleika Granados DO at [address]      umbilical    UPPER GASTROINTESTINAL ENDOSCOPY N/A 9/14/2023    EUS LINEAR SCOPE, FINE NEEDLE ASPIRATION performed by Angus Tomlinson MD at [address] [facility] History   Problem Relation Age of Onset    Cancer Father         Lung    Stroke Father     Cancer Brother         Lymphoma
[facility]) Gastroenterology  Consultation Note     . Chief Complaint:  AMS    Reason for consult:    Sepsis  Pancreatic pseudocyst with recent imaging showing inflammation    History of present illness: This is a 70 y.o. male with PMH including hypertension, COPD, GERD, essential tremors, history of colorectal cancer who was sent to the ER due to AMS, found to have UTI and started on antibiotics. GI was consulted due to presenting sepsis and concern that it could be relate to the residual pancreatic pseudocyst seen on imaging that is actually larger now on current imaging. Fluid collection on CT now shows pseudocyst measuring 6.3 x 11.1 x 5.2 cm compared to previous dimensions of 2 x 2.1 cm    9/14/2023, pt did have EUS with FNA biopsies of the pancreatic tail mass that was negative     BMP WNL, CBC  with slightly elevated WBC's 11.7-12.8, Hgb 9.3 g/dL, INR 1.6    Pt appears slightly confused, cannot provide much information. Pt does deny any abdominal pain, nausea or vomiting. No pain response on palpation      Previous GI history:   9/14/2023 EUS with FNA biopsies per Dr. Morales Negus  Findings:     Retropharyngeal area was grossly normal appearing     Esophagus: abnormal: large hiatal hernia                          Esophagogastric markings: Diaphragmatic hiatus- 45 cm; GE junction- 38 cm; Squamo-columnar junction- 38 cm     Stomach:    Fundus: normal    Body: normal    Antrum: normal     Duodenum:     Descending: normal    Bulb: normal     Then, we introduced Pentax echoendoscopy orally and advanced to lower part of esophagus, stomach and duodenum. Findings: Esophagus and stomach- normal  Pancreas- heterogenous area near tail of pancreas, likely inflammatory lesion; size 2.8 cm x 2.6 cm- FNA obtained- total of 3 passes made. No abnormal lymphadenopathy  CBD- normal  Liver- normal      The scope was removed and the patient tolerated the procedure well.       Impression- EGD- large hiatal hernia  EUS-
coli septicemia with bandemia  SUNITHA  Elevated CPK  Lactic acidosis  Elevated ammonia levels  COPD  Hypertension  History of colorectal cancer      RECOMMENDATIONS:  Troponin noted to be elevated 87 on admission trending down 65. Likely secondary to sepsis. Recommend to trend troponin every 8 hours x3  Telemetry monitoring  Continue on home aspirin  No plan for intervention at this time from cardiology standpoint  Further recommendations to follow      Discussed with patient and nursing. Jairo Keene MD  Fellow, [address] [place] [address]   Pager - [phone]    Attending Physician Statement  I have discussed the care of Tonia Farrell, including pertinent history and exam findings,  with the cardiology fellow/resident. I have seen and examined the patient and the key elements of all parts of the encounter have been performed by me. I have completed at least one if not all key elements of the E/M (history, physical exam, and MDM). I agree with the assessment, plan and orders as documented by the resident with additional recommendations as below:     Repeat TTE to r/o endocarditis given e coli bacteremia and mildly elevated troponin.      Sherif Sotelo MD, [facility]
flush  5-40 mL IntraVENous 2 times per day    sodium chloride flush  5-40 mL IntraVENous 2 times per day    ipratropium 0.5 mg-albuterol 2.5 mg  1 Dose Inhalation Q4H WA RT    cefepime  2,000 mg IntraVENous Q12H    vancomycin (VANCOCIN) intermittent dosing (placeholder)   Other RX Placeholder    vancomycin  1,000 mg IntraVENous Q24H    lactulose  20 g Oral TID    [Held by provider] apixaban  5 mg Oral BID    aspirin  81 mg Oral Daily    budesonide-formoterol  2 puff Inhalation BID    [Held by provider] divalproex  1,000 mg Oral Daily    [Held by provider] lamoTRIgine  100 mg Oral Daily    [START ON 10/8/2023] pantoprazole  40 mg Oral QAM AC       Social History:     Social History     Socioeconomic History    Marital status:      Spouse name: Not on file    Number of children: Not on file    Years of education: Not on file    Highest education level: Not on file   Occupational History    Not on file   Tobacco Use    Smoking status: Every Day     Packs/day: 2     Types: Cigarettes, Pipe    Smokeless tobacco: Never   Vaping Use    Vaping Use: Some days   Substance and Sexual Activity    Alcohol use: No    Drug use: No    Sexual activity: Never   Other Topics Concern    Not on file   Social History Narrative    Not on file     Social Determinants of Health     Financial Resource Strain: Not on file   Food Insecurity: Not on file   Transportation Needs: Not on file   Physical Activity: Not on file   Stress: Not on file   Social Connections: Not on file   Intimate Partner Violence: Not on file   Housing Stability: Not on file       Family History:     Family History   Problem Relation Age of Onset    Cancer Father         Lung    Stroke Father     Cancer Brother         Lymphoma    Mental Illness Other         Aunt      Medical Decision Making:   I have independently reviewed/ordered the following labs:    CBC with Differential:   Recent Labs     10/06/23  2227 10/07/23  1038   WBC 11.7* 12.8*   HGB 9.1* 9.3*

## 2023-10-07 NOTE — ED NOTES
Patient brought in by EMS from his extended care facility for altered mental status. According to EMS, patient had been conversant up until about 2 weeks ago and then became more. It is unclear exactly why today was the day they decided to have him seen in the emergency department. On arrival here, patient does open his eyes when his name is said loudly. He will follow commands. He is not answering questions or verbalizing anything. There is no signs of trauma. Lungs are clear to auscultation bilaterally. Abdomen is soft and nondistended. We will get CT scan of the head and chest.  We will check labs and EKG and plan to admit patient.         [address] [place], [place]  10/07/23 2508

## 2023-10-07 NOTE — H&P
evidence of cholecystitis. CT CHEST PULMONARY EMBOLISM W CONTRAST    Result Date: 10/6/2023  No evidence of pulmonary embolism or acute pulmonary abnormality. Incompletely characterized amorphous soft tissue densities adjacent to the splenic hilum, not present on comparison exam of 08/29/2023. Recommend further characterization with CT abdomen and pelvis. CT HEAD WO CONTRAST    Result Date: 10/6/2023  No acute intracranial abnormality. XR CHEST PORTABLE    Result Date: 10/6/2023  No acute process. Assessment :      Hospital Problems             Last Modified POA    * (Principal) Sepsis ([address]) 10/7/2023 Yes    COPD (chronic obstructive pulmonary disease) ([address]) 10/7/2023 Yes    Tobacco abuse 10/7/2023 Yes    Essential (primary) hypertension 10/7/2023 Yes    SUNITHA (acute kidney injury) ([address]) 10/7/2023 Yes    Hyperammonemia ([address]) 10/7/2023 Yes    NSTEMI (non-ST elevated myocardial infarction) ([address]) 10/7/2023 Yes    Toxic metabolic encephalopathy 19/5/9663 Yes     Plan:     Patient status inpatient in the Progressive Unit/Step down    Toxic metabolic encephalopathy. Likely multifactorial.  Secondary to sepsis, dehydration with SUNITHA and possibly further complicated by hyperammonemia secondary to valproic acid toxicity? As well as suspected Parkinson's disease per chart review. Check valproic acid level. Start gentle hydration at 75 ML per hour. Concern for possible seizures given history of Lamictal and Depakote? Seizure history versus psych history and rather for bipolar disorder. However, patient staring frequently during examination concern for absent seizures. Hyperammonemia. CT abdomen pelvis reviewed liver with small simple cyst laterally in the right lobe inferiorly unchanged from previous. Suspect possible valproic acid toxicity. Check valproic acid level. Continue lactulose 20 mg 3 times daily and titrate to 3-4 bowel movements daily. Sepsis suspected to be secondary to UTI.

## 2023-10-07 NOTE — ED NOTES
Pt alert and oriented able to tell writer what hospital he is in and denies any pain or medical needs at this time.       [address] [place], [place]  10/07/23 0040

## 2023-10-07 NOTE — ED NOTES
ED to inpatient nurses report    Chief Complaint   Patient presents with    Altered Mental Status      Present to ED from [facility] ([place], [place])  LOC: alert and orientated to name, place, date  Vital signs   Vitals:    10/06/23 2200 10/06/23 2224 10/06/23 2238 10/06/23 2239   BP: (!) 97/56   125/74   Pulse: 68 70  73   Resp: 19 21  21   Temp:       TempSrc:       SpO2: 90% 90%  95%   Weight:   176 lb (79.8 kg)       Oxygen Baseline 2L NC    Current needs required I& O  LDAs:   Peripheral IV 10/06/23 Left Wrist (Active)     Mobility: Fully dependent  Pending ED orders: finished antibiotic  Present condition: stable, alert and oriented  Code Status: [unfilled]   Consults:  [x]  Hospitalist  Completed  [] yes [] no  []  Medicine  Completed  [] yes [] No  []  Cardiology  Completed  [] yes [] No  []  GI   Completed  [] yes [] No  []  Neurology  Completed  [] yes [] No  []  Nephrology Completed  [] yes [] No  []  Vascular  Completed  [] yes [] No   []  Surgery  Completed  [] yes [] No   []  Urology  Completed  [] yes [] No   []  Plastics  Completed  [] yes [] No   []  ENT  Completed  [] yes [] No   []  Other   Completed  [] yes [] No  Pertinent event(s)   Pertinent event(s) Patient brought in by EMS from his extended care facility for altered mental status. According to EMS, patient had been conversant up until about 2 weeks ago and then became more somnolent. It is unclear exactly why today was the day they decided to have him seen in the emergency department. On arrival here, patient does open his eyes when his name is said loudly. He will follow commands. He is not answering questions or verbalizing anything. There is no signs of trauma. Lungs are clear to auscultation bilaterally. Abdomen is soft and nondistended. We will get CT scan of the head and chest.  We will check labs and EKG and plan to admit patient.      Electronically signed by Noé Feliz RN on 10/7/2023 at 12:52 AM      [address] [place] [address]  10/07/23 7362

## 2023-10-08 ENCOUNTER — APPOINTMENT (OUTPATIENT)
Dept: MRI IMAGING | Age: 78
DRG: 871 | End: 2023-10-08
Payer: MEDICARE

## 2023-10-08 ENCOUNTER — APPOINTMENT (OUTPATIENT)
Dept: GENERAL RADIOLOGY | Age: 78
DRG: 871 | End: 2023-10-08
Payer: MEDICARE

## 2023-10-08 LAB
AMMONIA PLAS-SCNC: 22 UMOL/L (ref 16–60)
ANION GAP SERPL CALCULATED.3IONS-SCNC: 11 MMOL/L (ref 9–17)
BASOPHILS # BLD: 0.04 K/UL (ref 0–0.2)
BASOPHILS NFR BLD: 0 % (ref 0–2)
BUN SERPL-MCNC: 17 MG/DL (ref 8–23)
CALCIUM SERPL-MCNC: 8.4 MG/DL (ref 8.6–10.4)
CHLORIDE SERPL-SCNC: 106 MMOL/L (ref 98–107)
CO2 SERPL-SCNC: 24 MMOL/L (ref 20–31)
CREAT SERPL-MCNC: 0.8 MG/DL (ref 0.7–1.2)
EOSINOPHIL # BLD: 0.03 K/UL (ref 0–0.44)
EOSINOPHILS RELATIVE PERCENT: 0 % (ref 1–4)
ERYTHROCYTE [DISTWIDTH] IN BLOOD BY AUTOMATED COUNT: 17.2 % (ref 11.8–14.4)
GFR SERPL CREATININE-BSD FRML MDRD: >60 ML/MIN/1.73M2
GLUCOSE SERPL-MCNC: 90 MG/DL (ref 70–99)
HCT VFR BLD AUTO: 33.4 % (ref 40.7–50.3)
HGB BLD-MCNC: 10 G/DL (ref 13–17)
IMM GRANULOCYTES # BLD AUTO: 0.07 K/UL (ref 0–0.3)
IMM GRANULOCYTES NFR BLD: 1 %
LYMPHOCYTES NFR BLD: 1.76 K/UL (ref 1.1–3.7)
LYMPHOCYTES RELATIVE PERCENT: 18 % (ref 24–43)
MAGNESIUM SERPL-MCNC: 2.2 MG/DL (ref 1.6–2.6)
MCH RBC QN AUTO: 28.4 PG (ref 25.2–33.5)
MCHC RBC AUTO-ENTMCNC: 29.9 G/DL (ref 28.4–34.8)
MCV RBC AUTO: 94.9 FL (ref 82.6–102.9)
MONOCYTES NFR BLD: 1.05 K/UL (ref 0.1–1.2)
MONOCYTES NFR BLD: 11 % (ref 3–12)
NEUTROPHILS NFR BLD: 70 % (ref 36–65)
NEUTS SEG NFR BLD: 7.03 K/UL (ref 1.5–8.1)
NRBC BLD-RTO: 0 PER 100 WBC
PLATELET # BLD AUTO: 302 K/UL (ref 138–453)
PMV BLD AUTO: 9.3 FL (ref 8.1–13.5)
POTASSIUM SERPL-SCNC: 4 MMOL/L (ref 3.7–5.3)
RBC # BLD AUTO: 3.52 M/UL (ref 4.21–5.77)
RBC # BLD: ABNORMAL 10*6/UL
SODIUM SERPL-SCNC: 141 MMOL/L (ref 135–144)
WBC OTHER # BLD: 10 K/UL (ref 3.5–11.3)

## 2023-10-08 PROCEDURE — 94761 N-INVAS EAR/PLS OXIMETRY MLT: CPT

## 2023-10-08 PROCEDURE — 71045 X-RAY EXAM CHEST 1 VIEW: CPT

## 2023-10-08 PROCEDURE — 2580000003 HC RX 258: Performed by: PSYCHIATRY & NEUROLOGY

## 2023-10-08 PROCEDURE — 2500000003 HC RX 250 WO HCPCS: Performed by: PSYCHIATRY & NEUROLOGY

## 2023-10-08 PROCEDURE — 6370000000 HC RX 637 (ALT 250 FOR IP): Performed by: STUDENT IN AN ORGANIZED HEALTH CARE EDUCATION/TRAINING PROGRAM

## 2023-10-08 PROCEDURE — 36415 COLL VENOUS BLD VENIPUNCTURE: CPT

## 2023-10-08 PROCEDURE — 99222 1ST HOSP IP/OBS MODERATE 55: CPT | Performed by: INTERNAL MEDICINE

## 2023-10-08 PROCEDURE — 95819 EEG AWAKE AND ASLEEP: CPT | Performed by: PSYCHIATRY & NEUROLOGY

## 2023-10-08 PROCEDURE — 80048 BASIC METABOLIC PNL TOTAL CA: CPT

## 2023-10-08 PROCEDURE — 2700000000 HC OXYGEN THERAPY PER DAY

## 2023-10-08 PROCEDURE — 94640 AIRWAY INHALATION TREATMENT: CPT

## 2023-10-08 PROCEDURE — 82140 ASSAY OF AMMONIA: CPT

## 2023-10-08 PROCEDURE — 99232 SBSQ HOSP IP/OBS MODERATE 35: CPT | Performed by: PSYCHIATRY & NEUROLOGY

## 2023-10-08 PROCEDURE — 83735 ASSAY OF MAGNESIUM: CPT

## 2023-10-08 PROCEDURE — APPSS30 APP SPLIT SHARED TIME 16-30 MINUTES: Performed by: NURSE PRACTITIONER

## 2023-10-08 PROCEDURE — 6360000002 HC RX W HCPCS: Performed by: STUDENT IN AN ORGANIZED HEALTH CARE EDUCATION/TRAINING PROGRAM

## 2023-10-08 PROCEDURE — 85025 COMPLETE CBC W/AUTO DIFF WBC: CPT

## 2023-10-08 PROCEDURE — 6360000002 HC RX W HCPCS: Performed by: INTERNAL MEDICINE

## 2023-10-08 PROCEDURE — 95819 EEG AWAKE AND ASLEEP: CPT

## 2023-10-08 PROCEDURE — 6370000000 HC RX 637 (ALT 250 FOR IP): Performed by: NURSE PRACTITIONER

## 2023-10-08 PROCEDURE — 2580000003 HC RX 258: Performed by: NURSE PRACTITIONER

## 2023-10-08 PROCEDURE — 99232 SBSQ HOSP IP/OBS MODERATE 35: CPT | Performed by: STUDENT IN AN ORGANIZED HEALTH CARE EDUCATION/TRAINING PROGRAM

## 2023-10-08 PROCEDURE — 2060000000 HC ICU INTERMEDIATE R&B

## 2023-10-08 PROCEDURE — 70551 MRI BRAIN STEM W/O DYE: CPT

## 2023-10-08 PROCEDURE — 2580000003 HC RX 258: Performed by: INTERNAL MEDICINE

## 2023-10-08 PROCEDURE — 2580000003 HC RX 258: Performed by: STUDENT IN AN ORGANIZED HEALTH CARE EDUCATION/TRAINING PROGRAM

## 2023-10-08 RX ORDER — ENOXAPARIN SODIUM 100 MG/ML
40 INJECTION SUBCUTANEOUS DAILY
Status: DISCONTINUED | OUTPATIENT
Start: 2023-10-08 | End: 2023-10-11 | Stop reason: HOSPADM

## 2023-10-08 RX ADMIN — IPRATROPIUM BROMIDE AND ALBUTEROL SULFATE 1 DOSE: 2.5; .5 SOLUTION RESPIRATORY (INHALATION) at 21:21

## 2023-10-08 RX ADMIN — VALPROATE SODIUM 500 MG: 100 INJECTION, SOLUTION INTRAVENOUS at 12:46

## 2023-10-08 RX ADMIN — IPRATROPIUM BROMIDE AND ALBUTEROL SULFATE 1 DOSE: 2.5; .5 SOLUTION RESPIRATORY (INHALATION) at 08:14

## 2023-10-08 RX ADMIN — SODIUM CHLORIDE: 9 INJECTION, SOLUTION INTRAVENOUS at 00:38

## 2023-10-08 RX ADMIN — SODIUM CHLORIDE, PRESERVATIVE FREE 5 ML: 5 INJECTION INTRAVENOUS at 09:47

## 2023-10-08 RX ADMIN — CEFTOLOZANE AND TAZOBACTAM 3000 MG: 1; .5 INJECTION, POWDER, LYOPHILIZED, FOR SOLUTION INTRAVENOUS at 00:26

## 2023-10-08 RX ADMIN — BUDESONIDE AND FORMOTEROL FUMARATE DIHYDRATE 2 PUFF: 160; 4.5 AEROSOL RESPIRATORY (INHALATION) at 21:22

## 2023-10-08 RX ADMIN — LACTULOSE 20 G: 20 SOLUTION ORAL at 12:43

## 2023-10-08 RX ADMIN — CEFTOLOZANE AND TAZOBACTAM 3000 MG: 1; .5 INJECTION, POWDER, LYOPHILIZED, FOR SOLUTION INTRAVENOUS at 13:55

## 2023-10-08 RX ADMIN — IPRATROPIUM BROMIDE AND ALBUTEROL SULFATE 1 DOSE: 2.5; .5 SOLUTION RESPIRATORY (INHALATION) at 11:57

## 2023-10-08 RX ADMIN — SODIUM CHLORIDE, PRESERVATIVE FREE 5 ML: 5 INJECTION INTRAVENOUS at 22:58

## 2023-10-08 RX ADMIN — SODIUM CHLORIDE, PRESERVATIVE FREE 5 ML: 5 INJECTION INTRAVENOUS at 09:48

## 2023-10-08 RX ADMIN — BUDESONIDE AND FORMOTEROL FUMARATE DIHYDRATE 2 PUFF: 160; 4.5 AEROSOL RESPIRATORY (INHALATION) at 08:15

## 2023-10-08 RX ADMIN — IPRATROPIUM BROMIDE AND ALBUTEROL SULFATE 1 DOSE: 2.5; .5 SOLUTION RESPIRATORY (INHALATION) at 15:48

## 2023-10-08 RX ADMIN — CEFTOLOZANE AND TAZOBACTAM 3000 MG: 1; .5 INJECTION, POWDER, LYOPHILIZED, FOR SOLUTION INTRAVENOUS at 22:59

## 2023-10-08 RX ADMIN — ENOXAPARIN SODIUM 40 MG: 100 INJECTION SUBCUTANEOUS at 12:43

## 2023-10-08 RX ADMIN — LAMOTRIGINE 100 MG: 100 TABLET ORAL at 12:43

## 2023-10-08 RX ADMIN — ASPIRIN 81 MG: 81 TABLET, CHEWABLE ORAL at 09:54

## 2023-10-08 RX ADMIN — PANTOPRAZOLE SODIUM 40 MG: 40 TABLET, DELAYED RELEASE ORAL at 12:43

## 2023-10-08 RX ADMIN — LACTULOSE 20 G: 20 SOLUTION ORAL at 09:46

## 2023-10-08 ASSESSMENT — PAIN SCALES - GENERAL
PAINLEVEL_OUTOF10: 5
PAINLEVEL_OUTOF10: 4

## 2023-10-08 ASSESSMENT — PAIN SCALES - WONG BAKER: WONGBAKER_NUMERICALRESPONSE: 4

## 2023-10-08 ASSESSMENT — PAIN DESCRIPTION - ORIENTATION: ORIENTATION: MID

## 2023-10-08 ASSESSMENT — PAIN DESCRIPTION - LOCATION
LOCATION: COCCYX
LOCATION: BUTTOCKS

## 2023-10-08 ASSESSMENT — PAIN DESCRIPTION - FREQUENCY: FREQUENCY: CONTINUOUS

## 2023-10-08 NOTE — RT PROTOCOL NOTE
decrease Frequency below that used at home. 0-3 - enter or revise RT bronchodilator order(s) to equivalent RT Bronchodilator order with Frequency of every 4 hours PRN for wheezing or increased work of breathing using Per Protocol order mode. 4-6 - enter or revise RT Bronchodilator order(s) to two equivalent RT bronchodilator orders with one order with BID Frequency and one order with Frequency of every 4 hours PRN wheezing or increased work of breathing using Per Protocol order mode. 7-10 - enter or revise RT Bronchodilator order(s) to two equivalent RT bronchodilator orders with one order with TID Frequency and one order with Frequency of every 4 hours PRN wheezing or increased work of breathing using Per Protocol order mode. 11-13 - enter or revise RT Bronchodilator order(s) to one equivalent RT bronchodilator order with QID Frequency and an Albuterol order with Frequency of every 4 hours PRN wheezing or increased work of breathing using Per Protocol order mode. Greater than 13 - enter or revise RT Bronchodilator order(s) to one equivalent RT bronchodilator order with every 4 hours Frequency and an Albuterol order with Frequency of every 2 hours PRN wheezing or increased work of breathing using Per Protocol order mode. RT to enter RT Home Evaluation for COPD & MDI Assessment order using Per Protocol order mode.     Electronically signed by Ilir Cormier RCP on 10/8/2023 at 10:56 AM

## 2023-10-08 NOTE — PROCEDURES
Date: 10/8/2023  Referring physician: Anjali Storey MD    Indication  Patient aged 70 y with seizures. EEG done to assess for epileptiform activity. Introduction  This routine 32-minute EEG was recorded using the International 10-20 System on a [employer] workstation at 256 samples/s. Automated spike and seizure detection algorithms were applied. Description  During the maximal alert state, a well-regulated, symmetric, and reactive 8 Hz posterior dominant rhythm was seen. No consistent focal slowing or interhemispheric asymmetry was noted. Stage I and stage II sleep were observed. There were few left anterior temporal sharps. Activations  Hyperventilation was not performed. Intermittent photic stimulation was performed and demonstrated no posterior driving response. Impression  Abnormal awake EEG. The slowing mentioned above suggests mild non specific encephalopathy. The presence of left anterior temporal sharps increases patient risk for focal seizures. Juan Luther MD  Epilepsy Board Certified. Neurology Board Certified.     Electronically Signed

## 2023-10-09 PROBLEM — N12 PYELONEPHRITIS: Status: ACTIVE | Noted: 2023-10-09

## 2023-10-09 PROBLEM — Z86.69 HX OF SEIZURE DISORDER: Status: ACTIVE | Noted: 2023-10-09

## 2023-10-09 LAB
ANION GAP SERPL CALCULATED.3IONS-SCNC: 7 MMOL/L (ref 9–17)
BASOPHILS # BLD: 0.03 K/UL (ref 0–0.2)
BASOPHILS NFR BLD: 0 % (ref 0–2)
BUN SERPL-MCNC: 9 MG/DL (ref 8–23)
CALCIUM SERPL-MCNC: 7.9 MG/DL (ref 8.6–10.4)
CHLORIDE SERPL-SCNC: 106 MMOL/L (ref 98–107)
CO2 SERPL-SCNC: 26 MMOL/L (ref 20–31)
CREAT SERPL-MCNC: 0.7 MG/DL (ref 0.7–1.2)
EKG ATRIAL RATE: 69 BPM
EKG P AXIS: 58 DEGREES
EKG P-R INTERVAL: 132 MS
EKG Q-T INTERVAL: 462 MS
EKG QRS DURATION: 132 MS
EKG QTC CALCULATION (BAZETT): 495 MS
EKG R AXIS: -39 DEGREES
EKG T AXIS: 36 DEGREES
EKG VENTRICULAR RATE: 69 BPM
EOSINOPHIL # BLD: 0.04 K/UL (ref 0–0.44)
EOSINOPHILS RELATIVE PERCENT: 1 % (ref 1–4)
ERYTHROCYTE [DISTWIDTH] IN BLOOD BY AUTOMATED COUNT: 17.1 % (ref 11.8–14.4)
GFR SERPL CREATININE-BSD FRML MDRD: >60 ML/MIN/1.73M2
GLUCOSE SERPL-MCNC: 91 MG/DL (ref 70–99)
HCT VFR BLD AUTO: 29.3 % (ref 40.7–50.3)
HGB BLD-MCNC: 8.9 G/DL (ref 13–17)
IMM GRANULOCYTES # BLD AUTO: 0.1 K/UL (ref 0–0.3)
IMM GRANULOCYTES NFR BLD: 1 %
LYMPHOCYTES NFR BLD: 2.09 K/UL (ref 1.1–3.7)
LYMPHOCYTES RELATIVE PERCENT: 27 % (ref 24–43)
MAGNESIUM SERPL-MCNC: 2 MG/DL (ref 1.6–2.6)
MCH RBC QN AUTO: 28.4 PG (ref 25.2–33.5)
MCHC RBC AUTO-ENTMCNC: 30.4 G/DL (ref 28.4–34.8)
MCV RBC AUTO: 93.6 FL (ref 82.6–102.9)
MICROORGANISM SPEC CULT: ABNORMAL
MONOCYTES NFR BLD: 0.85 K/UL (ref 0.1–1.2)
MONOCYTES NFR BLD: 11 % (ref 3–12)
NEUTROPHILS NFR BLD: 60 % (ref 36–65)
NEUTS SEG NFR BLD: 4.52 K/UL (ref 1.5–8.1)
NRBC BLD-RTO: 0 PER 100 WBC
PLATELET # BLD AUTO: 336 K/UL (ref 138–453)
PMV BLD AUTO: 9.6 FL (ref 8.1–13.5)
POTASSIUM SERPL-SCNC: 3.6 MMOL/L (ref 3.7–5.3)
RBC # BLD AUTO: 3.13 M/UL (ref 4.21–5.77)
RBC # BLD: ABNORMAL 10*6/UL
SERVICE CMNT-IMP: ABNORMAL
SODIUM SERPL-SCNC: 139 MMOL/L (ref 135–144)
SPECIMEN DESCRIPTION: ABNORMAL
SPECIMEN DESCRIPTION: ABNORMAL
WBC OTHER # BLD: 7.6 K/UL (ref 3.5–11.3)

## 2023-10-09 PROCEDURE — 2500000003 HC RX 250 WO HCPCS: Performed by: PSYCHIATRY & NEUROLOGY

## 2023-10-09 PROCEDURE — 6370000000 HC RX 637 (ALT 250 FOR IP): Performed by: NURSE PRACTITIONER

## 2023-10-09 PROCEDURE — 99233 SBSQ HOSP IP/OBS HIGH 50: CPT

## 2023-10-09 PROCEDURE — 05HC33Z INSERTION OF INFUSION DEVICE INTO LEFT BASILIC VEIN, PERCUTANEOUS APPROACH: ICD-10-PCS | Performed by: INTERNAL MEDICINE

## 2023-10-09 PROCEDURE — 6360000002 HC RX W HCPCS: Performed by: INTERNAL MEDICINE

## 2023-10-09 PROCEDURE — 99232 SBSQ HOSP IP/OBS MODERATE 35: CPT | Performed by: INTERNAL MEDICINE

## 2023-10-09 PROCEDURE — 85025 COMPLETE CBC W/AUTO DIFF WBC: CPT

## 2023-10-09 PROCEDURE — 76937 US GUIDE VASCULAR ACCESS: CPT

## 2023-10-09 PROCEDURE — 97530 THERAPEUTIC ACTIVITIES: CPT

## 2023-10-09 PROCEDURE — APPSS30 APP SPLIT SHARED TIME 16-30 MINUTES: Performed by: NURSE PRACTITIONER

## 2023-10-09 PROCEDURE — 99232 SBSQ HOSP IP/OBS MODERATE 35: CPT | Performed by: STUDENT IN AN ORGANIZED HEALTH CARE EDUCATION/TRAINING PROGRAM

## 2023-10-09 PROCEDURE — 93010 ELECTROCARDIOGRAM REPORT: CPT | Performed by: INTERNAL MEDICINE

## 2023-10-09 PROCEDURE — 6370000000 HC RX 637 (ALT 250 FOR IP): Performed by: STUDENT IN AN ORGANIZED HEALTH CARE EDUCATION/TRAINING PROGRAM

## 2023-10-09 PROCEDURE — 2580000003 HC RX 258: Performed by: INTERNAL MEDICINE

## 2023-10-09 PROCEDURE — C1751 CATH, INF, PER/CENT/MIDLINE: HCPCS

## 2023-10-09 PROCEDURE — 6360000002 HC RX W HCPCS: Performed by: STUDENT IN AN ORGANIZED HEALTH CARE EDUCATION/TRAINING PROGRAM

## 2023-10-09 PROCEDURE — 2580000003 HC RX 258: Performed by: PSYCHIATRY & NEUROLOGY

## 2023-10-09 PROCEDURE — 99232 SBSQ HOSP IP/OBS MODERATE 35: CPT | Performed by: PSYCHIATRY & NEUROLOGY

## 2023-10-09 PROCEDURE — 94640 AIRWAY INHALATION TREATMENT: CPT

## 2023-10-09 PROCEDURE — 80048 BASIC METABOLIC PNL TOTAL CA: CPT

## 2023-10-09 PROCEDURE — 2060000000 HC ICU INTERMEDIATE R&B

## 2023-10-09 PROCEDURE — 36415 COLL VENOUS BLD VENIPUNCTURE: CPT

## 2023-10-09 PROCEDURE — 2580000003 HC RX 258: Performed by: NURSE PRACTITIONER

## 2023-10-09 PROCEDURE — 83735 ASSAY OF MAGNESIUM: CPT

## 2023-10-09 RX ORDER — ACETAMINOPHEN 325 MG/1
650 TABLET ORAL EVERY 4 HOURS PRN
Status: DISCONTINUED | OUTPATIENT
Start: 2023-10-09 | End: 2023-10-11 | Stop reason: HOSPADM

## 2023-10-09 RX ADMIN — MEROPENEM 1000 MG: 1 INJECTION, POWDER, FOR SOLUTION INTRAVENOUS at 16:22

## 2023-10-09 RX ADMIN — VALPROATE SODIUM 500 MG: 100 INJECTION, SOLUTION INTRAVENOUS at 05:41

## 2023-10-09 RX ADMIN — MEROPENEM 1000 MG: 1 INJECTION, POWDER, FOR SOLUTION INTRAVENOUS at 23:54

## 2023-10-09 RX ADMIN — VALPROATE SODIUM 500 MG: 100 INJECTION, SOLUTION INTRAVENOUS at 18:17

## 2023-10-09 RX ADMIN — SODIUM CHLORIDE, PRESERVATIVE FREE 10 ML: 5 INJECTION INTRAVENOUS at 20:12

## 2023-10-09 RX ADMIN — ASPIRIN 81 MG: 81 TABLET, CHEWABLE ORAL at 08:16

## 2023-10-09 RX ADMIN — PANTOPRAZOLE SODIUM 40 MG: 40 TABLET, DELAYED RELEASE ORAL at 08:16

## 2023-10-09 RX ADMIN — ENOXAPARIN SODIUM 40 MG: 100 INJECTION SUBCUTANEOUS at 08:16

## 2023-10-09 RX ADMIN — LAMOTRIGINE 100 MG: 100 TABLET ORAL at 11:08

## 2023-10-09 RX ADMIN — ACETAMINOPHEN 650 MG: 325 TABLET ORAL at 18:03

## 2023-10-09 RX ADMIN — VALPROATE SODIUM 500 MG: 100 INJECTION, SOLUTION INTRAVENOUS at 00:32

## 2023-10-09 RX ADMIN — IPRATROPIUM BROMIDE AND ALBUTEROL SULFATE 1 DOSE: 2.5; .5 SOLUTION RESPIRATORY (INHALATION) at 08:20

## 2023-10-09 RX ADMIN — ACETAMINOPHEN 650 MG: 325 TABLET ORAL at 11:08

## 2023-10-09 RX ADMIN — POTASSIUM BICARBONATE 40 MEQ: 782 TABLET, EFFERVESCENT ORAL at 08:44

## 2023-10-09 RX ADMIN — CEFTOLOZANE AND TAZOBACTAM 3000 MG: 1; .5 INJECTION, POWDER, LYOPHILIZED, FOR SOLUTION INTRAVENOUS at 07:58

## 2023-10-09 RX ADMIN — BUDESONIDE AND FORMOTEROL FUMARATE DIHYDRATE 2 PUFF: 160; 4.5 AEROSOL RESPIRATORY (INHALATION) at 08:20

## 2023-10-09 RX ADMIN — SODIUM CHLORIDE, PRESERVATIVE FREE 10 ML: 5 INJECTION INTRAVENOUS at 08:17

## 2023-10-09 ASSESSMENT — ENCOUNTER SYMPTOMS
COLOR CHANGE: 0
APNEA: 0
EYE DISCHARGE: 0
ABDOMINAL PAIN: 0

## 2023-10-09 ASSESSMENT — PAIN SCALES - GENERAL
PAINLEVEL_OUTOF10: 3
PAINLEVEL_OUTOF10: 3

## 2023-10-09 NOTE — CARE COORDINATION
Referral sent to [facility]. Notified by Dr Dave Pedroza that patient will need Invanz for 14 days. Notified Doretha Pardo from [facility]. She will notify facility and call back. Confirmed with Doretha Pardo that patient is a bed hold to return. [phone] received call from Doretha Pardo from [facility].  They would like to try to get precert since patient is now requiring IV atb

## 2023-10-10 ENCOUNTER — APPOINTMENT (OUTPATIENT)
Age: 78
DRG: 871 | End: 2023-10-10
Attending: STUDENT IN AN ORGANIZED HEALTH CARE EDUCATION/TRAINING PROGRAM
Payer: MEDICARE

## 2023-10-10 LAB
ANION GAP SERPL CALCULATED.3IONS-SCNC: 12 MMOL/L (ref 9–16)
BUN SERPL-MCNC: 7 MG/DL (ref 8–23)
CALCIUM SERPL-MCNC: 8.1 MG/DL (ref 8.6–10.4)
CHLORIDE SERPL-SCNC: 105 MMOL/L (ref 98–107)
CO2 SERPL-SCNC: 25 MMOL/L (ref 20–31)
CREAT SERPL-MCNC: 0.8 MG/DL (ref 0.7–1.2)
ECHO AV MEAN GRADIENT: 12 MMHG
ECHO AV MEAN VELOCITY: 1.5 M/S
ECHO AV PEAK GRADIENT: 23 MMHG
ECHO AV PEAK VELOCITY: 2.4 M/S
ECHO AV VELOCITY RATIO: 0.5
ECHO AV VTI: 47.4 CM
ECHO BSA: 1.93 M2
ECHO LV E' LATERAL VELOCITY: 13 CM/S
ECHO LV E' SEPTAL VELOCITY: 5 CM/S
ECHO LVOT AV VTI INDEX: 0.51
ECHO LVOT MEAN GRADIENT: 3 MMHG
ECHO LVOT PEAK GRADIENT: 6 MMHG
ECHO LVOT PEAK VELOCITY: 1.2 M/S
ECHO LVOT VTI: 24.1 CM
ECHO MV A VELOCITY: 1.26 M/S
ECHO MV E DECELERATION TIME (DT): 274 MS
ECHO MV E VELOCITY: 0.97 M/S
ECHO MV E/A RATIO: 0.77
ECHO MV E/E' LATERAL: 7.46
ECHO MV E/E' RATIO (AVERAGED): 13.43
ECHO MV E/E' SEPTAL: 19.4
ECHO MV LVOT VTI INDEX: 1.45
ECHO MV MAX VELOCITY: 1.3 M/S
ECHO MV MEAN GRADIENT: 2 MMHG
ECHO MV MEAN VELOCITY: 0.7 M/S
ECHO MV PEAK GRADIENT: 6 MMHG
ECHO MV VTI: 34.9 CM
ECHO PV MAX VELOCITY: 1.2 M/S
ECHO PV PEAK GRADIENT: 6 MMHG
ECHO RV INTERNAL DIMENSION: 4.1 CM
GFR SERPL CREATININE-BSD FRML MDRD: >60 ML/MIN/1.73M2
GLUCOSE SERPL-MCNC: 80 MG/DL (ref 74–99)
POTASSIUM SERPL-SCNC: 4.1 MMOL/L (ref 3.7–5.3)
SODIUM SERPL-SCNC: 142 MMOL/L (ref 136–145)

## 2023-10-10 PROCEDURE — 2060000000 HC ICU INTERMEDIATE R&B

## 2023-10-10 PROCEDURE — 94640 AIRWAY INHALATION TREATMENT: CPT

## 2023-10-10 PROCEDURE — 6370000000 HC RX 637 (ALT 250 FOR IP): Performed by: NURSE PRACTITIONER

## 2023-10-10 PROCEDURE — 93321 DOPPLER ECHO F-UP/LMTD STD: CPT

## 2023-10-10 PROCEDURE — 6360000002 HC RX W HCPCS: Performed by: STUDENT IN AN ORGANIZED HEALTH CARE EDUCATION/TRAINING PROGRAM

## 2023-10-10 PROCEDURE — 97530 THERAPEUTIC ACTIVITIES: CPT

## 2023-10-10 PROCEDURE — 97166 OT EVAL MOD COMPLEX 45 MIN: CPT

## 2023-10-10 PROCEDURE — 94761 N-INVAS EAR/PLS OXIMETRY MLT: CPT

## 2023-10-10 PROCEDURE — 36415 COLL VENOUS BLD VENIPUNCTURE: CPT

## 2023-10-10 PROCEDURE — 2500000003 HC RX 250 WO HCPCS: Performed by: PSYCHIATRY & NEUROLOGY

## 2023-10-10 PROCEDURE — 2580000003 HC RX 258: Performed by: NURSE PRACTITIONER

## 2023-10-10 PROCEDURE — 6370000000 HC RX 637 (ALT 250 FOR IP): Performed by: STUDENT IN AN ORGANIZED HEALTH CARE EDUCATION/TRAINING PROGRAM

## 2023-10-10 PROCEDURE — 99232 SBSQ HOSP IP/OBS MODERATE 35: CPT | Performed by: FAMILY MEDICINE

## 2023-10-10 PROCEDURE — 2700000000 HC OXYGEN THERAPY PER DAY

## 2023-10-10 PROCEDURE — 80048 BASIC METABOLIC PNL TOTAL CA: CPT

## 2023-10-10 PROCEDURE — 93308 TTE F-UP OR LMTD: CPT | Performed by: INTERNAL MEDICINE

## 2023-10-10 PROCEDURE — 2580000003 HC RX 258: Performed by: PSYCHIATRY & NEUROLOGY

## 2023-10-10 PROCEDURE — 99233 SBSQ HOSP IP/OBS HIGH 50: CPT

## 2023-10-10 PROCEDURE — 93325 DOPPLER ECHO COLOR FLOW MAPG: CPT | Performed by: INTERNAL MEDICINE

## 2023-10-10 PROCEDURE — 93321 DOPPLER ECHO F-UP/LMTD STD: CPT | Performed by: INTERNAL MEDICINE

## 2023-10-10 PROCEDURE — 6360000002 HC RX W HCPCS: Performed by: NURSE PRACTITIONER

## 2023-10-10 PROCEDURE — 97535 SELF CARE MNGMENT TRAINING: CPT

## 2023-10-10 PROCEDURE — 2580000003 HC RX 258: Performed by: INTERNAL MEDICINE

## 2023-10-10 PROCEDURE — 6360000002 HC RX W HCPCS: Performed by: INTERNAL MEDICINE

## 2023-10-10 RX ADMIN — ENOXAPARIN SODIUM 40 MG: 100 INJECTION SUBCUTANEOUS at 08:31

## 2023-10-10 RX ADMIN — ACETAMINOPHEN 650 MG: 325 TABLET ORAL at 05:53

## 2023-10-10 RX ADMIN — Medication 3 UNITS: at 21:25

## 2023-10-10 RX ADMIN — LAMOTRIGINE 100 MG: 100 TABLET ORAL at 08:31

## 2023-10-10 RX ADMIN — IPRATROPIUM BROMIDE AND ALBUTEROL SULFATE 1 DOSE: 2.5; .5 SOLUTION RESPIRATORY (INHALATION) at 11:35

## 2023-10-10 RX ADMIN — ALBUTEROL SULFATE 2.5 MG: 2.5 SOLUTION RESPIRATORY (INHALATION) at 04:54

## 2023-10-10 RX ADMIN — ACETAMINOPHEN 650 MG: 325 TABLET ORAL at 21:47

## 2023-10-10 RX ADMIN — MEROPENEM 1000 MG: 1 INJECTION, POWDER, FOR SOLUTION INTRAVENOUS at 08:32

## 2023-10-10 RX ADMIN — IPRATROPIUM BROMIDE AND ALBUTEROL SULFATE 1 DOSE: 2.5; .5 SOLUTION RESPIRATORY (INHALATION) at 16:10

## 2023-10-10 RX ADMIN — SODIUM CHLORIDE, PRESERVATIVE FREE 10 ML: 5 INJECTION INTRAVENOUS at 08:31

## 2023-10-10 RX ADMIN — MEROPENEM 1000 MG: 1 INJECTION, POWDER, FOR SOLUTION INTRAVENOUS at 16:42

## 2023-10-10 RX ADMIN — ASPIRIN 81 MG: 81 TABLET, CHEWABLE ORAL at 08:32

## 2023-10-10 RX ADMIN — SODIUM CHLORIDE, PRESERVATIVE FREE 10 ML: 5 INJECTION INTRAVENOUS at 21:34

## 2023-10-10 RX ADMIN — VALPROATE SODIUM 500 MG: 100 INJECTION, SOLUTION INTRAVENOUS at 17:51

## 2023-10-10 RX ADMIN — VALPROATE SODIUM 500 MG: 100 INJECTION, SOLUTION INTRAVENOUS at 05:43

## 2023-10-10 RX ADMIN — SODIUM CHLORIDE, PRESERVATIVE FREE 10 ML: 5 INJECTION INTRAVENOUS at 08:32

## 2023-10-10 RX ADMIN — IPRATROPIUM BROMIDE AND ALBUTEROL SULFATE 1 DOSE: 2.5; .5 SOLUTION RESPIRATORY (INHALATION) at 07:41

## 2023-10-10 RX ADMIN — ACETAMINOPHEN 650 MG: 325 TABLET ORAL at 18:38

## 2023-10-10 RX ADMIN — BUDESONIDE AND FORMOTEROL FUMARATE DIHYDRATE 2 PUFF: 160; 4.5 AEROSOL RESPIRATORY (INHALATION) at 07:41

## 2023-10-10 RX ADMIN — Medication 3 UNITS: at 21:24

## 2023-10-10 RX ADMIN — PANTOPRAZOLE SODIUM 40 MG: 40 TABLET, DELAYED RELEASE ORAL at 05:53

## 2023-10-10 ASSESSMENT — ENCOUNTER SYMPTOMS
CONSTIPATION: 0
BLOOD IN STOOL: 0
EYE DISCHARGE: 0
NAUSEA: 0
CHEST TIGHTNESS: 0
COUGH: 0
COLOR CHANGE: 0
DIARRHEA: 0
VOMITING: 0
ABDOMINAL PAIN: 0
APNEA: 0
SHORTNESS OF BREATH: 0
WHEEZING: 0

## 2023-10-10 ASSESSMENT — PAIN DESCRIPTION - LOCATION: LOCATION: HAND

## 2023-10-10 ASSESSMENT — PAIN SCALES - GENERAL
PAINLEVEL_OUTOF10: 3
PAINLEVEL_OUTOF10: 3

## 2023-10-10 ASSESSMENT — PAIN DESCRIPTION - ORIENTATION: ORIENTATION: RIGHT

## 2023-10-11 VITALS
HEIGHT: 70 IN | BODY MASS INDEX: 23.77 KG/M2 | DIASTOLIC BLOOD PRESSURE: 65 MMHG | TEMPERATURE: 99.1 F | RESPIRATION RATE: 23 BRPM | HEART RATE: 60 BPM | SYSTOLIC BLOOD PRESSURE: 134 MMHG | OXYGEN SATURATION: 92 % | WEIGHT: 166 LBS

## 2023-10-11 PROCEDURE — 94640 AIRWAY INHALATION TREATMENT: CPT

## 2023-10-11 PROCEDURE — 2580000003 HC RX 258: Performed by: NURSE PRACTITIONER

## 2023-10-11 PROCEDURE — 2580000003 HC RX 258: Performed by: INTERNAL MEDICINE

## 2023-10-11 PROCEDURE — 99239 HOSP IP/OBS DSCHRG MGMT >30: CPT | Performed by: FAMILY MEDICINE

## 2023-10-11 PROCEDURE — 6370000000 HC RX 637 (ALT 250 FOR IP): Performed by: STUDENT IN AN ORGANIZED HEALTH CARE EDUCATION/TRAINING PROGRAM

## 2023-10-11 PROCEDURE — 6370000000 HC RX 637 (ALT 250 FOR IP): Performed by: NURSE PRACTITIONER

## 2023-10-11 PROCEDURE — 94761 N-INVAS EAR/PLS OXIMETRY MLT: CPT

## 2023-10-11 PROCEDURE — 99233 SBSQ HOSP IP/OBS HIGH 50: CPT | Performed by: NURSE PRACTITIONER

## 2023-10-11 PROCEDURE — 6360000002 HC RX W HCPCS: Performed by: STUDENT IN AN ORGANIZED HEALTH CARE EDUCATION/TRAINING PROGRAM

## 2023-10-11 PROCEDURE — 2580000003 HC RX 258: Performed by: PSYCHIATRY & NEUROLOGY

## 2023-10-11 PROCEDURE — 2500000003 HC RX 250 WO HCPCS: Performed by: PSYCHIATRY & NEUROLOGY

## 2023-10-11 PROCEDURE — 2700000000 HC OXYGEN THERAPY PER DAY

## 2023-10-11 PROCEDURE — 6360000002 HC RX W HCPCS: Performed by: INTERNAL MEDICINE

## 2023-10-11 RX ADMIN — VALPROATE SODIUM 500 MG: 100 INJECTION, SOLUTION INTRAVENOUS at 05:44

## 2023-10-11 RX ADMIN — SODIUM CHLORIDE, PRESERVATIVE FREE 10 ML: 5 INJECTION INTRAVENOUS at 08:30

## 2023-10-11 RX ADMIN — MEROPENEM 1000 MG: 1 INJECTION, POWDER, FOR SOLUTION INTRAVENOUS at 08:40

## 2023-10-11 RX ADMIN — ACETAMINOPHEN 650 MG: 325 TABLET ORAL at 10:38

## 2023-10-11 RX ADMIN — ENOXAPARIN SODIUM 40 MG: 100 INJECTION SUBCUTANEOUS at 08:41

## 2023-10-11 RX ADMIN — LAMOTRIGINE 100 MG: 100 TABLET ORAL at 08:44

## 2023-10-11 RX ADMIN — IPRATROPIUM BROMIDE AND ALBUTEROL SULFATE 1 DOSE: 2.5; .5 SOLUTION RESPIRATORY (INHALATION) at 07:53

## 2023-10-11 RX ADMIN — SODIUM CHLORIDE: 9 INJECTION, SOLUTION INTRAVENOUS at 08:39

## 2023-10-11 RX ADMIN — ASPIRIN 81 MG: 81 TABLET, CHEWABLE ORAL at 08:41

## 2023-10-11 RX ADMIN — PANTOPRAZOLE SODIUM 40 MG: 40 TABLET, DELAYED RELEASE ORAL at 08:41

## 2023-10-11 RX ADMIN — BUDESONIDE AND FORMOTEROL FUMARATE DIHYDRATE 2 PUFF: 160; 4.5 AEROSOL RESPIRATORY (INHALATION) at 07:53

## 2023-10-11 RX ADMIN — MEROPENEM 1000 MG: 1 INJECTION, POWDER, FOR SOLUTION INTRAVENOUS at 00:10

## 2023-10-11 ASSESSMENT — ENCOUNTER SYMPTOMS
VOMITING: 0
NAUSEA: 0
COLOR CHANGE: 0
SHORTNESS OF BREATH: 0
APNEA: 0
DIARRHEA: 0
COUGH: 0
EYE DISCHARGE: 0
BLOOD IN STOOL: 0
ABDOMINAL PAIN: 0
CHEST TIGHTNESS: 0
CONSTIPATION: 0
WHEEZING: 0

## 2023-10-11 ASSESSMENT — PAIN DESCRIPTION - LOCATION
LOCATION: HAND;FACE
LOCATION: ARM;HAND
LOCATION: HAND;ARM

## 2023-10-11 ASSESSMENT — PAIN DESCRIPTION - ORIENTATION
ORIENTATION: RIGHT

## 2023-10-11 ASSESSMENT — PAIN SCALES - GENERAL
PAINLEVEL_OUTOF10: 8

## 2023-10-11 NOTE — CARE COORDINATION
Transitional Planning  Received call from [facility] with [address] [place]. Pt approved 10/11-10/17 approval will be faxed to [phone]. If discharged delayed greater than 72 hrs new approval will be needed. 8:58  PS Dr Larry Ordonez her we have approval for SNF  Await response    10:55  Mckay completed    11:40  Spoke with Mauricio Huitron Rn to complete her portion of [address] pt scheduled for pickup at 2pm w/ Saucedo Quivers of [facility] spoke with Jessica Julio to verify 2pm admission they are ok with 2pm   NURSING REPORT CAN BE CALLED [phone]  Faxed Ins auth and avs  to [phone]    12:00  [address] [place] [address] child of pt listed as legal guardian [phone] no answer left vm for cb to [phone]    12:15   Faxed cherelle avs mar x 1 day to [facility] Pkt completed with Mckay     1:34  Emilie Dunn returned call informed her of pt's right to appeal his discharge. Informed pt would be transferred via ambulance to [facility].   She provided address for certified letter with copy of 2nd [address] [address]    Discharge [address] [facility] [address]  Clinical Case Management Department  Written by: Davis Marin RN    Patient Name: Hoy Curling  Attending Provider: No att. providers found  Admit Date: 10/6/2023  7:40 PM  MRN: [id number]  Account: [de-identified]                     : 1945  Discharge Date: 10/11/2023      Disposition: SNF [facility] via [facility]    Davis Marin RN

## 2023-10-11 NOTE — DISCHARGE INSTR - COC
Continuity of Care Form    Patient Name: Andrew Benitez   :  1945  MRN:  [id number]    Admit date:  10/6/2023  Discharge date:  10/11/23    Code Status Order: Full Code   Advance Directives:     Admitting Physician:  No admitting provider for patient encounter. PCP: Alex Dobbs MD    Discharging Nurse: [facility] Unit/Room#:   Discharging Unit Phone Number: [phone]    Emergency Contact:   Extended Emergency Contact Information  Primary Emergency Contact: [address] of [facility] [place] Phone: [phone]  Relation: Child  Secondary Emergency Contact: [address] Phone: [phone]  Relation:     Past Surgical History:  Past Surgical History:   Procedure Laterality Date    ANUS SURGERY      bowel surgery r/t cancer approx 4 years ago    COLONOSCOPY N/A 2018    COLONOSCOPY POLYPECTOMY HOT BIOPSY performed by Omar Kussmaul, DO at [address]      umbilical    UPPER GASTROINTESTINAL ENDOSCOPY N/A 2023    EUS LINEAR SCOPE, FINE NEEDLE ASPIRATION performed by Joyce Urias MD at [address] [employer]       Immunization History: There is no immunization history on file for this patient. Active Problems:  Patient Active Problem List   Diagnosis Code    COVID-19 U07.1    Acute respiratory failure with hypoxia ([facility]) J96.01    COPD (chronic obstructive pulmonary disease) ([facility]) J44.9    Tobacco abuse Z72.0    Essential (primary) hypertension I10    Bipolar 1 disorder ([address]) F31.9    SUNITHA (acute kidney injury) ([address]) N17.9    Acute aspiration pneumonia ([address]) J69.0    Hypokalemia E87.6    Pulmonary embolism on left ([address]) I26.99    Primary parkinsonism G20. C    Right leg DVT (HCC) R08.219    Acute diastolic CHF (congestive heart failure) (HCC) I50.31    Pleural effusion right side J90    Sepsis (HCC) A41.9    Pancreatic mass K86.89    Chronic anemia D64.9    Abnormal LFTs R79.89    Hyperammonemia (HCC) E72.20    NSTEMI (non-ST elevated

## 2023-10-11 NOTE — DISCHARGE SUMMARY
10/06/2023 10:01 PM    HGBUR MODERATE 10/06/2023 10:01 PM    PHUR 5.0 10/06/2023 10:01 PM    PROTEINU 2+ 10/06/2023 10:01 PM    GLUCOSEU NEGATIVE 10/06/2023 10:01 PM    KETUA NEGATIVE 10/06/2023 10:01 PM    BILIRUBINUR NEGATIVE  Verified by ictotest. 10/06/2023 10:01 PM    UROBILINOGEN Normal 10/06/2023 10:01 PM    NITRU POSITIVE 10/06/2023 10:01 PM    LEUKOCYTESUR LARGE 10/06/2023 10:01 PM     TSH:    Lab Results   Component Value Date/Time    TSH 1.38 10/06/2023 08:05 PM        Radiology:  MRI BRAIN WO CONTRAST    Result Date: 10/8/2023  Chronic microvascular disease without acute intracranial abnormality. Remote lacunar infarct in the left cerebellar hemisphere. XR CHEST PORTABLE    Result Date: 10/8/2023  Chronic pulmonary change without acute cardiopulmonary process. CT ABDOMEN PELVIS W IV CONTRAST Additional Contrast? None    Result Date: 10/7/2023  Finding identified in the region of the splenic hilum on the earlier CT PA appears to represent a large pancreatic pseudocyst associated with the tail of the pancreas. Extensive inflammatory changes at the pancreatic tail and extending anterosuperiorly on noncontrast CT abdomen/pelvis of 08/29/2023 have resolved. This should be correlated clinically. Presence of a pancreatic tail mass cannot be excluded. Consider follow-up pancreatic mass protocol MRI. Moderate constipation. Moderate-sized hiatal hernia. Cholelithiasis without evidence of cholecystitis. CT CHEST PULMONARY EMBOLISM W CONTRAST    Result Date: 10/6/2023  No evidence of pulmonary embolism or acute pulmonary abnormality. Incompletely characterized amorphous soft tissue densities adjacent to the splenic hilum, not present on comparison exam of 08/29/2023. Recommend further characterization with CT abdomen and pelvis. CT HEAD WO CONTRAST    Result Date: 10/6/2023  No acute intracranial abnormality. XR CHEST PORTABLE    Result Date: 10/6/2023  No acute process.        Consultations:

## 2023-10-11 NOTE — PLAN OF CARE
CC:  arthritis    SUBJECTIVE:  No issues overnight. Afebrile.    OBJECTIVE:    Vital Last Value 24 Hour Range   Temperature 98.4 °F (36.9 °C) (11/03/17 1215) Temp  Min: 97 °F (36.1 °C)  Max: 98.4 °F (36.9 °C)   Pulse 84 (11/03/17 1215) Pulse  Min: 78  Max: 107   Respiratory 20 (11/03/17 1215) Resp  Min: 20  Max: 24   Non-Invasive  Blood Pressure 128/68 (11/03/17 1215) BP  Min: 119/72  Max: 128/68   O2 Sat   NA   Pulse Oximetry 99 % (11/03/17 1215) SpO2  Min: 94 %  Max: 100 %     Vital Today Admitted   Weight 14.1 kg (11/03/17 0722) Weight: 14 kg (11/01/17 1500)   Height N/A       Weight over the past 48 Hours:  Patient Vitals for the past 48 hrs:   Weight   11/03/17 0722 14.1 kg         PHYSICAL EXAMINATION:  General: Well appearing male, no acute distress.  HEENT: PERRL, EOMI, no conjunctival injection. TM with normal landmarks bilaterally.  Oropharynx with moist mucus membranes, clear.  Neck is supple, no lymphadenopathy  Lungs: Clear to auscultation bilaterally. No wheezes, rales, rhonchi. Normal work of breathing.  Cardiac: Regular rate and rhythm, normal S1S2, no murmur appreciated.  Abdomen: Soft, nontender, nondistended. Normoactive bowel sounds. No organomegaly or masses.  : Ángel 1 male genitalia, testes descended bilaterally. No hernia present.   Back: Spine is straight.  Skin: no rashes or lesions.    Intake/Output:    Last Stool Occurrence: 1 (11/02/17 1550)    No intake/output data recorded.    I/O last 3 completed shifts:  In: 856.5 [P.O.:120; I.V.:736.5]  Out: 695 [Urine:694; Stool:1]      Intake/Output Summary (Last 24 hours) at 11/03/17 1735  Last data filed at 11/03/17 1223   Gross per 24 hour   Intake           856.48 ml   Output              693 ml   Net           163.48 ml       Laboratory Results:  Labs last 24 hrs:    Recent Results (from the past 24 hour(s))   Vancomycin Trough    Collection Time: 11/02/17  7:45 PM   Result Value Ref Range    VANCOMYCIN (TROUGH) 6.0 (L) 10.0 - 20.0 mcg/mL 
PROVIDE ADEQUATE OXYGENATION WITH ACCEPTABLE SP02/ABG'S    [x]  IDENTIFY APPROPRIATE OXYGEN THERAPY  [x]   MONITOR SP02/ABG'S AS NEEDED   [x]   PATIENT EDUCATION AS NEEDED   BRONCHOSPASM/BRONCHOCONSTRICTION     [x]         IMPROVE AERATION/BREATH SOUNDS  [x]   ADMINISTER BRONCHODILATOR THERAPY AS APPROPRIATE  [x]   ASSESS BREATH SOUNDS  []   IMPLEMENT AEROSOL/MDI PROTOCOL  [x]   PATIENT EDUCATION AS NEEDED
Problem: Discharge Planning  Goal: Discharge to home or other facility with appropriate resources  10/10/2023 1037 by Jonas Pyle RN  Outcome: Progressing  Flowsheets (Taken 10/10/2023 5056)  Discharge to home or other facility with appropriate resources: Identify barriers to discharge with patient and caregiver  10/10/2023 0355 by Heather Hall RN  Outcome: Progressing     Problem: Pain  Goal: Verbalizes/displays adequate comfort level or baseline comfort level  10/10/2023 1037 by Jonas Pyle RN  Outcome: Progressing  Flowsheets (Taken 10/10/2023 0724)  Verbalizes/displays adequate comfort level or baseline comfort level: Encourage patient to monitor pain and request assistance  10/10/2023 0355 by Heather Hall RN  Outcome: Progressing     Problem: Confusion  Goal: Confusion, delirium, dementia, or psychosis is improved or at baseline  Description: INTERVENTIONS:  1. Assess for possible contributors to thought disturbance, including medications, impaired vision or hearing, underlying metabolic abnormalities, dehydration, psychiatric diagnoses, and notify attending LIP  2. [place] high risk fall precautions, as indicated  3. Provide frequent short contacts to provide reality reorientation, refocusing and direction  4. Decrease environmental stimuli, including noise as appropriate  5. Monitor and intervene to maintain adequate nutrition, hydration, elimination, sleep and activity  6. If unable to ensure safety without constant attention obtain sitter and review sitter guidelines with assigned personnel  7.  Initiate Psychosocial CNS and Spiritual Care consult, as indicated  10/10/2023 1037 by Jonas Pyle RN  Outcome: Progressing  Flowsheets (Taken 10/10/2023 5243)  Effect of thought disturbance (confusion, delirium, dementia, or psychosis) are managed with adequate functional status: Assess for contributors to thought disturbance, including medications, impaired vision or hearing, underlying
Problem: Discharge Planning  Goal: Discharge to home or other facility with appropriate resources  10/11/2023 1327 by Justin Rocha RN  Outcome: Completed  10/11/2023 0422 by Deedee Hills RN  Outcome: Progressing  Flowsheets (Taken 10/10/2023 1653 by Annita Dempsey, RN)  Discharge to home or other facility with appropriate resources: Identify barriers to discharge with patient and caregiver     Problem: Pain  Goal: Verbalizes/displays adequate comfort level or baseline comfort level  10/11/2023 1327 by Justin Rocha RN  Outcome: Completed  10/11/2023 0422 by Deedee Hills RN  Outcome: Progressing  [address] (Taken 10/10/2023 1645 by Annita Dempsey, RN)  Verbalizes/displays adequate comfort level or baseline comfort level: Encourage patient to monitor pain and request assistance     Problem: Confusion  Goal: Confusion, delirium, dementia, or psychosis is improved or at baseline  Description: INTERVENTIONS:  1. Assess for possible contributors to thought disturbance, including medications, impaired vision or hearing, underlying metabolic abnormalities, dehydration, psychiatric diagnoses, and notify attending LIP  2. [place] high risk fall precautions, as indicated  3. Provide frequent short contacts to provide reality reorientation, refocusing and direction  4. Decrease environmental stimuli, including noise as appropriate  5. Monitor and intervene to maintain adequate nutrition, hydration, elimination, sleep and activity  6. If unable to ensure safety without constant attention obtain sitter and review sitter guidelines with assigned personnel  7.  Initiate Psychosocial CNS and Spiritual Care consult, as indicated  10/11/2023 1327 by Justin Rocha RN  Outcome: Completed  10/11/2023 0422 by Deedee Hills RN  Outcome: Progressing  Flowsheets (Taken 10/10/2023 1653 by Annita Dempsey, RN)  Effect of thought disturbance (confusion, delirium, dementia, or psychosis) are managed with adequate
Problem: Discharge Planning  Goal: Discharge to home or other facility with appropriate resources  Outcome: Progressing     Problem: Pain  Goal: Verbalizes/displays adequate comfort level or baseline comfort level  Outcome: Progressing     Problem: Confusion  Goal: Confusion, delirium, dementia, or psychosis is improved or at baseline  Description: INTERVENTIONS:  1. Assess for possible contributors to thought disturbance, including medications, impaired vision or hearing, underlying metabolic abnormalities, dehydration, psychiatric diagnoses, and notify attending LIP  2. [place] high risk fall precautions, as indicated  3. Provide frequent short contacts to provide reality reorientation, refocusing and direction  4. Decrease environmental stimuli, including noise as appropriate  5. Monitor and intervene to maintain adequate nutrition, hydration, elimination, sleep and activity  6. If unable to ensure safety without constant attention obtain sitter and review sitter guidelines with assigned personnel  7. Initiate Psychosocial CNS and Spiritual Care consult, as indicated  Outcome: Progressing     Problem: Skin/Tissue Integrity  Goal: Absence of new skin breakdown  Description: 1. Monitor for areas of redness and/or skin breakdown  2. Assess vascular access sites hourly  3. Every 4-6 hours minimum:  Change oxygen saturation probe site  4. Every 4-6 hours:  If on nasal continuous positive airway pressure, respiratory therapy assess nares and determine need for appliance change or resting period.   Outcome: Progressing     Problem: ABCDS Injury Assessment  Goal: Absence of physical injury  Outcome: Progressing     Problem: Safety - Adult  Goal: Free from fall injury  Outcome: Progressing
Problem: Discharge Planning  Goal: Discharge to home or other facility with appropriate resources  Outcome: Progressing  Flowsheets (Taken 10/10/2023 1653 by Nati Moy RN)  Discharge to home or other facility with appropriate resources: Identify barriers to discharge with patient and caregiver     Problem: Pain  Goal: Verbalizes/displays adequate comfort level or baseline comfort level  Outcome: Progressing  Flowsheets (Taken 10/10/2023 1645 by Nati Moy RN)  Verbalizes/displays adequate comfort level or baseline comfort level: Encourage patient to monitor pain and request assistance     Problem: Confusion  Goal: Confusion, delirium, dementia, or psychosis is improved or at baseline  Description: INTERVENTIONS:  1. Assess for possible contributors to thought disturbance, including medications, impaired vision or hearing, underlying metabolic abnormalities, dehydration, psychiatric diagnoses, and notify attending LIP  2. [place] high risk fall precautions, as indicated  3. Provide frequent short contacts to provide reality reorientation, refocusing and direction  4. Decrease environmental stimuli, including noise as appropriate  5. Monitor and intervene to maintain adequate nutrition, hydration, elimination, sleep and activity  6. If unable to ensure safety without constant attention obtain sitter and review sitter guidelines with assigned personnel  7. Initiate Psychosocial CNS and Spiritual Care consult, as indicated  Outcome: Progressing  Flowsheets (Taken 10/10/2023 1653 by Nati Moy RN)  Effect of thought disturbance (confusion, delirium, dementia, or psychosis) are managed with adequate functional status: Assess for contributors to thought disturbance, including medications, impaired vision or hearing, underlying metabolic abnormalities, dehydration, psychiatric diagnoses, notify LIP     Problem: Skin/Tissue Integrity  Goal: Absence of new skin breakdown  Description: 1.   Monitor for areas
Problem: Respiratory - Adult  Goal: Achieves optimal ventilation and oxygenation  10/10/2023 0744 by Nicki Bryan RCP  Outcome: Progressing
Problem: Respiratory - Adult  Goal: Achieves optimal ventilation and oxygenation  10/8/2023 2133 by Suzie Mott RCP  Outcome: Progressing   BRONCHOSPASM/BRONCHOCONSTRICTION     [x]         IMPROVE AERATION/BREATH SOUNDS  [x]   ADMINISTER BRONCHODILATOR THERAPY AS APPROPRIATE  [x]   ASSESS BREATH SOUNDS  []   IMPLEMENT AEROSOL/MDI PROTOCOL  [x]   PATIENT EDUCATION AS NEEDED
Problem: Respiratory - Adult  Goal: Achieves optimal ventilation and oxygenation  10/9/2023 0826 by Mckenzie Huang RCP  Outcome: Progressing
Problem: Respiratory - Adult  Goal: Achieves optimal ventilation and oxygenation  Flowsheets (Taken 10/8/2023 1255)  Achieves optimal ventilation and oxygenation: Respiratory therapy support as indicated
Problem: Respiratory - Adult  Goal: Achieves optimal ventilation and oxygenation  Outcome: Progressing
  Creatinine Serum    Collection Time: 11/02/17  7:45 PM   Result Value Ref Range    Creatinine 0.18 (L) 0.21 - 0.70 mg/dL    GFR Estimate,  Not calculated.     GFR Estimate, Non  Not calculated.          ASSESSMENT/PLAN:    Rajesh is a 4 year old male admitted as an inpatient with hip arthritis, suspected Borrelia. Treating empirically for other bacterial pathogens while awaiting synovial fluid culture results. Synovial fluids culture negative x 2 days but blood culture positive with gram positive cocci in clusters - further identification pending.     CV/ RESP: Q4 hour vital signs.  Pulseoximetry as needed.    FENGI: Age appropriate diet as tolerated.    ID: Afebrile. Will monitor temperature closely. Continue cefuroxime and vancomycin. Synovial fluid culture negative x 2 days.  Blood culture positive with gram positive cocci in clusters, awaiting identification (lab reported that it will likely be available tomorrow - 11/4, but may not be available until following morning). New blood cultures ordered as well as repeat CBC and CRP.   · If organism is likely contaminant, patient can be discharged and treated as Lyme arthritis with 28 days of amoxicillin (prescription already provided to family).    · If the organism is a potential pathogen, will revisit with [facility] ID specialist and treat accordingly.    Pharmacy is managing the vancomycin dosing.    NEURO: Will monitor pain status. The patient may have ibuprofen and/or acetaminophen as needed.    DISPOSITION: Will reassess in the morning.          
monitored and maintained or improved  Outcome: Progressing

## 2023-10-13 LAB
MICROORGANISM SPEC CULT: ABNORMAL
SERVICE CMNT-IMP: ABNORMAL
SPECIMEN DESCRIPTION: ABNORMAL

## 2023-10-16 ENCOUNTER — TELEPHONE (OUTPATIENT)
Dept: ONCOLOGY | Age: 78
End: 2023-10-16

## 2023-10-16 NOTE — TELEPHONE ENCOUNTER
RECEIVED AN INBASKET MESSAGE FROM PT REQUESTING AN APPOINTMENT WITH DR BEAVER FOR MRI RESULTS. PT IS ALREADY SCHEDULED FOR  11/28/23 @ THE [place] LOCATION. WRITER CALLED PT BACK AND LEFT A VM FOR PT. WILL OFFER 11/16/23 AT [facility] LOCATION.

## 2023-10-20 ENCOUNTER — OFFICE VISIT (OUTPATIENT)
Dept: GASTROENTEROLOGY | Age: 78
End: 2023-10-20

## 2023-10-20 ENCOUNTER — PATIENT MESSAGE (OUTPATIENT)
Dept: ONCOLOGY | Age: 78
End: 2023-10-20

## 2023-10-20 VITALS
BODY MASS INDEX: 23.77 KG/M2 | DIASTOLIC BLOOD PRESSURE: 77 MMHG | SYSTOLIC BLOOD PRESSURE: 106 MMHG | HEART RATE: 100 BPM | HEIGHT: 70 IN | WEIGHT: 166 LBS

## 2023-10-20 DIAGNOSIS — K86.3 PSEUDOCYST OF PANCREAS DUE TO CHRONIC PANCREATITIS (HCC): Primary | ICD-10-CM

## 2023-10-20 DIAGNOSIS — K86.1 PSEUDOCYST OF PANCREAS DUE TO CHRONIC PANCREATITIS (HCC): Primary | ICD-10-CM

## 2023-10-20 RX ORDER — DIVALPROEX SODIUM 500 MG/1
TABLET, DELAYED RELEASE ORAL
COMMUNITY
Start: 2023-09-18 | End: 2023-10-20 | Stop reason: SDUPTHER

## 2023-10-20 ASSESSMENT — ENCOUNTER SYMPTOMS
COUGH: 1
BACK PAIN: 1
WHEEZING: 1
SHORTNESS OF BREATH: 1

## 2023-10-20 NOTE — PROGRESS NOTES
GI CLINIC FOLLOW UP    INTERVAL HISTORY:   No referring provider defined for this encounter. Chief Complaint   Patient presents with    Follow-Up from Hospital     EUS done for Pancreatic lesion, history of cancer. He says he is doing well since leaving the hospital. Becca Fraga working OK no acid. No pain. HISTORY OF PRESENT ILLNESS: Priya Steve is a 70 y.o. male , referred for evaluation of pancreatic pseudocyst.  He had EUS on 9/14/23 for evaluation of mass in the tail of pancreas which showed- heterogenous area near tail of pancreas, likely inflammatory lesion; size 2.8 cm x 2.6 cm- FNA showed- FRAGMENTS OF PANCREATIC PARENCHYMA   SHOWING CHANGES OF CHRONIC PANCREATITIS. He was re-admitted on 10/06/23 with AMS secondary to SUNITHA with hyperammonemia from valproate and UTI. Also his abdominal CT-scan showed- pseudocyst measuring 6.3 x 11.1 x 5.2 cm compared to previous dimensions of 2 x 2.1 cm. Patient is stable. Denies any symptoms. No c/o- nausea, vomiting, fever, loss of appetite, weight loss, bloating, bleeding CA, diarrhea, nocturnal diarrhea, tenesmus        Past Medical,Family, and Social History reviewed and does contribute to the patient presentingcondition. I did review all the labs results available for the labs which were ordered by the primary care physician, and the other consultants, we search on epic at [facility] and all the available care everywhere epic    I did review all the imaging studies of the abdomen available on EMR, ordered by the primary care physician and the other consultant    I did review all the pathology from the biopsies done on the previous endoscopies    Patient's PMH/PSH,SH,PSYCH Hx, MEDs, ALLERGIES, and ROS were all reviewed and updated in the appropriate sections.     PAST MEDICAL HISTORY:  Past Medical History:   Diagnosis Date    Acute respiratory failure with hypoxia (HCC)     Back pain     Bipolar 1 disorder (HCC)     Cancer (HCC)     bowel

## 2023-10-24 ENCOUNTER — TELEPHONE (OUTPATIENT)
Dept: ONCOLOGY | Age: 78
End: 2023-10-24

## 2023-10-24 NOTE — TELEPHONE ENCOUNTER
Called patient daughter about MRI of the pancreas, did review the scan of the abdomen and patient to follow-up with surgery

## 2023-11-02 ENCOUNTER — HOSPITAL ENCOUNTER (OUTPATIENT)
Age: 78
Setting detail: SPECIMEN
Discharge: HOME | End: 2023-11-02

## 2023-11-02 LAB
ALBUMIN SERPL-MCNC: 3.2 G/DL (ref 3.5–5.2)
ALBUMIN/GLOB SERPL: 0.8 {RATIO} (ref 1–2.5)
ALP SERPL-CCNC: 80 U/L (ref 40–129)
ALT SERPL-CCNC: 5 U/L (ref 5–41)
ANION GAP SERPL CALCULATED.3IONS-SCNC: 9 MMOL/L (ref 9–17)
AST SERPL-CCNC: 11 U/L
BASOPHILS # BLD: 0.04 K/UL (ref 0–0.2)
BASOPHILS NFR BLD: 1 % (ref 0–2)
BILIRUB SERPL-MCNC: <0.1 MG/DL (ref 0.3–1.2)
BUN SERPL-MCNC: 13 MG/DL (ref 8–23)
CALCIUM SERPL-MCNC: 9.2 MG/DL (ref 8.6–10.4)
CEA SERPL-MCNC: 1.5 NG/ML
CHLORIDE SERPL-SCNC: 104 MMOL/L (ref 98–107)
CO2 SERPL-SCNC: 30 MMOL/L (ref 20–31)
CREAT SERPL-MCNC: 0.8 MG/DL (ref 0.7–1.2)
EOSINOPHIL # BLD: 0.24 K/UL (ref 0–0.44)
EOSINOPHILS RELATIVE PERCENT: 4 % (ref 1–4)
ERYTHROCYTE [DISTWIDTH] IN BLOOD BY AUTOMATED COUNT: 17.8 % (ref 11.8–14.4)
FERRITIN SERPL-MCNC: 55 NG/ML (ref 30–400)
GFR SERPL CREATININE-BSD FRML MDRD: >60 ML/MIN/1.73M2
GLUCOSE SERPL-MCNC: 88 MG/DL (ref 70–99)
HCT VFR BLD AUTO: 34.7 % (ref 40.7–50.3)
HGB BLD-MCNC: 10.4 G/DL (ref 13–17)
IMM GRANULOCYTES # BLD AUTO: <0.03 K/UL (ref 0–0.3)
IMM GRANULOCYTES NFR BLD: 0 %
IRON SATN MFR SERPL: 13 % (ref 20–55)
IRON SERPL-MCNC: 34 UG/DL (ref 59–158)
LYMPHOCYTES # BLD: 31 % (ref 24–43)
LYMPHOCYTES NFR BLD: 1.78 K/UL (ref 1.1–3.7)
MCH RBC QN AUTO: 27.7 PG (ref 25.2–33.5)
MCHC RBC AUTO-ENTMCNC: 30 G/DL (ref 28.4–34.8)
MCV RBC AUTO: 92.5 FL (ref 82.6–102.9)
MONOCYTES NFR BLD: 0.43 K/UL (ref 0.1–1.2)
MONOCYTES NFR BLD: 8 % (ref 3–12)
NEUTROPHILS NFR BLD: 56 % (ref 36–65)
NEUTS SEG NFR BLD: 3.19 K/UL (ref 1.5–8.1)
NRBC BLD-RTO: 0 PER 100 WBC
PLATELET # BLD AUTO: 483 K/UL (ref 138–453)
PMV BLD AUTO: 9.4 FL (ref 8.1–13.5)
POTASSIUM SERPL-SCNC: 4 MMOL/L (ref 3.7–5.3)
PROT SERPL-MCNC: 7.4 G/DL (ref 6.4–8.3)
RBC # BLD AUTO: 3.75 M/UL (ref 4.21–5.77)
RBC # BLD: ABNORMAL 10*6/UL
SODIUM SERPL-SCNC: 143 MMOL/L (ref 135–144)
TIBC SERPL-MCNC: 271 UG/DL (ref 250–450)
UNSATURATED IRON BINDING CAPACITY: 237 UG/DL (ref 112–347)
WBC OTHER # BLD: 5.7 K/UL (ref 3.5–11.3)

## 2023-11-02 PROCEDURE — P9603 ONE-WAY ALLOW PRORATED MILES: HCPCS

## 2023-11-02 PROCEDURE — 85025 COMPLETE CBC W/AUTO DIFF WBC: CPT

## 2023-11-02 PROCEDURE — 83540 ASSAY OF IRON: CPT

## 2023-11-02 PROCEDURE — 80053 COMPREHEN METABOLIC PANEL: CPT

## 2023-11-02 PROCEDURE — 36415 COLL VENOUS BLD VENIPUNCTURE: CPT

## 2023-11-02 PROCEDURE — 83550 IRON BINDING TEST: CPT

## 2023-11-02 PROCEDURE — 82728 ASSAY OF FERRITIN: CPT

## 2023-11-02 PROCEDURE — 82378 CARCINOEMBRYONIC ANTIGEN: CPT

## 2023-11-02 PROCEDURE — 86300 IMMUNOASSAY TUMOR CA 15-3: CPT

## 2023-11-04 LAB — CANCER AG15-3 SERPL-ACNC: 17 U/ML (ref 0–31)

## 2023-11-08 ENCOUNTER — TELEPHONE (OUTPATIENT)
Dept: INFECTIOUS DISEASES | Age: 78
End: 2023-11-08

## 2023-11-08 NOTE — TELEPHONE ENCOUNTER
Maria lEena from [address] called to cancel pt appt with Dr. Lorrie Segura today @ [phone] PM states the transportation never showed up. Says the  will call back to make a new appt at later date.

## 2023-11-10 NOTE — TELEPHONE ENCOUNTER
Tried to call [facility] twice to re-schedule patient. Writer was forwarded two times to the , no answer, not able to leave message.

## 2023-11-14 ENCOUNTER — OFFICE VISIT (OUTPATIENT)
Dept: GASTROENTEROLOGY | Age: 78
End: 2023-11-14
Payer: MEDICARE

## 2023-11-14 VITALS
BODY MASS INDEX: 22.4 KG/M2 | WEIGHT: 160 LBS | HEIGHT: 71 IN | TEMPERATURE: 97.3 F | SYSTOLIC BLOOD PRESSURE: 117 MMHG | HEART RATE: 93 BPM | DIASTOLIC BLOOD PRESSURE: 73 MMHG

## 2023-11-14 DIAGNOSIS — K86.3 PSEUDOCYST, PANCREAS: Primary | ICD-10-CM

## 2023-11-14 PROCEDURE — 3078F DIAST BP <80 MM HG: CPT | Performed by: INTERNAL MEDICINE

## 2023-11-14 PROCEDURE — 1123F ACP DISCUSS/DSCN MKR DOCD: CPT | Performed by: INTERNAL MEDICINE

## 2023-11-14 PROCEDURE — 3074F SYST BP LT 130 MM HG: CPT | Performed by: INTERNAL MEDICINE

## 2023-11-14 PROCEDURE — G8484 FLU IMMUNIZE NO ADMIN: HCPCS | Performed by: INTERNAL MEDICINE

## 2023-11-14 PROCEDURE — G8428 CUR MEDS NOT DOCUMENT: HCPCS | Performed by: INTERNAL MEDICINE

## 2023-11-14 PROCEDURE — 4004F PT TOBACCO SCREEN RCVD TLK: CPT | Performed by: INTERNAL MEDICINE

## 2023-11-14 PROCEDURE — G8420 CALC BMI NORM PARAMETERS: HCPCS | Performed by: INTERNAL MEDICINE

## 2023-11-14 PROCEDURE — 99213 OFFICE O/P EST LOW 20 MIN: CPT | Performed by: INTERNAL MEDICINE

## 2023-11-16 ENCOUNTER — HOSPITAL ENCOUNTER (OUTPATIENT)
Dept: MRI IMAGING | Age: 78
Discharge: HOME | End: 2023-11-18
Attending: INTERNAL MEDICINE
Payer: MEDICARE

## 2023-11-16 DIAGNOSIS — K86.89 PANCREATIC MASS: ICD-10-CM

## 2023-11-16 LAB
EGFR, POC: >60 ML/MIN/1.73M2
POC CREATININE: 0.7 MG/DL (ref 0.51–1.19)

## 2023-11-16 PROCEDURE — A9579 GAD-BASE MR CONTRAST NOS,1ML: HCPCS | Performed by: INTERNAL MEDICINE

## 2023-11-16 PROCEDURE — 2580000003 HC RX 258: Performed by: INTERNAL MEDICINE

## 2023-11-16 PROCEDURE — 82565 ASSAY OF CREATININE: CPT

## 2023-11-16 PROCEDURE — 6360000004 HC RX CONTRAST MEDICATION: Performed by: INTERNAL MEDICINE

## 2023-11-16 PROCEDURE — 74183 MRI ABD W/O CNTR FLWD CNTR: CPT

## 2023-11-16 RX ORDER — 0.9 % SODIUM CHLORIDE 0.9 %
50 INTRAVENOUS SOLUTION INTRAVENOUS ONCE
Status: COMPLETED | OUTPATIENT
Start: 2023-11-16 | End: 2023-11-16

## 2023-11-16 RX ORDER — SODIUM CHLORIDE 0.9 % (FLUSH) 0.9 %
10 SYRINGE (ML) INJECTION 2 TIMES DAILY
Status: DISCONTINUED | OUTPATIENT
Start: 2023-11-16 | End: 2023-11-19 | Stop reason: HOSPADM

## 2023-11-16 RX ADMIN — SODIUM CHLORIDE, PRESERVATIVE FREE 10 ML: 5 INJECTION INTRAVENOUS at 13:23

## 2023-11-16 RX ADMIN — GADOTERIDOL 15 ML: 279.3 INJECTION, SOLUTION INTRAVENOUS at 13:23

## 2023-11-16 RX ADMIN — SODIUM CHLORIDE 50 ML: 9 INJECTION, SOLUTION INTRAVENOUS at 13:23

## 2023-11-17 ENCOUNTER — OFFICE VISIT (OUTPATIENT)
Dept: INFECTIOUS DISEASES | Age: 78
End: 2023-11-17
Payer: MEDICARE

## 2023-11-17 VITALS
WEIGHT: 161 LBS | TEMPERATURE: 97.2 F | DIASTOLIC BLOOD PRESSURE: 73 MMHG | HEART RATE: 89 BPM | BODY MASS INDEX: 22.54 KG/M2 | SYSTOLIC BLOOD PRESSURE: 108 MMHG | HEIGHT: 71 IN

## 2023-11-17 DIAGNOSIS — Z16.12 UTI DUE TO EXTENDED-SPECTRUM BETA LACTAMASE (ESBL) PRODUCING ESCHERICHIA COLI: Primary | ICD-10-CM

## 2023-11-17 DIAGNOSIS — B96.29 UTI DUE TO EXTENDED-SPECTRUM BETA LACTAMASE (ESBL) PRODUCING ESCHERICHIA COLI: Primary | ICD-10-CM

## 2023-11-17 DIAGNOSIS — N39.0 UTI DUE TO EXTENDED-SPECTRUM BETA LACTAMASE (ESBL) PRODUCING ESCHERICHIA COLI: Primary | ICD-10-CM

## 2023-11-17 PROCEDURE — 1036F TOBACCO NON-USER: CPT | Performed by: INTERNAL MEDICINE

## 2023-11-17 PROCEDURE — G8420 CALC BMI NORM PARAMETERS: HCPCS | Performed by: INTERNAL MEDICINE

## 2023-11-17 PROCEDURE — 99214 OFFICE O/P EST MOD 30 MIN: CPT | Performed by: INTERNAL MEDICINE

## 2023-11-17 PROCEDURE — 3074F SYST BP LT 130 MM HG: CPT | Performed by: INTERNAL MEDICINE

## 2023-11-17 PROCEDURE — 3078F DIAST BP <80 MM HG: CPT | Performed by: INTERNAL MEDICINE

## 2023-11-17 PROCEDURE — G8484 FLU IMMUNIZE NO ADMIN: HCPCS | Performed by: INTERNAL MEDICINE

## 2023-11-17 PROCEDURE — 1123F ACP DISCUSS/DSCN MKR DOCD: CPT | Performed by: INTERNAL MEDICINE

## 2023-11-17 PROCEDURE — G8428 CUR MEDS NOT DOCUMENT: HCPCS | Performed by: INTERNAL MEDICINE

## 2023-11-17 ASSESSMENT — ENCOUNTER SYMPTOMS
COLOR CHANGE: 0
ABDOMINAL DISTENTION: 0
EYE DISCHARGE: 0
APNEA: 0

## 2023-11-17 NOTE — TELEPHONE ENCOUNTER
Drea David,    Per Dr. Simi Enriquez, he would like to discuss the results with you and your father during his office visit on 12/8/23. Please plan to call in to your father's phone at the time of the appointment.

## 2023-11-17 NOTE — PROGRESS NOTES
[facility] - Initial Consult Note  Today's Date: 11/17/2023    Impression :   Post stv admission 10/6/23  E coli ESBL septicemia  from E coli ESBL pyelonephritis - non obstructive  Altered mental status at the nursing home  Bandemia  SUNITHA -  Elevated CPK  Lactic acidosis, hyperammonemia  Large pancreatic tail pseudocyst with increased inflammation, possible mass  Present at least since August 9/14/2023, pancreatic mass biopsy negative for malignancy     Other:  COPD 2 L HO 2,   hypertension, essential tremors,  Colorectal cancer -2021 - post polypectomy - had chemo - no open surgery  Parkinson      Recommendations   Esbl sepsis from uti  Recommend prevention of future sepsis and recognize any UTI early - watch for urine smell and dysuria or frequency and test the urine  Suggest urology apointt to ck on the prostate that might have caused the recent UTI and sepsis -I ll place a referral to urology  See me PRN   Diagnosis Orders   1. UTI due to extended-spectrum beta lactamase (ESBL) producing Escherichia coli  An Arzola MD, Urology, [facility]          Return if symptoms worsen or fail to improve. History of Present Illness:   Eveline Vieira is a 70y.o.-year-old  male who presents with   Chief Complaint   Patient presents with    Frequent Infections     Post st V     Visit 11/17/23 post STV  Feels good AB done and pulled midline  No dysuria and no diarrhea - claims ok flow of urine - does not see a   No frequent urination - no smell in the urine  Weak in general - can walk few steps only  On 2 L NC    See me PRN        I have personally reviewed the past medical history, past surgical history, medications, social history, and family history, and I haveupdated the database accordingly.   Past Medical History:     Past Medical History:   Diagnosis Date    Acute respiratory failure with hypoxia ([address])     Back pain     Bipolar 1 disorder ([address])     Cancer ([address])

## 2023-11-22 ENCOUNTER — OFFICE VISIT (OUTPATIENT)
Dept: NEUROLOGY | Age: 78
End: 2023-11-22
Payer: MEDICARE

## 2023-11-22 VITALS
HEART RATE: 91 BPM | BODY MASS INDEX: 22.26 KG/M2 | WEIGHT: 159 LBS | DIASTOLIC BLOOD PRESSURE: 62 MMHG | SYSTOLIC BLOOD PRESSURE: 99 MMHG | HEIGHT: 71 IN

## 2023-11-22 DIAGNOSIS — G40.A09 NONINTRACTABLE ABSENCE EPILEPSY WITHOUT STATUS EPILEPTICUS (HCC): Primary | ICD-10-CM

## 2023-11-22 DIAGNOSIS — R25.9 MIXED ACTION AND RESTING TREMOR: ICD-10-CM

## 2023-11-22 PROCEDURE — G8484 FLU IMMUNIZE NO ADMIN: HCPCS | Performed by: PHYSICIAN ASSISTANT

## 2023-11-22 PROCEDURE — 3078F DIAST BP <80 MM HG: CPT | Performed by: PHYSICIAN ASSISTANT

## 2023-11-22 PROCEDURE — G8427 DOCREV CUR MEDS BY ELIG CLIN: HCPCS | Performed by: PHYSICIAN ASSISTANT

## 2023-11-22 PROCEDURE — G8420 CALC BMI NORM PARAMETERS: HCPCS | Performed by: PHYSICIAN ASSISTANT

## 2023-11-22 PROCEDURE — 3074F SYST BP LT 130 MM HG: CPT | Performed by: PHYSICIAN ASSISTANT

## 2023-11-22 PROCEDURE — 1036F TOBACCO NON-USER: CPT | Performed by: PHYSICIAN ASSISTANT

## 2023-11-22 PROCEDURE — 99204 OFFICE O/P NEW MOD 45 MIN: CPT | Performed by: PHYSICIAN ASSISTANT

## 2023-11-22 PROCEDURE — 1123F ACP DISCUSS/DSCN MKR DOCD: CPT | Performed by: PHYSICIAN ASSISTANT

## 2023-11-22 RX ORDER — ONDANSETRON 4 MG/1
4 TABLET, FILM COATED ORAL EVERY 6 HOURS PRN
COMMUNITY

## 2023-11-22 NOTE — PROGRESS NOTES
Food Insecurity: Not on file   Transportation Needs: Not on file   Physical Activity: Not on file   Stress: Not on file   Social Connections: Not on file   Intimate Partner Violence: Not on file   Housing Stability: Not on file        Current Outpatient Medications   Medication Sig Dispense Refill    ondansetron (ZOFRAN) 4 MG tablet Take 1 tablet by mouth every 6 hours as needed for Nausea or Vomiting      Multiple Vitamin (MULTIVITAMIN ADULT PO) Take by mouth      OXYGEN Inhale 2 L into the lungs continuous      bumetanide (BUMEX) 1 MG tablet Take 0.5 tablets by mouth daily 30 tablet 2    Vitamin D (CHOLECALCIFEROL) 50 MCG (2000 UT) TABS tablet Take 2 tablets by mouth daily 60 tablet 2    budesonide-formoterol (SYMBICORT) 160-4.5 MCG/ACT AERO Inhale 2 puffs into the lungs 2 times daily      ipratropium-albuterol (DUONEB) 0.5-2.5 (3) MG/3ML SOLN nebulizer solution Inhale 3 mLs into the lungs in the morning and 3 mLs in the evening. 360 mL     apixaban (ELIQUIS) 5 MG TABS tablet Take 1 tablet by mouth 2 times daily 60 tablet     aspirin 81 MG chewable tablet Take 1 tablet by mouth daily 30 tablet 3    QUEtiapine (SEROQUEL) 400 MG tablet Take 1 tablet by mouth at bedtime      lamoTRIgine (LAMICTAL) 100 MG tablet Take 1 tablet by mouth daily      divalproex (DEPAKOTE ER) 500 MG extended release tablet Take 2 tablets by mouth daily 60 tablet 0     No current facility-administered medications for this visit. No Known Allergies     REVIEW OF SYSTEMS:       All items selected indicate a positive finding. Those items not selected are negative.   Constitutional [] Weight loss/gain   [] Fatigue  [] Fever/Chills   HEENT [] Hearing Loss  [] Visual Disturbance  [] Tinnitus  [] Eye pain  [] Vertigo   Respiratory [] Shortness of Breath  [] Cough  [] Snoring   Cardiovascular [] Chest Pain  [] Palpitations  [] Lightheaded   GI [] Constipation  [] Diarrhea  [] Swallowing change  [] Nausea/vomiting    [] Urinary

## 2023-11-24 ENCOUNTER — HOSPITAL ENCOUNTER (OUTPATIENT)
Age: 78
Setting detail: SPECIMEN
Discharge: HOME | End: 2023-11-24

## 2023-11-24 LAB
AMMONIA PLAS-SCNC: 19 UMOL/L (ref 16–60)
CERULOPLASMIN SERPL-MCNC: 30 MG/DL (ref 15–30)
LAMOTRIGINE SERPL-MCNC: 4.7 UG/ML (ref 3–15)
VALPROATE SERPL-MCNC: 17 UG/ML (ref 50–125)

## 2023-11-24 PROCEDURE — P9603 ONE-WAY ALLOW PRORATED MILES: HCPCS

## 2023-11-24 PROCEDURE — 80175 DRUG SCREEN QUAN LAMOTRIGINE: CPT

## 2023-11-24 PROCEDURE — 36415 COLL VENOUS BLD VENIPUNCTURE: CPT

## 2023-11-24 PROCEDURE — 82525 ASSAY OF COPPER: CPT

## 2023-11-24 PROCEDURE — 82140 ASSAY OF AMMONIA: CPT

## 2023-11-24 PROCEDURE — 82390 ASSAY OF CERULOPLASMIN: CPT

## 2023-11-24 PROCEDURE — 80164 ASSAY DIPROPYLACETIC ACD TOT: CPT

## 2023-11-25 LAB — COPPER SERPL-MCNC: 133.6 UG/DL (ref 70–140)

## 2023-12-08 ENCOUNTER — OFFICE VISIT (OUTPATIENT)
Dept: ONCOLOGY | Age: 78
End: 2023-12-08

## 2023-12-08 ENCOUNTER — TELEPHONE (OUTPATIENT)
Dept: ONCOLOGY | Age: 78
End: 2023-12-08

## 2023-12-08 VITALS
DIASTOLIC BLOOD PRESSURE: 74 MMHG | TEMPERATURE: 97.4 F | BODY MASS INDEX: 22.45 KG/M2 | SYSTOLIC BLOOD PRESSURE: 112 MMHG | HEART RATE: 75 BPM | WEIGHT: 161 LBS

## 2023-12-08 DIAGNOSIS — K86.2 PANCREATIC CYST: ICD-10-CM

## 2023-12-08 DIAGNOSIS — D50.0 IRON DEFICIENCY ANEMIA DUE TO CHRONIC BLOOD LOSS: Primary | ICD-10-CM

## 2023-12-08 DIAGNOSIS — D62 ACUTE BLOOD LOSS ANEMIA: ICD-10-CM

## 2023-12-08 RX ORDER — FERROUS SULFATE 325(65) MG
325 TABLET ORAL
Qty: 90 TABLET | Refills: 1 | Status: SHIPPED | OUTPATIENT
Start: 2023-12-08

## 2023-12-08 NOTE — TELEPHONE ENCOUNTER
AVS 12/8/23    C in 3 months with labs        Labs ordered today  Ferritin  Please complete by 12/8/2023  Iron and TIBC  Please complete by 12/8/2023  Vitamin B12 & Folate  Please complete by 12/8/2023  CBC with Auto Differential  Please complete by or around 12/15/2023  Comprehensive Metabolic Panel  Please complete by or around 12/15/2023    Return in about 3 months  (around 3/8/2024). RV 3/25/24    Labs drawn week prior.     PT was given AVS and appt schedule    Electronically signed by Ирина Olsen on 12/8/2023 at 11:05 AM

## 2023-12-08 NOTE — PROGRESS NOTES
No free  intraperitoneal fluid. VESSELS:  The aorta is normal in caliber. The visceral branches are patent. The IVC is appropriately opacified. SOFT TISSUES/BONES:  No suspicious signal abnormality identified. *Unless otherwise specified, incidental findings do not require dedicated  imaging follow-up. Impression: 1.  12 cm complex fluid collection in the left upper quadrant most consistent  with pseudocyst, given the previously seen inflammatory change in this area  on the 08/29/2023 exam.    2.  Cholelithiasis. 3.  Benign-appearing septated right renal cyst for which no specific  follow-up is necessary. RECOMMENDATIONS:  Right Bosniak II benign renal cyst measuring 3.2 cm. No follow-up imaging is  recommended. [employer] 2021; [id number], Bosniak Classification of Cystic Renal Masses,  Version 2019. ASSESSMENT:       Diagnosis Orders   1. Iron deficiency anemia due to chronic blood loss  Iron and TIBC    Vitamin B12 & Folate    Ferritin    CBC with Auto Differential    Comprehensive Metabolic Panel      2. Acute blood loss anemia        3. Pancreatic cyst               pancreatic mass/pseudocyst  History of colorectal cancer>per records  Normocytic anemia  History of leg DVT and PE    PLAN:     I reviewed the labs/imaging available to me,outside records and discussed with the patient. I explained to the patient the nature of this problem.  I explained the significance of these abnormalities and possible etiology and management options   56-year-old man history of colorectal cancer found to have pancreatic tail mass size of 2.8 x 2.6 cm on EUS looks like inflammatory/benign;FNA negative for malignancy, follow-up scan and MRI did show progressing size up to 12 cm, I did review the imaging myself and with the radiology looks like there is a hemorrhagic component and no evidence of malignancy> however patient asymptomatic> follow-up with GI if evidence of progression possible aspiration and

## 2023-12-14 ENCOUNTER — HOSPITAL ENCOUNTER (OUTPATIENT)
Age: 78
Setting detail: SPECIMEN
Discharge: HOME | End: 2023-12-14

## 2023-12-14 LAB
ALBUMIN SERPL-MCNC: 2.9 G/DL (ref 3.5–5.2)
ALBUMIN/GLOB SERPL: 1 {RATIO} (ref 1–2.5)
ALP SERPL-CCNC: 57 U/L (ref 40–129)
ALT SERPL-CCNC: <5 U/L (ref 10–50)
ANION GAP SERPL CALCULATED.3IONS-SCNC: 7 MMOL/L (ref 9–16)
AST SERPL-CCNC: 16 U/L (ref 10–50)
BILIRUB SERPL-MCNC: <0.2 MG/DL (ref 0–1.2)
BUN SERPL-MCNC: 18 MG/DL (ref 8–23)
CALCIUM SERPL-MCNC: 8.6 MG/DL (ref 8.6–10.4)
CHLORIDE SERPL-SCNC: 104 MMOL/L (ref 98–107)
CO2 SERPL-SCNC: 30 MMOL/L (ref 20–31)
CREAT SERPL-MCNC: 1 MG/DL (ref 0.7–1.2)
ERYTHROCYTE [DISTWIDTH] IN BLOOD BY AUTOMATED COUNT: 17.7 % (ref 11.8–14.4)
GFR SERPL CREATININE-BSD FRML MDRD: >60 ML/MIN/1.73M2
GLUCOSE SERPL-MCNC: 69 MG/DL (ref 74–99)
HCT VFR BLD AUTO: 31.2 % (ref 40.7–50.3)
HGB BLD-MCNC: 9.3 G/DL (ref 13–17)
MCH RBC QN AUTO: 26.9 PG (ref 25.2–33.5)
MCHC RBC AUTO-ENTMCNC: 29.8 G/DL (ref 28.4–34.8)
MCV RBC AUTO: 90.2 FL (ref 82.6–102.9)
NRBC BLD-RTO: 0 PER 100 WBC
PLATELET # BLD AUTO: 333 K/UL (ref 138–453)
PMV BLD AUTO: 9 FL (ref 8.1–13.5)
POTASSIUM SERPL-SCNC: 4 MMOL/L (ref 3.7–5.3)
PROT SERPL-MCNC: 6.7 G/DL (ref 6.6–8.7)
RBC # BLD AUTO: 3.46 M/UL (ref 4.21–5.77)
SODIUM SERPL-SCNC: 141 MMOL/L (ref 136–145)
VALPROATE FREE SERPL-MCNC: 3.8 UG/ML (ref 7–23)
WBC OTHER # BLD: 6.3 K/UL (ref 3.5–11.3)

## 2023-12-14 PROCEDURE — 85027 COMPLETE CBC AUTOMATED: CPT

## 2023-12-14 PROCEDURE — 36415 COLL VENOUS BLD VENIPUNCTURE: CPT

## 2023-12-14 PROCEDURE — P9603 ONE-WAY ALLOW PRORATED MILES: HCPCS

## 2023-12-14 PROCEDURE — 80165 DIPROPYLACETIC ACID FREE: CPT

## 2023-12-14 PROCEDURE — 80053 COMPREHEN METABOLIC PANEL: CPT

## 2024-02-12 ENCOUNTER — OFFICE VISIT (OUTPATIENT)
Dept: NEUROLOGY | Age: 79
End: 2024-02-12
Payer: MEDICAID

## 2024-02-12 VITALS
BODY MASS INDEX: 23.16 KG/M2 | HEART RATE: 77 BPM | WEIGHT: 171 LBS | DIASTOLIC BLOOD PRESSURE: 67 MMHG | HEIGHT: 72 IN | SYSTOLIC BLOOD PRESSURE: 114 MMHG

## 2024-02-12 DIAGNOSIS — G40.A09 NONINTRACTABLE ABSENCE EPILEPSY WITHOUT STATUS EPILEPTICUS (HCC): Primary | ICD-10-CM

## 2024-02-12 DIAGNOSIS — R25.9 MIXED ACTION AND RESTING TREMOR: ICD-10-CM

## 2024-02-12 PROCEDURE — G8420 CALC BMI NORM PARAMETERS: HCPCS | Performed by: PHYSICIAN ASSISTANT

## 2024-02-12 PROCEDURE — 99214 OFFICE O/P EST MOD 30 MIN: CPT | Performed by: PHYSICIAN ASSISTANT

## 2024-02-12 PROCEDURE — 3078F DIAST BP <80 MM HG: CPT | Performed by: PHYSICIAN ASSISTANT

## 2024-02-12 PROCEDURE — 1123F ACP DISCUSS/DSCN MKR DOCD: CPT | Performed by: PHYSICIAN ASSISTANT

## 2024-02-12 PROCEDURE — G8427 DOCREV CUR MEDS BY ELIG CLIN: HCPCS | Performed by: PHYSICIAN ASSISTANT

## 2024-02-12 PROCEDURE — G8484 FLU IMMUNIZE NO ADMIN: HCPCS | Performed by: PHYSICIAN ASSISTANT

## 2024-02-12 PROCEDURE — 3074F SYST BP LT 130 MM HG: CPT | Performed by: PHYSICIAN ASSISTANT

## 2024-02-12 PROCEDURE — 4004F PT TOBACCO SCREEN RCVD TLK: CPT | Performed by: PHYSICIAN ASSISTANT

## 2024-02-12 RX ORDER — OMEPRAZOLE 20 MG/1
20 CAPSULE, DELAYED RELEASE ORAL DAILY
COMMUNITY
Start: 2024-01-24

## 2024-02-12 NOTE — PROGRESS NOTES
[id number] [facility] [address]  [facility] [address]  Dept: [phone]    PATIENT NAME: Jeovanny Feliciano  PATIENT MRN: [id number]  PRIMARY CARE PHYSICIAN: Dontae Forrest MD    HPI:      Jeovanny Feliciano is a 78 y.o. male who returns regarding history of seizures and newer problem of tremor. Medical history is significant for bipolar 1, COPD, HTN, CAD (Eliquis and ASA 81 mg). For seizure prevention he takes Depakote  mg BID and lamictal 100 mg daily. He presents with aid today.    At last appointment Sinemet  mg was prescribed for tremor. He is tolerating the medication without side effect and believes it has been helpful for tremor.    No concern for seizures since last appointment. No staring episodes convulsion, tongue-biting, incontinence.     He will be restarting physical therapy for gait this week.     Nov 2023 total valproic acid level 17 (low), lmt level 4.7, ceruloplasmin 30, ammonia 19, serum copper 133.    Prior:    He was recently hospitlized Oct 2023 for metabolic encephalopathy secondary to SUNITHA, urosepsis. During this hospitalization he exhibited symptoms of Parkinson's including rigidity, unilateral tremor, and gait disturbance.    He has noticed tremors worse in right hand, left is less for many years. If he is nervous, the tremors are worse. He is right hand dominant. He has not noticed that intensity is worsening over the years. The tremor occurs at rest, but is also noticeable when holding objects. Denies leg or head tremor. Denies micrographia- reports large, sloppy handwriting. Denies hypophonia. He has had difficulty standing, walking since the hospitalization and has been enrolled in PT with some improvement. Admits to some shuffling gait. Denies bradykinesia or freezing spells.    Denies memory loss. Denies visual or auditory hallucinations.      PREVIOUS WORKUP:     BRAIN MRI (10/7/23) -  Chronic microvascular disease without acute intracranial abnormality.     Remote lacunar

## 2024-02-19 ENCOUNTER — OFFICE VISIT (OUTPATIENT)
Dept: GASTROENTEROLOGY | Age: 79
End: 2024-02-19
Payer: MEDICAID

## 2024-02-19 VITALS
TEMPERATURE: 98.2 F | WEIGHT: 171 LBS | SYSTOLIC BLOOD PRESSURE: 128 MMHG | BODY MASS INDEX: 23.19 KG/M2 | DIASTOLIC BLOOD PRESSURE: 76 MMHG | HEART RATE: 111 BPM

## 2024-02-19 DIAGNOSIS — K86.1 PSEUDOCYST OF PANCREAS DUE TO CHRONIC PANCREATITIS (HCC): Primary | ICD-10-CM

## 2024-02-19 DIAGNOSIS — K86.3 PSEUDOCYST OF PANCREAS DUE TO CHRONIC PANCREATITIS (HCC): Primary | ICD-10-CM

## 2024-02-19 PROCEDURE — 4004F PT TOBACCO SCREEN RCVD TLK: CPT | Performed by: INTERNAL MEDICINE

## 2024-02-19 PROCEDURE — 3078F DIAST BP <80 MM HG: CPT | Performed by: INTERNAL MEDICINE

## 2024-02-19 PROCEDURE — G8427 DOCREV CUR MEDS BY ELIG CLIN: HCPCS | Performed by: INTERNAL MEDICINE

## 2024-02-19 PROCEDURE — 3074F SYST BP LT 130 MM HG: CPT | Performed by: INTERNAL MEDICINE

## 2024-02-19 PROCEDURE — G8484 FLU IMMUNIZE NO ADMIN: HCPCS | Performed by: INTERNAL MEDICINE

## 2024-02-19 PROCEDURE — 1123F ACP DISCUSS/DSCN MKR DOCD: CPT | Performed by: INTERNAL MEDICINE

## 2024-02-19 PROCEDURE — 99214 OFFICE O/P EST MOD 30 MIN: CPT | Performed by: INTERNAL MEDICINE

## 2024-02-19 PROCEDURE — G8420 CALC BMI NORM PARAMETERS: HCPCS | Performed by: INTERNAL MEDICINE

## 2024-02-19 NOTE — PROGRESS NOTES
GI CLINIC FOLLOW UP    INTERVAL HISTORY:   No referring provider defined for this encounter.    No chief complaint on file.          HISTORY OF PRESENT ILLNESS: Mr.Craig TAE Feliciano is a 78 y.o. male , who follows our clinic for chronic pancreatitis with pseudocyst.    He was see for evaluation of pancreatic pseudocyst.  He had EUS on 9/14/23 for evaluation of mass in the tail of pancreas which showed- heterogenous area near tail of pancreas, likely inflammatory lesion; size 2.8 cm x 2.6 cm- FNA showed- FRAGMENTS OF PANCREATIC PARENCHYMA SHOWING CHANGES OF CHRONIC PANCREATITIS.  He was later re-admitted on 10/06/23 with altered mental status secondary to SUNITHA with hyperammonemia from valproate and UTI. He had abdominal CT-scan during this admission which showed- pseudocyst measuring 6.3 x 11.1 x 5.2 cm compared to previous dimensions of 2 x 2.1 cm.    We repeated MRI in November 2023 which shows- 12 x 7 x 6.4 cm complex cystic collection in the left upper quadrant abutting the left hepatic lobe, stomach, splenic hilum and  pancreatic tail.    Patient is otherwise asymptomatic.    No c/o- nausea, vomiting, fever, loss of appetite, weight loss, bloating, bleeding IN, diarrhea, nocturnal diarrhea, tenesmus      Past Medical,Family, and Social History reviewed and does not contribute to the patient presentingcondition.    I did review all the labs results available for the labs which were ordered by the primary care physician, and the other consultants, we search on [employer] at [facility] and all the available care everywhere epic    I did review all the imaging studies of the abdomen available on EMR, ordered by the primary care physician and the other consultant    I did review all the pathology from the biopsies done on the previous endoscopies    Patient's PMH/PSH,SH,PSYCH Hx, MEDs, ALLERGIES, and ROS were all reviewed and updated in the appropriate sections.    PAST MEDICAL HISTORY:  Past Medical History:   Diagnosis

## 2024-03-05 ENCOUNTER — TELEPHONE (OUTPATIENT)
Dept: ONCOLOGY | Age: 79
End: 2024-03-05

## 2024-03-05 NOTE — TELEPHONE ENCOUNTER
Pt arrives via [facility] for appt , upon check in pt questions reason for appt , office states it is a 3m f/u with  with lab review . Pt deny's appt.   Pt states he has no need to see today and would like to leave .   Office states understanding and called [facility] for  , Lab orders for blood work is sent with pt .   Merly will be notified of above .   Electronically signed by Liset Liz MA on 3/5/2024 at 9:25 AM

## 2024-03-05 NOTE — TELEPHONE ENCOUNTER
Spoke with Merly and explained that patient arrived at office and did not want to see Dr. Cárdenas. He was told he's \"cancer free\" so there was no reason for him to be here. We explained that he was here for blood work that was not completed yet. We let her also know that the blood work was reprinted and put in folder to be completed and when those results came in if there is any concern MD would reach out.

## 2024-03-07 ENCOUNTER — HOSPITAL ENCOUNTER (OUTPATIENT)
Age: 79
Setting detail: SPECIMEN
Discharge: HOME | End: 2024-03-07

## 2024-03-07 LAB
ALBUMIN SERPL-MCNC: 3.7 G/DL (ref 3.5–5.2)
ALP SERPL-CCNC: 71 U/L (ref 40–129)
ALT SERPL-CCNC: 5 U/L (ref 10–50)
ANION GAP SERPL CALCULATED.3IONS-SCNC: 12 MMOL/L (ref 9–16)
AST SERPL-CCNC: 18 U/L (ref 10–50)
BASOPHILS # BLD: 0.05 K/UL (ref 0–0.2)
BASOPHILS NFR BLD: 1 % (ref 0–2)
BILIRUB SERPL-MCNC: 0.2 MG/DL (ref 0–1.2)
BUN SERPL-MCNC: 24 MG/DL (ref 8–23)
CALCIUM SERPL-MCNC: 9.3 MG/DL (ref 8.6–10.4)
CHLORIDE SERPL-SCNC: 103 MMOL/L (ref 98–107)
CO2 SERPL-SCNC: 26 MMOL/L (ref 20–31)
CREAT SERPL-MCNC: 1.3 MG/DL (ref 0.7–1.2)
EOSINOPHIL # BLD: 0.37 K/UL (ref 0–0.44)
EOSINOPHILS RELATIVE PERCENT: 6 % (ref 1–4)
FERRITIN SERPL-MCNC: 31 NG/ML (ref 30–400)
FOLATE SERPL-MCNC: 10.1 NG/ML (ref 4.8–24.2)
GFR SERPL CREATININE-BSD FRML MDRD: 58 ML/MIN/1.73M2
GLUCOSE SERPL-MCNC: 120 MG/DL (ref 74–99)
HCT VFR BLD AUTO: 33.3 % (ref 40.7–50.3)
HGB BLD-MCNC: 9.7 G/DL (ref 13–17)
IMM GRANULOCYTES # BLD AUTO: <0.03 K/UL (ref 0–0.3)
IMM GRANULOCYTES NFR BLD: 0 %
IRON SATN MFR SERPL: 4 % (ref 20–55)
IRON SERPL-MCNC: 18 UG/DL (ref 61–157)
LYMPHOCYTES NFR BLD: 1.94 K/UL (ref 1.1–3.7)
LYMPHOCYTES RELATIVE PERCENT: 30 % (ref 24–43)
MCH RBC QN AUTO: 24 PG (ref 25.2–33.5)
MCHC RBC AUTO-ENTMCNC: 29.1 G/DL (ref 28.4–34.8)
MCV RBC AUTO: 82.4 FL (ref 82.6–102.9)
MONOCYTES NFR BLD: 0.48 K/UL (ref 0.1–1.2)
MONOCYTES NFR BLD: 7 % (ref 3–12)
NEUTROPHILS NFR BLD: 56 % (ref 36–65)
NEUTS SEG NFR BLD: 3.64 K/UL (ref 1.5–8.1)
NRBC BLD-RTO: 0 PER 100 WBC
PLATELET # BLD AUTO: 426 K/UL (ref 138–453)
POTASSIUM SERPL-SCNC: 4.5 MMOL/L (ref 3.7–5.3)
PROT SERPL-MCNC: 8 G/DL (ref 6.6–8.7)
RBC # BLD AUTO: 4.04 M/UL (ref 4.21–5.77)
RBC # BLD: ABNORMAL 10*6/UL
SODIUM SERPL-SCNC: 141 MMOL/L (ref 136–145)
TIBC SERPL-MCNC: 427 UG/DL (ref 250–450)
UNSATURATED IRON BINDING CAPACITY: 409 UG/DL (ref 112–347)
VIT B12 SERPL-MCNC: 327 PG/ML (ref 232–1245)
WBC OTHER # BLD: 6.5 K/UL (ref 3.5–11.3)

## 2024-03-07 PROCEDURE — 82746 ASSAY OF FOLIC ACID SERUM: CPT

## 2024-03-07 PROCEDURE — 80053 COMPREHEN METABOLIC PANEL: CPT

## 2024-03-07 PROCEDURE — 82728 ASSAY OF FERRITIN: CPT

## 2024-03-07 PROCEDURE — P9603 ONE-WAY ALLOW PRORATED MILES: HCPCS

## 2024-03-07 PROCEDURE — 82607 VITAMIN B-12: CPT

## 2024-03-07 PROCEDURE — 83550 IRON BINDING TEST: CPT

## 2024-03-07 PROCEDURE — 83540 ASSAY OF IRON: CPT

## 2024-03-07 PROCEDURE — 36415 COLL VENOUS BLD VENIPUNCTURE: CPT

## 2024-03-07 PROCEDURE — 85025 COMPLETE CBC W/AUTO DIFF WBC: CPT

## 2024-05-13 ENCOUNTER — OFFICE VISIT (OUTPATIENT)
Dept: NEUROLOGY | Age: 79
End: 2024-05-13
Payer: MEDICAID

## 2024-05-13 VITALS
HEIGHT: 71 IN | SYSTOLIC BLOOD PRESSURE: 101 MMHG | BODY MASS INDEX: 24.36 KG/M2 | DIASTOLIC BLOOD PRESSURE: 63 MMHG | WEIGHT: 174 LBS | HEART RATE: 87 BPM

## 2024-05-13 DIAGNOSIS — G40.A09 NONINTRACTABLE ABSENCE EPILEPSY WITHOUT STATUS EPILEPTICUS (HCC): ICD-10-CM

## 2024-05-13 DIAGNOSIS — R25.9 MIXED ACTION AND RESTING TREMOR: Primary | ICD-10-CM

## 2024-05-13 PROCEDURE — G8420 CALC BMI NORM PARAMETERS: HCPCS | Performed by: PHYSICIAN ASSISTANT

## 2024-05-13 PROCEDURE — 3078F DIAST BP <80 MM HG: CPT | Performed by: PHYSICIAN ASSISTANT

## 2024-05-13 PROCEDURE — 3074F SYST BP LT 130 MM HG: CPT | Performed by: PHYSICIAN ASSISTANT

## 2024-05-13 PROCEDURE — G8427 DOCREV CUR MEDS BY ELIG CLIN: HCPCS | Performed by: PHYSICIAN ASSISTANT

## 2024-05-13 PROCEDURE — 99214 OFFICE O/P EST MOD 30 MIN: CPT | Performed by: PHYSICIAN ASSISTANT

## 2024-05-13 PROCEDURE — 4004F PT TOBACCO SCREEN RCVD TLK: CPT | Performed by: PHYSICIAN ASSISTANT

## 2024-05-13 PROCEDURE — 1123F ACP DISCUSS/DSCN MKR DOCD: CPT | Performed by: PHYSICIAN ASSISTANT

## 2024-05-13 RX ORDER — DIVALPROEX SODIUM 250 MG/1
250 TABLET, DELAYED RELEASE ORAL DAILY
COMMUNITY
Start: 2024-05-08

## 2024-05-13 RX ORDER — BUMETANIDE 0.5 MG/1
0.5 TABLET ORAL DAILY
COMMUNITY
Start: 2024-04-23

## 2024-05-13 NOTE — PROGRESS NOTES
status    Alert and oriented x 3; intact memory with no confusion, hypophonia, no hallucinations or delusions     Cranial nerves    II - visual fields intact to confrontation  III, IV, VI - extra-ocular muscles full: no pupillary defect; no DEONDRE, no nystagmus, no ptosis   V - normal facial sensation                                                               VII - normal facial symmetry                                                             VIII - intact hearing                                                                             IX, X - symmetrical palate                                                                  XI - symmetrical shoulder shrug                                                       XII - tongue midline without atrophy or fasciculation      Motor function  Normal muscle bulk and tone; strength 5/5 on all 4 extremities, no pronator drift  Mild right wrist coghweeling      Sensory function Intact to light touch      Cerebellar Bilat upper ext resting tremor which is worse on right; present with intention. No dysmetria. Festination with finger-thumb-tap.      Reflex function DTR 2+ on bilateral UE and LE, symmetric.       Gait                   wheelchair        ASSESSMENT / PLAN:   Jeovanny Feliciano is a 79 y.o. male that established care with neurology for tremor.     Physical examination is most consistent with Parkinson's due to resting tremor with cogwheeling; Sinemet was incidentally stopped on transitioning to a new living facility.  He did not notice when he was taking versus not taking this medication.  At this time at, well tremors are apparent, he does not find them disruptive and would like to continue to monitor to be used.  He has not had concern for seizures.  He presents with some questions today regarding medication side effect and we discussed that Depakote can be associated primarily with a postural tremor.  I do not think it accounts for all of his symptoms, but may

## 2024-06-04 ENCOUNTER — HOSPITAL ENCOUNTER (OUTPATIENT)
Age: 79
Setting detail: SPECIMEN
Discharge: HOME | End: 2024-06-04

## 2024-06-04 LAB
25(OH)D3 SERPL-MCNC: 34.6 NG/ML (ref 30–100)
ALBUMIN SERPL-MCNC: 4.1 G/DL (ref 3.5–5.2)
ALBUMIN/GLOB SERPL: 1 {RATIO} (ref 1–2.5)
ALP SERPL-CCNC: 69 U/L (ref 40–129)
ALT SERPL-CCNC: 5 U/L (ref 10–50)
ANION GAP SERPL CALCULATED.3IONS-SCNC: 9 MMOL/L (ref 9–16)
AST SERPL-CCNC: 17 U/L (ref 10–50)
BILIRUB SERPL-MCNC: <0.2 MG/DL (ref 0–1.2)
BUN SERPL-MCNC: 18 MG/DL (ref 8–23)
CALCIUM SERPL-MCNC: 9.2 MG/DL (ref 8.6–10.4)
CHLORIDE SERPL-SCNC: 104 MMOL/L (ref 98–107)
CO2 SERPL-SCNC: 27 MMOL/L (ref 20–31)
CREAT SERPL-MCNC: 1 MG/DL (ref 0.7–1.2)
ERYTHROCYTE [DISTWIDTH] IN BLOOD BY AUTOMATED COUNT: 19.2 % (ref 11.8–14.4)
GFR, ESTIMATED: 75 ML/MIN/1.73M2
GLUCOSE SERPL-MCNC: 104 MG/DL (ref 74–99)
HCT VFR BLD AUTO: 31.3 % (ref 40.7–50.3)
HGB BLD-MCNC: 8.9 G/DL (ref 13–17)
MCH RBC QN AUTO: 21.9 PG (ref 25.2–33.5)
MCHC RBC AUTO-ENTMCNC: 28.4 G/DL (ref 28.4–34.8)
MCV RBC AUTO: 77.1 FL (ref 82.6–102.9)
NRBC BLD-RTO: 0 PER 100 WBC
PLATELET # BLD AUTO: 397 K/UL (ref 138–453)
PMV BLD AUTO: 9.2 FL (ref 8.1–13.5)
POTASSIUM SERPL-SCNC: 4.3 MMOL/L (ref 3.7–5.3)
PROT SERPL-MCNC: 7.4 G/DL (ref 6.6–8.7)
RBC # BLD AUTO: 4.06 M/UL (ref 4.21–5.77)
SODIUM SERPL-SCNC: 140 MMOL/L (ref 136–145)
VALPROATE SERPL-MCNC: 15 UG/ML (ref 50–125)
WBC OTHER # BLD: 6.6 K/UL (ref 3.5–11.3)

## 2024-06-04 PROCEDURE — 80053 COMPREHEN METABOLIC PANEL: CPT

## 2024-06-04 PROCEDURE — P9603 ONE-WAY ALLOW PRORATED MILES: HCPCS

## 2024-06-04 PROCEDURE — 85027 COMPLETE CBC AUTOMATED: CPT

## 2024-06-04 PROCEDURE — 82306 VITAMIN D 25 HYDROXY: CPT

## 2024-06-04 PROCEDURE — 36415 COLL VENOUS BLD VENIPUNCTURE: CPT

## 2024-06-04 PROCEDURE — 80164 ASSAY DIPROPYLACETIC ACD TOT: CPT

## 2024-09-04 ENCOUNTER — TELEPHONE (OUTPATIENT)
Dept: GASTROENTEROLOGY | Age: 79
End: 2024-09-04

## 2024-09-04 NOTE — TELEPHONE ENCOUNTER
Writer called and couldn't leave a message due to the phone ringing then it rang busy.  I canceled her apptointment on Sept 30 but needs to reschedule with dr contreras

## 2024-11-21 ENCOUNTER — OFFICE VISIT (OUTPATIENT)
Dept: NEUROLOGY | Age: 79
End: 2024-11-21
Payer: MEDICAID

## 2024-11-21 VITALS
BODY MASS INDEX: 23.52 KG/M2 | SYSTOLIC BLOOD PRESSURE: 127 MMHG | HEIGHT: 71 IN | DIASTOLIC BLOOD PRESSURE: 75 MMHG | HEART RATE: 76 BPM | WEIGHT: 168 LBS

## 2024-11-21 DIAGNOSIS — G40.A09 NONINTRACTABLE ABSENCE EPILEPSY WITHOUT STATUS EPILEPTICUS (HCC): ICD-10-CM

## 2024-11-21 DIAGNOSIS — R25.9 MIXED ACTION AND RESTING TREMOR: Primary | ICD-10-CM

## 2024-11-21 PROCEDURE — 4004F PT TOBACCO SCREEN RCVD TLK: CPT | Performed by: PHYSICIAN ASSISTANT

## 2024-11-21 PROCEDURE — 3074F SYST BP LT 130 MM HG: CPT | Performed by: PHYSICIAN ASSISTANT

## 2024-11-21 PROCEDURE — 1123F ACP DISCUSS/DSCN MKR DOCD: CPT | Performed by: PHYSICIAN ASSISTANT

## 2024-11-21 PROCEDURE — 3078F DIAST BP <80 MM HG: CPT | Performed by: PHYSICIAN ASSISTANT

## 2024-11-21 PROCEDURE — G8427 DOCREV CUR MEDS BY ELIG CLIN: HCPCS | Performed by: PHYSICIAN ASSISTANT

## 2024-11-21 PROCEDURE — 99214 OFFICE O/P EST MOD 30 MIN: CPT | Performed by: PHYSICIAN ASSISTANT

## 2024-11-21 PROCEDURE — G8484 FLU IMMUNIZE NO ADMIN: HCPCS | Performed by: PHYSICIAN ASSISTANT

## 2024-11-21 PROCEDURE — G8420 CALC BMI NORM PARAMETERS: HCPCS | Performed by: PHYSICIAN ASSISTANT

## 2024-11-21 RX ORDER — FERROUS SULFATE 325(65) MG
325 TABLET ORAL
COMMUNITY

## 2024-11-21 RX ORDER — CALCIUM CARBONATE 500 MG/1
1 TABLET, CHEWABLE ORAL DAILY
COMMUNITY

## 2024-11-21 RX ORDER — CHOLECALCIFEROL (VITAMIN D3) 1250 MCG
CAPSULE ORAL
COMMUNITY

## 2024-11-21 RX ORDER — QUETIAPINE FUMARATE 200 MG/1
TABLET, FILM COATED ORAL
COMMUNITY
Start: 2024-11-14

## 2024-11-21 NOTE — PROGRESS NOTES
[address] [facility] [address]  [facility] [address]  Dept: [phone]    PATIENT NAME: Jeovanny Feliciano  PATIENT MRN: [id number]  PRIMARY CARE PHYSICIAN: Dontae Forrest MD    HPI:      Jeovanny Feliciano is a 79 y.o. male who returns regarding history of seizures and newer problem of tremor. Medical history is significant for bipolar 1, COPD, HTN, CAD (Eliquis and ASA 81 mg). For seizure prevention he takes Depakote  mg BID and lamictal 100 mg daily. He presents with aid today.    At last appointment he elected to remain off Sinemet . Generally he has been feeling similar to prior. No falls and he is ambulating with his rolator without issue. There are continued tremors- right hand worse than left. Fortunately the tremor is not limiting and he can play the piano without issue.    His father had essential tremor.    He is learning chess theory recently and generally denies overt memory issues. No visual or auditory hallucinations.  No convulsions, tongue-biting, incontinence, focal weakness, staring episodes.    Prior information:    He was recently hospitlized Oct 2023 for metabolic encephalopathy secondary to SUNITHA, urosepsis. During this hospitalization he exhibited symptoms of Parkinson's including rigidity, unilateral tremor, and gait disturbance.    Sinemet was ineffective for tremor.     He has noticed tremors worse in right hand, left is less for many years. If he is nervous, the tremors are worse. He is right hand dominant. He has not noticed that intensity is worsening over the years. The tremor occurs at rest, but is also noticeable when holding objects. Denies leg or head tremor. Denies micrographia- reports large, sloppy handwriting. Denies hypophonia. He has had difficulty standing, walking since the hospitalization and has been enrolled in PT with some improvement. Admits to some shuffling gait. Denies bradykinesia or freezing spells.      PREVIOUS WORKUP:     Nov 2023 total valproic acid

## 2025-01-17 ENCOUNTER — OFFICE VISIT (OUTPATIENT)
Dept: GASTROENTEROLOGY | Age: 80
End: 2025-01-17

## 2025-01-17 VITALS
DIASTOLIC BLOOD PRESSURE: 64 MMHG | SYSTOLIC BLOOD PRESSURE: 127 MMHG | BODY MASS INDEX: 23.38 KG/M2 | HEART RATE: 70 BPM | HEIGHT: 71 IN | WEIGHT: 167 LBS

## 2025-01-17 DIAGNOSIS — K86.3 PSEUDOCYST OF PANCREAS DUE TO CHRONIC PANCREATITIS (HCC): Primary | ICD-10-CM

## 2025-01-17 DIAGNOSIS — Z12.11 SCREEN FOR COLON CANCER: ICD-10-CM

## 2025-01-17 DIAGNOSIS — K86.1 PSEUDOCYST OF PANCREAS DUE TO CHRONIC PANCREATITIS (HCC): Primary | ICD-10-CM

## 2025-01-17 RX ORDER — POLYETHYLENE GLYCOL 3350 17 G/17G
POWDER, FOR SOLUTION ORAL
Qty: 255 G | Refills: 0 | Status: SHIPPED | OUTPATIENT
Start: 2025-01-17

## 2025-01-17 NOTE — PROGRESS NOTES
Component Value Date    WBC 6.6 06/04/2024    HGB 8.9 (L) 06/04/2024    HCT 31.3 (L) 06/04/2024    MCV 77.1 (L) 06/04/2024     06/04/2024     06/04/2024    K 4.3 06/04/2024     06/04/2024    CO2 27 06/04/2024    BUN 18 06/04/2024    CREATININE 1.0 06/04/2024    BILITOT <0.2 06/04/2024    ALKPHOS 69 06/04/2024    AST 17 06/04/2024    ALT 5 (L) 06/04/2024    INR 1.6 10/06/2023         Lab Results   Component Value Date    RBC 4.06 (L) 06/04/2024    HGB 8.9 (L) 06/04/2024    MCV 77.1 (L) 06/04/2024    MCH 21.9 (L) 06/04/2024    MCHC 28.4 06/04/2024    RDW 19.2 (H) 06/04/2024    MPV 9.2 06/04/2024    BASOPCT 1 03/07/2024    LYMPHSABS 1.94 03/07/2024    MONOSABS 0.48 03/07/2024    NEUTROABS 3.64 03/07/2024    EOSABS 0.37 03/07/2024    BASOSABS 0.05 03/07/2024         DIAGNOSTIC TESTING:     No results found.       PHYSICAL EXAMINATION: Vital signs reviewed per the nursing documentation.     Wt 75.8 kg (167 lb)   BMI 23.29 kg/m²   Body mass index is 23.29 kg/m².   Physical Exam  Constitutional:       Appearance: Normal appearance.   HENT:      Head: Normocephalic.      Mouth/Throat:      Mouth: Mucous membranes are moist.   Eyes:      General: No scleral icterus.  Cardiovascular:      Rate and Rhythm: Normal rate and regular rhythm.      Pulses: Normal pulses.      Heart sounds: Normal heart sounds. No murmur heard.     No gallop.   Pulmonary:      Effort: Pulmonary effort is normal. No respiratory distress.      Breath sounds: Normal breath sounds. No stridor. No wheezing, rhonchi or rales.   Abdominal:      General: Abdomen is flat. There is no distension.      Palpations: Abdomen is soft. There is no mass.      Tenderness: There is no abdominal tenderness.   Musculoskeletal:      Cervical back: Neck supple.   Neurological:      Mental Status: He is alert and oriented to person, place, and time.           IMPRESSION: Mr. Feliciano is a 79 y.o. male with small mass near tail of pancreas S/P FNA

## 2025-06-25 ENCOUNTER — TRANSCRIBE ORDERS (OUTPATIENT)
Dept: ADMINISTRATIVE | Age: 80
End: 2025-06-25

## 2025-06-25 DIAGNOSIS — R10.9 ABDOMINAL PAIN, VOMITING, AND DIARRHEA: Primary | ICD-10-CM

## 2025-06-25 DIAGNOSIS — R11.10 ABDOMINAL PAIN, VOMITING, AND DIARRHEA: Primary | ICD-10-CM

## 2025-06-25 DIAGNOSIS — R19.7 ABDOMINAL PAIN, VOMITING, AND DIARRHEA: Primary | ICD-10-CM

## 2025-07-02 ENCOUNTER — TELEPHONE (OUTPATIENT)
Age: 80
End: 2025-07-02

## 2025-07-02 NOTE — TELEPHONE ENCOUNTER
Called Merly to schedule new patient appointment and she states that she wasn't aware her father Jeovanny Feliciano wanted to change PCP. She states that she will look over our website and Dr's and will give us a call back when she's done.

## 2025-07-19 ENCOUNTER — HOSPITAL ENCOUNTER (EMERGENCY)
Age: 80
Discharge: HOME | End: 2025-07-19
Attending: EMERGENCY MEDICINE
Payer: MEDICARE

## 2025-07-19 VITALS
HEIGHT: 71 IN | WEIGHT: 176 LBS | SYSTOLIC BLOOD PRESSURE: 132 MMHG | OXYGEN SATURATION: 97 % | TEMPERATURE: 99.1 F | BODY MASS INDEX: 24.64 KG/M2 | DIASTOLIC BLOOD PRESSURE: 84 MMHG | HEART RATE: 73 BPM | RESPIRATION RATE: 14 BRPM

## 2025-07-19 DIAGNOSIS — F25.9 SCHIZOAFFECTIVE DISORDER, UNSPECIFIED TYPE (HCC): Primary | ICD-10-CM

## 2025-07-19 PROCEDURE — 99283 EMERGENCY DEPT VISIT LOW MDM: CPT

## 2025-07-19 RX ORDER — FLUTICASONE PROPIONATE AND SALMETEROL 250; 50 UG/1; UG/1
POWDER RESPIRATORY (INHALATION)
COMMUNITY
Start: 2025-06-26

## 2025-07-19 ASSESSMENT — PAIN - FUNCTIONAL ASSESSMENT: PAIN_FUNCTIONAL_ASSESSMENT: NONE - DENIES PAIN

## 2025-07-19 ASSESSMENT — LIFESTYLE VARIABLES
HOW OFTEN DO YOU HAVE A DRINK CONTAINING ALCOHOL: NEVER
HOW MANY STANDARD DRINKS CONTAINING ALCOHOL DO YOU HAVE ON A TYPICAL DAY: PATIENT DOES NOT DRINK

## 2025-07-19 NOTE — ED NOTES
I spoke with Tracy, the nurse at [facility], regarding the patient. According to staff, the patient was at his baseline today--walking around and denying any pain or distress. He independently decided to call 911 due to abdominal pain, but when EMS arrived, he changed his complaint to leg pain and requested to be taken to the ER. The nurse also mentioned that the patient has been more manic and irritable over the past week and has been consistently arguing with another resident.

## 2025-07-19 NOTE — ED NOTES
Upon assessment pt stated \"you know what Tosha, this is more like a psych related incident. I got scared at my facility, theres been a carlo whos been mean to me. I have history of bipolar or whatever that started in 1992.\" Pt stated he has a psychiatric appt on Monday. Although pt called 911 for right sided abdominal pain he is denying pain in the affected area and states that his \"liver is swollen\" and is scheduled to have a CT completed. Pt states he has PMHX of COPD, emphysema, current everyday smoker.

## 2025-07-19 NOTE — ED PROVIDER NOTES
[facility]  Emergency Medicine Department    Pt Name: Jeovanny Feliciano  MRN: [id number]  Birthdate 1945  Date of evaluation: 7/19/2025  Provider: Keny Sandoval MD    CHIEF COMPLAINT     Chief Complaint   Patient presents with    Abdominal Pain     Pt arrived by EMS, pt called c/o right sided abdominal pain for one month. Vitals 143/53, HR 72, 95% O2 RA, FSBS 111.        HISTORY OF PRESENT ILLNESS  (Location/Symptom, Timing/Onset, Context/Setting,Quality, Duration, Modifying Factors, Severity.)   Jeovanny Feliciano is a 80 y.o. male with a history of schizoaffective disorder who lives in a group home who presents to the emergency department with various inconsistent complaints including abdominal pain that has resolved.  He states the pain was in his head but was coming from his pancreas.  He states all pain has resolved completely.  He dissolves nausea or vomiting.  He is requesting discharge at this time.    Nursing Notes were reviewed.    ALLERGIES     Patient has no known allergies.    CURRENT MEDICATIONS       Previous Medications    APIXABAN (ELIQUIS) 5 MG TABS TABLET    Take 1 tablet by mouth 2 times daily    ASPIRIN 81 MG CHEWABLE TABLET    Take 1 tablet by mouth daily    BUDESONIDE-FORMOTEROL (SYMBICORT) 160-4.5 MCG/ACT AERO    Inhale 2 puffs into the lungs 2 times daily    BUMETANIDE (BUMEX) 0.5 MG TABLET    Take 1 tablet by mouth daily    CALCIUM CARBONATE (TUMS) 500 MG CHEWABLE TABLET    Take 1 tablet by mouth daily    CHOLECALCIFEROL (VITAMIN D3) 1.25 MG (84110 UT) CAPS    Take by mouth    DIVALPROEX (DEPAKOTE ER) 500 MG EXTENDED RELEASE TABLET    Take 2 tablets by mouth daily    DIVALPROEX (DEPAKOTE) 250 MG DR TABLET    Take 1 tablet by mouth daily    FERROUS SULFATE (IRON 325) 325 (65 FE) MG TABLET    Take 1 tablet by mouth daily (with breakfast)    FLUTICASONE-SALMETEROL (ADVAIR) 250-50 MCG/ACT AEPB DISKUS INHALER        IPRATROPIUM-ALBUTEROL (DUONEB) 0.5-2.5 (3) MG/3ML SOLN    [facility], Dontae HO MD  [address] [address]  [phone]    Schedule an appointment as soon as possible for a visit   For Follow up, As needed    DISCHARGE MEDICATIONS:     New Prescriptions    No medications on file     (Please note that portions of this note were completed with a voice recognition program.  Efforts were made to edit the dictations but occasionally words are mis-transcribed.)    Keny Sandoval MD  Attending Emergency Physician          Keny Sandoval MD  07/19/25 3889

## 2025-08-15 ENCOUNTER — HOSPITAL ENCOUNTER (OUTPATIENT)
Dept: CT IMAGING | Age: 80
Discharge: HOME | End: 2025-08-15
Payer: MEDICARE

## 2025-08-15 ENCOUNTER — HOSPITAL ENCOUNTER (OUTPATIENT)
Dept: CT IMAGING | Age: 80
End: 2025-08-15
Payer: MEDICARE

## 2025-08-15 ENCOUNTER — HOSPITAL ENCOUNTER (OUTPATIENT)
Dept: LAB | Age: 80
Discharge: HOME | End: 2025-08-15
Payer: MEDICARE

## 2025-08-15 DIAGNOSIS — R11.10 ABDOMINAL PAIN, VOMITING, AND DIARRHEA: ICD-10-CM

## 2025-08-15 DIAGNOSIS — R19.7 ABDOMINAL PAIN, VOMITING, AND DIARRHEA: ICD-10-CM

## 2025-08-15 DIAGNOSIS — R19.7 ABDOMINAL PAIN, VOMITING, AND DIARRHEA: Primary | ICD-10-CM

## 2025-08-15 DIAGNOSIS — R11.10 ABDOMINAL PAIN, VOMITING, AND DIARRHEA: Primary | ICD-10-CM

## 2025-08-15 DIAGNOSIS — R10.9 ABDOMINAL PAIN, VOMITING, AND DIARRHEA: Primary | ICD-10-CM

## 2025-08-15 DIAGNOSIS — R10.9 ABDOMINAL PAIN, VOMITING, AND DIARRHEA: ICD-10-CM

## 2025-08-15 LAB
CREAT SERPL-MCNC: 1 MG/DL (ref 0.7–1.2)
GFR, ESTIMATED: 77 ML/MIN/1.73M2

## 2025-08-15 PROCEDURE — 36415 COLL VENOUS BLD VENIPUNCTURE: CPT

## 2025-08-15 PROCEDURE — 82565 ASSAY OF CREATININE: CPT

## 2025-08-15 RX ORDER — IOPAMIDOL 755 MG/ML
75 INJECTION, SOLUTION INTRAVASCULAR
Status: ACTIVE | OUTPATIENT
Start: 2025-08-15

## 2025-08-22 ENCOUNTER — HOSPITAL ENCOUNTER (EMERGENCY)
Age: 80
Discharge: HOME | End: 2025-08-22
Attending: EMERGENCY MEDICINE
Payer: MEDICARE

## 2025-08-22 VITALS
DIASTOLIC BLOOD PRESSURE: 82 MMHG | BODY MASS INDEX: 24.55 KG/M2 | WEIGHT: 176 LBS | SYSTOLIC BLOOD PRESSURE: 157 MMHG | TEMPERATURE: 97.7 F | RESPIRATION RATE: 18 BRPM | OXYGEN SATURATION: 95 % | HEART RATE: 85 BPM

## 2025-08-22 DIAGNOSIS — S09.90XA INJURY OF HEAD, INITIAL ENCOUNTER: Primary | ICD-10-CM

## 2025-08-22 PROCEDURE — 99283 EMERGENCY DEPT VISIT LOW MDM: CPT

## 2025-08-22 PROCEDURE — 6370000000 HC RX 637 (ALT 250 FOR IP): Performed by: EMERGENCY MEDICINE

## 2025-08-22 RX ORDER — HYDROCODONE BITARTRATE AND ACETAMINOPHEN 5; 325 MG/1; MG/1
1 TABLET ORAL ONCE
Status: COMPLETED | OUTPATIENT
Start: 2025-08-22 | End: 2025-08-22

## 2025-08-22 RX ORDER — QUETIAPINE FUMARATE 200 MG/1
200 TABLET, FILM COATED ORAL ONCE
Status: COMPLETED | OUTPATIENT
Start: 2025-08-22 | End: 2025-08-22

## 2025-08-22 RX ADMIN — QUETIAPINE FUMARATE 200 MG: 200 TABLET ORAL at 22:29

## 2025-08-22 RX ADMIN — HYDROCODONE BITARTRATE AND ACETAMINOPHEN 1 TABLET: 5; 325 TABLET ORAL at 22:29

## 2025-08-22 ASSESSMENT — PAIN SCALES - GENERAL
PAINLEVEL_OUTOF10: 0
PAINLEVEL_OUTOF10: 1

## 2025-08-22 ASSESSMENT — PAIN - FUNCTIONAL ASSESSMENT
PAIN_FUNCTIONAL_ASSESSMENT: 0-10
PAIN_FUNCTIONAL_ASSESSMENT: 0-10

## 2025-08-31 ENCOUNTER — HOSPITAL ENCOUNTER (EMERGENCY)
Age: 80
Discharge: HOME | End: 2025-08-31
Attending: EMERGENCY MEDICINE
Payer: MEDICARE

## 2025-08-31 ENCOUNTER — APPOINTMENT (OUTPATIENT)
Dept: GENERAL RADIOLOGY | Age: 80
End: 2025-08-31
Payer: MEDICARE

## 2025-08-31 ENCOUNTER — APPOINTMENT (OUTPATIENT)
Dept: CT IMAGING | Age: 80
End: 2025-08-31
Payer: MEDICARE

## 2025-08-31 VITALS
RESPIRATION RATE: 16 BRPM | OXYGEN SATURATION: 93 % | TEMPERATURE: 99.1 F | SYSTOLIC BLOOD PRESSURE: 103 MMHG | HEIGHT: 70 IN | DIASTOLIC BLOOD PRESSURE: 82 MMHG | HEART RATE: 81 BPM | WEIGHT: 176 LBS | BODY MASS INDEX: 25.2 KG/M2

## 2025-08-31 DIAGNOSIS — S40.022A ARM CONTUSION, LEFT, INITIAL ENCOUNTER: Primary | ICD-10-CM

## 2025-08-31 PROCEDURE — 99284 EMERGENCY DEPT VISIT MOD MDM: CPT

## 2025-08-31 PROCEDURE — 2500000003 HC RX 250 WO HCPCS: Performed by: EMERGENCY MEDICINE

## 2025-08-31 PROCEDURE — 73080 X-RAY EXAM OF ELBOW: CPT

## 2025-08-31 PROCEDURE — 96372 THER/PROPH/DIAG INJ SC/IM: CPT

## 2025-08-31 PROCEDURE — 72131 CT LUMBAR SPINE W/O DYE: CPT

## 2025-08-31 PROCEDURE — 73090 X-RAY EXAM OF FOREARM: CPT

## 2025-08-31 RX ORDER — MORPHINE SULFATE 2 MG/ML
2 INJECTION, SOLUTION INTRAMUSCULAR; INTRAVENOUS ONCE
Status: COMPLETED | OUTPATIENT
Start: 2025-08-31 | End: 2025-08-31

## 2025-08-31 RX ADMIN — MORPHINE SULFATE 2 MG: 2 INJECTION, SOLUTION INTRAMUSCULAR; INTRAVENOUS at 09:20

## 2025-08-31 ASSESSMENT — PAIN - FUNCTIONAL ASSESSMENT: PAIN_FUNCTIONAL_ASSESSMENT: 0-10

## 2025-08-31 ASSESSMENT — PAIN SCALES - GENERAL: PAINLEVEL_OUTOF10: 9

## 2025-08-31 ASSESSMENT — PAIN DESCRIPTION - ORIENTATION: ORIENTATION: LEFT

## 2025-08-31 ASSESSMENT — PAIN DESCRIPTION - PAIN TYPE: TYPE: ACUTE PAIN

## 2025-08-31 ASSESSMENT — PAIN DESCRIPTION - LOCATION: LOCATION: COCCYX;ARM

## 2025-09-03 ENCOUNTER — APPOINTMENT (OUTPATIENT)
Dept: VASCULAR LAB | Age: 80
End: 2025-09-03
Payer: MEDICAID

## 2025-09-03 ENCOUNTER — APPOINTMENT (OUTPATIENT)
Dept: CT IMAGING | Age: 80
End: 2025-09-03
Payer: MEDICAID

## 2025-09-03 ENCOUNTER — HOSPITAL ENCOUNTER (EMERGENCY)
Age: 80
Discharge: ANOTHER HOSPITAL | End: 2025-09-03
Attending: STUDENT IN AN ORGANIZED HEALTH CARE EDUCATION/TRAINING PROGRAM
Payer: MEDICAID

## 2025-09-03 ENCOUNTER — APPOINTMENT (OUTPATIENT)
Dept: GENERAL RADIOLOGY | Age: 80
End: 2025-09-03
Payer: MEDICAID

## 2025-09-03 VITALS
OXYGEN SATURATION: 91 % | HEART RATE: 78 BPM | WEIGHT: 176 LBS | BODY MASS INDEX: 25.2 KG/M2 | DIASTOLIC BLOOD PRESSURE: 62 MMHG | HEIGHT: 70 IN | TEMPERATURE: 98.6 F | RESPIRATION RATE: 14 BRPM | SYSTOLIC BLOOD PRESSURE: 122 MMHG

## 2025-09-03 DIAGNOSIS — J18.9 PNEUMONIA DUE TO INFECTIOUS ORGANISM, UNSPECIFIED LATERALITY, UNSPECIFIED PART OF LUNG: ICD-10-CM

## 2025-09-03 DIAGNOSIS — L03.114 CELLULITIS OF LEFT UPPER EXTREMITY: Primary | ICD-10-CM

## 2025-09-03 LAB
ANION GAP SERPL CALCULATED.3IONS-SCNC: 13 MMOL/L (ref 9–16)
BASOPHILS # BLD: 0.03 K/UL (ref 0–0.2)
BASOPHILS NFR BLD: 0 % (ref 0–2)
BNP SERPL-MCNC: 673 PG/ML (ref 0–450)
BUN SERPL-MCNC: 44 MG/DL (ref 8–23)
CALCIUM SERPL-MCNC: 8.6 MG/DL (ref 8.8–10.2)
CHLORIDE SERPL-SCNC: 100 MMOL/L (ref 98–107)
CO2 SERPL-SCNC: 22 MMOL/L (ref 20–31)
CREAT SERPL-MCNC: 1 MG/DL (ref 0.7–1.2)
CRP SERPL HS-MCNC: 211 MG/L (ref 0–5)
ECHO BSA: 1.99 M2
EOSINOPHIL # BLD: 0.05 K/UL (ref 0–0.44)
EOSINOPHILS RELATIVE PERCENT: 0 % (ref 1–4)
ERYTHROCYTE [DISTWIDTH] IN BLOOD BY AUTOMATED COUNT: 14 % (ref 11.8–14.4)
ERYTHROCYTE [SEDIMENTATION RATE] IN BLOOD BY PHOTOMETRIC METHOD: 123 MM/HR (ref 0–20)
GFR, ESTIMATED: 73 ML/MIN/1.73M2
GLUCOSE SERPL-MCNC: 129 MG/DL (ref 82–115)
HCT VFR BLD AUTO: 34.1 % (ref 40.7–50.3)
HGB BLD-MCNC: 11.6 G/DL (ref 13–17)
IMM GRANULOCYTES # BLD AUTO: 0.05 K/UL (ref 0–0.3)
IMM GRANULOCYTES NFR BLD: 0 %
LACTATE BLDV-SCNC: 0.9 MMOL/L (ref 0.5–1.9)
LACTATE BLDV-SCNC: 0.9 MMOL/L (ref 0.5–1.9)
LYMPHOCYTES NFR BLD: 0.82 K/UL (ref 1.1–3.7)
LYMPHOCYTES RELATIVE PERCENT: 7 % (ref 24–43)
MCH RBC QN AUTO: 31.6 PG (ref 25.2–33.5)
MCHC RBC AUTO-ENTMCNC: 34 G/DL (ref 28.4–34.8)
MCV RBC AUTO: 92.9 FL (ref 82.6–102.9)
MONOCYTES NFR BLD: 0.69 K/UL (ref 0.1–1.2)
MONOCYTES NFR BLD: 6 % (ref 3–12)
NEUTROPHILS NFR BLD: 87 % (ref 36–65)
NEUTS SEG NFR BLD: 10.96 K/UL (ref 1.5–8.1)
NRBC BLD-RTO: 0 PER 100 WBC
PLATELET # BLD AUTO: 263 K/UL (ref 138–453)
PMV BLD AUTO: 9.1 FL (ref 8.1–13.5)
POTASSIUM SERPL-SCNC: 4.5 MMOL/L (ref 3.7–5.3)
RBC # BLD AUTO: 3.67 M/UL (ref 4.21–5.77)
SODIUM SERPL-SCNC: 136 MMOL/L (ref 136–145)
WBC OTHER # BLD: 12.6 K/UL (ref 3.5–11.3)

## 2025-09-03 PROCEDURE — 80048 BASIC METABOLIC PNL TOTAL CA: CPT

## 2025-09-03 PROCEDURE — 96365 THER/PROPH/DIAG IV INF INIT: CPT

## 2025-09-03 PROCEDURE — 93971 EXTREMITY STUDY: CPT | Performed by: SURGERY

## 2025-09-03 PROCEDURE — 2580000003 HC RX 258: Performed by: NURSE PRACTITIONER

## 2025-09-03 PROCEDURE — 87205 SMEAR GRAM STAIN: CPT

## 2025-09-03 PROCEDURE — 96367 TX/PROPH/DG ADDL SEQ IV INF: CPT

## 2025-09-03 PROCEDURE — 96366 THER/PROPH/DIAG IV INF ADDON: CPT

## 2025-09-03 PROCEDURE — 6360000002 HC RX W HCPCS: Performed by: NURSE PRACTITIONER

## 2025-09-03 PROCEDURE — 73200 CT UPPER EXTREMITY W/O DYE: CPT

## 2025-09-03 PROCEDURE — 83880 ASSAY OF NATRIURETIC PEPTIDE: CPT

## 2025-09-03 PROCEDURE — 85025 COMPLETE CBC W/AUTO DIFF WBC: CPT

## 2025-09-03 PROCEDURE — 85652 RBC SED RATE AUTOMATED: CPT

## 2025-09-03 PROCEDURE — 83605 ASSAY OF LACTIC ACID: CPT

## 2025-09-03 PROCEDURE — 87186 SC STD MICRODIL/AGAR DIL: CPT

## 2025-09-03 PROCEDURE — 93005 ELECTROCARDIOGRAM TRACING: CPT | Performed by: NURSE PRACTITIONER

## 2025-09-03 PROCEDURE — 99285 EMERGENCY DEPT VISIT HI MDM: CPT

## 2025-09-03 PROCEDURE — 87040 BLOOD CULTURE FOR BACTERIA: CPT

## 2025-09-03 PROCEDURE — 71045 X-RAY EXAM CHEST 1 VIEW: CPT

## 2025-09-03 PROCEDURE — 86140 C-REACTIVE PROTEIN: CPT

## 2025-09-03 PROCEDURE — 87070 CULTURE OTHR SPECIMN AEROBIC: CPT

## 2025-09-03 PROCEDURE — 93971 EXTREMITY STUDY: CPT

## 2025-09-03 PROCEDURE — 87075 CULTR BACTERIA EXCEPT BLOOD: CPT

## 2025-09-03 RX ADMIN — VANCOMYCIN HYDROCHLORIDE 2000 MG: 1 INJECTION, POWDER, LYOPHILIZED, FOR SOLUTION INTRAVENOUS at 11:05

## 2025-09-03 RX ADMIN — CEFEPIME 2000 MG: 2 INJECTION, POWDER, FOR SOLUTION INTRAVENOUS at 10:31

## 2025-09-03 ASSESSMENT — ENCOUNTER SYMPTOMS
VOMITING: 0
NAUSEA: 0
COLOR CHANGE: 1
COUGH: 1
ABDOMINAL PAIN: 0
SHORTNESS OF BREATH: 1

## 2025-09-03 ASSESSMENT — PAIN - FUNCTIONAL ASSESSMENT: PAIN_FUNCTIONAL_ASSESSMENT: 0-10

## 2025-09-03 ASSESSMENT — PAIN SCALES - GENERAL: PAINLEVEL_OUTOF10: 8

## 2025-09-04 LAB
EKG ATRIAL RATE: 76 BPM
EKG P AXIS: 66 DEGREES
EKG P-R INTERVAL: 126 MS
EKG Q-T INTERVAL: 428 MS
EKG QRS DURATION: 136 MS
EKG QTC CALCULATION (BAZETT): 481 MS
EKG R AXIS: -77 DEGREES
EKG T AXIS: 64 DEGREES
EKG VENTRICULAR RATE: 76 BPM

## 2025-09-05 LAB
MICROORGANISM SPEC CULT: ABNORMAL
MICROORGANISM SPEC CULT: ABNORMAL
MICROORGANISM/AGENT SPEC: ABNORMAL
MICROORGANISM/AGENT SPEC: ABNORMAL
SPECIMEN DESCRIPTION: ABNORMAL

## 2025-09-07 LAB
MICROORGANISM SPEC CULT: NORMAL
MICROORGANISM SPEC CULT: NORMAL
SERVICE CMNT-IMP: NORMAL
SERVICE CMNT-IMP: NORMAL
SPECIMEN DESCRIPTION: NORMAL
SPECIMEN DESCRIPTION: NORMAL

## (undated) DEVICE — GLOVE SURG SZ 65 STD WHT LTX SYN POLYMER BEAD REINF ANTI RL

## (undated) DEVICE — ENDO KIT W/SYRINGE: Brand: MEDLINE INDUSTRIES, INC.

## (undated) DEVICE — DEFENDO AIR WATER SUCTION AND BIOPSY VALVE KIT FOR  OLYMPUS: Brand: DEFENDO AIR/WATER/SUCTION AND BIOPSY VALVE

## (undated) DEVICE — Z DISCONTINUED BY MEDLINE USE 2711682 TRAY SKIN PREP DRY W/ PREM GLV

## (undated) DEVICE — COVER,MAYO STAND,STERILE: Brand: MEDLINE

## (undated) DEVICE — PAD,ABDOMINAL,8"X7.5",ST,LF,20/BX: Brand: MEDLINE INDUSTRIES, INC.

## (undated) DEVICE — Device: Brand: LEVEL 1

## (undated) DEVICE — 1200CC GUARDIAN II: Brand: GUARDIAN

## (undated) DEVICE — 3M™ WARMING BLANKET, UPPER BODY, 10 PER CASE, 42268: Brand: BAIR HUGGER™

## (undated) DEVICE — ENDOSCOPIC ULTRASOUND FINE NEEDLE BIOPSY (FNB) DEVICE: Brand: ACQUIRE

## (undated) DEVICE — Device: Brand: DEFENDO VALVE AND CONNECTOR KIT

## (undated) DEVICE — ST CHARLES PERI-GYN PACK: Brand: MEDLINE INDUSTRIES, INC.

## (undated) DEVICE — GAUZE,SPONGE,FLUFF,6"X6.75",STRL,5/TRAY: Brand: MEDLINE

## (undated) DEVICE — UNDERPANTS MAT L XL SEAMLESS CLR CODE WAISTBAND KNIT

## (undated) DEVICE — SNARE ENDOSCP L240CM LOOP W13MM DIA2.4MM SHT THROW SM OVL

## (undated) DEVICE — 1810 FOAM BLOCK NEEDLE COUNTER: Brand: DEVON

## (undated) DEVICE — YANKAUER,BULB TIP,W/O VENT,RIGID,STERILE: Brand: MEDLINE

## (undated) DEVICE — SHEARS ENDOSCP L9CM CRV HARM FOCS +

## (undated) DEVICE — BITEBLOCK 54FR W/ DENT RIM BLOX

## (undated) DEVICE — GOWN,AURORA,NONREINFORCED,LARGE: Brand: MEDLINE

## (undated) DEVICE — TUBING, SUCTION, 3/16" X 10', STRAIGHT: Brand: MEDLINE